# Patient Record
Sex: MALE | Race: WHITE | NOT HISPANIC OR LATINO | Employment: OTHER | URBAN - NONMETROPOLITAN AREA
[De-identification: names, ages, dates, MRNs, and addresses within clinical notes are randomized per-mention and may not be internally consistent; named-entity substitution may affect disease eponyms.]

---

## 2017-01-12 ENCOUNTER — LAB (OUTPATIENT)
Dept: FAMILY MEDICINE CLINIC | Facility: CLINIC | Age: 72
End: 2017-01-12

## 2017-01-12 DIAGNOSIS — Z12.5 SCREENING FOR PROSTATE CANCER: ICD-10-CM

## 2017-01-12 DIAGNOSIS — E55.9 VITAMIN D DEFICIENCY: ICD-10-CM

## 2017-01-12 DIAGNOSIS — I10 ESSENTIAL HYPERTENSION: ICD-10-CM

## 2017-01-12 DIAGNOSIS — D69.3 IDIOPATHIC THROMBOCYTOPENIC PURPURA (HCC): ICD-10-CM

## 2017-01-12 LAB
25(OH)D3 SERPL-MCNC: 31.6 NG/ML
ALBUMIN SERPL-MCNC: 4.5 G/DL (ref 3.2–4.8)
ALBUMIN/GLOB SERPL: 1.8 G/DL (ref 1.5–2.5)
ALP SERPL-CCNC: 55 U/L (ref 25–100)
ALT SERPL W P-5'-P-CCNC: 27 U/L (ref 7–40)
ANION GAP SERPL CALCULATED.3IONS-SCNC: 8 MMOL/L (ref 3–11)
ARTICHOKE IGE QN: 74 MG/DL (ref 0–130)
AST SERPL-CCNC: 23 U/L (ref 0–33)
BASOPHILS # BLD AUTO: 0 10*3/MM3 (ref 0–0.2)
BASOPHILS NFR BLD AUTO: 0 % (ref 0–1)
BILIRUB SERPL-MCNC: 1.4 MG/DL (ref 0.3–1.2)
BUN BLD-MCNC: 12 MG/DL (ref 9–23)
BUN/CREAT SERPL: 17.1 (ref 7–25)
CALCIUM SPEC-SCNC: 9.7 MG/DL (ref 8.7–10.4)
CHLORIDE SERPL-SCNC: 101 MMOL/L (ref 99–109)
CHOLEST SERPL-MCNC: 166 MG/DL (ref 0–200)
CO2 SERPL-SCNC: 30 MMOL/L (ref 20–31)
CREAT BLD-MCNC: 0.7 MG/DL (ref 0.6–1.3)
DEPRECATED RDW RBC AUTO: 46.7 FL (ref 37–54)
EOSINOPHIL # BLD AUTO: 0.06 10*3/MM3 (ref 0.1–0.3)
EOSINOPHIL NFR BLD AUTO: 1.3 % (ref 0–3)
ERYTHROCYTE [DISTWIDTH] IN BLOOD BY AUTOMATED COUNT: 13.1 % (ref 11.3–14.5)
GFR SERPL CREATININE-BSD FRML MDRD: 111 ML/MIN/1.73
GLOBULIN UR ELPH-MCNC: 2.5 GM/DL
GLUCOSE BLD-MCNC: 102 MG/DL (ref 70–100)
HCT VFR BLD AUTO: 43.9 % (ref 38.9–50.9)
HDLC SERPL-MCNC: 67 MG/DL (ref 40–60)
HGB BLD-MCNC: 15.4 G/DL (ref 13.1–17.5)
IMM GRANULOCYTES # BLD: 0.01 10*3/MM3 (ref 0–0.03)
IMM GRANULOCYTES NFR BLD: 0.2 % (ref 0–0.6)
LYMPHOCYTES # BLD AUTO: 1.45 10*3/MM3 (ref 0.6–4.8)
LYMPHOCYTES NFR BLD AUTO: 31.6 % (ref 24–44)
MCH RBC QN AUTO: 34.3 PG (ref 27–31)
MCHC RBC AUTO-ENTMCNC: 35.1 G/DL (ref 32–36)
MCV RBC AUTO: 97.8 FL (ref 80–99)
MONOCYTES # BLD AUTO: 0.58 10*3/MM3 (ref 0–1)
MONOCYTES NFR BLD AUTO: 12.6 % (ref 0–12)
NEUTROPHILS # BLD AUTO: 2.49 10*3/MM3 (ref 1.5–8.3)
NEUTROPHILS NFR BLD AUTO: 54.3 % (ref 41–71)
PLATELET # BLD AUTO: 108 10*3/MM3 (ref 150–450)
PMV BLD AUTO: 9.6 FL (ref 6–12)
POTASSIUM BLD-SCNC: 4.6 MMOL/L (ref 3.5–5.5)
PROT SERPL-MCNC: 7 G/DL (ref 5.7–8.2)
PSA SERPL-MCNC: 1 NG/ML (ref 0–4)
RBC # BLD AUTO: 4.49 10*6/MM3 (ref 4.2–5.76)
SODIUM BLD-SCNC: 139 MMOL/L (ref 132–146)
TRIGL SERPL-MCNC: 87 MG/DL (ref 0–150)
TSH SERPL DL<=0.05 MIU/L-ACNC: 1.07 MIU/ML (ref 0.35–5.35)
VIT B12 BLD-MCNC: 619 PG/ML (ref 211–911)
WBC NRBC COR # BLD: 4.59 10*3/MM3 (ref 3.5–10.8)

## 2017-01-12 PROCEDURE — 84443 ASSAY THYROID STIM HORMONE: CPT | Performed by: INTERNAL MEDICINE

## 2017-01-12 PROCEDURE — 82607 VITAMIN B-12: CPT | Performed by: INTERNAL MEDICINE

## 2017-01-12 PROCEDURE — 82306 VITAMIN D 25 HYDROXY: CPT | Performed by: INTERNAL MEDICINE

## 2017-01-12 PROCEDURE — 80053 COMPREHEN METABOLIC PANEL: CPT | Performed by: INTERNAL MEDICINE

## 2017-01-12 PROCEDURE — G0103 PSA SCREENING: HCPCS | Performed by: INTERNAL MEDICINE

## 2017-01-12 PROCEDURE — 80061 LIPID PANEL: CPT | Performed by: INTERNAL MEDICINE

## 2017-01-12 PROCEDURE — 85025 COMPLETE CBC W/AUTO DIFF WBC: CPT | Performed by: INTERNAL MEDICINE

## 2017-01-16 ENCOUNTER — OFFICE VISIT (OUTPATIENT)
Dept: FAMILY MEDICINE CLINIC | Facility: CLINIC | Age: 72
End: 2017-01-16

## 2017-01-16 VITALS
HEIGHT: 70 IN | TEMPERATURE: 98.4 F | DIASTOLIC BLOOD PRESSURE: 62 MMHG | OXYGEN SATURATION: 99 % | BODY MASS INDEX: 30.55 KG/M2 | HEART RATE: 62 BPM | WEIGHT: 213.4 LBS | RESPIRATION RATE: 16 BRPM | SYSTOLIC BLOOD PRESSURE: 135 MMHG

## 2017-01-16 DIAGNOSIS — E78.5 HYPERLIPIDEMIA, UNSPECIFIED HYPERLIPIDEMIA TYPE: ICD-10-CM

## 2017-01-16 DIAGNOSIS — E03.9 ACQUIRED HYPOTHYROIDISM: Primary | ICD-10-CM

## 2017-01-16 DIAGNOSIS — I10 ESSENTIAL HYPERTENSION: ICD-10-CM

## 2017-01-16 PROCEDURE — 99214 OFFICE O/P EST MOD 30 MIN: CPT | Performed by: INTERNAL MEDICINE

## 2017-01-16 RX ORDER — LEVOTHYROXINE SODIUM 112 UG/1
112 TABLET ORAL DAILY
Qty: 30 TABLET | Refills: 5 | Status: SHIPPED | OUTPATIENT
Start: 2017-01-16 | End: 2017-08-02 | Stop reason: SDUPTHER

## 2017-01-16 NOTE — MR AVS SNAPSHOT
Deniz Dickson   1/16/2017 9:45 AM   Office Visit    Dept Phone:  [phone]   Encounter #:  [id number]    Provider:  Zane Magallon MD   Department:  [facility] PRIMARY CARE                Your Full Care Plan              Today's Medication Changes          These changes are accurate as of: 1/16/17 10:32 AM.  If you have any questions, ask your nurse or doctor.               Medication(s)that have changed:     levothyroxine 112 MCG tablet   Commonly known as:  SYNTHROID, LEVOTHROID   Take 1 tablet by mouth Daily.   What changed:  See the new instructions.   Changed by:  Zane Magallon MD            Where to Get Your Medications      These medications were sent to [address] AT [facility] - [phone]  - [phone] [address] [address]     Phone:  [phone]     levothyroxine 112 MCG tablet                  Your Updated Medication List          This list is accurate as of: 1/16/17 10:32 AM.  Always use your most recent med list.                bisoprolol 5 MG tablet   Commonly known as:  ZEBeta   TAKE ONE HALF TABLET BY MOUTH DAILY       celecoxib 200 MG capsule   Commonly known as:  CeleBREX       CENTRUM SILVER ADULT 50+ tablet       ezetimibe-simvastatin 10-20 MG per tablet   Commonly known as:  VYTORIN   Take 1 tablet by mouth every evening.       finasteride 5 MG tablet   Commonly known as:  PROSCAR   Take 1 tablet by mouth daily.       fish oil 1200 MG capsule capsule       FLONASE 50 MCG/ACT nasal spray   Generic drug:  fluticasone       levothyroxine 112 MCG tablet   Commonly known as:  SYNTHROID, LEVOTHROID   Take 1 tablet by mouth Daily.       OSTEO BI-FLEX REGULAR STRENGTH PO       potassium gluconate 595 MG tablet tablet       vitamin B-12 1000 MCG tablet   Commonly known as:  CYANOCOBALAMIN       Vitamin D-3 1000 UNITS capsule               You Were Diagnosed With        Codes Comments    Acquired hypothyroidism    -  Primary ICD-10-CM: E03.9  ICD-9-CM: 244.9     Essential hypertension     ICD-10-CM: I10  ICD-9-CM: 401.9     Hyperlipidemia, unspecified hyperlipidemia type     ICD-10-CM: E78.5  ICD-9-CM: 272.4       Instructions     None    Patient Instructions History      Upcoming Appointments     Visit Type Date Time Department    OFFICE VISIT 2017  9:45 AM [facility]    MRI [facility] ABDOMEN WO CONTRAST 3/15/2017  9:00 AM  [facility]    OFFICE VISIT 2017 10:00 AM [facility]    FOLLOW UP 2017  9:45 AM [facility]    FOLLOW UP 2017  8:30 AM [facility]    FOLLOW UP 1 UNIT 2017 11:15 AM [facility]      MyChart Signup     [facility] 7digital allows you to send messages to your doctor, view your test results, renew your prescriptions, schedule appointments, and more. To sign up, go to [employer] and click on the Sign Up Now link in the New User? box. Enter your 7digital Activation Code exactly as it appears below along with the last four digits of your Social Security Number and your Date of Birth () to complete the sign-up process. If you do not sign up before the expiration date, you must request a new code.    7digital Activation Code: [email or web address]  Expires: 2017  8:23 AM    If you have questions, you can email [email or web address] or call [phone] to talk to our 7digital staff. Remember, 7digital is NOT to be used for urgent needs. For medical emergencies, dial 911.               Other Info from Your Visit           Your Appointments     Mar 15, 2017  9:00 AM EDT   MR [facility] abdomen wo contrast with [facility] 1   [facility]    [address] [address]   [phone]           Nothing to eat or drink 6 hrs before your test, medications are okay to take with a small sip of water. Arrive 30 minutes  before your appointment. Bring a current list  "of medications you are taking. Wear clothing without metal snaps, zippers, or other metalic artifacts. Do not wear jewelry to appointment.            Apr 19, 2017 10:00 AM EDT   Office Visit with Zane Magallon MD   [facility] PRIMARY CARE (--)    [address]  [facility] [address] [phone]           Arrive 15 minutes prior to appointment.            Apr 24, 2017  9:45 AM EDT   Follow Up with Suresh Ojeda MD   [facility] CARDIOLOGY (--)    [address]  [facility] [place] [address] [phone]           Arrive 15 minutes prior to appointment.            Jun 29, 2017  8:30 AM EDT   Follow Up with Doroteo Justice MD   [facility] UROLOGY (--)    [address]  [facility] [id number] [phone]           Arrive 15 minutes prior to appointment.            Dec 14, 2017 11:15 AM EST   FOLLOW UP with Jerome Whiting MD   [facility] HEMATOLOGY  AND ONCOLOGY ([place])    [address]  [facility] [place] [address] [phone]              Allergies     Penicillins  Itching, Rash      Reason for Visit     Follow-up           Vital Signs     Blood Pressure Pulse Temperature Respirations Height Weight    135/62 (BP Location: Right arm, Patient Position: Sitting, Cuff Size: Large Adult) 62 98.4 °F (36.9 °C) (Oral) 16 70\" (177.8 cm) 213 lb 6.4 oz (96.8 kg)    Oxygen Saturation Body Mass Index Smoking Status             99% 30.62 kg/m2 Former Smoker         Problems and Diagnoses Noted     High cholesterol or triglycerides    High blood pressure    Underactive thyroid        "

## 2017-01-16 NOTE — PROGRESS NOTES
"Subjective   Patient ID: Deniz Dickson is a 71 y.o. male Pt request medical management.     History of Present Illness   Pt request medical management.     Feeling well.   Wife  over Now. Pt adjusting.   Seen Dr Estrlela, Dr germain. Blood ok.   Dr Mccormick and cpap going well.       The following portions of the patient's history were reviewed and updated as appropriate: allergies, current medications, past family history, past medical history, past social history, past surgical history and problem list.  Review of Systems   Constitutional: Positive for fatigue.   All other systems reviewed and are negative.      Visit Vitals   • /62 (BP Location: Right arm, Patient Position: Sitting, Cuff Size: Large Adult)   • Pulse 62   • Temp 98.4 °F (36.9 °C) (Oral)   • Resp 16   • Ht 70\" (177.8 cm)   • Wt 213 lb 6.4 oz (96.8 kg)   • SpO2 99%   • BMI 30.62 kg/m2       Objective   Physical Exam  General Appearance:    Alert, cooperative, no distress, appears stated age   Head:    Normocephalic, without obvious abnormality, atraumatic   Eyes:    PERRL, conjunctiva/corneas clear, EOM's intact, fundi     benign, both eyes        Ears:    Normal TM's and external ear canals, both ears   Nose:   Nares normal, septum midline, mucosa normal, no drainage    or sinus tenderness   Throat:   Lips, mucosa, and tongue normal; teeth and gums normal   Neck:   Supple, symmetrical, trachea midline, no adenopathy;        thyroid:  No enlargement/tenderness/nodules; no carotid    bruit or JVD   Back:     Symmetric, no curvature, ROM normal, no CVA tenderness   Lungs:     Clear to auscultation bilaterally, respirations unlabored   Chest wall:    No tenderness or deformity   Heart:    Regular rate and rhythm, S1 and S2 normal, no murmur, rub   or gallop   Abdomen:     Soft, non-tender, bowel sounds active all four quadrants,     no masses, no organomegaly           Extremities:   Extremities normal, atraumatic, no cyanosis or edema "   Pulses:   2+ and symmetric all extremities   Skin:   Skin color, texture, turgor normal, no rashes or lesions   Lymph nodes:   Cervical, supraclavicular, and axillary nodes normal   Neurologic:   CNII-XII intact. Normal strength, sensation and reflexes       throughout     Assessment/Plan   Labs discussed and stable.   Adjust thyroid meds due to fatigue. wnl will try to get better numbers and clinical improvement.       Deniz was seen today for follow-up.    Diagnoses and all orders for this visit:    Acquired hypothyroidism    Essential hypertension    Hyperlipidemia, unspecified hyperlipidemia type    No Follow-up on file.           Immunization History   Administered Date(s) Administered   • Pneumococcal Conjugate 10/22/2014   • Pneumococcal Conjugate 13-Valent 12/25/2015   • Tdap 07/31/2012   • Zoster 02/01/2015           There are no Patient Instructions on file for this visit.

## 2017-03-14 ENCOUNTER — HOSPITAL ENCOUNTER (OUTPATIENT)
Dept: MRI IMAGING | Facility: HOSPITAL | Age: 72
Discharge: HOME | End: 2017-03-14
Attending: INTERNAL MEDICINE | Admitting: INTERNAL MEDICINE

## 2017-03-14 DIAGNOSIS — N28.1 RENAL CYST: ICD-10-CM

## 2017-03-14 DIAGNOSIS — K86.2 PANCREATIC CYST: ICD-10-CM

## 2017-03-14 DIAGNOSIS — K76.89 LIVER CYST: ICD-10-CM

## 2017-03-14 PROCEDURE — 74181 MRI ABDOMEN W/O CONTRAST: CPT

## 2017-03-15 ENCOUNTER — APPOINTMENT (OUTPATIENT)
Dept: MRI IMAGING | Facility: HOSPITAL | Age: 72
End: 2017-03-15
Attending: INTERNAL MEDICINE

## 2017-04-14 ENCOUNTER — LAB (OUTPATIENT)
Dept: FAMILY MEDICINE CLINIC | Facility: CLINIC | Age: 72
End: 2017-04-14

## 2017-04-14 DIAGNOSIS — R97.20 ELEVATED PSA: Primary | ICD-10-CM

## 2017-04-14 DIAGNOSIS — E03.9 ACQUIRED HYPOTHYROIDISM: ICD-10-CM

## 2017-04-15 LAB
T3FREE SERPL-MCNC: 3.1 PG/ML (ref 2–4.4)
T4 FREE SERPL-MCNC: 1.33 NG/DL (ref 0.78–2.19)
TSH SERPL DL<=0.005 MIU/L-ACNC: 0.72 MIU/ML (ref 0.47–4.68)

## 2017-04-19 ENCOUNTER — OFFICE VISIT (OUTPATIENT)
Dept: FAMILY MEDICINE CLINIC | Facility: CLINIC | Age: 72
End: 2017-04-19

## 2017-04-19 VITALS
DIASTOLIC BLOOD PRESSURE: 58 MMHG | TEMPERATURE: 98 F | SYSTOLIC BLOOD PRESSURE: 129 MMHG | HEIGHT: 70 IN | RESPIRATION RATE: 16 BRPM | OXYGEN SATURATION: 97 % | BODY MASS INDEX: 30.64 KG/M2 | WEIGHT: 214 LBS | HEART RATE: 66 BPM

## 2017-04-19 DIAGNOSIS — I10 ESSENTIAL HYPERTENSION: ICD-10-CM

## 2017-04-19 DIAGNOSIS — E03.9 ACQUIRED HYPOTHYROIDISM: ICD-10-CM

## 2017-04-19 DIAGNOSIS — E80.4 GILBERT'S SYNDROME: ICD-10-CM

## 2017-04-19 DIAGNOSIS — E78.5 HYPERLIPIDEMIA, UNSPECIFIED HYPERLIPIDEMIA TYPE: Primary | ICD-10-CM

## 2017-04-19 PROCEDURE — G0439 PPPS, SUBSEQ VISIT: HCPCS | Performed by: INTERNAL MEDICINE

## 2017-04-19 PROCEDURE — 99214 OFFICE O/P EST MOD 30 MIN: CPT | Performed by: INTERNAL MEDICINE

## 2017-04-19 RX ORDER — ASPIRIN 81 MG/1
81 TABLET ORAL DAILY
Qty: 90 TABLET | Refills: 3 | Status: SHIPPED | OUTPATIENT
Start: 2017-04-19 | End: 2018-05-29 | Stop reason: SDUPTHER

## 2017-04-19 NOTE — PROGRESS NOTES
"Subjective   Patient ID: Deniz Dickson is a 71 y.o. male Pt is here for management of multiple medical problems.    Chief Complaint   Patient presents with   • Hypothyroidism     3 month follow-up, patient needs refills on Levothyroxine and Vytorin sent to [facility].   • Hypertension     3 month follow-up   • Hyperlipidemia     3 month follow-up       History of Present Illness  Still with sig congestion and post nasal drip.  Taking otc sinus med with good result for 2 med.  Pt doing well otherwise.         The following portions of the patient's history were reviewed and updated as appropriate: allergies, current medications, past family history, past medical history, past social history, past surgical history and problem list.      Review of Systems   Constitutional: Negative for fatigue.   HENT: Positive for congestion, rhinorrhea and sinus pressure.    All other systems reviewed and are negative.      Objective     /58 (BP Location: Left arm, Patient Position: Sitting, Cuff Size: Large Adult)  Pulse 66  Temp 98 °F (36.7 °C) (Oral)   Resp 16  Ht 70\" (177.8 cm)  Wt 214 lb (97.1 kg)  SpO2 97%  BMI 30.71 kg/m2  Physical Exam  General Appearance:    Alert, cooperative, no distress, appears stated age   Head:    Normocephalic, without obvious abnormality, atraumatic   Eyes:    PERRL, conjunctiva/corneas clear, EOM's intact           Ears:    Normal TM's and external ear canals, both ears   Nose:   Nares normal, septum midline, mucosa normal, no drainage    or sinus tenderness   Throat:   Lips, mucosa, and tongue normal; teeth and gums normal   Neck:   Supple, symmetrical, trachea midline, no adenopathy;        thyroid:  No enlargement/tenderness/nodules; no carotid    bruit or JVD   Back:     Symmetric, no curvature, ROM normal, no CVA tenderness   Lungs:     Clear to auscultation bilaterally, respirations unlabored   Chest wall:    No tenderness or deformity   Heart:    Regular rate and rhythm, S1 and " S2 normal, no murmur, rub   or gallop   Abdomen:     Soft, non-tender, bowel sounds active all four quadrants,     no masses, no organomegaly           Extremities:   Extremities normal, atraumatic, no cyanosis or edema   Pulses:   2+ and symmetric all extremities   Skin:   Skin color, texture, turgor normal, no rashes or lesions   Lymph nodes:   Cervical, supraclavicular, and axillary nodes normal   Neurologic:   CNII-XII intact. Normal strength, sensation and reflexes       throughout       Assessment/Plan     Labs discussed.    No change in thyroid med.     Mri ab reviewed with pt. Will hold on future mri unless bili elevates.  Highest bili 1.7 in past.  Last bili1.4.      Deniz was seen today for hypothyroidism, hypertension and hyperlipidemia.    Diagnoses and all orders for this visit:    Hyperlipidemia, unspecified hyperlipidemia type  -     Lipid Panel  -     Comprehensive Metabolic Panel  -     TSH  -     T4, Free  -     T3, Free  -     Vitamin B12    Essential hypertension  -     Lipid Panel  -     Comprehensive Metabolic Panel  -     TSH  -     T4, Free  -     T3, Free  -     Vitamin B12    Acquired hypothyroidism  -     Lipid Panel  -     Comprehensive Metabolic Panel  -     TSH  -     T4, Free  -     T3, Free  -     Vitamin B12    Gilbert's syndrome  -     Lipid Panel  -     Comprehensive Metabolic Panel  -     TSH  -     T4, Free  -     T3, Free  -     Vitamin B12    Other orders  -     aspirin 81 MG EC tablet; Take 1 tablet by mouth Daily.      Return in about 6 months (around 10/19/2017).                   There are no Patient Instructions on file for this visit.

## 2017-04-24 ENCOUNTER — OFFICE VISIT (OUTPATIENT)
Dept: CARDIOLOGY | Facility: CLINIC | Age: 72
End: 2017-04-24

## 2017-04-24 VITALS
DIASTOLIC BLOOD PRESSURE: 76 MMHG | HEART RATE: 64 BPM | HEIGHT: 70 IN | BODY MASS INDEX: 31.07 KG/M2 | WEIGHT: 217 LBS | SYSTOLIC BLOOD PRESSURE: 128 MMHG

## 2017-04-24 DIAGNOSIS — E78.2 MIXED HYPERLIPIDEMIA: ICD-10-CM

## 2017-04-24 DIAGNOSIS — I10 ESSENTIAL HYPERTENSION: Primary | ICD-10-CM

## 2017-04-24 PROCEDURE — 99213 OFFICE O/P EST LOW 20 MIN: CPT | Performed by: INTERNAL MEDICINE

## 2017-04-24 RX ORDER — BISOPROLOL FUMARATE 5 MG/1
5 TABLET, FILM COATED ORAL DAILY
Qty: 45 TABLET | Refills: 3 | Status: SHIPPED | OUTPATIENT
Start: 2017-04-24 | End: 2018-04-16 | Stop reason: SDUPTHER

## 2017-04-24 RX ORDER — FEXOFENADINE HCL 180 MG/1
180 TABLET ORAL DAILY
COMMUNITY
End: 2019-07-19

## 2017-04-24 NOTE — PROGRESS NOTES
Deniz Dickson  1945  [phone]          PCP: Zane Magallon MD  [address]  [facility] [address]    IDENTIFICATION: A 71 y.o. male , retired ,  2016 from [place].     PROBLEM LIST:  1. 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 Dyslipidemia December 2015: Total cholesterol 178, triglycerides 119, HDL 69, LDL 85.   3. Hypertension, controlled on current regimen.   4. Remote bilateral knee surgery per Dr. Smith.   5. Obstructive sleep apnea, on CPAP greater than 5 years.   6. Reformed smoker, times greater than 12 years.   7. Hypothyroidism.       Chief Complaint   Patient presents with   • Hypertension           Allergies  Allergies   Allergen Reactions   • Penicillins Itching and Rash       Current Medications    Current Outpatient Prescriptions:   •  aspirin 81 MG EC tablet, Take 1 tablet by mouth Daily., Disp: 90 tablet, Rfl: 3  •  bisoprolol (ZEBeta) 5 MG tablet, Take 1 tablet by mouth Daily. 1/2 tablet daily, Disp: 45 tablet, Rfl: 3  •  celecoxib (CeleBREX) 200 MG capsule, Take 2 capsules by mouth Daily., Disp: , Rfl:   •  Cholecalciferol (VITAMIN D-3 PO), Take  by mouth Daily., Disp: , Rfl:   •  ezetimibe-simvastatin (VYTORIN) 10-20 MG per tablet, Take 1 tablet by mouth every evening., Disp: 90 tablet, Rfl: 3  •  fexofenadine (ALLEGRA) 180 MG tablet, Take 180 mg by mouth Daily., Disp: , Rfl:   •  finasteride (PROSCAR) 5 MG tablet, Take 1 tablet by mouth daily., Disp: 90 tablet, Rfl: 3  •  fluticasone (FLONASE) 50 MCG/ACT nasal spray, 1 spray into each nostril., Disp: , Rfl:   •  Glucosamine-Chondroitin (OSTEO BI-FLEX REGULAR STRENGTH PO), Take 1 tablet by mouth 2 (two) times a day., Disp: , Rfl:   •  levothyroxine (SYNTHROID, LEVOTHROID) 112 MCG tablet, Take 1 tablet by mouth Daily., Disp: 30 tablet, Rfl: 5  •  Multiple Vitamins-Minerals (CENTRUM SILVER ADULT 50+) tablet, Take 1  "tablet by mouth daily., Disp: , Rfl:   •  Omega-3 Fatty Acids (FISH OIL) 1200 MG capsule capsule, Take  by mouth Daily With Breakfast., Disp: , Rfl:   •  potassium gluconate 595 MG tablet tablet, Take 1 tablet by mouth Daily., Disp: , Rfl:   •  vitamin B-12 (CYANOCOBALAMIN) 1000 MCG tablet, Take  by mouth Daily., Disp: , Rfl:     History of Present Illness     Pt denies any chest pain, dyspnea, dyspnea on exertion, orthopnea, PND, palpitations, lower extremity edema.  Has lost weight w CHO restriction.  Tends cattle has decreased otherwise walking due to knee pain for which she is seeing Dr. Reed.    ROS:  All systems have been reviewed and are negative with the exception of those mentioned in the HPI.    OBJECTIVE:  Vitals:    04/24/17 0916 04/24/17 0921   BP: 136/84 128/76   BP Location: Right arm Left arm   Patient Position: Sitting Sitting   Pulse: 64    Weight: 217 lb (98.4 kg)    Height: 70\" (177.8 cm)      Physical Exam   Constitutional: He appears well-developed and well-nourished.   Neck: Normal range of motion. Neck supple. No hepatojugular reflux and no JVD present. Carotid bruit is not present. No tracheal deviation present. No thyromegaly present.   Cardiovascular: Normal rate, regular rhythm, S1 normal, S2 normal, intact distal pulses and normal pulses.  PMI is not displaced.  Exam reveals no gallop, no distant heart sounds, no friction rub, no midsystolic click and no opening snap.    No murmur heard.  Pulses:       Radial pulses are 2+ on the right side, and 2+ on the left side.        Dorsalis pedis pulses are 2+ on the right side, and 2+ on the left side.        Posterior tibial pulses are 2+ on the right side, and 2+ on the left side.   Pulmonary/Chest: Effort normal and breath sounds normal. He has no wheezes. He has no rales.   Abdominal: Soft. Bowel sounds are normal. He exhibits no mass. There is no tenderness. There is no guarding.       Diagnostic Data:  Procedures      ASSESSMENT:   " Diagnosis Plan   1. Essential hypertension     2. Mixed hyperlipidemia         PLAN:  1. Dyslipidemia, well-controlled on current regimen.   2. Hypertension, controlled.     Sleep apnea treated.                  3.  Acceptable cardiac wrist to undergo knee surgery of deemed indicated    Zane Magallon MD, thank you for referring Mr. Dickson for evaluation.  I have forwarded my electronically generated recommendations to you for review.  Please do not hesitate to call with any questions.      Suresh Ojeda MD, FACC

## 2017-04-24 NOTE — PROGRESS NOTES
Encounter Date:04/24/2017    Location: Rm Magallon MD    Patient ID: Deniz Dickson is a 71 y.o. male resident of [place], [place].    1945  Subjective:      Chief Complaint/Reason for visit:    Chief Complaint   Patient presents with   • Hypertension       Problem List:  1. Probable nonobstructive coronary artery disease:                   A: 2/15 SE wnl w low exercise tolerance  2. Dyslipidemia December 2015: Total cholesterol 178, triglycerides 119, HDL 69, LDL 85.   3. Hypertension, controlled on current regimen.   4. Remote bilateral knee surgery per Dr. Smith.   5. Obstructive sleep apnea, on CPAP greater than 5 years.   6. Reformed smoker, times greater than 12 years.   7. Hypothyroidism.   8. S/P left total knee replacement    HPI: Mr. Dickson  is a 71 yr old white male with the above noted medical history who presents for 9 month follow up of his hypertension and hyperlipidemia. He is doing well form a cardiac standpoint.  He denies symptoms of chest pain, unusual shortness of breath, palpitations, edema, orthopnea or PND.  His blood pressure has been well controlled at home.  He is using his CPAP on a regular basis.  His most recent Lipid Profile in January was acceptable. He anticipates having his right knee replaced in June.    Social History     Social History   • Marital status:      Spouse name: N/A   • Number of children: N/A   • Years of education: N/A     Occupational History   •  Retired     Social History Main Topics   • Smoking status: Former Smoker     Years: 15.00     Quit date: 6/8/1985   • Smokeless tobacco: Former User     Quit date: 1925   • Alcohol use No   • Drug use: No   • Sexual activity: Defer     Other Topics Concern   • Not on file     Social History Narrative       family history includes Alcohol abuse in his father; Brain cancer in his sister; Cancer in his father; Colon cancer in his other and paternal grandmother; Heart disease in his  mother; Lung cancer in his father; Mental illness in his mother; Migraines in his mother; Prostate cancer in his other.     has a past medical history of Coronary artery disease; Dyslipidemia; Elevated prostate specific antigen (PSA); Essential hypertension, benign; H/O benign prostatic hypertrophy; High cholesterol; History of hemorrhoids; History of idiopathic thrombocytopenic purpura; History of malignant neoplasm of skin; History of osteoarthritis; History of sleep apnea; Hypertension; Hypothyroidism; JUWAN on CPAP; and Tobacco abuse.    Allergies   Allergen Reactions   • Penicillins Itching and Rash         Current Outpatient Prescriptions:   •  aspirin 81 MG EC tablet, Take 1 tablet by mouth Daily., Disp: 90 tablet, Rfl: 3  •  bisoprolol (ZEBETA) 5 MG tablet, TAKE ONE HALF TABLET BY MOUTH DAILY, Disp: 45 tablet, Rfl: 3  •  celecoxib (CeleBREX) 200 MG capsule, Take 2 capsules by mouth Daily., Disp: , Rfl:   •  Cholecalciferol (VITAMIN D-3 PO), Take  by mouth Daily., Disp: , Rfl:   •  ezetimibe-simvastatin (VYTORIN) 10-20 MG per tablet, Take 1 tablet by mouth every evening., Disp: 90 tablet, Rfl: 3  •  fexofenadine (ALLEGRA) 180 MG tablet, Take 180 mg by mouth Daily., Disp: , Rfl:   •  finasteride (PROSCAR) 5 MG tablet, Take 1 tablet by mouth daily., Disp: 90 tablet, Rfl: 3  •  fluticasone (FLONASE) 50 MCG/ACT nasal spray, 1 spray into each nostril., Disp: , Rfl:   •  Glucosamine-Chondroitin (OSTEO BI-FLEX REGULAR STRENGTH PO), Take 1 tablet by mouth 2 (two) times a day., Disp: , Rfl:   •  levothyroxine (SYNTHROID, LEVOTHROID) 112 MCG tablet, Take 1 tablet by mouth Daily., Disp: 30 tablet, Rfl: 5  •  Multiple Vitamins-Minerals (CENTRUM SILVER ADULT 50+) tablet, Take 1 tablet by mouth daily., Disp: , Rfl:   •  Omega-3 Fatty Acids (FISH OIL) 1200 MG capsule capsule, Take  by mouth Daily With Breakfast., Disp: , Rfl:   •  potassium gluconate 595 MG tablet tablet, Take 1 tablet by mouth Daily., Disp: , Rfl:   •   vitamin B-12 (CYANOCOBALAMIN) 1000 MCG tablet, Take  by mouth Daily., Disp: , Rfl:     Review of Systems   Constitution: Negative.   HENT: Positive for hearing loss.    Eyes: Negative.    Cardiovascular: Negative for chest pain, dyspnea on exertion, irregular heartbeat, leg swelling, near-syncope, orthopnea, palpitations, paroxysmal nocturnal dyspnea and syncope.   Respiratory: Negative.    Endocrine: Negative.    Hematologic/Lymphatic: Negative.    Skin: Negative.    Musculoskeletal: Positive for arthritis and joint pain.   Gastrointestinal: Negative.    Genitourinary: Negative.    Neurological: Negative.    Psychiatric/Behavioral: Negative.    Allergic/Immunologic: Negative.        Vitals:    04/24/17 0921   BP: 128/76   Pulse: 64   Weight   217 lbs  BMI- 31.1      Objective:       Physical Exam   Constitutional: He is oriented to person, place, and time. He appears well-developed and well-nourished.   HENT:   Head: Normocephalic and atraumatic.   Neck: Normal range of motion. No thyromegaly present.   Cardiovascular: Normal rate, regular rhythm, normal heart sounds and intact distal pulses.    Pulmonary/Chest: Effort normal and breath sounds normal.   Abdominal: Soft. Bowel sounds are normal.   Musculoskeletal: Normal range of motion. He exhibits no edema.   Neurological: He is alert and oriented to person, place, and time.   Skin: Skin is warm and dry.   Psychiatric: He has a normal mood and affect. His behavior is normal. Judgment and thought content normal.   Nursing note and vitals reviewed.      Data Review:   Procedures      Assessment:      Diagnosis Plan   1. Essential hypertension  Well-controlled   2. Mixed hyperlipidemia  Acceptable profile 1/2017     Plan:   Patient is cleared from a cardiac standpoint to proceed with knee replacement.  Continue current medications for hypertension and hyperlipidemia.  FU in 9 MO, sooner as needed.  Thank you for allowing us to participate in the care of your patient.      Scribed for Suresh Ojeda MD by Samira Buck. 4/24/2017  9:54 AM      Please note that portions of this note may have been completed with a voice recognition program. Efforts were made to edit the dictations, but occasionally words are mistranscribed.

## 2017-05-19 ENCOUNTER — HOSPITAL ENCOUNTER (OUTPATIENT)
Dept: OTHER | Age: 72
Discharge: HOME | End: 2017-05-19
Attending: INTERNAL MEDICINE | Admitting: INTERNAL MEDICINE

## 2017-05-19 LAB
A/G RATIO: 2.1 (ref 0.8–2)
ALBUMIN SERPL-MCNC: 4.5 G/DL (ref 3.4–4.8)
ALP BLD-CCNC: 53 U/L (ref 25–100)
ALT SERPL-CCNC: 28 U/L (ref 4–36)
ANION GAP SERPL CALCULATED.3IONS-SCNC: 10 MMOL/L (ref 3–16)
AST SERPL-CCNC: 23 U/L (ref 8–33)
BASOPHILS ABSOLUTE: 0 K/UL (ref 0–0.1)
BASOPHILS RELATIVE PERCENT: 0.2 %
BILIRUB SERPL-MCNC: 1.6 MG/DL (ref 0.3–1.2)
BUN BLDV-MCNC: 14 MG/DL (ref 6–20)
CALCIUM SERPL-MCNC: 9.4 MG/DL (ref 8.5–10.5)
CHLORIDE BLD-SCNC: 100 MMOL/L (ref 98–107)
CO2: 28 MMOL/L (ref 20–30)
CREAT SERPL-MCNC: 0.8 MG/DL (ref 0.4–1.2)
EOSINOPHILS ABSOLUTE: 0.1 K/UL (ref 0–0.4)
EOSINOPHILS RELATIVE PERCENT: 1.5 %
GFR AFRICAN AMERICAN: >59
GFR NON-AFRICAN AMERICAN: >59
GLOBULIN: 2.1 G/DL
GLUCOSE BLD-MCNC: 120 MG/DL (ref 74–106)
HCT VFR BLD CALC: 42.1 % (ref 40–54)
HEMOGLOBIN: 15.1 G/DL (ref 13–18)
LYMPHOCYTES ABSOLUTE: 1.4 K/UL (ref 1.5–4)
LYMPHOCYTES RELATIVE PERCENT: 26.3 % (ref 20–40)
MCH RBC QN AUTO: 34.2 PG (ref 27–32)
MCHC RBC AUTO-ENTMCNC: 35.9 G/DL (ref 31–35)
MCV RBC AUTO: 95.2 FL (ref 80–100)
MONOCYTES ABSOLUTE: 0.7 K/UL (ref 0.2–0.8)
MONOCYTES RELATIVE PERCENT: 12.1 % (ref 3–10)
NEUTROPHILS ABSOLUTE: 3.3 K/UL (ref 2–7.5)
NEUTROPHILS RELATIVE PERCENT: 59.9 %
PDW BLD-RTO: 12.8 % (ref 11–16)
PLATELET # BLD: 103 K/UL (ref 150–400)
PMV BLD AUTO: 9 FL (ref 6–10)
POTASSIUM SERPL-SCNC: 4.5 MMOL/L (ref 3.4–5.1)
RBC # BLD: 4.42 M/UL (ref 4.5–6)
SODIUM BLD-SCNC: 138 MMOL/L (ref 136–145)
TOTAL PROTEIN: 6.6 G/DL (ref 6.4–8.3)
WBC # BLD: 5.4 K/UL (ref 4–11)

## 2017-05-29 ENCOUNTER — RESULTS ENCOUNTER (OUTPATIENT)
Dept: UROLOGY | Facility: CLINIC | Age: 72
End: 2017-05-29

## 2017-05-29 DIAGNOSIS — R97.20 ELEVATED PSA: ICD-10-CM

## 2017-05-31 ENCOUNTER — APPOINTMENT (OUTPATIENT)
Dept: LAB | Facility: HOSPITAL | Age: 72
End: 2017-05-31
Attending: UROLOGY

## 2017-05-31 PROCEDURE — 36415 COLL VENOUS BLD VENIPUNCTURE: CPT

## 2017-05-31 PROCEDURE — 84153 ASSAY OF PSA TOTAL: CPT | Performed by: UROLOGY

## 2017-06-01 LAB — PSA SERPL-MCNC: 1.3 NG/ML (ref 0–4)

## 2017-06-11 DIAGNOSIS — E78.00 HYPERCHOLESTEREMIA: ICD-10-CM

## 2017-06-12 RX ORDER — EZETIMIBE/SIMVASTATIN 10 MG-20MG
TABLET ORAL
Qty: 30 TABLET | Refills: 2 | Status: SHIPPED | OUTPATIENT
Start: 2017-06-12 | End: 2017-09-07 | Stop reason: SDUPTHER

## 2017-06-29 ENCOUNTER — OFFICE VISIT (OUTPATIENT)
Dept: UROLOGY | Facility: CLINIC | Age: 72
End: 2017-06-29

## 2017-06-29 VITALS
WEIGHT: 207.4 LBS | DIASTOLIC BLOOD PRESSURE: 74 MMHG | HEIGHT: 71 IN | SYSTOLIC BLOOD PRESSURE: 134 MMHG | BODY MASS INDEX: 29.03 KG/M2 | OXYGEN SATURATION: 95 % | HEART RATE: 64 BPM | TEMPERATURE: 98.1 F

## 2017-06-29 DIAGNOSIS — R97.20 ELEVATED PROSTATE SPECIFIC ANTIGEN (PSA): ICD-10-CM

## 2017-06-29 DIAGNOSIS — N40.0 BENIGN NON-NODULAR PROSTATIC HYPERPLASIA, PRESENCE OF LOWER URINARY TRACT SYMPTOMS UNSPECIFIED: ICD-10-CM

## 2017-06-29 DIAGNOSIS — N40.0 ENLARGED PROSTATE: Primary | ICD-10-CM

## 2017-06-29 DIAGNOSIS — N40.0 BENIGN NON-NODULAR PROSTATIC HYPERPLASIA WITHOUT LOWER URINARY TRACT SYMPTOMS: ICD-10-CM

## 2017-06-29 LAB
BILIRUB BLD-MCNC: NEGATIVE MG/DL
CLARITY, POC: CLEAR
COLOR UR: YELLOW
GLUCOSE UR STRIP-MCNC: NEGATIVE MG/DL
KETONES UR QL: NEGATIVE
LEUKOCYTE EST, POC: NEGATIVE
NITRITE UR-MCNC: NEGATIVE MG/ML
PH UR: 6 [PH] (ref 5–8)
PROT UR STRIP-MCNC: NEGATIVE MG/DL
RBC # UR STRIP: NEGATIVE /UL
SP GR UR: 1.03 (ref 1–1.03)
UROBILINOGEN UR QL: NORMAL

## 2017-06-29 PROCEDURE — 81003 URINALYSIS AUTO W/O SCOPE: CPT | Performed by: UROLOGY

## 2017-06-29 PROCEDURE — 99213 OFFICE O/P EST LOW 20 MIN: CPT | Performed by: UROLOGY

## 2017-06-29 RX ORDER — FINASTERIDE 5 MG/1
5 TABLET, FILM COATED ORAL DAILY
Qty: 90 TABLET | Refills: 3 | Status: SHIPPED | OUTPATIENT
Start: 2017-06-29 | End: 2018-06-29 | Stop reason: SDUPTHER

## 2017-06-29 NOTE — PROGRESS NOTES
Chief Complaint  Benign Prostatic Hypertrophy (yearly fup.) and Elevated PSA        HPI  Yessi is a 72 y.o. male who returns today for annual checkup with 2 primary concerns.  Originally referred for elevated PSA and symptoms of outlet obstruction he was found to have a markedly enlarged prostate.  After taking Proscar to shrink the gland he's voiding better and his PSA is down.  Most recently it was measured at 1.3.  He's had a slight progression in his difficulty voiding during the past year but it is still mild.  He is currently taking Proscar but we had an alpha-blocker in the future if he needs it.    Second concern today is erectile dysfunction.  He's requesting samples of Viagra and this is provided along with information on various sources.     Vitals:    06/29/17 0902   BP: 134/74   Pulse: 64   Temp: 98.1 °F (36.7 °C)   SpO2: 95%       Past Medical History  Past Medical History:   Diagnosis Date   • Coronary artery disease     Probable Non obstructive    • Dyslipidemia    • Elevated prostate specific antigen (PSA)    • Essential hypertension, benign    • H/O benign prostatic hypertrophy    • High cholesterol    • History of hemorrhoids    • History of idiopathic thrombocytopenic purpura    • History of malignant neoplasm of skin    • History of osteoarthritis    • History of sleep apnea    • Hypertension    • Hypothyroidism    • JUWAN on CPAP     greater than 5 years    • Tobacco abuse        Past Surgical History  Past Surgical History:   Procedure Laterality Date   • APPENDECTOMY     • COLONOSCOPY      Complete Colonoscopy   • HEMORRHOIDECTOMY     • HERNIA REPAIR     • KNEE SURGERY      Left Knee   • OTHER SURGICAL HISTORY      Surgery Testis Orchiopexy       Medications  has a current medication list which includes the following prescription(s): bisoprolol, cholecalciferol, fexofenadine, finasteride, fluticasone, glucosamine-chondroitin, levothyroxine, centrum silver adult 50+, fish oil, potassium  gluconate, vitamin b-12, vytorin, aspirin, and celecoxib.      Allergies  Allergies   Allergen Reactions   • Penicillins Itching and Rash       Social History  Social History     Social History Narrative       Family History  He has no family history of bladder or kidney cancer  He has no family history of kidney stones      AUA Symptom Score:      Review of Systems  Review of Systems   Constitutional: Negative.    Genitourinary: Negative.    All other systems reviewed and are negative.      Physical Exam  Physical Exam   Constitutional: He is oriented to person, place, and time. He appears well-developed and well-nourished.   HENT:   Head: Normocephalic and atraumatic.   Neck: Normal range of motion.   Pulmonary/Chest: Effort normal. No respiratory distress.   Abdominal: Soft. He exhibits no distension and no mass. There is no tenderness. No hernia.   Genitourinary: Rectum normal and prostate normal.   Musculoskeletal: Normal range of motion.   Lymphadenopathy:     He has no cervical adenopathy.   Neurological: He is alert and oriented to person, place, and time.   Skin: Skin is warm and dry.   Psychiatric: He has a normal mood and affect. His behavior is normal.   Vitals reviewed.      Labs Recent and today in the office:  Results for orders placed or performed in visit on 06/29/17   POC Urinalysis Dipstick, Automated   Result Value Ref Range    Color Yellow Yellow, Straw, Dark Yellow, Christal    Clarity, UA Clear Clear    Glucose, UA Negative Negative, 1000 mg/dL (3+) mg/dL    Bilirubin Negative Negative    Ketones, UA Negative Negative    Specific Gravity  1.030 1.005 - 1.030    Blood, UA Negative Negative    pH, Urine 6.0 5.0 - 8.0    Protein, POC Negative Negative mg/dL    Urobilinogen, UA Normal Normal    Leukocytes Negative Negative    Nitrite, UA Negative Negative         Assessment & Plan  He is currently taking Proscar but we had an alpha-blocker in the future if he needs it.    Second concern today is  erectile dysfunction.  He's requesting samples of Viagra and this is provided along with information on various sources.

## 2017-07-07 ENCOUNTER — TELEPHONE (OUTPATIENT)
Dept: CARDIOLOGY | Facility: CLINIC | Age: 72
End: 2017-07-07

## 2017-07-07 ENCOUNTER — OFFICE VISIT (OUTPATIENT)
Dept: FAMILY MEDICINE CLINIC | Facility: CLINIC | Age: 72
End: 2017-07-07

## 2017-07-07 VITALS
BODY MASS INDEX: 29.96 KG/M2 | TEMPERATURE: 97.9 F | HEIGHT: 71 IN | RESPIRATION RATE: 16 BRPM | HEART RATE: 55 BPM | WEIGHT: 214 LBS | DIASTOLIC BLOOD PRESSURE: 66 MMHG | SYSTOLIC BLOOD PRESSURE: 135 MMHG | OXYGEN SATURATION: 99 %

## 2017-07-07 DIAGNOSIS — Z01.818 PRE-OPERATIVE EXAM: Primary | ICD-10-CM

## 2017-07-07 DIAGNOSIS — E03.9 ACQUIRED HYPOTHYROIDISM: ICD-10-CM

## 2017-07-07 DIAGNOSIS — R53.83 FATIGUE, UNSPECIFIED TYPE: ICD-10-CM

## 2017-07-07 DIAGNOSIS — I25.10 CORONARY ARTERY DISEASE INVOLVING NATIVE HEART WITHOUT ANGINA PECTORIS, UNSPECIFIED VESSEL OR LESION TYPE: ICD-10-CM

## 2017-07-07 DIAGNOSIS — Z86.2 HISTORY OF ITP: ICD-10-CM

## 2017-07-07 DIAGNOSIS — Z01.818 PRE-OPERATIVE EXAM: ICD-10-CM

## 2017-07-07 DIAGNOSIS — I10 ESSENTIAL HYPERTENSION: ICD-10-CM

## 2017-07-07 PROCEDURE — 99214 OFFICE O/P EST MOD 30 MIN: CPT | Performed by: INTERNAL MEDICINE

## 2017-07-07 PROCEDURE — 93000 ELECTROCARDIOGRAM COMPLETE: CPT | Performed by: INTERNAL MEDICINE

## 2017-07-07 NOTE — PROGRESS NOTES
"Subjective   Patient ID: Deniz Dickson is a 72 y.o. male Pt is here for management of multiple medical problems.    Chief Complaint   Patient presents with   • Pre-op Exam     patient here for pre-op clearance for surgery on the right knee, scheduled on 07/12/17       History of Present Illness  Pt going for partial knee replacement right knee. May do with local block.  May need general.     Dr Cassidy.        The following portions of the patient's history were reviewed and updated as appropriate: allergies, current medications, past family history, past medical history, past social history, past surgical history and problem list.      Review of Systems   Constitutional: Positive for fatigue.   HENT: Positive for hearing loss.    Respiratory: Negative for cough, shortness of breath and wheezing.    All other systems reviewed and are negative.      Objective     /66 (BP Location: Left arm, Patient Position: Sitting, Cuff Size: Large Adult)  Pulse 55  Temp 97.9 °F (36.6 °C)  Resp 16  Ht 70.5\" (179.1 cm)  Wt 214 lb (97.1 kg)  SpO2 99%  BMI 30.27 kg/m2  Physical Exam  General Appearance:    Alert, cooperative, no distress, appears stated age   Head:    Normocephalic, without obvious abnormality, atraumatic   Eyes:    PERRL, conjunctiva/corneas clear, EOM's intact           Ears:    Normal TM's and external ear canals, both ears   Nose:   Nares normal, septum midline, mucosa normal, no drainage    or sinus tenderness   Throat:   Lips, mucosa, and tongue normal; teeth and gums normal   Neck:   Supple, symmetrical, trachea midline, no adenopathy;        thyroid:  No enlargement/tenderness/nodules; no carotid    bruit or JVD   Back:     Symmetric, no curvature, ROM normal, no CVA tenderness   Lungs:     Clear to auscultation bilaterally, respirations unlabored   Chest wall:    No tenderness or deformity   Heart:    Regular rate and rhythm, S1 and S2 normal, no murmur, rub   or gallop   Abdomen:     " Soft, non-tender, bowel sounds active all four quadrants,     no masses, no organomegaly           Extremities:   Extremities normal, atraumatic, no cyanosis or edema   Pulses:   2+ and symmetric all extremities   Skin:   Skin color, texture, turgor normal, no rashes or lesions   Lymph nodes:   Cervical, supraclavicular, and axillary nodes normal   Neurologic:   CNII-XII intact. Normal strength, sensation and reflexes       throughout       Assessment/Plan     Pt can walk 2 blocks on flat land. soa with walking up hill. Limited due to knee pain.   No CP.  Ok to walk up 2 fights of steps.     Neg stress test with good tolerance 2/5/2016.     Neg physical exam findings for any cardiac changes.    Reasonable exercise tolerance.    Pt is cleared for local block.  If general anesthesia is needed pt is still a good candidate.    Labs pending.          Deniz was seen today for pre-op exam.    Diagnoses and all orders for this visit:    Pre-operative exam  -     CBC Auto Differential  -     Comprehensive Metabolic Panel  -     Lipid Panel  -     TSH  -     T4, Free  -     T3, Free  -     Protime-INR  -     APTT; Future  -     Urinalysis With Microscopic  -     Urine Culture    History of ITP  -     CBC Auto Differential  -     Comprehensive Metabolic Panel  -     Lipid Panel  -     TSH  -     T4, Free  -     T3, Free  -     Protime-INR  -     APTT; Future  -     Urinalysis With Microscopic  -     Urine Culture    Coronary artery disease involving native heart without angina pectoris, unspecified vessel or lesion type  -     CBC Auto Differential  -     Comprehensive Metabolic Panel  -     Lipid Panel  -     TSH  -     T4, Free  -     T3, Free  -     Protime-INR  -     APTT; Future  -     Urinalysis With Microscopic  -     Urine Culture    Essential hypertension  -     CBC Auto Differential  -     Comprehensive Metabolic Panel  -     Lipid Panel  -     TSH  -     T4, Free  -     T3, Free  -     Protime-INR  -     APTT;  Future  -     Urinalysis With Microscopic  -     Urine Culture    Acquired hypothyroidism  -     CBC Auto Differential  -     Comprehensive Metabolic Panel  -     Lipid Panel  -     TSH  -     T4, Free  -     T3, Free  -     Protime-INR  -     APTT; Future  -     Urinalysis With Microscopic  -     Urine Culture    Fatigue, unspecified type  -     CBC Auto Differential  -     Comprehensive Metabolic Panel  -     Lipid Panel  -     TSH  -     T4, Free  -     T3, Free  -     Protime-INR  -     APTT; Future  -     Urinalysis With Microscopic  -     Urine Culture    Other orders  -     ECG 12 Lead  -     CBC & Differential  -     Microscopic Examination      No Follow-up on file.      ECG 12 Lead  Date/Time: 7/7/2017 9:22 AM  Performed by: TG PALUMBO  Authorized by: TG PALUMBO   Comparison: compared with previous ECG   Similar to previous ECG  Rhythm: sinus bradycardia  Clinical impression: normal ECG               Lungs are clear. cxr not done. No clear indication.             There are no Patient Instructions on file for this visit.

## 2017-07-09 LAB
ALBUMIN SERPL-MCNC: 4.7 G/DL (ref 3.5–5)
ALBUMIN/GLOB SERPL: 1.7 G/DL (ref 1–2)
ALP SERPL-CCNC: 61 U/L (ref 38–126)
ALT SERPL-CCNC: 42 U/L (ref 13–69)
APPEARANCE UR: CLEAR
AST SERPL-CCNC: 33 U/L (ref 15–46)
BACTERIA #/AREA URNS HPF: ABNORMAL /HPF
BACTERIA UR CULT: NO GROWTH
BACTERIA UR CULT: NORMAL
BASOPHILS # BLD AUTO: 0.01 10*3/MM3 (ref 0–0.2)
BASOPHILS NFR BLD AUTO: 0.2 % (ref 0–2.5)
BILIRUB SERPL-MCNC: 1.7 MG/DL (ref 0.2–1.3)
BILIRUB UR QL STRIP: NEGATIVE
BUN SERPL-MCNC: 13 MG/DL (ref 7–20)
BUN/CREAT SERPL: 16.3 (ref 6.3–21.9)
CALCIUM SERPL-MCNC: 9.5 MG/DL (ref 8.4–10.2)
CASTS URNS MICRO: ABNORMAL
CHLORIDE SERPL-SCNC: 101 MMOL/L (ref 98–107)
CHOLEST SERPL-MCNC: 173 MG/DL (ref 0–199)
CO2 SERPL-SCNC: 25 MMOL/L (ref 26–30)
COLOR UR: YELLOW
CREAT SERPL-MCNC: 0.8 MG/DL (ref 0.6–1.3)
EOSINOPHIL # BLD AUTO: 0.05 10*3/MM3 (ref 0–0.7)
EOSINOPHIL NFR BLD AUTO: 0.9 % (ref 0–7)
EPI CELLS #/AREA URNS HPF: ABNORMAL /HPF
ERYTHROCYTE [DISTWIDTH] IN BLOOD BY AUTOMATED COUNT: 12.5 % (ref 11.5–14.5)
GLOBULIN SER CALC-MCNC: 2.7 GM/DL
GLUCOSE SERPL-MCNC: 111 MG/DL (ref 74–98)
GLUCOSE UR QL: NEGATIVE
HCT VFR BLD AUTO: 47.8 % (ref 42–52)
HDLC SERPL-MCNC: 70 MG/DL (ref 40–60)
HGB BLD-MCNC: 16.3 G/DL (ref 14–18)
HGB UR QL STRIP: NEGATIVE
IMM GRANULOCYTES # BLD: 0.02 10*3/MM3 (ref 0–0.06)
IMM GRANULOCYTES NFR BLD: 0.3 % (ref 0–0.6)
INR PPP: 0.98 (ref 0.9–1.1)
KETONES UR QL STRIP: NEGATIVE
LDLC SERPL CALC-MCNC: 71 MG/DL (ref 0–99)
LEUKOCYTE ESTERASE UR QL STRIP: NEGATIVE
LYMPHOCYTES # BLD AUTO: 1.67 10*3/MM3 (ref 0.6–3.4)
LYMPHOCYTES NFR BLD AUTO: 28.5 % (ref 10–50)
MCH RBC QN AUTO: 33.7 PG (ref 27–31)
MCHC RBC AUTO-ENTMCNC: 34.1 G/DL (ref 30–37)
MCV RBC AUTO: 98.8 FL (ref 80–94)
MONOCYTES # BLD AUTO: 0.57 10*3/MM3 (ref 0–0.9)
MONOCYTES NFR BLD AUTO: 9.7 % (ref 0–12)
NEUTROPHILS # BLD AUTO: 3.53 10*3/MM3 (ref 2–6.9)
NEUTROPHILS NFR BLD AUTO: 60.4 % (ref 37–80)
NITRITE UR QL STRIP: NEGATIVE
NRBC BLD AUTO-RTO: 0 /100 WBC (ref 0–0)
PH UR STRIP: 6.5 [PH] (ref 5–8)
PLATELET # BLD AUTO: 97 10*3/MM3 (ref 130–400)
POTASSIUM SERPL-SCNC: 4.4 MMOL/L (ref 3.5–5.1)
PROT SERPL-MCNC: 7.4 G/DL (ref 6.3–8.2)
PROT UR QL STRIP: NEGATIVE
PROTHROMBIN TIME: 10.7 SECONDS (ref 9.3–12.1)
RBC # BLD AUTO: 4.84 10*6/MM3 (ref 4.7–6.1)
RBC #/AREA URNS HPF: ABNORMAL /HPF
SODIUM SERPL-SCNC: 140 MMOL/L (ref 137–145)
SP GR UR: 1.01 (ref 1–1.03)
T3FREE SERPL-MCNC: 2.9 PG/ML (ref 2–4.4)
T4 FREE SERPL-MCNC: 1.2 NG/DL (ref 0.78–2.19)
TRIGL SERPL-MCNC: 158 MG/DL
TSH SERPL DL<=0.005 MIU/L-ACNC: 1.14 MIU/ML (ref 0.47–4.68)
UROBILINOGEN UR STRIP-MCNC: (no result) MG/DL
VLDLC SERPL CALC-MCNC: 31.6 MG/DL
WBC # BLD AUTO: 5.85 10*3/MM3 (ref 4.8–10.8)
WBC #/AREA URNS HPF: ABNORMAL /HPF

## 2017-07-10 ENCOUNTER — TELEPHONE (OUTPATIENT)
Dept: ONCOLOGY | Facility: CLINIC | Age: 72
End: 2017-07-10

## 2017-07-10 NOTE — TELEPHONE ENCOUNTER
----- Message from Paz Guillermo sent at 7/7/2017 10:26 AM EDT -----  Regarding: YEMI - LETTER  Contact: [phone]  NEEDS A LETTER SENT TO DR. BURKE MCLEAN MD, GIVING CONSENT FOR RIGHT KNEE SURGERY FROM DR. BRAGG.     PATIENT JUST LAB WORK THIS MORNING WITH DR. VELAZQUEZ (PCP), SHOULD BE IN [facility].     SURGERY IS SCHEDULED FOR WEDNESDAY, JULY 12, 2017.     FAX LETTER [phone]    CALL PATIENT IF YOU HAVE ANY QUESTIONS

## 2017-07-20 ENCOUNTER — OFFICE VISIT (OUTPATIENT)
Dept: FAMILY MEDICINE CLINIC | Facility: CLINIC | Age: 72
End: 2017-07-20

## 2017-07-20 ENCOUNTER — TELEPHONE (OUTPATIENT)
Dept: FAMILY MEDICINE CLINIC | Facility: CLINIC | Age: 72
End: 2017-07-20

## 2017-07-20 VITALS
OXYGEN SATURATION: 95 % | TEMPERATURE: 98.2 F | WEIGHT: 205.8 LBS | BODY MASS INDEX: 29.46 KG/M2 | HEIGHT: 70 IN | DIASTOLIC BLOOD PRESSURE: 72 MMHG | SYSTOLIC BLOOD PRESSURE: 125 MMHG | HEART RATE: 71 BPM

## 2017-07-20 DIAGNOSIS — J18.9 CAP (COMMUNITY ACQUIRED PNEUMONIA): ICD-10-CM

## 2017-07-20 DIAGNOSIS — K59.03 DRUG-INDUCED CONSTIPATION: Primary | ICD-10-CM

## 2017-07-20 DIAGNOSIS — Z96.651 STATUS POST TOTAL RIGHT KNEE REPLACEMENT: ICD-10-CM

## 2017-07-20 DIAGNOSIS — J18.9 CAP (COMMUNITY ACQUIRED PNEUMONIA): Primary | ICD-10-CM

## 2017-07-20 PROCEDURE — 99214 OFFICE O/P EST MOD 30 MIN: CPT | Performed by: INTERNAL MEDICINE

## 2017-07-20 RX ORDER — MELOXICAM 15 MG/1
15 TABLET ORAL DAILY
COMMUNITY
Start: 2017-07-12 | End: 2017-09-06

## 2017-07-20 RX ORDER — IPRATROPIUM BROMIDE AND ALBUTEROL SULFATE 2.5; .5 MG/3ML; MG/3ML
3 SOLUTION RESPIRATORY (INHALATION) EVERY 4 HOURS PRN
Qty: 120 VIAL | Refills: 3 | Status: SHIPPED | OUTPATIENT
Start: 2017-07-20 | End: 2017-09-06

## 2017-07-20 RX ORDER — GABAPENTIN 300 MG/1
300 CAPSULE ORAL DAILY
COMMUNITY
Start: 2017-07-12 | End: 2017-09-06

## 2017-07-20 RX ORDER — LEVOFLOXACIN 750 MG/1
750 TABLET ORAL DAILY
COMMUNITY
Start: 2017-07-19 | End: 2017-09-06

## 2017-07-20 NOTE — PROGRESS NOTES
"Subjective   Patient ID: Deniz Dickson is a 72 y.o. male Pt is here for management of multiple medical problems.    Chief Complaint   Patient presents with   • Follow-up     patient is here for a follow up on pnuemonia and ct scan done at at [facility] patient is also following up on weakness       History of Present Illness    Recent CAP.    Recent knee repalcement. Had low o2 going home.   Was not using spirometer.   Pt has been on IV vanco and IV levo. Finished recent abx keflex.     surgery last week. Wed.  Had ct chest and xr chest.  D/C [facility] yesterday.       The following portions of the patient's history were reviewed and updated as appropriate: allergies, current medications, past family history, past medical history, past social history, past surgical history and problem list.      Review of Systems   Constitutional: Positive for fatigue. Negative for fever.   Respiratory: Positive for cough and shortness of breath. Negative for wheezing.    Neurological: Positive for weakness.   All other systems reviewed and are negative.      Objective     /72 (BP Location: Right arm, Patient Position: Sitting)  Pulse 71  Temp 98.2 °F (36.8 °C) (Oral)   Ht 70\" (177.8 cm)  Wt 205 lb 12.8 oz (93.4 kg)  SpO2 95%  BMI 29.53 kg/m2  Physical Exam  General Appearance:    Alert, cooperative, no distress, appears stated age   Head:    Normocephalic, without obvious abnormality, atraumatic   Eyes:    PERRL, conjunctiva/corneas clear, EOM's intact           Ears:    Normal TM's and external ear canals, both ears   Nose:   Nares normal, septum midline, mucosa normal, no drainage    or sinus tenderness   Throat:   Lips, mucosa, and tongue normal; teeth and gums normal   Neck:   Supple, symmetrical, trachea midline, no adenopathy;        thyroid:  No enlargement/tenderness/nodules; no carotid    bruit or JVD   Back:     Symmetric, no curvature, ROM normal, no CVA tenderness   Lungs:     Clear to auscultation " bilaterally, respirations unlabored   Chest wall:    No tenderness or deformity   Heart:    Regular rate and rhythm, S1 and S2 normal, no murmur, rub   or gallop   Abdomen:     Soft, non-tender, bowel sounds active all four quadrants,     no masses, no organomegaly           Extremities:   Extremities normal, atraumatic, no cyanosis or edema   Pulses:   2+ and symmetric all extremities   Skin:   Skin color, texture, turgor normal, no rashes or lesions   Lymph nodes:   Cervical, supraclavicular, and axillary nodes normal   Neurologic:   CNII-XII intact. Normal strength, sensation and reflexes       throughout       Assessment/Plan     On abx.     Mag citrate for constipation.   Increase walking. Use spirometry.        Deniz was seen today for follow-up.    Diagnoses and all orders for this visit:    Drug-induced constipation    Status post total right knee replacement    CAP (community acquired pneumonia)      No Follow-up on file.                   There are no Patient Instructions on file for this visit.

## 2017-07-20 NOTE — TELEPHONE ENCOUNTER
Alberto with [facility] called and requested that we send in an order for a nebulizer and duo nebs to [facility] for Mr. Dickson.  Thank you

## 2017-08-01 DIAGNOSIS — E03.9 ACQUIRED HYPOTHYROIDISM: ICD-10-CM

## 2017-08-01 RX ORDER — LEVOTHYROXINE SODIUM 112 UG/1
TABLET ORAL
Qty: 30 TABLET | Refills: 4 | Status: CANCELLED | OUTPATIENT
Start: 2017-08-01

## 2017-08-02 DIAGNOSIS — E03.9 ACQUIRED HYPOTHYROIDISM: ICD-10-CM

## 2017-08-02 RX ORDER — LEVOTHYROXINE SODIUM 112 UG/1
112 TABLET ORAL DAILY
Qty: 30 TABLET | Refills: 5 | Status: SHIPPED | OUTPATIENT
Start: 2017-08-02 | End: 2018-01-23 | Stop reason: SDUPTHER

## 2017-09-02 LAB
ALBUMIN SERPL-MCNC: 4.4 G/DL (ref 3.5–5)
ALBUMIN/GLOB SERPL: 1.8 G/DL (ref 1–2)
ALP SERPL-CCNC: 71 U/L (ref 38–126)
ALT SERPL-CCNC: 44 U/L (ref 13–69)
AST SERPL-CCNC: 26 U/L (ref 15–46)
BILIRUB SERPL-MCNC: 1.2 MG/DL (ref 0.2–1.3)
BUN SERPL-MCNC: 11 MG/DL (ref 7–20)
BUN/CREAT SERPL: 13.8 (ref 6.3–21.9)
CALCIUM SERPL-MCNC: 9.5 MG/DL (ref 8.4–10.2)
CHLORIDE SERPL-SCNC: 101 MMOL/L (ref 98–107)
CHOLEST SERPL-MCNC: 150 MG/DL (ref 0–199)
CO2 SERPL-SCNC: 28 MMOL/L (ref 26–30)
CREAT SERPL-MCNC: 0.8 MG/DL (ref 0.6–1.3)
GLOBULIN SER CALC-MCNC: 2.5 GM/DL
GLUCOSE SERPL-MCNC: 100 MG/DL (ref 74–98)
HDLC SERPL-MCNC: 65 MG/DL (ref 40–60)
LDLC SERPL CALC-MCNC: 62 MG/DL (ref 0–99)
POTASSIUM SERPL-SCNC: 4.5 MMOL/L (ref 3.5–5.1)
PROT SERPL-MCNC: 6.9 G/DL (ref 6.3–8.2)
SODIUM SERPL-SCNC: 139 MMOL/L (ref 137–145)
T3FREE SERPL-MCNC: 3.1 PG/ML (ref 2–4.4)
T4 FREE SERPL-MCNC: 1.15 NG/DL (ref 0.78–2.19)
TRIGL SERPL-MCNC: 114 MG/DL
TSH SERPL DL<=0.005 MIU/L-ACNC: 0.57 MIU/ML (ref 0.47–4.68)
VIT B12 SERPL-MCNC: 857 PG/ML (ref 239–931)
VLDLC SERPL CALC-MCNC: 22.8 MG/DL

## 2017-09-06 ENCOUNTER — OFFICE VISIT (OUTPATIENT)
Dept: FAMILY MEDICINE CLINIC | Facility: CLINIC | Age: 72
End: 2017-09-06

## 2017-09-06 VITALS
OXYGEN SATURATION: 97 % | WEIGHT: 213 LBS | SYSTOLIC BLOOD PRESSURE: 123 MMHG | HEIGHT: 70 IN | RESPIRATION RATE: 16 BRPM | HEART RATE: 70 BPM | TEMPERATURE: 97.5 F | BODY MASS INDEX: 30.49 KG/M2 | DIASTOLIC BLOOD PRESSURE: 65 MMHG

## 2017-09-06 DIAGNOSIS — E03.9 ACQUIRED HYPOTHYROIDISM: ICD-10-CM

## 2017-09-06 DIAGNOSIS — G89.29 CHRONIC BILATERAL LOW BACK PAIN WITHOUT SCIATICA: ICD-10-CM

## 2017-09-06 DIAGNOSIS — M25.552 HIP PAIN, BILATERAL: ICD-10-CM

## 2017-09-06 DIAGNOSIS — I10 ESSENTIAL HYPERTENSION: ICD-10-CM

## 2017-09-06 DIAGNOSIS — M25.559 HIP PAIN, CHRONIC, UNSPECIFIED LATERALITY: Primary | ICD-10-CM

## 2017-09-06 DIAGNOSIS — M54.50 CHRONIC BILATERAL LOW BACK PAIN WITHOUT SCIATICA: ICD-10-CM

## 2017-09-06 DIAGNOSIS — G89.29 HIP PAIN, CHRONIC, UNSPECIFIED LATERALITY: Primary | ICD-10-CM

## 2017-09-06 DIAGNOSIS — M25.551 HIP PAIN, BILATERAL: ICD-10-CM

## 2017-09-06 PROCEDURE — 99214 OFFICE O/P EST MOD 30 MIN: CPT | Performed by: INTERNAL MEDICINE

## 2017-09-06 RX ORDER — CELECOXIB 100 MG/1
200 CAPSULE ORAL DAILY PRN
Qty: 60 CAPSULE | Refills: 5 | Status: SHIPPED | OUTPATIENT
Start: 2017-09-06 | End: 2017-10-25 | Stop reason: HOSPADM

## 2017-09-06 RX ORDER — CELECOXIB 100 MG/1
200 CAPSULE ORAL DAILY
COMMUNITY
End: 2017-09-06 | Stop reason: SDUPTHER

## 2017-09-06 NOTE — PROGRESS NOTES
"Subjective   Patient ID: Deniz Dickson is a 72 y.o. male Pt is here for management of multiple medical problems.    Chief Complaint   Patient presents with   • Hypertension     follow-up   • Hypothyroidism     follow-up   • Coronary Artery Disease     follow-up   • Medication refills     patient needs refills for 90 days on  Levothyroxine and Celebrex  to [facility]       History of Present Illness    Feels well.   Tolerating meds well.    Here for medical management.     Back on celebrex and once lower dose with option to take extra as needed. Hip and back pain from recovering of knee sugery.         The following portions of the patient's history were reviewed and updated as appropriate: allergies, current medications, past family history, past medical history, past social history, past surgical history and problem list.      Review of Systems   Constitutional: Negative for fatigue.   Cardiovascular: Negative for chest pain and palpitations.   Musculoskeletal: Positive for arthralgias, back pain, gait problem, joint swelling and myalgias.   All other systems reviewed and are negative.      Objective     /65 (BP Location: Left arm, Patient Position: Sitting, Cuff Size: Large Adult)  Pulse 70  Temp 97.5 °F (36.4 °C) (Oral)   Resp 16  Ht 70\" (177.8 cm)  Wt 213 lb (96.6 kg)  SpO2 97%  BMI 30.56 kg/m2  Physical Exam  General Appearance:    Alert, cooperative, no distress, appears stated age   Head:    Normocephalic, without obvious abnormality, atraumatic   Eyes:    PERRL, conjunctiva/corneas clear, EOM's intact           Ears:    Normal TM's and external ear canals, both ears   Nose:   Nares normal, septum midline, mucosa normal, no drainage    or sinus tenderness   Throat:   Lips, mucosa, and tongue normal; teeth and gums normal   Neck:   Supple, symmetrical, trachea midline, no adenopathy;        thyroid:  No enlargement/tenderness/nodules; no carotid    bruit or JVD   Back:     Symmetric, no " curvature, ROM normal, no CVA tenderness   Lungs:     Clear to auscultation bilaterally, respirations unlabored   Chest wall:    No tenderness or deformity   Heart:    Regular rate and rhythm, S1 and S2 normal, no murmur, rub   or gallop   Abdomen:     Soft, non-tender, bowel sounds active all four quadrants,     no masses, no organomegaly           Extremities:   Extremities normal, atraumatic, no cyanosis or edema   Pulses:   2+ and symmetric all extremities   Skin:   Skin color, texture, turgor normal, no rashes or lesions   Lymph nodes:   Cervical, supraclavicular, and axillary nodes normal   Neurologic:   CNII-XII intact. Normal strength, sensation and reflexes       throughout       Assessment/Plan   streching hip and back demostrated today.   Labs reviewed with pt and look great.            Deniz was seen today for hypertension, hypothyroidism, coronary artery disease and medication refills.    Diagnoses and all orders for this visit:    Hip pain, chronic, unspecified laterality  -     TSH  -     T4, Free  -     T3, Free  -     Vitamin B12  -     Comprehensive Metabolic Panel  -     CBC & Differential  -     Lipid Panel    Chronic bilateral low back pain without sciatica  -     TSH  -     T4, Free  -     T3, Free  -     Vitamin B12  -     Comprehensive Metabolic Panel  -     CBC & Differential  -     Lipid Panel    Essential hypertension  -     TSH  -     T4, Free  -     T3, Free  -     Vitamin B12  -     Comprehensive Metabolic Panel  -     CBC & Differential  -     Lipid Panel    Acquired hypothyroidism  -     TSH  -     T4, Free  -     T3, Free  -     Vitamin B12  -     Comprehensive Metabolic Panel  -     CBC & Differential  -     Lipid Panel    Hip pain, bilateral    Other orders  -     celecoxib (CeleBREX) 100 MG capsule; Take 2 capsules by mouth Daily As Needed for Mild Pain .      Return in about 6 months (around 3/6/2018).                   There are no Patient Instructions on file for this visit.

## 2017-09-07 ENCOUNTER — TELEPHONE (OUTPATIENT)
Dept: FAMILY MEDICINE CLINIC | Facility: CLINIC | Age: 72
End: 2017-09-07

## 2017-09-07 DIAGNOSIS — E78.00 HYPERCHOLESTEREMIA: ICD-10-CM

## 2017-09-07 RX ORDER — EZETIMIBE/SIMVASTATIN 10 MG-20MG
1 TABLET ORAL NIGHTLY
Qty: 30 TABLET | Refills: 2 | Status: SHIPPED | OUTPATIENT
Start: 2017-09-07 | End: 2018-01-22 | Stop reason: SDUPTHER

## 2017-10-25 ENCOUNTER — OFFICE VISIT (OUTPATIENT)
Dept: GASTROENTEROLOGY | Facility: CLINIC | Age: 72
End: 2017-10-25

## 2017-10-25 VITALS
OXYGEN SATURATION: 96 % | SYSTOLIC BLOOD PRESSURE: 130 MMHG | TEMPERATURE: 97.4 F | WEIGHT: 215.4 LBS | BODY MASS INDEX: 30.84 KG/M2 | HEART RATE: 63 BPM | DIASTOLIC BLOOD PRESSURE: 80 MMHG | HEIGHT: 70 IN

## 2017-10-25 DIAGNOSIS — E80.4 GILBERT'S DISEASE: Primary | ICD-10-CM

## 2017-10-25 PROCEDURE — 99213 OFFICE O/P EST LOW 20 MIN: CPT | Performed by: INTERNAL MEDICINE

## 2017-10-25 RX ORDER — CELECOXIB 100 MG/1
100 CAPSULE ORAL DAILY
COMMUNITY
End: 2018-04-17 | Stop reason: SDUPTHER

## 2017-10-25 NOTE — PROGRESS NOTES
PCP: Zane Magallon MD    Chief Complaint   Patient presents with   • Follow-up       History of Present Illness:   HPI  Mr. Dickson returns to the office for a follow-up visit.  He had right knee surgery and is slowly recovering.  The patient is active on a daily basis.  He has a good appetite.  There is no history of abdominal pain with meals or postprandially.  There is no history of nausea vomiting.  Mr. Dicksno has no unexplained weight loss.  There is no history of night sweats, fever or chills.  He has not been aware of any blood in the stool or other signs of gastrointestinal bleeding.  Past Medical History:   Diagnosis Date   • Coronary artery disease     Probable Non obstructive    • Dyslipidemia    • Elevated prostate specific antigen (PSA)    • Essential hypertension, benign    • H/O benign prostatic hypertrophy    • High cholesterol    • History of hemorrhoids    • History of idiopathic thrombocytopenic purpura    • History of malignant neoplasm of skin    • History of osteoarthritis    • History of sleep apnea    • Hypertension    • Hypothyroidism    • JUWAN on CPAP     greater than 5 years    • S/P right knee surgery 07/12/2017   • Tobacco abuse        Past Surgical History:   Procedure Laterality Date   • APPENDECTOMY     • COLONOSCOPY      Complete Colonoscopy   • HEMORRHOIDECTOMY     • HERNIA REPAIR     • KNEE SURGERY      Left Knee   • KNEE SURGERY  07/2017    partial right knee replacement   • OTHER SURGICAL HISTORY      Surgery Testis Orchiopexy         Current Outpatient Prescriptions:   •  aspirin 81 MG EC tablet, Take 1 tablet by mouth Daily., Disp: 90 tablet, Rfl: 3  •  bisoprolol (ZEBeta) 5 MG tablet, Take 1 tablet by mouth Daily. 1/2 tablet daily, Disp: 45 tablet, Rfl: 3  •  celecoxib (CeleBREX) 100 MG capsule, Take 100 mg by mouth Daily., Disp: , Rfl:   •  Cholecalciferol (VITAMIN D-3 PO), Take  by mouth Daily., Disp: , Rfl:   •  finasteride (PROSCAR) 5 MG tablet, Take 1 tablet by mouth  Daily., Disp: 90 tablet, Rfl: 3  •  fluticasone (FLONASE) 50 MCG/ACT nasal spray, 1 spray into each nostril., Disp: , Rfl:   •  Glucosamine-Chondroitin (OSTEO BI-FLEX REGULAR STRENGTH PO), Take 1 tablet by mouth 2 (two) times a day., Disp: , Rfl:   •  levothyroxine (SYNTHROID, LEVOTHROID) 112 MCG tablet, Take 1 tablet by mouth Daily., Disp: 30 tablet, Rfl: 5  •  Multiple Vitamins-Minerals (CENTRUM SILVER ADULT 50+) tablet, Take 1 tablet by mouth daily., Disp: , Rfl:   •  Omega-3 Fatty Acids (FISH OIL) 1200 MG capsule capsule, Take  by mouth Daily With Breakfast., Disp: , Rfl:   •  potassium gluconate 595 MG tablet tablet, Take 1 tablet by mouth Daily., Disp: , Rfl:   •  vitamin B-12 (CYANOCOBALAMIN) 1000 MCG tablet, Take  by mouth Daily., Disp: , Rfl:   •  VYTORIN 10-20 MG per tablet, Take 1 tablet by mouth Every Night., Disp: 30 tablet, Rfl: 2  •  fexofenadine (ALLEGRA) 180 MG tablet, Take 180 mg by mouth Daily., Disp: , Rfl:     Allergies   Allergen Reactions   • Penicillins Itching and Rash       Family History   Problem Relation Age of Onset   • Migraines Mother    • Mental illness Mother    • Heart disease Mother    • Alcohol abuse Father    • Cancer Father    • Lung cancer Father    • Brain cancer Sister    • Colon cancer Paternal Grandmother    • Prostate cancer Other    • Colon cancer Other        Social History     Social History   • Marital status:      Spouse name: N/A   • Number of children: N/A   • Years of education: N/A     Occupational History   •  Retired     Social History Main Topics   • Smoking status: Former Smoker     Years: 15.00     Quit date: 6/8/1985   • Smokeless tobacco: Former User     Quit date: 1925   • Alcohol use No   • Drug use: No   • Sexual activity: Defer     Other Topics Concern   • Not on file     Social History Narrative       Review of Systems   Constitutional: Negative for activity change, appetite change, fatigue, fever and unexpected weight change.   HENT: Negative  for dental problem, hearing loss, mouth sores, postnasal drip, sneezing, trouble swallowing and voice change.    Eyes: Negative for pain, redness, itching and visual disturbance.   Respiratory: Negative for cough, choking, chest tightness, shortness of breath and wheezing.    Cardiovascular: Negative for chest pain, palpitations and leg swelling.   Gastrointestinal: Negative for abdominal distention, abdominal pain, anal bleeding, blood in stool, constipation, diarrhea, nausea, rectal pain and vomiting.   Endocrine: Negative for cold intolerance, heat intolerance, polydipsia, polyphagia and polyuria.   Genitourinary: Negative.  Negative for dysuria, enuresis, flank pain, hematuria and urgency.   Musculoskeletal: Negative for arthralgias, back pain, gait problem, joint swelling and myalgias.   Skin: Negative for color change, pallor and rash.   Allergic/Immunologic: Negative for environmental allergies, food allergies and immunocompromised state.   Neurological: Negative for dizziness, tremors, seizures, facial asymmetry, speech difficulty, numbness and headaches.   Hematological: Negative for adenopathy.   Psychiatric/Behavioral: Negative for behavioral problems, confusion, dysphoric mood, hallucinations and self-injury.       Vitals:    10/25/17 1015   BP: 130/80   Pulse: 63   Temp: 97.4 °F (36.3 °C)   SpO2: 96%       Physical Exam   Constitutional: He is oriented to person, place, and time. He appears well-nourished. No distress.   HENT:   Head: Normocephalic and atraumatic.   Mouth/Throat: Oropharynx is clear and moist. No oropharyngeal exudate.   Eyes: EOM are normal. Pupils are equal, round, and reactive to light. No scleral icterus.   Neck: Normal range of motion. Neck supple. No thyromegaly present.   Cardiovascular: Normal rate, regular rhythm and normal heart sounds.  Exam reveals no gallop.    No murmur heard.  Pulmonary/Chest: Effort normal and breath sounds normal. He has no wheezes. He has no rales.    Abdominal: Soft. Bowel sounds are normal. There is no tenderness. There is no guarding.   Diastasis recti   Musculoskeletal: Normal range of motion. He exhibits no edema or tenderness.   Lymphadenopathy:     He has no cervical adenopathy.   Neurological: He is alert and oriented to person, place, and time. He exhibits normal muscle tone.   Skin: Skin is dry. No erythema. No pallor.   Psychiatric: He has a normal mood and affect. His behavior is normal. Thought content normal.   Vitals reviewed.      Deniz was seen today for follow-up.    Diagnoses and all orders for this visit:    Gilbert's disease  The patient is doing well at this time.  He has no stigmata of chronic liver disease.      Plan: Discussed with Mr. Dickson that at this time I will have him return to see me as needed.        I spent over 50% of the office visit counseling and answering questions from the patient.

## 2017-11-06 ENCOUNTER — HOSPITAL ENCOUNTER (OUTPATIENT)
Dept: OTHER | Age: 72
Discharge: HOME | End: 2017-11-06
Attending: INTERNAL MEDICINE | Admitting: INTERNAL MEDICINE

## 2017-11-06 LAB
A/G RATIO: 1.8 (ref 0.8–2)
ALBUMIN SERPL-MCNC: 4.6 G/DL (ref 3.4–4.8)
ALP BLD-CCNC: 53 U/L (ref 25–100)
ALT SERPL-CCNC: 32 U/L (ref 4–36)
ANION GAP SERPL CALCULATED.3IONS-SCNC: 11 MMOL/L (ref 3–16)
AST SERPL-CCNC: 26 U/L (ref 8–33)
BASOPHILS ABSOLUTE: 0 K/UL (ref 0–0.1)
BASOPHILS RELATIVE PERCENT: 0.2 %
BILIRUB SERPL-MCNC: 1.4 MG/DL (ref 0.3–1.2)
BUN BLDV-MCNC: 12 MG/DL (ref 6–20)
CALCIUM SERPL-MCNC: 9.2 MG/DL (ref 8.5–10.5)
CHLORIDE BLD-SCNC: 102 MMOL/L (ref 98–107)
CO2: 27 MMOL/L (ref 20–30)
CREAT SERPL-MCNC: 0.7 MG/DL (ref 0.4–1.2)
EOSINOPHILS ABSOLUTE: 0.1 K/UL (ref 0–0.4)
EOSINOPHILS RELATIVE PERCENT: 1.4 %
GFR AFRICAN AMERICAN: >59
GFR NON-AFRICAN AMERICAN: >59
GLOBULIN: 2.5 G/DL
GLUCOSE BLD-MCNC: 115 MG/DL (ref 74–106)
HCT VFR BLD CALC: 44.5 % (ref 40–54)
HEMOGLOBIN: 15.9 G/DL (ref 13–18)
LYMPHOCYTES ABSOLUTE: 1.5 K/UL (ref 1.5–4)
LYMPHOCYTES RELATIVE PERCENT: 30.1 % (ref 20–40)
MCH RBC QN AUTO: 33.5 PG (ref 27–32)
MCHC RBC AUTO-ENTMCNC: 35.7 G/DL (ref 31–35)
MCV RBC AUTO: 93.9 FL (ref 80–100)
MONOCYTES ABSOLUTE: 0.5 K/UL (ref 0.2–0.8)
MONOCYTES RELATIVE PERCENT: 10.7 % (ref 3–10)
NEUTROPHILS ABSOLUTE: 2.8 K/UL (ref 2–7.5)
NEUTROPHILS RELATIVE PERCENT: 57.6 %
PDW BLD-RTO: 13.3 % (ref 11–16)
PLATELET # BLD: 115 K/UL (ref 150–400)
PMV BLD AUTO: 8.7 FL (ref 6–10)
POTASSIUM SERPL-SCNC: 4.2 MMOL/L (ref 3.4–5.1)
RBC # BLD: 4.74 M/UL (ref 4.5–6)
SODIUM BLD-SCNC: 140 MMOL/L (ref 136–145)
TOTAL PROTEIN: 7.1 G/DL (ref 6.4–8.3)
WBC # BLD: 4.9 K/UL (ref 4–11)

## 2017-12-06 LAB
ALBUMIN SERPL-MCNC: 4.7 G/DL (ref 3.5–5)
ALBUMIN/GLOB SERPL: 1.7 G/DL (ref 1–2)
ALP SERPL-CCNC: 57 U/L (ref 38–126)
ALT SERPL-CCNC: 47 U/L (ref 13–69)
AST SERPL-CCNC: 39 U/L (ref 15–46)
BASOPHILS # BLD AUTO: 0.01 10*3/MM3 (ref 0–0.2)
BASOPHILS NFR BLD AUTO: 0.2 % (ref 0–2.5)
BILIRUB SERPL-MCNC: 1.9 MG/DL (ref 0.2–1.3)
BUN SERPL-MCNC: 15 MG/DL (ref 7–20)
BUN/CREAT SERPL: 21.4 (ref 6.3–21.9)
CALCIUM SERPL-MCNC: 9.4 MG/DL (ref 8.4–10.2)
CHLORIDE SERPL-SCNC: 101 MMOL/L (ref 98–107)
CHOLEST SERPL-MCNC: 175 MG/DL (ref 0–199)
CO2 SERPL-SCNC: 26 MMOL/L (ref 26–30)
CREAT SERPL-MCNC: 0.7 MG/DL (ref 0.6–1.3)
EOSINOPHIL # BLD AUTO: 0.07 10*3/MM3 (ref 0–0.7)
EOSINOPHIL NFR BLD AUTO: 1.2 % (ref 0–7)
ERYTHROCYTE [DISTWIDTH] IN BLOOD BY AUTOMATED COUNT: 12.7 % (ref 11.5–14.5)
GFR SERPLBLD CREATININE-BSD FMLA CKD-EPI: 111 ML/MIN/1.73
GFR SERPLBLD CREATININE-BSD FMLA CKD-EPI: 134 ML/MIN/1.73
GLOBULIN SER CALC-MCNC: 2.7 GM/DL
GLUCOSE SERPL-MCNC: 102 MG/DL (ref 74–98)
HCT VFR BLD AUTO: 45.5 % (ref 42–52)
HDLC SERPL-MCNC: 68 MG/DL (ref 40–60)
HGB BLD-MCNC: 15.2 G/DL (ref 14–18)
IMM GRANULOCYTES # BLD: 0.01 10*3/MM3 (ref 0–0.06)
IMM GRANULOCYTES NFR BLD: 0.2 % (ref 0–0.6)
LDLC SERPL CALC-MCNC: 82 MG/DL (ref 0–99)
LYMPHOCYTES # BLD AUTO: 1.44 10*3/MM3 (ref 0.6–3.4)
LYMPHOCYTES NFR BLD AUTO: 25.4 % (ref 10–50)
MCH RBC QN AUTO: 32.3 PG (ref 27–31)
MCHC RBC AUTO-ENTMCNC: 33.4 G/DL (ref 30–37)
MCV RBC AUTO: 96.8 FL (ref 80–94)
MONOCYTES # BLD AUTO: 0.59 10*3/MM3 (ref 0–0.9)
MONOCYTES NFR BLD AUTO: 10.4 % (ref 0–12)
NEUTROPHILS # BLD AUTO: 3.55 10*3/MM3 (ref 2–6.9)
NEUTROPHILS NFR BLD AUTO: 62.6 % (ref 37–80)
NRBC BLD AUTO-RTO: 0 /100 WBC (ref 0–0)
PLATELET # BLD AUTO: 121 10*3/MM3 (ref 130–400)
POTASSIUM SERPL-SCNC: 4.7 MMOL/L (ref 3.5–5.1)
PROT SERPL-MCNC: 7.4 G/DL (ref 6.3–8.2)
RBC # BLD AUTO: 4.7 10*6/MM3 (ref 4.7–6.1)
SODIUM SERPL-SCNC: 139 MMOL/L (ref 137–145)
T3FREE SERPL-MCNC: 2.9 PG/ML (ref 2–4.4)
T4 FREE SERPL-MCNC: 1.13 NG/DL (ref 0.78–2.19)
TRIGL SERPL-MCNC: 124 MG/DL
TSH SERPL DL<=0.005 MIU/L-ACNC: 1.32 MIU/ML (ref 0.47–4.68)
VIT B12 SERPL-MCNC: 942 PG/ML (ref 239–931)
VLDLC SERPL CALC-MCNC: 24.8 MG/DL
WBC # BLD AUTO: 5.67 10*3/MM3 (ref 4.8–10.8)

## 2017-12-14 ENCOUNTER — OFFICE VISIT (OUTPATIENT)
Dept: ONCOLOGY | Facility: CLINIC | Age: 72
End: 2017-12-14

## 2017-12-14 VITALS
RESPIRATION RATE: 16 BRPM | BODY MASS INDEX: 30.64 KG/M2 | DIASTOLIC BLOOD PRESSURE: 71 MMHG | TEMPERATURE: 97.7 F | HEIGHT: 70 IN | SYSTOLIC BLOOD PRESSURE: 131 MMHG | WEIGHT: 214 LBS | HEART RATE: 74 BPM

## 2017-12-14 DIAGNOSIS — D69.3 IDIOPATHIC THROMBOCYTOPENIC PURPURA (HCC): Primary | ICD-10-CM

## 2017-12-14 PROCEDURE — 99213 OFFICE O/P EST LOW 20 MIN: CPT | Performed by: INTERNAL MEDICINE

## 2017-12-14 NOTE — PROGRESS NOTES
CHIEF COMPLAINT: Follow-up ITP      HISTORY OF PRESENT ILLNESS:  The patient is a 72 y.o. male, here for follow up on management of ITP.  Platelets are 121,000.  No bleeding complications since last I saw him.      Past Medical History:   Diagnosis Date   • Coronary artery disease     Probable Non obstructive    • Dyslipidemia    • Elevated prostate specific antigen (PSA)    • Essential hypertension, benign    • H/O benign prostatic hypertrophy    • High cholesterol    • History of hemorrhoids    • History of idiopathic thrombocytopenic purpura    • History of malignant neoplasm of skin    • History of osteoarthritis    • History of sleep apnea    • Hypertension    • Hypothyroidism    • JUWAN on CPAP     greater than 5 years    • S/P right knee surgery 07/12/2017   • Tobacco abuse      Past Surgical History:   Procedure Laterality Date   • APPENDECTOMY     • COLONOSCOPY      Complete Colonoscopy   • HEMORRHOIDECTOMY     • HERNIA REPAIR     • KNEE ARTHROPLASTY, PARTIAL REPLACEMENT Right 07/12/2017   • KNEE SURGERY      Left Knee   • OTHER SURGICAL HISTORY      Surgery Testis Orchiopexy       Allergies   Allergen Reactions   • Penicillins Itching and Rash       Family History and Social History reviewed and changed as necessary      REVIEW OF SYSTEM:   Review of Systems   Constitutional: Negative for appetite change, chills, diaphoresis, fatigue, fever and unexpected weight change.   HENT:   Negative for mouth sores, sore throat and trouble swallowing.    Eyes: Negative for icterus.   Respiratory: Negative for cough, hemoptysis and shortness of breath.    Cardiovascular: Negative for chest pain, leg swelling and palpitations.   Gastrointestinal: Negative for abdominal distention, abdominal pain, blood in stool, constipation, diarrhea, nausea and vomiting.   Endocrine: Negative for hot flashes.   Genitourinary: Negative for bladder incontinence, difficulty urinating, dysuria, frequency and hematuria.    Musculoskeletal:  "Negative for gait problem, neck pain and neck stiffness.   Skin: Negative for rash.   Neurological: Negative for dizziness, gait problem, headaches, light-headedness and numbness.   Hematological: Negative for adenopathy. Does not bruise/bleed easily.   Psychiatric/Behavioral: Negative for depression. The patient is not nervous/anxious.    All other systems reviewed and are negative.       PHYSICAL EXAM    Vitals:    12/14/17 1056   BP: 131/71   Pulse: 74   Resp: 16   Temp: 97.7 °F (36.5 °C)   TempSrc: Temporal Artery    Weight: 97.1 kg (214 lb)   Height: 177.8 cm (70\")     Constitutional: Appears well-developed and well-nourished. No distress.   ECOG: (0) Fully active, able to carry on all predisease performance without restriction  HENT:   Head: Normocephalic.   Mouth/Throat: Oropharynx is clear and moist.   Eyes: Conjunctivae are normal. Pupils are equal, round, and reactive to light. No scleral icterus.   Neck: Neck supple. No JVD present. No thyromegaly present.   Cardiovascular: Normal rate, regular rhythm and normal heart sounds.    Pulmonary/Chest: Breath sounds normal. No respiratory distress.   Abdominal: Soft. Exhibits no distension and no mass. There is no hepatosplenomegaly. There is no tenderness. There is no rebound and no guarding.   Musculoskeletal:Exhibits no edema, tenderness or deformity.   Neurological: Alert and oriented to person, place, and time. Exhibits normal muscle tone.   Skin: No ecchymosis, no petechiae and no rash noted. Not diaphoretic. No cyanosis. Nails show no clubbing.   Psychiatric: Normal mood and affect.   Vitals reviewed.      No visits with results within 6 Week(s) from this visit.  Latest known visit with results is:    Office Visit on 09/06/2017   Component Date Value Ref Range Status   • TSH 12/05/2017 1.320  0.470 - 4.680 mIU/mL Final   • Free T4 12/05/2017 1.13  0.78 - 2.19 ng/dL Final   • T3, Free 12/05/2017 2.9  2.0 - 4.4 pg/mL Final   • Vitamin B-12 12/05/2017 942* " 239 - 931 pg/mL Final   • Glucose 12/05/2017 102* 74 - 98 mg/dL Final   • BUN 12/05/2017 15  7 - 20 mg/dL Final   • Creatinine 12/05/2017 0.70  0.60 - 1.30 mg/dL Final   • eGFR Non African Am 12/05/2017 111  >60 mL/min/1.73 Final    Comment: The MDRD GFR formula is only valid for adults with stable  renal function between ages 18 and 70.     • eGFR  Am 12/05/2017 134  >60 mL/min/1.73 Final   • BUN/Creatinine Ratio 12/05/2017 21.4  6.3 - 21.9 Final   • Sodium 12/05/2017 139  137 - 145 mmol/L Final   • Potassium 12/05/2017 4.7  3.5 - 5.1 mmol/L Final   • Chloride 12/05/2017 101  98 - 107 mmol/L Final   • Total CO2 12/05/2017 26.0  26.0 - 30.0 mmol/L Final   • Calcium 12/05/2017 9.4  8.4 - 10.2 mg/dL Final   • Total Protein 12/05/2017 7.4  6.3 - 8.2 g/dL Final   • Albumin 12/05/2017 4.70  3.50 - 5.00 g/dL Final   • Globulin 12/05/2017 2.7  gm/dL Final   • A/G Ratio 12/05/2017 1.7  1.0 - 2.0 g/dL Final   • Total Bilirubin 12/05/2017 1.9* 0.2 - 1.3 mg/dL Final   • Alkaline Phosphatase 12/05/2017 57  38 - 126 U/L Final   • AST (SGOT) 12/05/2017 39  15 - 46 U/L Final   • ALT (SGPT) 12/05/2017 47  13 - 69 U/L Final   • WBC 12/05/2017 5.67  4.80 - 10.80 10*3/mm3 Final   • RBC 12/05/2017 4.70  4.70 - 6.10 10*6/mm3 Final   • Hemoglobin 12/05/2017 15.2  14.0 - 18.0 g/dL Final   • Hematocrit 12/05/2017 45.5  42.0 - 52.0 % Final   • MCV 12/05/2017 96.8* 80.0 - 94.0 fL Final   • MCH 12/05/2017 32.3* 27.0 - 31.0 pg Final   • MCHC 12/05/2017 33.4  30.0 - 37.0 g/dL Final   • RDW 12/05/2017 12.7  11.5 - 14.5 % Final   • Platelets 12/05/2017 121* 130 - 400 10*3/mm3 Final   • Neutrophil Rel % 12/05/2017 62.6  37.0 - 80.0 % Final   • Lymphocyte Rel % 12/05/2017 25.4  10.0 - 50.0 % Final   • Monocyte Rel % 12/05/2017 10.4  0.0 - 12.0 % Final   • Eosinophil Rel % 12/05/2017 1.2  0.0 - 7.0 % Final   • Basophil Rel % 12/05/2017 0.2  0.0 - 2.5 % Final   • Neutrophils Absolute 12/05/2017 3.55  2.00 - 6.90 10*3/mm3 Final   • Lymphocytes  Absolute 12/05/2017 1.44  0.60 - 3.40 10*3/mm3 Final   • Monocytes Absolute 12/05/2017 0.59  0.00 - 0.90 10*3/mm3 Final   • Eosinophils Absolute 12/05/2017 0.07  0.00 - 0.70 10*3/mm3 Final   • Basophils Absolute 12/05/2017 0.01  0.00 - 0.20 10*3/mm3 Final   • Immature Granulocyte Rel % 12/05/2017 0.2  0.0 - 0.6 % Final   • Immature Grans Absolute 12/05/2017 0.01  0.00 - 0.06 10*3/mm3 Final   • nRBC 12/05/2017 0.0  0.0 - 0.0 /100 WBC Final   • Total Cholesterol 12/05/2017 175  0 - 199 mg/dL Final   • Triglycerides 12/05/2017 124  <150 mg/dL Final   • HDL Cholesterol 12/05/2017 68* 40 - 60 mg/dL Final   • VLDL Cholesterol 12/05/2017 24.8  mg/dL Final   • LDL Cholesterol  12/05/2017 82  0 - 99 mg/dL Final       Assessment/Plan     1.  ITP: His counts are stable and he's had no hemostatic complications.  While he can follow with his primary care, he prefers to see me and I will check him annually with a CBC prior to return and any time there are petechiae or ecchymoses that occur.  That has yet to happen.      Jerome Whiting MD    12/14/2017

## 2018-01-22 DIAGNOSIS — E78.00 HYPERCHOLESTEREMIA: ICD-10-CM

## 2018-01-22 RX ORDER — EZETIMIBE/SIMVASTATIN 10 MG-20MG
1 TABLET ORAL NIGHTLY
Qty: 90 TABLET | Refills: 3 | Status: SHIPPED | OUTPATIENT
Start: 2018-01-22 | End: 2019-01-31 | Stop reason: SDUPTHER

## 2018-01-23 DIAGNOSIS — E03.9 ACQUIRED HYPOTHYROIDISM: ICD-10-CM

## 2018-01-23 RX ORDER — LEVOTHYROXINE SODIUM 112 UG/1
112 TABLET ORAL DAILY
Qty: 90 TABLET | Refills: 2 | Status: SHIPPED | OUTPATIENT
Start: 2018-01-23 | End: 2019-03-11

## 2018-01-23 RX ORDER — LEVOTHYROXINE SODIUM 112 UG/1
112 TABLET ORAL DAILY
Qty: 90 TABLET | Refills: 2 | Status: SHIPPED | OUTPATIENT
Start: 2018-01-23 | End: 2018-01-23 | Stop reason: SDUPTHER

## 2018-01-29 ENCOUNTER — OFFICE VISIT (OUTPATIENT)
Dept: CARDIOLOGY | Facility: CLINIC | Age: 73
End: 2018-01-29

## 2018-01-29 VITALS
WEIGHT: 208.8 LBS | DIASTOLIC BLOOD PRESSURE: 60 MMHG | HEIGHT: 70 IN | BODY MASS INDEX: 29.89 KG/M2 | HEART RATE: 60 BPM | SYSTOLIC BLOOD PRESSURE: 140 MMHG

## 2018-01-29 DIAGNOSIS — E78.5 DYSLIPIDEMIA: ICD-10-CM

## 2018-01-29 DIAGNOSIS — I10 ESSENTIAL HYPERTENSION: ICD-10-CM

## 2018-01-29 DIAGNOSIS — I25.10 CORONARY ARTERY DISEASE INVOLVING NATIVE HEART WITHOUT ANGINA PECTORIS, UNSPECIFIED VESSEL OR LESION TYPE: Primary | ICD-10-CM

## 2018-01-29 PROCEDURE — 99213 OFFICE O/P EST LOW 20 MIN: CPT | Performed by: INTERNAL MEDICINE

## 2018-01-29 RX ORDER — GLUCOSAMINE/CHONDR SU A SOD 750-600 MG
1500 TABLET ORAL 2 TIMES DAILY
COMMUNITY
End: 2019-03-11 | Stop reason: SDUPTHER

## 2018-01-29 NOTE — PROGRESS NOTES
Deniz Dickson  1945  [phone]          PCP: Zane Magallon MD  [address] [id number]    IDENTIFICATION: A 72 y.o. male , retired ,  2016 from [place].     PROBLEM LIST:  1. 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 Dyslipidemia December 2015: Total cholesterol 178, triglycerides 119, HDL 69, LDL 85.   3. Hypertension, controlled on current regimen.   4. Remote bilateral knee surgery per Dr. Smith.   5. Obstructive sleep apnea, on CPAP greater than 5 years.   6. Reformed smoker, times greater than 12 years.   7. Hypothyroidism.       Chief Complaint   Patient presents with   • Hyperlipidemia           Allergies  Allergies   Allergen Reactions   • Penicillins Itching and Rash       Current Medications    Current Outpatient Prescriptions:   •  aspirin 81 MG EC tablet, Take 1 tablet by mouth Daily., Disp: 90 tablet, Rfl: 3  •  bisoprolol (ZEBeta) 5 MG tablet, Take 1 tablet by mouth Daily. 1/2 tablet daily, Disp: 45 tablet, Rfl: 3  •  celecoxib (CeleBREX) 100 MG capsule, Take 100 mg by mouth Daily. 200 mg in AM & 100 mg in PM, Disp: , Rfl:   •  Cholecalciferol (VITAMIN D-3 PO), Take  by mouth Daily., Disp: , Rfl:   •  CHONDROITIN SULFATE A PO, Take 1,103 mg by mouth., Disp: , Rfl:   •  fexofenadine (ALLEGRA) 180 MG tablet, Take 180 mg by mouth Daily., Disp: , Rfl:   •  finasteride (PROSCAR) 5 MG tablet, Take 1 tablet by mouth Daily., Disp: 90 tablet, Rfl: 3  •  fluticasone (FLONASE) 50 MCG/ACT nasal spray, 1 spray into each nostril., Disp: , Rfl:   •  Glucosamine HCl 1500 MG tablet, Take 1,500 mg by mouth 2 (Two) Times a Day., Disp: , Rfl:   •  Glucosamine-Chondroitin (OSTEO BI-FLEX REGULAR STRENGTH PO), Take 1 tablet by mouth 2 (two) times a day., Disp: , Rfl:   •  levothyroxine (SYNTHROID, LEVOTHROID) 112 MCG tablet, Take 1 tablet by mouth Daily., Disp: 90 tablet, Rfl: 2  •   "Multiple Vitamins-Minerals (CENTRUM SILVER ADULT 50+) tablet, Take 1 tablet by mouth daily., Disp: , Rfl:   •  Omega-3 Fatty Acids (FISH OIL) 1200 MG capsule capsule, Take 1,200 mg by mouth 2 (Two) Times a Day With Meals., Disp: , Rfl:   •  potassium gluconate 595 MG tablet tablet, Take 1 tablet by mouth Daily., Disp: , Rfl:   •  vitamin B-12 (CYANOCOBALAMIN) 1000 MCG tablet, Take  by mouth Daily., Disp: , Rfl:   •  VYTORIN 10-20 MG per tablet, Take 1 tablet by mouth Every Night., Disp: 90 tablet, Rfl: 3    History of Present Illness     Pt denies any chest pain, dyspnea, dyspnea on exertion, orthopnea, PND, palpitations, lower extremity edema.  Has lost weight w CHO restriction.  Tends cattle has decreased otherwise walking due to knee pain for which she is seeing Dr. Reed.    ROS:  All systems have been reviewed and are negative with the exception of those mentioned in the HPI.    OBJECTIVE:  Vitals:    01/29/18 1529   BP: 140/60   BP Location: Right arm   Patient Position: Sitting   Pulse: 60   Weight: 94.7 kg (208 lb 12.8 oz)   Height: 177.8 cm (70\")     Physical Exam   Constitutional: He appears well-developed and well-nourished.   Neck: Normal range of motion. Neck supple. No hepatojugular reflux and no JVD present. Carotid bruit is not present. No tracheal deviation present. No thyromegaly present.   Cardiovascular: Normal rate, regular rhythm, S1 normal, S2 normal, intact distal pulses and normal pulses.  PMI is not displaced.  Exam reveals no gallop, no distant heart sounds, no friction rub, no midsystolic click and no opening snap.    No murmur heard.  Pulses:       Radial pulses are 2+ on the right side, and 2+ on the left side.        Dorsalis pedis pulses are 2+ on the right side, and 2+ on the left side.        Posterior tibial pulses are 2+ on the right side, and 2+ on the left side.   Pulmonary/Chest: Effort normal and breath sounds normal. He has no wheezes. He has no rales.   Abdominal: " Soft. Bowel sounds are normal. He exhibits no mass. There is no tenderness. There is no guarding.       Diagnostic Data:  Procedures      ASSESSMENT:   Diagnosis Plan   1. Coronary artery disease involving native heart without angina pectoris, unspecified vessel or lesion type     2. Dyslipidemia     3. Essential hypertension         PLAN:  1. Dyslipidemia, well-controlled on current regimen.   2. Hypertension, controlled.     Sleep apnea treated.                  3.  Acceptable cardiac wrist to undergo knee surgery of deemed indicated    Zane Magallon MD, thank you for referring Mr. Dickson for evaluation.  I have forwarded my electronically generated recommendations to you for review.  Please do not hesitate to call with any questions.      Suresh Ojeda MD, [facility]

## 2018-02-13 ENCOUNTER — TELEPHONE (OUTPATIENT)
Dept: INTERNAL MEDICINE | Facility: CLINIC | Age: 73
End: 2018-02-13

## 2018-02-23 ENCOUNTER — TELEPHONE (OUTPATIENT)
Dept: INTERNAL MEDICINE | Facility: CLINIC | Age: 73
End: 2018-02-23

## 2018-02-23 NOTE — TELEPHONE ENCOUNTER
I attempted to reach patient to inform that after completing a prior authorization for Vytorin brand name, his insurance has denied the request. They did state that he can file a request withThe Grievance and Appeals Dept. at [facility]. Appeal requests must come from the member according to the chat with [email or web address]. He will need to contact his insurance.     I was unable to leave him a message as well.   Thank you.

## 2018-02-28 ENCOUNTER — TELEPHONE (OUTPATIENT)
Dept: INTERNAL MEDICINE | Facility: CLINIC | Age: 73
End: 2018-02-28

## 2018-03-01 DIAGNOSIS — E78.2 MIXED HYPERLIPIDEMIA: Primary | ICD-10-CM

## 2018-03-01 DIAGNOSIS — E55.9 VITAMIN D DEFICIENCY: ICD-10-CM

## 2018-03-01 DIAGNOSIS — N40.0 BENIGN PROSTATIC HYPERPLASIA WITHOUT LOWER URINARY TRACT SYMPTOMS: ICD-10-CM

## 2018-03-01 DIAGNOSIS — I10 ESSENTIAL HYPERTENSION: ICD-10-CM

## 2018-03-01 DIAGNOSIS — I25.10 CORONARY ARTERY DISEASE INVOLVING NATIVE HEART WITHOUT ANGINA PECTORIS, UNSPECIFIED VESSEL OR LESION TYPE: ICD-10-CM

## 2018-03-01 DIAGNOSIS — E03.9 ACQUIRED HYPOTHYROIDISM: ICD-10-CM

## 2018-03-01 DIAGNOSIS — Z12.5 ENCOUNTER FOR SCREENING FOR MALIGNANT NEOPLASM OF PROSTATE: ICD-10-CM

## 2018-03-02 ENCOUNTER — HOSPITAL ENCOUNTER (OUTPATIENT)
Dept: OTHER | Age: 73
Discharge: HOME | End: 2018-03-02
Attending: INTERNAL MEDICINE | Admitting: INTERNAL MEDICINE

## 2018-03-02 LAB
A/G RATIO: 2.3 (ref 0.8–2)
ALBUMIN SERPL-MCNC: 4.6 G/DL (ref 3.4–4.8)
ALP BLD-CCNC: 49 U/L (ref 25–100)
ALT SERPL-CCNC: 24 U/L (ref 4–36)
ANION GAP SERPL CALCULATED.3IONS-SCNC: 13 MMOL/L (ref 3–16)
AST SERPL-CCNC: 19 U/L (ref 8–33)
BASOPHILS ABSOLUTE: 0 K/UL (ref 0–0.1)
BASOPHILS RELATIVE PERCENT: 0.2 %
BILIRUB SERPL-MCNC: 1.5 MG/DL (ref 0.3–1.2)
BUN BLDV-MCNC: 11 MG/DL (ref 6–20)
CALCIUM SERPL-MCNC: 8.8 MG/DL (ref 8.5–10.5)
CHLORIDE BLD-SCNC: 100 MMOL/L (ref 98–107)
CHOLESTEROL, TOTAL: 162 MG/DL (ref 0–200)
CO2: 24 MMOL/L (ref 20–30)
CREAT SERPL-MCNC: 0.7 MG/DL (ref 0.4–1.2)
EOSINOPHILS ABSOLUTE: 0.1 K/UL (ref 0–0.4)
EOSINOPHILS RELATIVE PERCENT: 1.1 %
GFR AFRICAN AMERICAN: >59
GFR NON-AFRICAN AMERICAN: >59
GLOBULIN: 2 G/DL
GLUCOSE BLD-MCNC: 115 MG/DL (ref 74–106)
HCT VFR BLD CALC: 45.6 % (ref 40–54)
HDLC SERPL-MCNC: 73 MG/DL (ref 40–60)
HEMOGLOBIN: 15.3 G/DL (ref 13–18)
IMMATURE GRANULOCYTES #: 0 K/UL
IMMATURE GRANULOCYTES %: 0.4 % (ref 0–5)
LDL CHOLESTEROL CALCULATED: 67 MG/DL
LYMPHOCYTES ABSOLUTE: 1.4 K/UL (ref 1.5–4)
LYMPHOCYTES RELATIVE PERCENT: 26.3 %
MCH RBC QN AUTO: 33 PG (ref 27–32)
MCHC RBC AUTO-ENTMCNC: 33.6 G/DL (ref 31–35)
MCV RBC AUTO: 98.3 FL (ref 80–100)
MONOCYTES ABSOLUTE: 0.5 K/UL (ref 0.2–0.8)
MONOCYTES RELATIVE PERCENT: 9.5 %
NEUTROPHILS ABSOLUTE: 3.3 K/UL (ref 2–7.5)
NEUTROPHILS RELATIVE PERCENT: 62.5 %
PDW BLD-RTO: 12.7 % (ref 11–16)
PLATELET # BLD: 112 K/UL (ref 150–400)
PMV BLD AUTO: 9.3 FL (ref 6–10)
POTASSIUM SERPL-SCNC: 4.1 MMOL/L (ref 3.4–5.1)
PROSTATE SPECIFIC ANTIGEN: 1.12 NG/ML (ref 0–4)
RBC # BLD: 4.64 M/UL (ref 4.5–6)
SODIUM BLD-SCNC: 137 MMOL/L (ref 136–145)
T4 FREE: 1.44 NG/DL (ref 0.89–1.76)
TOTAL PROTEIN: 6.6 G/DL (ref 6.4–8.3)
TRIGL SERPL-MCNC: 112 MG/DL (ref 0–249)
TSH SERPL DL<=0.05 MIU/L-ACNC: 1.05 UIU/ML (ref 0.35–5.5)
VITAMIN B-12: 796 PG/ML (ref 211–911)
VLDLC SERPL CALC-MCNC: 22 MG/DL
WBC # BLD: 5.3 K/UL (ref 4–11)

## 2018-03-06 ENCOUNTER — OFFICE VISIT (OUTPATIENT)
Dept: INTERNAL MEDICINE | Facility: CLINIC | Age: 73
End: 2018-03-06

## 2018-03-06 VITALS
HEIGHT: 70 IN | BODY MASS INDEX: 30.21 KG/M2 | RESPIRATION RATE: 16 BRPM | WEIGHT: 211 LBS | DIASTOLIC BLOOD PRESSURE: 57 MMHG | TEMPERATURE: 97.9 F | SYSTOLIC BLOOD PRESSURE: 102 MMHG | OXYGEN SATURATION: 97 % | HEART RATE: 65 BPM

## 2018-03-06 DIAGNOSIS — E78.2 MIXED HYPERLIPIDEMIA: ICD-10-CM

## 2018-03-06 DIAGNOSIS — I10 ESSENTIAL HYPERTENSION: Primary | ICD-10-CM

## 2018-03-06 DIAGNOSIS — E03.9 ACQUIRED HYPOTHYROIDISM: ICD-10-CM

## 2018-03-06 DIAGNOSIS — E55.9 VITAMIN D DEFICIENCY: ICD-10-CM

## 2018-03-06 PROCEDURE — 99214 OFFICE O/P EST MOD 30 MIN: CPT | Performed by: INTERNAL MEDICINE

## 2018-03-06 NOTE — PROGRESS NOTES
Subjective     Patient ID: Deniz Dickson is a 72 y.o. male. Patient is here for management of multiple medical problems.     Chief Complaint   Patient presents with   • Hyperlipidemia     follow-up   • medication refills     patient needs refills on Aspirin and Celebrex sent to [facility]     Hyperlipidemia   This is a chronic problem. The problem is controlled. Recent lipid tests were reviewed and are normal. He has no history of chronic renal disease or diabetes. There are no known factors aggravating his hyperlipidemia. Pertinent negatives include no chest pain. Current antihyperlipidemic treatment includes statins and ezetimibe. The current treatment provides moderate improvement of lipids. There are no compliance problems.  Risk factors for coronary artery disease include hypertension, male sex and a sedentary lifestyle.   Hypertension   This is a chronic problem. The problem is unchanged. The problem is controlled. Pertinent negatives include no chest pain. There are no associated agents to hypertension. Risk factors for coronary artery disease include dyslipidemia, male gender and sedentary lifestyle. Past treatments include beta blockers. There is no history of chronic renal disease.      Pt had labs done at [facility].          The following portions of the patient's history were reviewed and updated as appropriate: allergies, current medications, past family history, past medical history, past social history, past surgical history and problem list.    Review of Systems   Constitutional: Negative for fatigue.   HENT: Negative for congestion and postnasal drip.    Cardiovascular: Negative for chest pain and leg swelling.   All other systems reviewed and are negative.      Current Outpatient Prescriptions:   •  aspirin 81 MG EC tablet, Take 1 tablet by mouth Daily., Disp: 90 tablet, Rfl: 3  •  bisoprolol (ZEBeta) 5 MG tablet, Take 1 tablet by mouth Daily. 1/2 tablet daily, Disp: 45 tablet, Rfl: 3  •  celecoxib  "(CeleBREX) 100 MG capsule, Take 100 mg by mouth Daily. 200 mg in AM & 100 mg in PM, Disp: , Rfl:   •  Cholecalciferol (VITAMIN D-3 PO), Take  by mouth Daily., Disp: , Rfl:   •  CHONDROITIN SULFATE A PO, Take 1,103 mg by mouth., Disp: , Rfl:   •  fexofenadine (ALLEGRA) 180 MG tablet, Take 180 mg by mouth Daily., Disp: , Rfl:   •  finasteride (PROSCAR) 5 MG tablet, Take 1 tablet by mouth Daily., Disp: 90 tablet, Rfl: 3  •  fluticasone (FLONASE) 50 MCG/ACT nasal spray, 1 spray into each nostril., Disp: , Rfl:   •  Glucosamine HCl 1500 MG tablet, Take 1,500 mg by mouth 2 (Two) Times a Day., Disp: , Rfl:   •  Glucosamine-Chondroitin (OSTEO BI-FLEX REGULAR STRENGTH PO), Take 1 tablet by mouth 2 (two) times a day., Disp: , Rfl:   •  levothyroxine (SYNTHROID, LEVOTHROID) 112 MCG tablet, Take 1 tablet by mouth Daily., Disp: 90 tablet, Rfl: 2  •  Multiple Vitamins-Minerals (CENTRUM SILVER ADULT 50+) tablet, Take 1 tablet by mouth daily., Disp: , Rfl:   •  Omega-3 Fatty Acids (FISH OIL) 1200 MG capsule capsule, Take 1,200 mg by mouth 2 (Two) Times a Day With Meals., Disp: , Rfl:   •  potassium gluconate 595 MG tablet tablet, Take 1 tablet by mouth Daily., Disp: , Rfl:   •  vitamin B-12 (CYANOCOBALAMIN) 1000 MCG tablet, Take  by mouth Daily., Disp: , Rfl:   •  VYTORIN 10-20 MG per tablet, Take 1 tablet by mouth Every Night., Disp: 90 tablet, Rfl: 3    Objective      Blood pressure 102/57, pulse 65, temperature 97.9 °F (36.6 °C), temperature source Oral, resp. rate 16, height 177.8 cm (70\"), weight 95.7 kg (211 lb), SpO2 97 %.    Physical Exam     General Appearance:    Alert, cooperative, no distress, appears stated age   Head:    Normocephalic, without obvious abnormality, atraumatic   Eyes:    PERRL, conjunctiva/corneas clear, EOM's intact   Ears:    Normal TM's and external ear canals, both ears   Nose:   Nares normal, septum midline, mucosa normal, no drainage   or sinus tenderness   Throat:   Lips, mucosa, and tongue " normal; teeth and gums normal   Neck:   Supple, symmetrical, trachea midline, no adenopathy;        thyroid:  No enlargement/tenderness/nodules; no carotid    bruit or JVD   Back:     Symmetric, no curvature, ROM normal, no CVA tenderness   Lungs:     Clear to auscultation bilaterally, respirations unlabored   Chest wall:    No tenderness or deformity   Heart:    Regular rate and rhythm, S1 and S2 normal, no murmur,        rub or gallop   Abdomen:     Soft, non-tender, bowel sounds active all four quadrants,     no masses, no organomegaly   Extremities:   Extremities normal, atraumatic, no cyanosis or edema   Pulses:   2+ and symmetric all extremities   Skin:   Skin color, texture, turgor normal, no rashes or lesions   Lymph nodes:   Cervical, supraclavicular, and axillary nodes normal   Neurologic:   CNII-XII intact. Normal strength, sensation and reflexes       throughout      Results for orders placed or performed in visit on 09/06/17   TSH   Result Value Ref Range    TSH 1.320 0.470 - 4.680 mIU/mL   T4, Free   Result Value Ref Range    Free T4 1.13 0.78 - 2.19 ng/dL   T3, Free   Result Value Ref Range    T3, Free 2.9 2.0 - 4.4 pg/mL   Vitamin B12   Result Value Ref Range    Vitamin B-12 942 (H) 239 - 931 pg/mL   Comprehensive Metabolic Panel   Result Value Ref Range    Glucose 102 (H) 74 - 98 mg/dL    BUN 15 7 - 20 mg/dL    Creatinine 0.70 0.60 - 1.30 mg/dL    eGFR Non African Am 111 >60 mL/min/1.73    eGFR African Am 134 >60 mL/min/1.73    BUN/Creatinine Ratio 21.4 6.3 - 21.9    Sodium 139 137 - 145 mmol/L    Potassium 4.7 3.5 - 5.1 mmol/L    Chloride 101 98 - 107 mmol/L    Total CO2 26.0 26.0 - 30.0 mmol/L    Calcium 9.4 8.4 - 10.2 mg/dL    Total Protein 7.4 6.3 - 8.2 g/dL    Albumin 4.70 3.50 - 5.00 g/dL    Globulin 2.7 gm/dL    A/G Ratio 1.7 1.0 - 2.0 g/dL    Total Bilirubin 1.9 (H) 0.2 - 1.3 mg/dL    Alkaline Phosphatase 57 38 - 126 U/L    AST (SGOT) 39 15 - 46 U/L    ALT (SGPT) 47 13 - 69 U/L   Lipid Panel    Result Value Ref Range    Total Cholesterol 175 0 - 199 mg/dL    Triglycerides 124 <150 mg/dL    HDL Cholesterol 68 (H) 40 - 60 mg/dL    VLDL Cholesterol 24.8 mg/dL    LDL Cholesterol  82 0 - 99 mg/dL   CBC & Differential   Result Value Ref Range    WBC 5.67 4.80 - 10.80 10*3/mm3    RBC 4.70 4.70 - 6.10 10*6/mm3    Hemoglobin 15.2 14.0 - 18.0 g/dL    Hematocrit 45.5 42.0 - 52.0 %    MCV 96.8 (H) 80.0 - 94.0 fL    MCH 32.3 (H) 27.0 - 31.0 pg    MCHC 33.4 30.0 - 37.0 g/dL    RDW 12.7 11.5 - 14.5 %    Platelets 121 (L) 130 - 400 10*3/mm3    Neutrophil Rel % 62.6 37.0 - 80.0 %    Lymphocyte Rel % 25.4 10.0 - 50.0 %    Monocyte Rel % 10.4 0.0 - 12.0 %    Eosinophil Rel % 1.2 0.0 - 7.0 %    Basophil Rel % 0.2 0.0 - 2.5 %    Neutrophils Absolute 3.55 2.00 - 6.90 10*3/mm3    Lymphocytes Absolute 1.44 0.60 - 3.40 10*3/mm3    Monocytes Absolute 0.59 0.00 - 0.90 10*3/mm3    Eosinophils Absolute 0.07 0.00 - 0.70 10*3/mm3    Basophils Absolute 0.01 0.00 - 0.20 10*3/mm3    Immature Granulocyte Rel % 0.2 0.0 - 0.6 %    Immature Grans Absolute 0.01 0.00 - 0.06 10*3/mm3    nRBC 0.0 0.0 - 0.0 /100 WBC         Assessment/Plan     Labs look good.    Labs located in care everywhere.     Pt stable and doing well.      Deniz was seen today for hyperlipidemia and medication refills.    Diagnoses and all orders for this visit:    Essential hypertension  -     TSH  -     T4, Free  -     Vitamin B12  -     Vitamin D 25 Hydroxy  -     Lipid Panel  -     CBC & Differential  -     Comprehensive Metabolic Panel    Mixed hyperlipidemia  -     TSH  -     T4, Free  -     Vitamin B12  -     Vitamin D 25 Hydroxy  -     Lipid Panel  -     CBC & Differential  -     Comprehensive Metabolic Panel    Acquired hypothyroidism  -     TSH  -     T4, Free  -     Vitamin B12  -     Vitamin D 25 Hydroxy  -     Lipid Panel  -     CBC & Differential  -     Comprehensive Metabolic Panel    Vitamin D deficiency  -     TSH  -     T4, Free  -     Vitamin B12  -      Vitamin D 25 Hydroxy  -     Lipid Panel  -     CBC & Differential  -     Comprehensive Metabolic Panel    Return in about 6 months (around 9/6/2018).          There are no Patient Instructions on file for this visit.     Zane Magallon MD    Assessment/Plan

## 2018-04-16 RX ORDER — BISOPROLOL FUMARATE 5 MG/1
TABLET, FILM COATED ORAL
Qty: 45 TABLET | Refills: 2 | Status: SHIPPED | OUTPATIENT
Start: 2018-04-16 | End: 2019-01-07 | Stop reason: SDUPTHER

## 2018-04-17 RX ORDER — CELECOXIB 100 MG/1
100 CAPSULE ORAL DAILY
Qty: 90 CAPSULE | Refills: 3 | Status: SHIPPED | OUTPATIENT
Start: 2018-04-17 | End: 2019-06-09 | Stop reason: SDUPTHER

## 2018-04-17 RX ORDER — CELECOXIB 100 MG/1
CAPSULE ORAL
Qty: 60 CAPSULE | Refills: 4 | Status: SHIPPED | OUTPATIENT
Start: 2018-04-17 | End: 2018-09-23 | Stop reason: SDUPTHER

## 2018-05-29 RX ORDER — ASPIRIN 81 MG/1
81 TABLET ORAL DAILY
Qty: 90 TABLET | Refills: 1 | Status: SHIPPED | OUTPATIENT
Start: 2018-05-29

## 2018-06-01 DIAGNOSIS — R97.20 ELEVATED PROSTATE SPECIFIC ANTIGEN (PSA): Primary | ICD-10-CM

## 2018-06-02 ENCOUNTER — LAB (OUTPATIENT)
Dept: LAB | Facility: HOSPITAL | Age: 73
End: 2018-06-02
Attending: UROLOGY

## 2018-06-02 DIAGNOSIS — R97.20 ELEVATED PROSTATE SPECIFIC ANTIGEN (PSA): ICD-10-CM

## 2018-06-02 LAB — PSA SERPL-MCNC: 1.07 NG/ML (ref 0.06–4)

## 2018-06-02 PROCEDURE — 84153 ASSAY OF PSA TOTAL: CPT

## 2018-06-05 ENCOUNTER — HOSPITAL ENCOUNTER (OUTPATIENT)
Dept: OTHER | Age: 73
Discharge: HOME | End: 2018-06-05
Attending: INTERNAL MEDICINE | Admitting: INTERNAL MEDICINE

## 2018-06-05 LAB
A/G RATIO: 2.4 (ref 0.8–2)
ALBUMIN SERPL-MCNC: 4.8 G/DL (ref 3.4–4.8)
ALP BLD-CCNC: 55 U/L (ref 25–100)
ALT SERPL-CCNC: 34 U/L (ref 4–36)
ANION GAP SERPL CALCULATED.3IONS-SCNC: 12 MMOL/L (ref 3–16)
AST SERPL-CCNC: 26 U/L (ref 8–33)
BASOPHILS ABSOLUTE: 0 K/UL (ref 0–0.1)
BASOPHILS RELATIVE PERCENT: 0 %
BILIRUB SERPL-MCNC: 1.4 MG/DL (ref 0.3–1.2)
BUN BLDV-MCNC: 12 MG/DL (ref 6–20)
CALCIUM SERPL-MCNC: 9.4 MG/DL (ref 8.5–10.5)
CHLORIDE BLD-SCNC: 101 MMOL/L (ref 98–107)
CO2: 26 MMOL/L (ref 20–30)
CREAT SERPL-MCNC: 0.8 MG/DL (ref 0.4–1.2)
EOSINOPHILS ABSOLUTE: 0 K/UL (ref 0–0.4)
EOSINOPHILS RELATIVE PERCENT: 0.7 %
GFR AFRICAN AMERICAN: >59
GFR NON-AFRICAN AMERICAN: >59
GLOBULIN: 2 G/DL
GLUCOSE BLD-MCNC: 115 MG/DL (ref 74–106)
HCT VFR BLD CALC: 45.7 % (ref 40–54)
HEMOGLOBIN: 15.6 G/DL (ref 13–18)
IMMATURE GRANULOCYTES #: 0 K/UL
IMMATURE GRANULOCYTES %: 0.2 % (ref 0–5)
LYMPHOCYTES ABSOLUTE: 1.2 K/UL (ref 1.5–4)
LYMPHOCYTES RELATIVE PERCENT: 21.1 %
MCH RBC QN AUTO: 33.2 PG (ref 27–32)
MCHC RBC AUTO-ENTMCNC: 34.1 G/DL (ref 31–35)
MCV RBC AUTO: 97.2 FL (ref 80–100)
MONOCYTES ABSOLUTE: 0.5 K/UL (ref 0.2–0.8)
MONOCYTES RELATIVE PERCENT: 9.3 %
NEUTROPHILS ABSOLUTE: 3.9 K/UL (ref 2–7.5)
NEUTROPHILS RELATIVE PERCENT: 68.7 %
PDW BLD-RTO: 12.9 % (ref 11–16)
PLATELET # BLD: 109 K/UL (ref 150–400)
PMV BLD AUTO: 8.7 FL (ref 6–10)
POTASSIUM SERPL-SCNC: 4.5 MMOL/L (ref 3.4–5.1)
RBC # BLD: 4.7 M/UL (ref 4.5–6)
SODIUM BLD-SCNC: 139 MMOL/L (ref 136–145)
TOTAL PROTEIN: 6.8 G/DL (ref 6.4–8.3)
WBC # BLD: 5.7 K/UL (ref 4–11)

## 2018-06-29 ENCOUNTER — OFFICE VISIT (OUTPATIENT)
Dept: UROLOGY | Facility: CLINIC | Age: 73
End: 2018-06-29

## 2018-06-29 VITALS
BODY MASS INDEX: 30.21 KG/M2 | OXYGEN SATURATION: 97 % | HEART RATE: 76 BPM | HEIGHT: 70 IN | WEIGHT: 211 LBS | TEMPERATURE: 97.8 F | DIASTOLIC BLOOD PRESSURE: 60 MMHG | SYSTOLIC BLOOD PRESSURE: 110 MMHG

## 2018-06-29 DIAGNOSIS — R97.20 ELEVATED PROSTATE SPECIFIC ANTIGEN (PSA): Primary | ICD-10-CM

## 2018-06-29 DIAGNOSIS — N52.01 ERECTILE DYSFUNCTION DUE TO ARTERIAL INSUFFICIENCY: ICD-10-CM

## 2018-06-29 DIAGNOSIS — N40.0 BENIGN NON-NODULAR PROSTATIC HYPERPLASIA WITHOUT LOWER URINARY TRACT SYMPTOMS: ICD-10-CM

## 2018-06-29 LAB
BILIRUB BLD-MCNC: NEGATIVE MG/DL
CLARITY, POC: CLEAR
COLOR UR: YELLOW
GLUCOSE UR STRIP-MCNC: NEGATIVE MG/DL
KETONES UR QL: NEGATIVE
LEUKOCYTE EST, POC: NEGATIVE
NITRITE UR-MCNC: NEGATIVE MG/ML
PH UR: 5.5 [PH] (ref 5–8)
PROT UR STRIP-MCNC: NEGATIVE MG/DL
RBC # UR STRIP: NEGATIVE /UL
SP GR UR: 1.02 (ref 1–1.03)
UROBILINOGEN UR QL: NORMAL

## 2018-06-29 PROCEDURE — 81003 URINALYSIS AUTO W/O SCOPE: CPT | Performed by: UROLOGY

## 2018-06-29 PROCEDURE — 99213 OFFICE O/P EST LOW 20 MIN: CPT | Performed by: UROLOGY

## 2018-06-29 RX ORDER — SILDENAFIL CITRATE 20 MG/1
60 TABLET ORAL DAILY PRN
Qty: 30 TABLET | Refills: 11 | Status: SHIPPED | OUTPATIENT
Start: 2018-06-29 | End: 2019-03-11 | Stop reason: SDUPTHER

## 2018-06-29 RX ORDER — FINASTERIDE 5 MG/1
5 TABLET, FILM COATED ORAL DAILY
Qty: 90 TABLET | Refills: 3 | Status: SHIPPED | OUTPATIENT
Start: 2018-06-29 | End: 2019-06-10 | Stop reason: SDUPTHER

## 2018-06-29 NOTE — PROGRESS NOTES
Chief Complaint  Annual Exam (Patient is here for a yearly follow up)        ELMER Dickson is a 73 y.o. male who returns today for annual follow-up generally doing well.  He was recently referred for elevated PSA was found to have a markedly enlarged prostate along with his symptoms of outlet obstruction.  After taking Proscar to shrink the gland he's voiding better and his PSA is down.  It was most recently measured at 1.07.  He continues to have very mild symptoms of outlet obstruction but his bothersome index is low and he is not interested in additional medication like an alpha-blocker.    Vitals:    06/29/18 0902   BP: 110/60   Pulse: 76   Temp: 97.8 °F (36.6 °C)   SpO2: 97%       Past Medical History  Past Medical History:   Diagnosis Date   • Coronary artery disease     Probable Non obstructive    • Dyslipidemia    • Elevated prostate specific antigen (PSA)    • Essential hypertension, benign    • H/O benign prostatic hypertrophy    • High cholesterol    • History of hemorrhoids    • History of idiopathic thrombocytopenic purpura    • History of malignant neoplasm of skin    • History of osteoarthritis    • History of sleep apnea    • Hypertension    • Hypothyroidism    • JUWAN on CPAP     greater than 5 years    • S/P right knee surgery 07/12/2017   • Tobacco abuse        Past Surgical History  Past Surgical History:   Procedure Laterality Date   • APPENDECTOMY     • COLONOSCOPY      Complete Colonoscopy   • HEMORRHOIDECTOMY     • HERNIA REPAIR     • KNEE ARTHROPLASTY, PARTIAL REPLACEMENT Right 07/12/2017   • KNEE SURGERY      Left Knee   • OTHER SURGICAL HISTORY      Surgery Testis Orchiopexy       Medications  has a current medication list which includes the following prescription(s): aspirin, bisoprolol, celecoxib, celecoxib, cholecalciferol, chondroitin sulfate a, fexofenadine, finasteride, fluticasone, glucosamine hcl, glucosamine-chondroitin, levothyroxine, centrum silver adult 50+, fish oil, potassium  gluconate, vitamin b-12, and vytorin.      Allergies  Allergies   Allergen Reactions   • Penicillins Itching and Rash       Social History  Social History     Social History Narrative   • No narrative on file       Family History  He has no family history of bladder or kidney cancer  He has no family history of kidney stones      AUA Symptom Score:      Review of Systems  Review of Systems    Physical Exam  Physical Exam   Constitutional: He is oriented to person, place, and time. He appears well-developed and well-nourished.   HENT:   Head: Normocephalic and atraumatic.   Neck: Normal range of motion.   Pulmonary/Chest: Effort normal. No respiratory distress.   Abdominal: Soft. He exhibits no distension and no mass. There is no tenderness. No hernia.   Genitourinary: Rectum normal and prostate normal.   Musculoskeletal: Normal range of motion.   Lymphadenopathy:     He has no cervical adenopathy.   Neurological: He is alert and oriented to person, place, and time.   Skin: Skin is warm and dry.   Psychiatric: He has a normal mood and affect. His behavior is normal.   Vitals reviewed.      Labs Recent and today in the office:  Results for orders placed or performed in visit on 06/29/18   POC Urinalysis Dipstick, Automated   Result Value Ref Range    Color Yellow Yellow, Straw, Dark Yellow, Christal    Clarity, UA Clear Clear    Specific Gravity  1.025 1.005 - 1.030    pH, Urine 5.5 5.0 - 8.0    Leukocytes Negative Negative    Nitrite, UA Negative Negative    Protein, POC Negative Negative mg/dL    Glucose, UA Negative Negative, 1000 mg/dL (3+) mg/dL    Ketones, UA Negative Negative    Urobilinogen, UA Normal Normal    Bilirubin Negative Negative    Blood, UA Negative Negative         Assessment & Plan  #1 BPH with outlet obstruction: Currently voiding adequately on Proscar and not interested in additional therapeutic measures    #2 elevated PSA: At this point down to normal    #3 erectile dysfunction: Asking for  prescription for generic sildenafil to be printed out so he can shop pharmacies.

## 2018-09-05 ENCOUNTER — HOSPITAL ENCOUNTER (OUTPATIENT)
Facility: HOSPITAL | Age: 73
Discharge: HOME | End: 2018-09-05
Payer: MEDICARE

## 2018-09-05 LAB
A/G RATIO: 2 (ref 0.8–2)
ALBUMIN SERPL-MCNC: 4.9 G/DL (ref 3.4–4.8)
ALP BLD-CCNC: 54 U/L (ref 25–100)
ALT SERPL-CCNC: 29 U/L (ref 4–36)
ANION GAP SERPL CALCULATED.3IONS-SCNC: 12 MMOL/L (ref 3–16)
AST SERPL-CCNC: 27 U/L (ref 8–33)
BASOPHILS ABSOLUTE: 0 K/UL (ref 0–0.1)
BASOPHILS RELATIVE PERCENT: 0.2 %
BILIRUB SERPL-MCNC: 1.3 MG/DL (ref 0.3–1.2)
BUN BLDV-MCNC: 18 MG/DL (ref 6–20)
CALCIUM SERPL-MCNC: 10 MG/DL (ref 8.5–10.5)
CHLORIDE BLD-SCNC: 103 MMOL/L (ref 98–107)
CHOLESTEROL, TOTAL: 203 MG/DL (ref 0–200)
CO2: 26 MMOL/L (ref 20–30)
CREAT SERPL-MCNC: 0.8 MG/DL (ref 0.4–1.2)
EOSINOPHILS ABSOLUTE: 0.1 K/UL (ref 0–0.4)
EOSINOPHILS RELATIVE PERCENT: 1.6 %
GFR AFRICAN AMERICAN: >59
GFR NON-AFRICAN AMERICAN: >59
GLOBULIN: 2.4 G/DL
GLUCOSE BLD-MCNC: 123 MG/DL (ref 74–106)
HCT VFR BLD CALC: 47.1 % (ref 40–54)
HDLC SERPL-MCNC: 74 MG/DL (ref 40–60)
HEMOGLOBIN: 15.8 G/DL (ref 13–18)
IMMATURE GRANULOCYTES #: 0 K/UL
IMMATURE GRANULOCYTES %: 0.4 % (ref 0–5)
LDL CHOLESTEROL CALCULATED: 99 MG/DL
LYMPHOCYTES ABSOLUTE: 1.4 K/UL (ref 1.5–4)
LYMPHOCYTES RELATIVE PERCENT: 25.4 %
MCH RBC QN AUTO: 33 PG (ref 27–32)
MCHC RBC AUTO-ENTMCNC: 33.5 G/DL (ref 31–35)
MCV RBC AUTO: 98.3 FL (ref 80–100)
MONOCYTES ABSOLUTE: 0.6 K/UL (ref 0.2–0.8)
MONOCYTES RELATIVE PERCENT: 10.8 %
NEUTROPHILS ABSOLUTE: 3.4 K/UL (ref 2–7.5)
NEUTROPHILS RELATIVE PERCENT: 61.6 %
PDW BLD-RTO: 12.7 % (ref 11–16)
PLATELET # BLD: 115 K/UL (ref 150–400)
PMV BLD AUTO: 9.6 FL (ref 6–10)
POTASSIUM SERPL-SCNC: 4.9 MMOL/L (ref 3.4–5.1)
RBC # BLD: 4.79 M/UL (ref 4.5–6)
SODIUM BLD-SCNC: 141 MMOL/L (ref 136–145)
T4 FREE: 1.44 NG/DL (ref 0.89–1.76)
TOTAL PROTEIN: 7.3 G/DL (ref 6.4–8.3)
TRIGL SERPL-MCNC: 148 MG/DL (ref 0–249)
TSH SERPL DL<=0.05 MIU/L-ACNC: 0.78 UIU/ML (ref 0.35–5.5)
VITAMIN B-12: 825 PG/ML (ref 211–911)
VITAMIN D 25-HYDROXY: 31.8 (ref 32–100)
VLDLC SERPL CALC-MCNC: 30 MG/DL
WBC # BLD: 5.6 K/UL (ref 4–11)

## 2018-09-05 PROCEDURE — 36415 COLL VENOUS BLD VENIPUNCTURE: CPT

## 2018-09-05 PROCEDURE — 84443 ASSAY THYROID STIM HORMONE: CPT

## 2018-09-05 PROCEDURE — 85025 COMPLETE CBC W/AUTO DIFF WBC: CPT

## 2018-09-05 PROCEDURE — 80053 COMPREHEN METABOLIC PANEL: CPT

## 2018-09-05 PROCEDURE — 82607 VITAMIN B-12: CPT

## 2018-09-05 PROCEDURE — 80061 LIPID PANEL: CPT

## 2018-09-05 PROCEDURE — 84439 ASSAY OF FREE THYROXINE: CPT

## 2018-09-05 PROCEDURE — 82306 VITAMIN D 25 HYDROXY: CPT

## 2018-09-06 LAB — PROSTATE SPECIFIC ANTIGEN: 1.13 NG/ML (ref 0–4)

## 2018-09-10 ENCOUNTER — OFFICE VISIT (OUTPATIENT)
Dept: INTERNAL MEDICINE | Facility: CLINIC | Age: 73
End: 2018-09-10

## 2018-09-10 VITALS
HEART RATE: 65 BPM | SYSTOLIC BLOOD PRESSURE: 130 MMHG | WEIGHT: 214 LBS | DIASTOLIC BLOOD PRESSURE: 66 MMHG | RESPIRATION RATE: 16 BRPM | HEIGHT: 70 IN | BODY MASS INDEX: 30.64 KG/M2 | TEMPERATURE: 97.3 F | OXYGEN SATURATION: 97 %

## 2018-09-10 DIAGNOSIS — E03.9 ACQUIRED HYPOTHYROIDISM: ICD-10-CM

## 2018-09-10 DIAGNOSIS — M72.2 PLANTAR FASCIITIS: ICD-10-CM

## 2018-09-10 DIAGNOSIS — I10 ESSENTIAL HYPERTENSION: Primary | ICD-10-CM

## 2018-09-10 PROCEDURE — 99214 OFFICE O/P EST MOD 30 MIN: CPT | Performed by: INTERNAL MEDICINE

## 2018-09-10 NOTE — PATIENT INSTRUCTIONS
Plantar Fasciitis  Plantar fasciitis is a painful foot condition that affects the heel. It occurs when the band of tissue that connects the toes to the heel bone (plantar fascia) becomes irritated. This can happen after exercising too much or doing other repetitive activities (overuse injury). The pain from plantar fasciitis can range from mild irritation to severe pain that makes it difficult for you to walk or move. The pain is usually worse in the morning or after you have been sitting or lying down for a while.  What are the causes?  This condition may be caused by:  · Standing for long periods of time.  · Wearing shoes that do not fit.  · Doing high-impact activities, including running, aerobics, and ballet.  · Being overweight.  · Having an abnormal way of walking (gait).  · Having tight calf muscles.  · Having high arches in your feet.  · Starting a new athletic activity.    What are the signs or symptoms?  The main symptom of this condition is heel pain. Other symptoms include:  · Pain that gets worse after activity or exercise.  · Pain that is worse in the morning or after resting.  · Pain that goes away after you walk for a few minutes.    How is this diagnosed?  This condition may be diagnosed based on your signs and symptoms. Your health care provider will also do a physical exam to check for:  · A tender area on the bottom of your foot.  · A high arch in your foot.  · Pain when you move your foot.  · Difficulty moving your foot.    You may also need to have imaging studies to confirm the diagnosis. These can include:  · X-rays.  · Ultrasound.  · MRI.    How is this treated?  Treatment for plantar fasciitis depends on the severity of the condition. Your treatment may include:  · Rest, ice, and over-the-counter pain medicines to manage your pain.  · Exercises to stretch your calves and your plantar fascia.  · A splint that holds your foot in a stretched, upward position while you sleep (night  splint).  · Physical therapy to relieve symptoms and prevent problems in the future.  · Cortisone injections to relieve severe pain.  · Extracorporeal shock wave therapy (ESWT) to stimulate damaged plantar fascia with electrical impulses. It is often used as a last resort before surgery.  · Surgery, if other treatments have not worked after 12 months.    Follow these instructions at home:  · Take medicines only as directed by your health care provider.  · Avoid activities that cause pain.  · Roll the bottom of your foot over a bag of ice or a bottle of cold water. Do this for 20 minutes, 3-4 times a day.  · Perform simple stretches as directed by your health care provider.  · Try wearing athletic shoes with air-sole or gel-sole cushions or soft shoe inserts.  · Wear a night splint while sleeping, if directed by your health care provider.  · Keep all follow-up appointments with your health care provider.  How is this prevented?  · Do not perform exercises or activities that cause heel pain.  · Consider finding low-impact activities if you continue to have problems.  · Lose weight if you need to.  The best way to prevent plantar fasciitis is to avoid the activities that aggravate your plantar fascia.  Contact a health care provider if:  · Your symptoms do not go away after treatment with home care measures.  · Your pain gets worse.  · Your pain affects your ability to move or do your daily activities.  This information is not intended to replace advice given to you by your health care provider. Make sure you discuss any questions you have with your health care provider.  Document Released: 09/12/2002 Document Revised: 05/22/2017 Document Reviewed: 10/28/2015  [employer] Interactive Patient Education © 2018 [employer]

## 2018-09-10 NOTE — PROGRESS NOTES
Subjective     Patient ID: Deniz Dickson is a 73 y.o. male. Patient is here for management of multiple medical problems.     Chief Complaint   Patient presents with   • Hypertension     follow-up     Hypertension   This is a chronic problem. The problem is unchanged. The problem is controlled. Pertinent negatives include no anxiety, blurred vision or headaches. There are no associated agents to hypertension. Past treatments include nothing. Current antihypertension treatment includes beta blockers. The current treatment provides moderate improvement.   Hypothyroidism   This is a chronic problem. The problem has been unchanged. Associated symptoms include arthralgias and joint swelling. Pertinent negatives include no headaches. Nothing aggravates the symptoms. He has tried nothing for the symptoms. The treatment provided moderate relief.              The following portions of the patient's history were reviewed and updated as appropriate: allergies, current medications, past family history, past medical history, past social history, past surgical history and problem list.    Review of Systems   HENT: Negative.    Eyes: Negative for blurred vision.   Musculoskeletal: Positive for arthralgias, back pain, gait problem and joint swelling.   Neurological: Negative for headaches.   Psychiatric/Behavioral: Negative for sleep disturbance.   All other systems reviewed and are negative.      Current Outpatient Prescriptions:   •  aspirin 81 MG EC tablet, Take 1 tablet by mouth Daily., Disp: 90 tablet, Rfl: 1  •  bisoprolol (ZEBeta) 5 MG tablet, TAKE ONE-HALF TABLET BY MOUTH DAILY, Disp: 45 tablet, Rfl: 2  •  celecoxib (CeleBREX) 100 MG capsule, TAKE TWO CAPSULES BY MOUTH DAILY AS NEEDED FOR MILD PAIN, Disp: 60 capsule, Rfl: 4  •  celecoxib (CeleBREX) 100 MG capsule, Take 1 capsule by mouth Daily. 200 mg in AM & 100 mg in PM, Disp: 90 capsule, Rfl: 3  •  Cholecalciferol (VITAMIN D-3 PO), Take  by mouth Daily., Disp: ,  "Rfl:   •  CHONDROITIN SULFATE A PO, Take 1,103 mg by mouth., Disp: , Rfl:   •  fexofenadine (ALLEGRA) 180 MG tablet, Take 180 mg by mouth Daily., Disp: , Rfl:   •  finasteride (PROSCAR) 5 MG tablet, Take 1 tablet by mouth Daily., Disp: 90 tablet, Rfl: 3  •  fluticasone (FLONASE) 50 MCG/ACT nasal spray, 1 spray into each nostril., Disp: , Rfl:   •  Glucosamine HCl 1500 MG tablet, Take 1,500 mg by mouth 2 (Two) Times a Day., Disp: , Rfl:   •  Glucosamine-Chondroitin (OSTEO BI-FLEX REGULAR STRENGTH PO), Take 1 tablet by mouth 2 (two) times a day., Disp: , Rfl:   •  levothyroxine (SYNTHROID, LEVOTHROID) 112 MCG tablet, Take 1 tablet by mouth Daily., Disp: 90 tablet, Rfl: 2  •  Multiple Vitamins-Minerals (CENTRUM SILVER ADULT 50+) tablet, Take 1 tablet by mouth daily., Disp: , Rfl:   •  Omega-3 Fatty Acids (FISH OIL) 1200 MG capsule capsule, Take 1,200 mg by mouth 2 (Two) Times a Day With Meals., Disp: , Rfl:   •  potassium gluconate 595 MG tablet tablet, Take 1 tablet by mouth Daily., Disp: , Rfl:   •  sildenafil (REVATIO) 20 MG tablet, Take 3 tablets by mouth Daily As Needed (ED)., Disp: 30 tablet, Rfl: 11  •  vitamin B-12 (CYANOCOBALAMIN) 1000 MCG tablet, Take  by mouth Daily., Disp: , Rfl:   •  VYTORIN 10-20 MG per tablet, Take 1 tablet by mouth Every Night., Disp: 90 tablet, Rfl: 3    Objective      Blood pressure 130/66, pulse 65, temperature 97.3 °F (36.3 °C), temperature source Oral, resp. rate 16, height 177.8 cm (70\"), weight 97.1 kg (214 lb), SpO2 97 %.    Physical Exam     General Appearance:    Alert, cooperative, no distress, appears stated age   Head:    Normocephalic, without obvious abnormality, atraumatic   Eyes:    PERRL, conjunctiva/corneas clear, EOM's intact   Ears:    Normal TM's and external ear canals, both ears   Nose:   Nares normal, septum midline, mucosa normal, no drainage   or sinus tenderness   Throat:   Lips, mucosa, and tongue normal; teeth and gums normal   Neck:   Supple, symmetrical, " trachea midline, no adenopathy;        thyroid:  No enlargement/tenderness/nodules; no carotid    bruit or JVD   Back:     Symmetric, no curvature, ROM normal, no CVA tenderness   Lungs:     Clear to auscultation bilaterally, respirations unlabored   Chest wall:    No tenderness or deformity   Heart:    Regular rate and rhythm, S1 and S2 normal, no murmur,        rub or gallop   Abdomen:     Soft, non-tender, bowel sounds active all four quadrants,     no masses, no organomegaly   Extremities:   ttp right foot.   Extremities normal, atraumatic, no cyanosis or edema   Pulses:   2+ and symmetric all extremities   Skin:   Skin color, texture, turgor normal, no rashes or lesions   Lymph nodes:   Cervical, supraclavicular, and axillary nodes normal   Neurologic:   CNII-XII intact. Normal strength, sensation and reflexes       throughout      Results for orders placed or performed in visit on 06/29/18   POC Urinalysis Dipstick, Automated   Result Value Ref Range    Color Yellow Yellow, Straw, Dark Yellow, Christal    Clarity, UA Clear Clear    Specific Gravity  1.025 1.005 - 1.030    pH, Urine 5.5 5.0 - 8.0    Leukocytes Negative Negative    Nitrite, UA Negative Negative    Protein, POC Negative Negative mg/dL    Glucose, UA Negative Negative, 1000 mg/dL (3+) mg/dL    Ketones, UA Negative Negative    Urobilinogen, UA Normal Normal    Bilirubin Negative Negative    Blood, UA Negative Negative         Assessment/Plan           Deniz was seen today for hypertension.    Diagnoses and all orders for this visit:    Essential hypertension  -     Lipid Panel  -     TSH  -     Comprehensive Metabolic Panel  -     Vitamin B12  -     CBC & Differential  -     T4, Free    Acquired hypothyroidism  -     Lipid Panel  -     TSH  -     Comprehensive Metabolic Panel  -     Vitamin B12  -     CBC & Differential  -     T4, Free    Plantar fasciitis      Return in about 6 months (around 3/10/2019).          Patient Instructions   Plantar  Fasciitis  Plantar fasciitis is a painful foot condition that affects the heel. It occurs when the band of tissue that connects the toes to the heel bone (plantar fascia) becomes irritated. This can happen after exercising too much or doing other repetitive activities (overuse injury). The pain from plantar fasciitis can range from mild irritation to severe pain that makes it difficult for you to walk or move. The pain is usually worse in the morning or after you have been sitting or lying down for a while.  What are the causes?  This condition may be caused by:  · Standing for long periods of time.  · Wearing shoes that do not fit.  · Doing high-impact activities, including running, aerobics, and ballet.  · Being overweight.  · Having an abnormal way of walking (gait).  · Having tight calf muscles.  · Having high arches in your feet.  · Starting a new athletic activity.    What are the signs or symptoms?  The main symptom of this condition is heel pain. Other symptoms include:  · Pain that gets worse after activity or exercise.  · Pain that is worse in the morning or after resting.  · Pain that goes away after you walk for a few minutes.    How is this diagnosed?  This condition may be diagnosed based on your signs and symptoms. Your health care provider will also do a physical exam to check for:  · A tender area on the bottom of your foot.  · A high arch in your foot.  · Pain when you move your foot.  · Difficulty moving your foot.    You may also need to have imaging studies to confirm the diagnosis. These can include:  · X-rays.  · Ultrasound.  · MRI.    How is this treated?  Treatment for plantar fasciitis depends on the severity of the condition. Your treatment may include:  · Rest, ice, and over-the-counter pain medicines to manage your pain.  · Exercises to stretch your calves and your plantar fascia.  · A splint that holds your foot in a stretched, upward position while you sleep (night splint).  · Physical  therapy to relieve symptoms and prevent problems in the future.  · Cortisone injections to relieve severe pain.  · Extracorporeal shock wave therapy (ESWT) to stimulate damaged plantar fascia with electrical impulses. It is often used as a last resort before surgery.  · Surgery, if other treatments have not worked after 12 months.    Follow these instructions at home:  · Take medicines only as directed by your health care provider.  · Avoid activities that cause pain.  · Roll the bottom of your foot over a bag of ice or a bottle of cold water. Do this for 20 minutes, 3-4 times a day.  · Perform simple stretches as directed by your health care provider.  · Try wearing athletic shoes with air-sole or gel-sole cushions or soft shoe inserts.  · Wear a night splint while sleeping, if directed by your health care provider.  · Keep all follow-up appointments with your health care provider.  How is this prevented?  · Do not perform exercises or activities that cause heel pain.  · Consider finding low-impact activities if you continue to have problems.  · Lose weight if you need to.  The best way to prevent plantar fasciitis is to avoid the activities that aggravate your plantar fascia.  Contact a health care provider if:  · Your symptoms do not go away after treatment with home care measures.  · Your pain gets worse.  · Your pain affects your ability to move or do your daily activities.  This information is not intended to replace advice given to you by your health care provider. Make sure you discuss any questions you have with your health care provider.  Document Released: 09/12/2002 Document Revised: 05/22/2017 Document Reviewed: 10/28/2015  [employer] Interactive Patient Education © 2018 [employer]         Zane Magallon MD    Assessment/Plan

## 2018-09-24 RX ORDER — CELECOXIB 100 MG/1
CAPSULE ORAL
Qty: 60 CAPSULE | Refills: 5 | Status: SHIPPED | OUTPATIENT
Start: 2018-09-24 | End: 2018-10-31 | Stop reason: SDDI

## 2018-10-31 ENCOUNTER — OFFICE VISIT (OUTPATIENT)
Dept: CARDIOLOGY | Facility: CLINIC | Age: 73
End: 2018-10-31

## 2018-10-31 VITALS
SYSTOLIC BLOOD PRESSURE: 142 MMHG | OXYGEN SATURATION: 96 % | HEIGHT: 70 IN | HEART RATE: 74 BPM | DIASTOLIC BLOOD PRESSURE: 70 MMHG | BODY MASS INDEX: 31.15 KG/M2 | WEIGHT: 217.6 LBS

## 2018-10-31 DIAGNOSIS — E78.2 MIXED HYPERLIPIDEMIA: Primary | ICD-10-CM

## 2018-10-31 DIAGNOSIS — I10 ESSENTIAL HYPERTENSION: ICD-10-CM

## 2018-10-31 DIAGNOSIS — G47.33 OSA (OBSTRUCTIVE SLEEP APNEA): ICD-10-CM

## 2018-10-31 PROCEDURE — 99213 OFFICE O/P EST LOW 20 MIN: CPT | Performed by: INTERNAL MEDICINE

## 2018-10-31 RX ORDER — CELECOXIB 200 MG/1
1 CAPSULE ORAL AS NEEDED
COMMUNITY
Start: 2018-09-05 | End: 2019-06-10 | Stop reason: SDUPTHER

## 2018-10-31 NOTE — PROGRESS NOTES
Deniz Dickson  1945  [phone]    OFFICE PROGRESS NOTE    VISIT DATE: 10/31/2018     PCP: Zane Magallon MD  [address]  [facility] [address]    IDENTIFICATION: A 73 y.o. male , retired ,  2016 from [employer].     PROBLEM LIST:  1. 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 Dyslipidemia   a. December 2015: Total cholesterol 178, triglycerides 119, HDL 69, LDL 85.   b. 12/5/17   HL 68 LDL 82  3. Hypertension, controlled on current regimen.   4. Remote bilateral knee surgery per Dr. Smith. Redo R 8/18  5. Obstructive sleep apnea, on CPAP greater than 5 years.   6. Reformed smoker, times greater than 12 years.   7. Hypothyroidism.       Chief Complaint   Patient presents with   • Coronary Artery Disease   • Hypertension     Allergies  Allergies   Allergen Reactions   • Penicillins Itching and Rash       Current Medications    Current Outpatient Prescriptions:   •  aspirin 81 MG EC tablet, Take 1 tablet by mouth Daily., Disp: 90 tablet, Rfl: 1  •  bisoprolol (ZEBeta) 5 MG tablet, TAKE ONE-HALF TABLET BY MOUTH DAILY, Disp: 45 tablet, Rfl: 2  •  celecoxib (CeleBREX) 100 MG capsule, Take 1 capsule by mouth Daily. 200 mg in AM & 100 mg in PM (Patient taking differently: Take 100 mg by mouth Daily.), Disp: 90 capsule, Rfl: 3  •  celecoxib (CeleBREX) 200 MG capsule, Take 1 capsule by mouth As Needed., Disp: , Rfl:   •  Cholecalciferol (VITAMIN D-3 PO), Take 1 tablet by mouth Daily., Disp: , Rfl:   •  fexofenadine (ALLEGRA) 180 MG tablet, Take 180 mg by mouth Daily., Disp: , Rfl:   •  finasteride (PROSCAR) 5 MG tablet, Take 1 tablet by mouth Daily., Disp: 90 tablet, Rfl: 3  •  fluticasone (FLONASE) 50 MCG/ACT nasal spray, 1 spray into each nostril., Disp: , Rfl:   •  Glucosamine HCl 1500 MG tablet, Take 1,500 mg by mouth 2 (Two) Times a Day., Disp: , Rfl:   •  Glucosamine-Chondroitin  "(OSTEO BI-FLEX REGULAR STRENGTH PO), Take 1 tablet by mouth 2 (two) times a day., Disp: , Rfl:   •  GLUCOSAMINE-CHONDROITIN-MSM PO, Take 1,500 mg by mouth 2 (Two) Times a Day., Disp: , Rfl:   •  levothyroxine (SYNTHROID, LEVOTHROID) 112 MCG tablet, Take 1 tablet by mouth Daily., Disp: 90 tablet, Rfl: 2  •  Multiple Vitamins-Minerals (CENTRUM SILVER ADULT 50+) tablet, Take 1 tablet by mouth daily., Disp: , Rfl:   •  Omega-3 Fatty Acids (FISH OIL) 1200 MG capsule capsule, Take 1,200 mg by mouth 2 (Two) Times a Day With Meals., Disp: , Rfl:   •  potassium gluconate 595 MG tablet tablet, Take 1 tablet by mouth Daily., Disp: , Rfl:   •  sildenafil (REVATIO) 20 MG tablet, Take 3 tablets by mouth Daily As Needed (ED)., Disp: 30 tablet, Rfl: 11  •  vitamin B-12 (CYANOCOBALAMIN) 1000 MCG tablet, Take 1,000 mcg by mouth Daily., Disp: , Rfl:   •  VYTORIN 10-20 MG per tablet, Take 1 tablet by mouth Every Night., Disp: 90 tablet, Rfl: 3    History of Present Illness     Pt denies any chest pain, dyspnea, dyspnea on exertion, orthopnea, PND, palpitations, lower extremity edema.   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  However now caring for his  wife's Kinyarwanda dog that has CHF.  ROS:  All systems have been reviewed and are negative with the exception of those mentioned in the HPI.    OBJECTIVE:  Vitals:    10/31/18 1456   BP: 142/70   BP Location: Right arm   Patient Position: Sitting   Pulse: 74   SpO2: 96%   Weight: 98.7 kg (217 lb 9.6 oz)   Height: 177.8 cm (70\")        Physical Exam   Constitutional: He appears well-developed and well-nourished.   Neck: Normal range of motion. Neck supple. No hepatojugular reflux and no JVD present. Carotid bruit is not present. No tracheal deviation present. No thyromegaly present.   Cardiovascular: Normal rate, regular rhythm, S1 normal, S2 normal, intact distal pulses and normal pulses.  PMI is not displaced.  Exam reveals no gallop, no distant heart sounds, no friction " rub, no midsystolic click and no opening snap.    No murmur heard.  Pulses:       Radial pulses are 2+ on the right side, and 2+ on the left side.        Dorsalis pedis pulses are 2+ on the right side, and 2+ on the left side.        Posterior tibial pulses are 2+ on the right side, and 2+ on the left side.   Pulmonary/Chest: Effort normal and breath sounds normal. He has no wheezes. He has no rales.   Abdominal: Soft. Bowel sounds are normal. He exhibits no mass. There is no tenderness. There is no guarding.       Diagnostic Data:  Procedures      ASSESSMENT:   Diagnosis Plan   1. Mixed hyperlipidemia     2. Essential hypertension     3. JUWAN (obstructive sleep apnea)         PLAN:  1. Dyslipidemia, well-controlled on current regimen.   2. Hypertension, controlled.   3. Sleep apnea treated.      Zane Magallon MD, thank you for referring Mr. Dickson for evaluation.  I have forwarded my electronically generated recommendations to you for review.  Please do not hesitate to call with any questions.     10/31/2018  3:17 PM     Suresh Ojeda MD, [facility]

## 2018-11-05 ENCOUNTER — OFFICE VISIT (OUTPATIENT)
Dept: UROLOGY | Facility: CLINIC | Age: 73
End: 2018-11-05

## 2018-11-05 DIAGNOSIS — R35.1 NOCTURIA MORE THAN TWICE PER NIGHT: ICD-10-CM

## 2018-11-05 DIAGNOSIS — N50.819 TESTICULAR PAIN: ICD-10-CM

## 2018-11-05 DIAGNOSIS — R35.0 URINARY FREQUENCY: Primary | ICD-10-CM

## 2018-11-05 LAB
BILIRUB BLD-MCNC: NEGATIVE MG/DL
CLARITY, POC: CLEAR
COLOR UR: YELLOW
GLUCOSE UR STRIP-MCNC: NEGATIVE MG/DL
KETONES UR QL: NEGATIVE
LEUKOCYTE EST, POC: NEGATIVE
NITRITE UR-MCNC: NEGATIVE MG/ML
PH UR: 5.5 [PH] (ref 5–8)
PROT UR STRIP-MCNC: NEGATIVE MG/DL
RBC # UR STRIP: ABNORMAL /UL
SP GR UR: 1.03 (ref 1–1.03)
UROBILINOGEN UR QL: NORMAL

## 2018-11-05 PROCEDURE — 81003 URINALYSIS AUTO W/O SCOPE: CPT | Performed by: UROLOGY

## 2018-11-05 PROCEDURE — 99214 OFFICE O/P EST MOD 30 MIN: CPT | Performed by: UROLOGY

## 2018-11-05 PROCEDURE — 51798 US URINE CAPACITY MEASURE: CPT | Performed by: UROLOGY

## 2018-11-05 RX ORDER — OXYBUTYNIN CHLORIDE 5 MG/1
5 TABLET ORAL NIGHTLY
Qty: 30 TABLET | Refills: 11 | Status: SHIPPED | OUTPATIENT
Start: 2018-11-05 | End: 2019-11-24 | Stop reason: SDUPTHER

## 2018-11-05 NOTE — PROGRESS NOTES
Chief Complaint  Urinary Frequency and Testicle Pain        HPI  Yessi is a 73 y.o. male who returns today with new complaints of pain or discomfort in the left testicle and nocturia ×4.  He was originally referred for elevated PSA and was found have a markedly enlarged prostate and put on Proscar.  Since then he's denied difficulty voiding and has a normal PSA through the last office visit.  He has a history of obstructive sleep apnea but states he always wears his CPAP device since his wife  in a nursing home when she left off her mask.    There were no vitals filed for this visit.    Past Medical History  Past Medical History:   Diagnosis Date   • Coronary artery disease     Probable Non obstructive    • Dyslipidemia    • Elevated prostate specific antigen (PSA)    • Essential hypertension, benign    • H/O benign prostatic hypertrophy    • High cholesterol    • History of hemorrhoids    • History of idiopathic thrombocytopenic purpura    • History of malignant neoplasm of skin    • History of osteoarthritis    • History of sleep apnea    • Hypertension    • Hypothyroidism    • JUWAN on CPAP     greater than 5 years    • S/P right knee surgery 2017   • Tobacco abuse        Past Surgical History  Past Surgical History:   Procedure Laterality Date   • APPENDECTOMY     • COLONOSCOPY      Complete Colonoscopy   • HEMORRHOIDECTOMY     • HERNIA REPAIR     • KNEE ARTHROPLASTY, PARTIAL REPLACEMENT Right 2017   • KNEE SURGERY      Left Knee   • OTHER SURGICAL HISTORY      Surgery Testis Orchiopexy       Medications  has a current medication list which includes the following prescription(s): aspirin, bisoprolol, celecoxib, celecoxib, cholecalciferol, fexofenadine, finasteride, fluticasone, glucosamine hcl, glucosamine-chondroitin, glucosamine-chondroitin-msm, levothyroxine, centrum silver adult 50+, fish oil, potassium gluconate, sildenafil, vitamin b-12, and vytorin.      Allergies  Allergies   Allergen  Reactions   • Penicillins Itching and Rash       Social History  Social History     Social History Narrative   • No narrative on file       Family History  He has no family history of bladder or kidney cancer  He has no family history of kidney stones      AUA Symptom Score:      Review of Systems  Review of Systems    Physical Exam  Physical Exam   Constitutional: He is oriented to person, place, and time. He appears well-developed and well-nourished.   HENT:   Head: Normocephalic and atraumatic.   Neck: Normal range of motion.   Pulmonary/Chest: Effort normal. No respiratory distress.   Abdominal: Soft. He exhibits no distension and no mass. There is no tenderness. No hernia. Hernia confirmed negative in the right inguinal area and confirmed negative in the left inguinal area.   Genitourinary: Penis normal.         Musculoskeletal: Normal range of motion. He exhibits edema.   Lymphadenopathy:     He has no cervical adenopathy. No inguinal adenopathy noted on the right or left side.   Neurological: He is alert and oriented to person, place, and time.   Skin: Skin is warm and dry.   Psychiatric: He has a normal mood and affect. His behavior is normal.   Vitals reviewed.      Labs Recent and today in the office:  Results for orders placed or performed in visit on 11/05/18   POC Urinalysis Dipstick, Automated   Result Value Ref Range    Color Yellow Yellow, Straw, Dark Yellow, Christal    Clarity, UA Clear Clear    Specific Gravity  1.030 1.005 - 1.030    pH, Urine 5.5 5.0 - 8.0    Leukocytes Negative Negative    Nitrite, UA Negative Negative    Protein, POC Negative Negative mg/dL    Glucose, UA Negative Negative, 1000 mg/dL (3+) mg/dL    Ketones, UA Negative Negative    Urobilinogen, UA Normal Normal    Bilirubin Negative Negative    Blood, UA Trace (A) Negative     Bladder scan reveals 0 postvoid residual    Assessment & Plan  1 left testicular pain.  No tenderness or palpable abnormality except for a    Hydrocele but  we'll get a scrotal ultrasound for better look.  He had undescended right testicle within orchiopexy and subsequent atrophy    #2 nocturia ×4: I suspect this is nocturnal polyuria associated with his sleep apnea and lower extremity edema.  He carries no postvoid residual so we'll try Ditropan at night increase his bladder capacity but he is encouraged to cut back on his salt intake.

## 2018-11-06 ENCOUNTER — HOSPITAL ENCOUNTER (OUTPATIENT)
Dept: ULTRASOUND IMAGING | Facility: HOSPITAL | Age: 73
Discharge: HOME | End: 2018-11-06
Attending: UROLOGY | Admitting: UROLOGY

## 2018-11-06 DIAGNOSIS — N50.819 TESTICULAR PAIN: ICD-10-CM

## 2018-11-06 PROCEDURE — 76870 US EXAM SCROTUM: CPT

## 2018-12-04 ENCOUNTER — HOSPITAL ENCOUNTER (OUTPATIENT)
Facility: HOSPITAL | Age: 73
Discharge: HOME | End: 2018-12-04
Payer: MEDICARE

## 2018-12-04 LAB
A/G RATIO: 2.1 (ref 0.8–2)
ALBUMIN SERPL-MCNC: 4.8 G/DL (ref 3.4–4.8)
ALP BLD-CCNC: 54 U/L (ref 25–100)
ALT SERPL-CCNC: 30 U/L (ref 4–36)
ANION GAP SERPL CALCULATED.3IONS-SCNC: 10 MMOL/L (ref 3–16)
AST SERPL-CCNC: 28 U/L (ref 8–33)
BASOPHILS ABSOLUTE: 0 K/UL (ref 0–0.1)
BASOPHILS RELATIVE PERCENT: 0.2 %
BILIRUB SERPL-MCNC: 1.4 MG/DL (ref 0.3–1.2)
BUN BLDV-MCNC: 16 MG/DL (ref 6–20)
CALCIUM SERPL-MCNC: 9.6 MG/DL (ref 8.5–10.5)
CHLORIDE BLD-SCNC: 102 MMOL/L (ref 98–107)
CO2: 28 MMOL/L (ref 20–30)
CREAT SERPL-MCNC: 0.8 MG/DL (ref 0.4–1.2)
EOSINOPHILS ABSOLUTE: 0.1 K/UL (ref 0–0.4)
EOSINOPHILS RELATIVE PERCENT: 1.1 %
GFR AFRICAN AMERICAN: >59
GFR NON-AFRICAN AMERICAN: >59
GLOBULIN: 2.3 G/DL
GLUCOSE BLD-MCNC: 109 MG/DL (ref 74–106)
HCT VFR BLD CALC: 46.7 % (ref 40–54)
HEMOGLOBIN: 15.5 G/DL (ref 13–18)
IMMATURE GRANULOCYTES #: 0 K/UL
IMMATURE GRANULOCYTES %: 0 % (ref 0–5)
LYMPHOCYTES ABSOLUTE: 1.3 K/UL (ref 1.5–4)
LYMPHOCYTES RELATIVE PERCENT: 23.2 %
MCH RBC QN AUTO: 32.7 PG (ref 27–32)
MCHC RBC AUTO-ENTMCNC: 33.2 G/DL (ref 31–35)
MCV RBC AUTO: 98.5 FL (ref 80–100)
MONOCYTES ABSOLUTE: 0.5 K/UL (ref 0.2–0.8)
MONOCYTES RELATIVE PERCENT: 9.6 %
NEUTROPHILS ABSOLUTE: 3.7 K/UL (ref 2–7.5)
NEUTROPHILS RELATIVE PERCENT: 65.9 %
PDW BLD-RTO: 12.4 % (ref 11–16)
PLATELET # BLD: 110 K/UL (ref 150–400)
PMV BLD AUTO: 8.7 FL (ref 6–10)
POTASSIUM SERPL-SCNC: 4.4 MMOL/L (ref 3.4–5.1)
RBC # BLD: 4.74 M/UL (ref 4.5–6)
SODIUM BLD-SCNC: 140 MMOL/L (ref 136–145)
TOTAL PROTEIN: 7.1 G/DL (ref 6.4–8.3)
WBC # BLD: 5.6 K/UL (ref 4–11)

## 2018-12-04 PROCEDURE — 36415 COLL VENOUS BLD VENIPUNCTURE: CPT

## 2018-12-04 PROCEDURE — 85025 COMPLETE CBC W/AUTO DIFF WBC: CPT

## 2018-12-04 PROCEDURE — 80053 COMPREHEN METABOLIC PANEL: CPT

## 2018-12-14 ENCOUNTER — OFFICE VISIT (OUTPATIENT)
Dept: UROLOGY | Facility: CLINIC | Age: 73
End: 2018-12-14

## 2018-12-14 VITALS
BODY MASS INDEX: 31.07 KG/M2 | HEART RATE: 64 BPM | TEMPERATURE: 98.5 F | OXYGEN SATURATION: 96 % | HEIGHT: 70 IN | DIASTOLIC BLOOD PRESSURE: 84 MMHG | SYSTOLIC BLOOD PRESSURE: 134 MMHG | WEIGHT: 217 LBS

## 2018-12-14 DIAGNOSIS — N40.1 BPH WITH URINARY OBSTRUCTION: ICD-10-CM

## 2018-12-14 DIAGNOSIS — N50.819 TESTICLE PAIN: Primary | ICD-10-CM

## 2018-12-14 DIAGNOSIS — N13.8 BPH WITH URINARY OBSTRUCTION: ICD-10-CM

## 2018-12-14 LAB
BILIRUB BLD-MCNC: NEGATIVE MG/DL
CLARITY, POC: CLEAR
COLOR UR: YELLOW
GLUCOSE UR STRIP-MCNC: NEGATIVE MG/DL
KETONES UR QL: NEGATIVE
LEUKOCYTE EST, POC: NEGATIVE
NITRITE UR-MCNC: NEGATIVE MG/ML
PH UR: 5.5 [PH] (ref 5–8)
PROT UR STRIP-MCNC: NEGATIVE MG/DL
RBC # UR STRIP: NEGATIVE /UL
SP GR UR: 1.03 (ref 1–1.03)
UROBILINOGEN UR QL: NORMAL

## 2018-12-14 PROCEDURE — 99213 OFFICE O/P EST LOW 20 MIN: CPT | Performed by: UROLOGY

## 2018-12-14 PROCEDURE — 81003 URINALYSIS AUTO W/O SCOPE: CPT | Performed by: UROLOGY

## 2018-12-14 NOTE — PROGRESS NOTES
Chief Complaint  Testicular pain (Fup scrotal ultrasound.)        ELMER Dickson is a 73 y.o. male who turns today for follow-up of multiple urological concerns.  He was having some pain in the left testicle all of his currently resolved.  Recent scrotal ultrasound revealed a normal left testicle with a small hydrocele and atrophic right testicle.    Second concern was nocturia.  He does have obstructive sleep apnea with contrast wear the mask.  He also has some problems with lower extremity edema and his been trying to cut back on his salt intake.  I offered him Ditropan and he states this made dramatic improvement in his sleeping through the night.  He continues his Proscar for the markedly enlarged prostate and his last PSA was down to normal.    Vitals:    12/14/18 0915   BP: 134/84   Pulse: 64   Temp: 98.5 °F (36.9 °C)   SpO2: 96%       Past Medical History  Past Medical History:   Diagnosis Date   • Coronary artery disease     Probable Non obstructive    • Dyslipidemia    • Elevated prostate specific antigen (PSA)    • Essential hypertension, benign    • H/O benign prostatic hypertrophy    • High cholesterol    • History of hemorrhoids    • History of idiopathic thrombocytopenic purpura    • History of malignant neoplasm of skin    • History of osteoarthritis    • History of sleep apnea    • Hypertension    • Hypothyroidism    • JUWAN on CPAP     greater than 5 years    • S/P right knee surgery 07/12/2017   • Tobacco abuse        Past Surgical History  Past Surgical History:   Procedure Laterality Date   • APPENDECTOMY     • COLONOSCOPY      Complete Colonoscopy   • HEMORRHOIDECTOMY     • HERNIA REPAIR     • KNEE ARTHROPLASTY, PARTIAL REPLACEMENT Right 07/12/2017   • KNEE SURGERY      Left Knee   • OTHER SURGICAL HISTORY      Surgery Testis Orchiopexy       Medications  has a current medication list which includes the following prescription(s): aspirin, bisoprolol, celecoxib, celecoxib, cholecalciferol,  fexofenadine, finasteride, fluticasone, glucosamine hcl, glucosamine-chondroitin, glucosamine-chondroitin-msm, levothyroxine, centrum silver adult 50+, fish oil, oxybutynin, potassium gluconate, sildenafil, vitamin b-12, and vytorin.      Allergies  Allergies   Allergen Reactions   • Penicillins Itching and Rash       Social History  Social History     Social History Narrative   • Not on file       Family History  He has no family history of bladder or kidney cancer  He has no family history of kidney stones      AUA Symptom Score:      Review of Systems  Review of Systems   Constitutional: Negative.    Genitourinary: Negative.    All other systems reviewed and are negative.      Physical Exam  Physical Exam    Labs Recent and today in the office:  Results for orders placed or performed in visit on 12/14/18   POC Urinalysis Dipstick, Automated   Result Value Ref Range    Color Yellow Yellow, Straw, Dark Yellow, Christal    Clarity, UA Clear Clear    Specific Gravity  1.030 1.005 - 1.030    pH, Urine 5.5 5.0 - 8.0    Leukocytes Negative Negative    Nitrite, UA Negative Negative    Protein, POC Negative Negative mg/dL    Glucose, UA Negative Negative, 1000 mg/dL (3+) mg/dL    Ketones, UA Negative Negative    Urobilinogen, UA Normal Normal    Bilirubin Negative Negative    Blood, UA Negative Negative         Assessment & Plan  #1 left testicular pain: Currently improved with a normal scrotal ultrasound and physical exam.  #2 BPH with outlet obstruction/elevated PSA: Currently all back to normal on Proscar

## 2018-12-17 ENCOUNTER — OFFICE VISIT (OUTPATIENT)
Dept: ONCOLOGY | Facility: CLINIC | Age: 73
End: 2018-12-17

## 2018-12-17 VITALS
OXYGEN SATURATION: 95 % | SYSTOLIC BLOOD PRESSURE: 140 MMHG | HEIGHT: 70 IN | RESPIRATION RATE: 18 BRPM | BODY MASS INDEX: 31.07 KG/M2 | TEMPERATURE: 97.9 F | WEIGHT: 217 LBS | DIASTOLIC BLOOD PRESSURE: 66 MMHG | HEART RATE: 69 BPM

## 2018-12-17 DIAGNOSIS — D69.3 IDIOPATHIC THROMBOCYTOPENIC PURPURA (HCC): Primary | Chronic | ICD-10-CM

## 2018-12-17 PROCEDURE — 99213 OFFICE O/P EST LOW 20 MIN: CPT | Performed by: INTERNAL MEDICINE

## 2018-12-17 NOTE — PROGRESS NOTES
CHIEF COMPLAINT: Follow-up ITP    Problem List:  Oncology/Hematology History    1. Chronic ITP  2. Degenerative joint disease per rheumatology  3. Obstructive sleep apnea  4. Hypertension  5. Hyperlipidemia  6. History of right UKA  7. History of basal cell carcinomas        Idiopathic thrombocytopenic purpura (CMS/HCC)    6/8/2016 Initial Diagnosis     Idiopathic thrombocytopenic purpura (CMS/HCC)            HISTORY OF PRESENT ILLNESS:  The patient is a 73 y.o. male, here for follow up on management of ITP.  No bleeding problems.  No petechiae or ecchymoses.  No change in the color caliber consistency of his stools.  Platelets on 12/14/18 were 110,000.      Past Medical History:   Diagnosis Date   • Coronary artery disease     Probable Non obstructive    • Dyslipidemia    • Elevated prostate specific antigen (PSA)    • Essential hypertension, benign    • H/O benign prostatic hypertrophy    • High cholesterol    • History of hemorrhoids    • History of idiopathic thrombocytopenic purpura    • History of malignant neoplasm of skin    • History of osteoarthritis    • History of sleep apnea    • Hypertension    • Hypothyroidism    • JUWAN on CPAP     greater than 5 years    • S/P right knee surgery 07/12/2017   • Tobacco abuse      Past Surgical History:   Procedure Laterality Date   • APPENDECTOMY     • COLONOSCOPY      Complete Colonoscopy   • HEMORRHOIDECTOMY     • HERNIA REPAIR     • KNEE ARTHROPLASTY, PARTIAL REPLACEMENT Right 07/12/2017   • KNEE SURGERY      Left Knee   • OTHER SURGICAL HISTORY      Surgery Testis Orchiopexy       Allergies   Allergen Reactions   • Penicillins Itching and Rash       Family History and Social History reviewed and changed as necessary      REVIEW OF SYSTEM:   Review of Systems   Constitutional: Negative for appetite change, chills, diaphoresis, fatigue, fever and unexpected weight change.   HENT:   Negative for mouth sores, sore throat and trouble swallowing.    Eyes: Negative for  "icterus.   Respiratory: Negative for cough, hemoptysis and shortness of breath.    Cardiovascular: Negative for chest pain, leg swelling and palpitations.   Gastrointestinal: Negative for abdominal distention, abdominal pain, blood in stool, constipation, diarrhea, nausea and vomiting.   Endocrine: Negative for hot flashes.   Genitourinary: Negative for bladder incontinence, difficulty urinating, dysuria, frequency and hematuria.    Musculoskeletal: Negative for gait problem, neck pain and neck stiffness.   Skin: Negative for rash.   Neurological: Negative for dizziness, gait problem, headaches, light-headedness and numbness.   Hematological: Negative for adenopathy. Does not bruise/bleed easily.   Psychiatric/Behavioral: Negative for depression. The patient is not nervous/anxious.    All other systems reviewed and are negative.       PHYSICAL EXAM    Vitals:    12/17/18 1030   BP: 140/66   Pulse: 69   Resp: 18   Temp: 97.9 °F (36.6 °C)   SpO2: 95%   Weight: 98.4 kg (217 lb)   Height: 177.8 cm (70\")     Constitutional: Appears well-developed and well-nourished. No distress.   ECOG: (0) Fully active, able to carry on all predisease performance without restriction  HENT:   Head: Normocephalic.   Mouth/Throat: Oropharynx is clear and moist.   Eyes: Conjunctivae are normal. Pupils are equal, round, and reactive to light. No scleral icterus.   Neck: Neck supple. No JVD present. No thyromegaly present.   Cardiovascular: Normal rate, regular rhythm and normal heart sounds.    Pulmonary/Chest: Breath sounds normal. No respiratory distress.   Abdominal: Soft. Exhibits no distension and no mass. There is no hepatosplenomegaly. There is no tenderness. There is no rebound and no guarding.   Musculoskeletal:Exhibits no edema, tenderness or deformity.   Neurological: Alert and oriented to person, place, and time. Exhibits normal muscle tone.   Skin: No ecchymosis, no petechiae and no rash noted. Not diaphoretic. No cyanosis. " Nails show no clubbing.   Psychiatric: Normal mood and affect.   Vitals reviewed.      No radiology results for the last 7 days          ASSESSMENT & PLAN:    1.  Chronic ITP: No hint of this worsening over time.  We'll continue to check him annually with no plans for treatment unless he has bleeding issues or platelets drop below 50,000.      Jerome Whiting MD    12/17/2018

## 2019-01-02 ENCOUNTER — OFFICE VISIT (OUTPATIENT)
Dept: ORTHOPEDIC SURGERY | Facility: CLINIC | Age: 74
End: 2019-01-02

## 2019-01-02 VITALS — HEIGHT: 70 IN | BODY MASS INDEX: 31.06 KG/M2 | HEART RATE: 75 BPM | WEIGHT: 216.93 LBS | OXYGEN SATURATION: 98 %

## 2019-01-02 DIAGNOSIS — M24.574 CONTRACTURE OF JOINT OF RIGHT FOOT: Primary | ICD-10-CM

## 2019-01-02 PROCEDURE — 99203 OFFICE O/P NEW LOW 30 MIN: CPT | Performed by: ORTHOPAEDIC SURGERY

## 2019-01-02 NOTE — PROGRESS NOTES
NEW PATIENT    Patient: Deniz Dickson  : 1945    Primary Care Provider: Zane Magallon MD    Requesting Provider: As above    Pain of the Right Foot      History    Chief Complaint: Right second toe pain    History of Present Illness: This is an extremely pleasant 73-year-old gentleman who sees Dr. Margot Estrella for osteoarthritis.  He is here because he has had a recurring problem with an ulcer and pain on the right second toe.  He reports that it rubs in his boots, especially his cowboy boots.  Most recently he got another wound about 6 weeks ago, and it has taken that long for it to still not  Be completely healed.  He does not have diabetes.  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  He has tried several types of pads.  When pad seemed to make the toe worse.  He uses a fairly large foam pad right now.  He would like to know whether there is any surgery that can be done.    Current Outpatient Medications on File Prior to Visit   Medication Sig Dispense Refill   • aspirin 81 MG EC tablet Take 1 tablet by mouth Daily. 90 tablet 1   • bisoprolol (ZEBeta) 5 MG tablet TAKE ONE-HALF TABLET BY MOUTH DAILY 45 tablet 2   • celecoxib (CeleBREX) 100 MG capsule Take 1 capsule by mouth Daily. 200 mg in AM & 100 mg in PM (Patient taking differently: Take 100 mg by mouth Daily.) 90 capsule 3   • celecoxib (CeleBREX) 200 MG capsule Take 1 capsule by mouth As Needed.     • Cholecalciferol (VITAMIN D-3 PO) Take 1 tablet by mouth Daily.     • fexofenadine (ALLEGRA) 180 MG tablet Take 180 mg by mouth Daily.     • finasteride (PROSCAR) 5 MG tablet Take 1 tablet by mouth Daily. 90 tablet 3   • fluticasone (FLONASE) 50 MCG/ACT nasal spray 1 spray into each nostril.     • Glucosamine HCl 1500 MG tablet Take 1,500 mg by mouth 2 (Two) Times a Day.     • Glucosamine-Chondroitin (OSTEO BI-FLEX REGULAR STRENGTH PO) Take 1 tablet by mouth 2 (two) times a day.     •  GLUCOSAMINE-CHONDROITIN-MSM PO Take 1,500 mg by mouth 2 (Two) Times a Day.     • levothyroxine (SYNTHROID, LEVOTHROID) 112 MCG tablet Take 1 tablet by mouth Daily. 90 tablet 2   • Multiple Vitamins-Minerals (CENTRUM SILVER ADULT 50+) tablet Take 1 tablet by mouth daily.     • Omega-3 Fatty Acids (FISH OIL) 1200 MG capsule capsule Take 1,200 mg by mouth 2 (Two) Times a Day With Meals.     • oxybutynin (DITROPAN) 5 MG tablet Take 1 tablet by mouth Every Night. 30 tablet 11   • potassium gluconate 595 MG tablet tablet Take 1 tablet by mouth Daily.     • sildenafil (REVATIO) 20 MG tablet Take 3 tablets by mouth Daily As Needed (ED). 30 tablet 11   • vitamin B-12 (CYANOCOBALAMIN) 1000 MCG tablet Take 1,000 mcg by mouth Daily.     • VYTORIN 10-20 MG per tablet Take 1 tablet by mouth Every Night. 90 tablet 3     No current facility-administered medications on file prior to visit.       Allergies   Allergen Reactions   • Penicillins Itching and Rash      Past Medical History:   Diagnosis Date   • Coronary artery disease     Probable Non obstructive    • Dyslipidemia    • Elevated prostate specific antigen (PSA)    • Essential hypertension, benign    • H/O benign prostatic hypertrophy    • High cholesterol    • History of hemorrhoids    • History of idiopathic thrombocytopenic purpura    • History of malignant neoplasm of skin    • History of osteoarthritis    • History of sleep apnea    • Hypertension    • Hypothyroidism    • JUWAN on CPAP     greater than 5 years    • S/P right knee surgery 07/12/2017   • Tobacco abuse      Past Surgical History:   Procedure Laterality Date   • APPENDECTOMY     • COLONOSCOPY      Complete Colonoscopy   • HEMORRHOIDECTOMY     • HERNIA REPAIR     • KNEE ARTHROPLASTY, PARTIAL REPLACEMENT Right 07/12/2017   • KNEE SURGERY      Left Knee   • OTHER SURGICAL HISTORY      Surgery Testis Orchiopexy     Family History   Problem Relation Age of Onset   • Migraines Mother    • Mental illness Mother    •  "Heart disease Mother    • Alcohol abuse Father    • Cancer Father    • Lung cancer Father    • Brain cancer Sister    • Colon cancer Paternal Grandmother    • Prostate cancer Other    • Colon cancer Other       Social History     Socioeconomic History   • Marital status:      Spouse name: Not on file   • Number of children: Not on file   • Years of education: Not on file   • Highest education level: Not on file   Social Needs   • Financial resource strain: Not on file   • Food insecurity - worry: Not on file   • Food insecurity - inability: Not on file   • Transportation needs - medical: Not on file   • Transportation needs - non-medical: Not on file   Occupational History     Employer: RETIRED   Tobacco Use   • Smoking status: Former Smoker     Years: 15.00     Types: Cigarettes     Last attempt to quit: 1985     Years since quittin.5   • Smokeless tobacco: Former User     Quit date:    Substance and Sexual Activity   • Alcohol use: No   • Drug use: No   • Sexual activity: Defer   Other Topics Concern   • Not on file   Social History Narrative   • Not on file        Review of Systems   Constitutional: Negative.    HENT: Negative.    Eyes: Negative.    Respiratory: Negative.    Cardiovascular: Negative.    Gastrointestinal: Negative.    Endocrine: Negative.    Genitourinary: Negative.    Musculoskeletal: Positive for arthralgias.   Skin: Negative.    Allergic/Immunologic: Negative.    Neurological: Negative.    Hematological: Negative.    Psychiatric/Behavioral: Negative.        The following portions of the patient's history were reviewed and updated as appropriate: allergies, current medications, past family history, past medical history, past social history, past surgical history and problem list.    Physical Exam:   Pulse 75   Ht 177.8 cm (70\")   Wt 98.4 kg (216 lb 14.9 oz)   SpO2 98%   BMI 31.13 kg/m²   GENERAL: Body habitus: overweight    Lower extremity edema: Right: none; Leftt: " none    Varicose veins:  Right: none; Left: none    Gait: normal     Mental Status:  awake and alert; oriented to person, place, and time    Voice:  clear  SKIN:  Normal and warm and dry    Hair Growth:  Right:normal; Left:  normal  NAILS: Toenails: thick  HEENT: Head: Normocephalic, atraumatic,  without obvious abnormality.  eye: normal external eye, no icterus  ears: normal external ears  nose: normal external nose  pharynx: dental hygiene adequate  Fairly hard of hearing  PULM:  Repiratory effort normal  CV:  Dorsalis Pedis:  Right: 2+; Left:2+    Posterior Tibial: Right:2+; Left:2+    Capillary Refill:  Brisk  MSK:  Hand:right handed and mild arthritis, Heberden's nodes      Tibia:  Right:  non tender; Left:  non tender      Ankle:  Right: non tender, ROM  normal and symmetric and motor function  normal; Left:  non tender, ROM  normal and symmetric and motor function  normal      Foot:  Right:  Second hammertoe with about a 40° contracture at the PIP joint, reducible metatarsal phalangeal joint, tiny area of skin over the PIP joint that's not completely solid, but no signs of infection.  He is tender in the PIP joint, otherwise nontender.  He has mild hallux valgus, but there is reasonable between the great toe and the third toe for the second toe to fit if it were straight; Left:  non tender, ROM  normal and motor function  normal      NEURO: Heel Walking:  Right:  normal; Left:  normal    Toe Walking:  Right:  normal; Left:  normal     Bylas-Perico 5.07 monofilament test: normal    Lower extremity sensation: intact     Reflexes:  Biceps:  Right:  1+; Left:  1+           Quads:  Right:  2+; Left:  2+           Ankle:  Right:  1+; Left:  1+      Calf Atrophy:none    Motor Function: all 5/5         Medical Decision Making    Data Review:   ordered and reviewed x-rays today, reviewed radiology results and reviewed outside records    Assessment and Plan/ Diagnosis/Treatment options:   1. Contracture of joint of  right foot  Painful hammertoe with recurrent ulcer over the PIP joint.  We talked about conservative and surgical treatment.  I showed him the silicone sleeves that we have, and explained where to order more.  We also discussed wide, deep toe box shoes.  If he wishes to have this straightened, I would do a PIP joint excision, metatarsal capsulotomy with extensor lengthening, and flexor tenotomy.  I think there is enough room between the first and third toes, that they do not need to be addressed.  The second toe can be straightened in isolation.  I explained however that it is a long recovery time.  I explained the pin in the toe.  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  The pin stays in about 6 weeks.  We talked about the pros and cons.  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  I will see him in June with standing 2 views of the foot  - XR Foot 2 View Right; Future

## 2019-01-07 RX ORDER — BISOPROLOL FUMARATE 5 MG/1
TABLET, FILM COATED ORAL
Qty: 45 TABLET | Refills: 1 | Status: SHIPPED | OUTPATIENT
Start: 2019-01-07 | End: 2019-07-14 | Stop reason: SDUPTHER

## 2019-01-31 ENCOUNTER — PRIOR AUTHORIZATION (OUTPATIENT)
Dept: INTERNAL MEDICINE | Facility: CLINIC | Age: 74
End: 2019-01-31

## 2019-01-31 DIAGNOSIS — E78.00 HYPERCHOLESTEREMIA: ICD-10-CM

## 2019-01-31 RX ORDER — EZETIMIBE/SIMVASTATIN 10 MG-20MG
TABLET ORAL
Qty: 30 TABLET | Refills: 2 | Status: SHIPPED | OUTPATIENT
Start: 2019-01-31 | End: 2019-04-29 | Stop reason: SDUPTHER

## 2019-01-31 NOTE — TELEPHONE ENCOUNTER
PA approved for Vytorin 10-20mg tablet.    Authorization good until 12/31/2019    Pharmacy notified

## 2019-03-05 LAB
ALBUMIN SERPL-MCNC: 4.9 G/DL (ref 3.5–5)
ALBUMIN/GLOB SERPL: 1.6 G/DL (ref 1–2)
ALP SERPL-CCNC: 76 U/L (ref 38–126)
ALT SERPL-CCNC: 43 U/L (ref 13–69)
AST SERPL-CCNC: 34 U/L (ref 15–46)
BASOPHILS # BLD AUTO: 0.02 10*3/MM3 (ref 0–0.2)
BASOPHILS NFR BLD AUTO: 0.3 % (ref 0–2.5)
BILIRUB SERPL-MCNC: 1.2 MG/DL (ref 0.2–1.3)
BUN SERPL-MCNC: 14 MG/DL (ref 7–20)
BUN/CREAT SERPL: 23.3 (ref 6.3–21.9)
CALCIUM SERPL-MCNC: 9.9 MG/DL (ref 8.4–10.2)
CHLORIDE SERPL-SCNC: 102 MMOL/L (ref 98–107)
CHOLEST SERPL-MCNC: 191 MG/DL (ref 0–199)
CO2 SERPL-SCNC: 25 MMOL/L (ref 26–30)
CREAT SERPL-MCNC: 0.6 MG/DL (ref 0.6–1.3)
EOSINOPHIL # BLD AUTO: 0.07 10*3/MM3 (ref 0–0.7)
EOSINOPHIL NFR BLD AUTO: 1 % (ref 0–7)
ERYTHROCYTE [DISTWIDTH] IN BLOOD BY AUTOMATED COUNT: 12.7 % (ref 11.5–14.5)
GLOBULIN SER CALC-MCNC: 3.1 GM/DL
GLUCOSE SERPL-MCNC: 102 MG/DL (ref 74–98)
HCT VFR BLD AUTO: 48.4 % (ref 42–52)
HDLC SERPL-MCNC: 74 MG/DL (ref 40–60)
HGB BLD-MCNC: 16.6 G/DL (ref 14–18)
IMM GRANULOCYTES # BLD AUTO: 0.03 10*3/MM3 (ref 0–0.06)
IMM GRANULOCYTES NFR BLD AUTO: 0.4 % (ref 0–0.6)
LDLC SERPL CALC-MCNC: 83 MG/DL (ref 0–99)
LYMPHOCYTES # BLD AUTO: 1.6 10*3/MM3 (ref 0.6–3.4)
LYMPHOCYTES NFR BLD AUTO: 23.8 % (ref 10–50)
MCH RBC QN AUTO: 32.9 PG (ref 27–31)
MCHC RBC AUTO-ENTMCNC: 34.3 G/DL (ref 30–37)
MCV RBC AUTO: 95.8 FL (ref 80–94)
MONOCYTES # BLD AUTO: 0.55 10*3/MM3 (ref 0–0.9)
MONOCYTES NFR BLD AUTO: 8.2 % (ref 0–12)
NEUTROPHILS # BLD AUTO: 4.46 10*3/MM3 (ref 2–6.9)
NEUTROPHILS NFR BLD AUTO: 66.3 % (ref 37–80)
NRBC BLD AUTO-RTO: 0 /100 WBC (ref 0–0)
PLATELET # BLD AUTO: 128 10*3/MM3 (ref 130–400)
POTASSIUM SERPL-SCNC: 4.6 MMOL/L (ref 3.5–5.1)
PROT SERPL-MCNC: 8 G/DL (ref 6.3–8.2)
RBC # BLD AUTO: 5.05 10*6/MM3 (ref 4.7–6.1)
SODIUM SERPL-SCNC: 138 MMOL/L (ref 137–145)
T4 FREE SERPL-MCNC: 1.53 NG/DL (ref 0.78–2.19)
TRIGL SERPL-MCNC: 169 MG/DL
TSH SERPL DL<=0.005 MIU/L-ACNC: 2.44 MIU/ML (ref 0.47–4.68)
VIT B12 SERPL-MCNC: 976 PG/ML (ref 239–931)
VLDLC SERPL CALC-MCNC: 33.8 MG/DL
WBC # BLD AUTO: 6.73 10*3/MM3 (ref 4.8–10.8)

## 2019-03-11 ENCOUNTER — OFFICE VISIT (OUTPATIENT)
Dept: INTERNAL MEDICINE | Facility: CLINIC | Age: 74
End: 2019-03-11

## 2019-03-11 VITALS
DIASTOLIC BLOOD PRESSURE: 65 MMHG | SYSTOLIC BLOOD PRESSURE: 128 MMHG | RESPIRATION RATE: 16 BRPM | HEIGHT: 70 IN | OXYGEN SATURATION: 98 % | HEART RATE: 69 BPM | TEMPERATURE: 97.6 F | BODY MASS INDEX: 30.46 KG/M2 | WEIGHT: 212.8 LBS

## 2019-03-11 DIAGNOSIS — J30.89 SEASONAL ALLERGIC RHINITIS DUE TO OTHER ALLERGIC TRIGGER: ICD-10-CM

## 2019-03-11 DIAGNOSIS — E78.00 HYPERCHOLESTEREMIA: ICD-10-CM

## 2019-03-11 DIAGNOSIS — D69.6 THROMBOCYTOPENIA (HCC): ICD-10-CM

## 2019-03-11 DIAGNOSIS — J40 BRONCHITIS: ICD-10-CM

## 2019-03-11 DIAGNOSIS — R93.3 ABNORMAL COLONOSCOPY: ICD-10-CM

## 2019-03-11 DIAGNOSIS — E03.9 ACQUIRED HYPOTHYROIDISM: ICD-10-CM

## 2019-03-11 DIAGNOSIS — R79.89 LOW VITAMIN D LEVEL: Primary | ICD-10-CM

## 2019-03-11 DIAGNOSIS — E55.9 VITAMIN D DEFICIENCY: ICD-10-CM

## 2019-03-11 PROCEDURE — 99214 OFFICE O/P EST MOD 30 MIN: CPT | Performed by: INTERNAL MEDICINE

## 2019-03-11 RX ORDER — LEVOCETIRIZINE DIHYDROCHLORIDE 5 MG/1
5 TABLET, FILM COATED ORAL EVERY EVENING
Qty: 30 TABLET | Refills: 11 | Status: SHIPPED | OUTPATIENT
Start: 2019-03-11 | End: 2019-12-09

## 2019-03-11 RX ORDER — LEVOTHYROXINE SODIUM 0.12 MG/1
125 TABLET ORAL DAILY
Qty: 90 TABLET | Refills: 3 | Status: SHIPPED | OUTPATIENT
Start: 2019-03-11 | End: 2019-12-09 | Stop reason: SDUPTHER

## 2019-03-11 RX ORDER — AZITHROMYCIN 250 MG/1
TABLET, FILM COATED ORAL
Qty: 6 TABLET | Refills: 0 | Status: SHIPPED | OUTPATIENT
Start: 2019-03-11 | End: 2019-07-19

## 2019-03-11 NOTE — PROGRESS NOTES
Subjective     Patient ID: Deniz Dickson is a 73 y.o. male. Patient is here for management of multiple medical problems.     Chief Complaint   Patient presents with   • hypercholest          • Med Refill     patient needs refills on Vytorin, Levothyroxine and Celebrex sent to [facility]   • Aspirin questions     patient has questions regarding whether or not he needs to take Aspirin     Hyperlipidemia   This is a chronic problem. The problem is controlled. Recent lipid tests were reviewed and are normal. He has no history of chronic renal disease or diabetes. There are no known factors aggravating his hyperlipidemia. Pertinent negatives include no myalgias. Current antihyperlipidemic treatment includes statins and ezetimibe. There are no compliance problems.  Risk factors for coronary artery disease include dyslipidemia and a sedentary lifestyle.      Pt on asa 81 mg.    Bronchitis over the past few weeks. And just unable to get over it. Cough yellow sputum. otc not helping.      The following portions of the patient's history were reviewed and updated as appropriate: allergies, current medications, past family history, past medical history, past social history, past surgical history and problem list.    Review of Systems   Constitutional: Negative for fatigue.   Musculoskeletal: Negative for arthralgias, gait problem, joint swelling and myalgias.   Psychiatric/Behavioral: Negative for sleep disturbance.   All other systems reviewed and are negative.      Current Outpatient Medications:   •  aspirin 81 MG EC tablet, Take 1 tablet by mouth Daily., Disp: 90 tablet, Rfl: 1  •  bisoprolol (ZEBeta) 5 MG tablet, TAKE ONE-HALF TABLET BY MOUTH DAILY, Disp: 45 tablet, Rfl: 1  •  celecoxib (CeleBREX) 100 MG capsule, Take 1 capsule by mouth Daily. 200 mg in AM & 100 mg in PM (Patient taking differently: Take 100 mg by mouth Daily.), Disp: 90 capsule, Rfl: 3  •  celecoxib (CeleBREX) 200 MG capsule, Take 1 capsule by mouth As  "Needed., Disp: , Rfl:   •  Cholecalciferol (VITAMIN D-3 PO), Take 1 tablet by mouth Daily., Disp: , Rfl:   •  fexofenadine (ALLEGRA) 180 MG tablet, Take 180 mg by mouth Daily., Disp: , Rfl:   •  finasteride (PROSCAR) 5 MG tablet, Take 1 tablet by mouth Daily., Disp: 90 tablet, Rfl: 3  •  fluticasone (FLONASE) 50 MCG/ACT nasal spray, 1 spray into each nostril., Disp: , Rfl:   •  Glucosamine-Chondroitin (OSTEO BI-FLEX REGULAR STRENGTH PO), Take 1 tablet by mouth 2 (two) times a day., Disp: , Rfl:   •  Multiple Vitamins-Minerals (CENTRUM SILVER ADULT 50+) tablet, Take 1 tablet by mouth daily., Disp: , Rfl:   •  oxybutynin (DITROPAN) 5 MG tablet, Take 1 tablet by mouth Every Night., Disp: 30 tablet, Rfl: 11  •  potassium gluconate 595 MG tablet tablet, Take 1 tablet by mouth Daily., Disp: , Rfl:   •  vitamin B-12 (CYANOCOBALAMIN) 1000 MCG tablet, Take 1,000 mcg by mouth Daily., Disp: , Rfl:   •  VYTORIN 10-20 MG per tablet, TAKE ONE TABLET BY MOUTH ONCE NIGHTLY, Disp: 30 tablet, Rfl: 2  •  azithromycin (ZITHROMAX) 250 MG tablet, Take 2 tablets the first day, then 1 tablet daily for 4 days., Disp: 6 tablet, Rfl: 0  •  levocetirizine (XYZAL) 5 MG tablet, Take 1 tablet by mouth Every Evening., Disp: 30 tablet, Rfl: 11  •  levothyroxine (SYNTHROID) 125 MCG tablet, Take 1 tablet by mouth Daily., Disp: 90 tablet, Rfl: 3  •  Omega-3 Fatty Acids (FISH OIL) 1200 MG capsule capsule, Take 1,200 mg by mouth 2 (Two) Times a Day With Meals., Disp: , Rfl:     Objective      Blood pressure 128/65, pulse 69, temperature 97.6 °F (36.4 °C), resp. rate 16, height 177.8 cm (70\"), weight 96.5 kg (212 lb 12.8 oz), SpO2 98 %.    Physical Exam     General Appearance:    Alert, cooperative, no distress, appears stated age   Head:    Normocephalic, without obvious abnormality, atraumatic   Eyes:    PERRL, conjunctiva/corneas clear, EOM's intact   Ears:    Normal TM's and external ear canals, both ears   Nose:   Nares normal, septum midline, " mucosa normal, no drainage   or sinus tenderness   Throat:   Lips, mucosa, and tongue normal; teeth and gums normal   Neck:   Supple, symmetrical, trachea midline, no adenopathy;        thyroid:  No enlargement/tenderness/nodules; no carotid    bruit or JVD   Back:     Symmetric, no curvature, ROM normal, no CVA tenderness   Lungs:     Clear to auscultation bilaterally, respirations unlabored   Chest wall:    No tenderness or deformity   Heart:    Regular rate and rhythm, S1 and S2 normal, no murmur,        rub or gallop   Abdomen:     Soft, non-tender, bowel sounds active all four quadrants,     no masses, no organomegaly   Extremities:   Extremities normal, atraumatic, no cyanosis or edema   Pulses:   2+ and symmetric all extremities   Skin:   Skin color, texture, turgor normal, no rashes or lesions   Lymph nodes:   Cervical, supraclavicular, and axillary nodes normal   Neurologic:   CNII-XII intact. Normal strength, sensation and reflexes       throughout      Results for orders placed or performed in visit on 12/14/18   POC Urinalysis Dipstick, Automated   Result Value Ref Range    Color Yellow Yellow, Straw, Dark Yellow, Christal    Clarity, UA Clear Clear    Specific Gravity  1.030 1.005 - 1.030    pH, Urine 5.5 5.0 - 8.0    Leukocytes Negative Negative    Nitrite, UA Negative Negative    Protein, POC Negative Negative mg/dL    Glucose, UA Negative Negative, 1000 mg/dL (3+) mg/dL    Ketones, UA Negative Negative    Urobilinogen, UA Normal Normal    Bilirubin Negative Negative    Blood, UA Negative Negative         Assessment/Plan       Deniz was seen today for hypertension, med refill and aspirin questions.    Diagnoses and all orders for this visit:    Low vitamin D level  -     T4, Free  -     TSH  -     Comprehensive Metabolic Panel  -     Vitamin B12  -     CBC & Differential  -     Lipid Panel  -     Vitamin D 25 Hydroxy    Hypercholesteremia  -     T4, Free  -     TSH  -     Comprehensive Metabolic Panel  -      Vitamin B12  -     CBC & Differential  -     Lipid Panel  -     Vitamin D 25 Hydroxy    Thrombocytopenia (CMS/HCC)  -     T4, Free  -     TSH  -     Comprehensive Metabolic Panel  -     Vitamin B12  -     CBC & Differential  -     Lipid Panel  -     Vitamin D 25 Hydroxy    Vitamin D deficiency   -     Vitamin D 25 Hydroxy    Abnormal colonoscopy  -     Ambulatory Referral to General Surgery    Seasonal allergic rhinitis due to other allergic trigger  -     levocetirizine (XYZAL) 5 MG tablet; Take 1 tablet by mouth Every Evening.    Acquired hypothyroidism  -     levothyroxine (SYNTHROID) 125 MCG tablet; Take 1 tablet by mouth Daily.    Bronchitis  -     azithromycin (ZITHROMAX) 250 MG tablet; Take 2 tablets the first day, then 1 tablet daily for 4 days.      Return in about 6 months (around 9/11/2019).          There are no Patient Instructions on file for this visit.     Zane Magallon MD    Assessment/Plan

## 2019-04-28 DIAGNOSIS — E78.00 HYPERCHOLESTEREMIA: ICD-10-CM

## 2019-04-28 RX ORDER — EZETIMIBE/SIMVASTATIN 10 MG-20MG
TABLET ORAL
Qty: 30 TABLET | Refills: 1 | Status: CANCELLED | OUTPATIENT
Start: 2019-04-28

## 2019-04-29 DIAGNOSIS — E78.00 HYPERCHOLESTEREMIA: ICD-10-CM

## 2019-04-29 RX ORDER — EZETIMIBE/SIMVASTATIN 10 MG-20MG
1 TABLET ORAL NIGHTLY
Qty: 30 TABLET | Refills: 5 | Status: SHIPPED | OUTPATIENT
Start: 2019-04-29 | End: 2019-10-21 | Stop reason: SDUPTHER

## 2019-06-10 ENCOUNTER — HOSPITAL ENCOUNTER (OUTPATIENT)
Facility: HOSPITAL | Age: 74
Discharge: HOME | End: 2019-06-10
Payer: MEDICARE

## 2019-06-10 DIAGNOSIS — N40.0 BENIGN NON-NODULAR PROSTATIC HYPERPLASIA WITHOUT LOWER URINARY TRACT SYMPTOMS: ICD-10-CM

## 2019-06-10 DIAGNOSIS — N40.1 BPH WITH URINARY OBSTRUCTION: ICD-10-CM

## 2019-06-10 DIAGNOSIS — N13.8 BPH WITH URINARY OBSTRUCTION: ICD-10-CM

## 2019-06-10 DIAGNOSIS — R97.20 ELEVATED PROSTATE SPECIFIC ANTIGEN (PSA): Primary | ICD-10-CM

## 2019-06-10 LAB
A/G RATIO: 2.6 (ref 0.8–2)
ALBUMIN SERPL-MCNC: 4.9 G/DL (ref 3.4–4.8)
ALP BLD-CCNC: 52 U/L (ref 25–100)
ALT SERPL-CCNC: 24 U/L (ref 4–36)
ANION GAP SERPL CALCULATED.3IONS-SCNC: 9 MMOL/L (ref 3–16)
AST SERPL-CCNC: 23 U/L (ref 8–33)
BASOPHILS ABSOLUTE: 0 K/UL (ref 0–0.1)
BASOPHILS RELATIVE PERCENT: 0.2 %
BILIRUB SERPL-MCNC: 1.4 MG/DL (ref 0.3–1.2)
BUN BLDV-MCNC: 16 MG/DL (ref 6–20)
CALCIUM SERPL-MCNC: 9.5 MG/DL (ref 8.5–10.5)
CHLORIDE BLD-SCNC: 100 MMOL/L (ref 98–107)
CO2: 31 MMOL/L (ref 20–30)
CREAT SERPL-MCNC: 0.8 MG/DL (ref 0.4–1.2)
EOSINOPHILS ABSOLUTE: 0.1 K/UL (ref 0–0.4)
EOSINOPHILS RELATIVE PERCENT: 1.7 %
GFR AFRICAN AMERICAN: >59
GFR NON-AFRICAN AMERICAN: >59
GLOBULIN: 1.9 G/DL
GLUCOSE BLD-MCNC: 118 MG/DL (ref 74–106)
HCT VFR BLD CALC: 46.1 % (ref 40–54)
HEMOGLOBIN: 15.7 G/DL (ref 13–18)
IMMATURE GRANULOCYTES #: 0 K/UL
IMMATURE GRANULOCYTES %: 0.2 % (ref 0–5)
LYMPHOCYTES ABSOLUTE: 1.4 K/UL (ref 1.5–4)
LYMPHOCYTES RELATIVE PERCENT: 29.2 %
MCH RBC QN AUTO: 33.1 PG (ref 27–32)
MCHC RBC AUTO-ENTMCNC: 34.1 G/DL (ref 31–35)
MCV RBC AUTO: 97.3 FL (ref 80–100)
MONOCYTES ABSOLUTE: 0.5 K/UL (ref 0.2–0.8)
MONOCYTES RELATIVE PERCENT: 10.9 %
NEUTROPHILS ABSOLUTE: 2.8 K/UL (ref 2–7.5)
NEUTROPHILS RELATIVE PERCENT: 57.8 %
PDW BLD-RTO: 12.4 % (ref 11–16)
PLATELET # BLD: 94 K/UL (ref 150–400)
PMV BLD AUTO: 8.9 FL (ref 6–10)
POTASSIUM SERPL-SCNC: 4.2 MMOL/L (ref 3.4–5.1)
RBC # BLD: 4.74 M/UL (ref 4.5–6)
SODIUM BLD-SCNC: 140 MMOL/L (ref 136–145)
TOTAL PROTEIN: 6.8 G/DL (ref 6.4–8.3)
WBC # BLD: 4.8 K/UL (ref 4–11)

## 2019-06-10 PROCEDURE — 85025 COMPLETE CBC W/AUTO DIFF WBC: CPT

## 2019-06-10 PROCEDURE — 36415 COLL VENOUS BLD VENIPUNCTURE: CPT

## 2019-06-10 PROCEDURE — 80053 COMPREHEN METABOLIC PANEL: CPT

## 2019-06-10 RX ORDER — FINASTERIDE 5 MG/1
5 TABLET, FILM COATED ORAL DAILY
Qty: 90 TABLET | Refills: 3 | Status: SHIPPED | OUTPATIENT
Start: 2019-06-10 | End: 2020-06-10

## 2019-06-11 ENCOUNTER — LAB (OUTPATIENT)
Dept: LAB | Facility: HOSPITAL | Age: 74
End: 2019-06-11

## 2019-06-11 ENCOUNTER — OFFICE VISIT (OUTPATIENT)
Dept: SURGERY | Facility: CLINIC | Age: 74
End: 2019-06-11

## 2019-06-11 VITALS
WEIGHT: 208 LBS | OXYGEN SATURATION: 96 % | BODY MASS INDEX: 29.78 KG/M2 | HEART RATE: 74 BPM | TEMPERATURE: 98.2 F | RESPIRATION RATE: 18 BRPM | HEIGHT: 70 IN | DIASTOLIC BLOOD PRESSURE: 68 MMHG | SYSTOLIC BLOOD PRESSURE: 120 MMHG

## 2019-06-11 DIAGNOSIS — R97.20 ELEVATED PROSTATE SPECIFIC ANTIGEN (PSA): ICD-10-CM

## 2019-06-11 DIAGNOSIS — Z86.010 HISTORY OF ADENOMATOUS POLYP OF COLON: Primary | ICD-10-CM

## 2019-06-11 DIAGNOSIS — N13.8 BPH WITH URINARY OBSTRUCTION: ICD-10-CM

## 2019-06-11 DIAGNOSIS — N40.1 BPH WITH URINARY OBSTRUCTION: ICD-10-CM

## 2019-06-11 LAB — PSA SERPL-MCNC: 0.92 NG/ML (ref 0.06–4)

## 2019-06-11 PROCEDURE — 99212 OFFICE O/P EST SF 10 MIN: CPT | Performed by: SURGERY

## 2019-06-11 PROCEDURE — 84153 ASSAY OF PSA TOTAL: CPT

## 2019-06-11 PROCEDURE — 36415 COLL VENOUS BLD VENIPUNCTURE: CPT

## 2019-06-11 RX ORDER — SODIUM CHLORIDE 0.9 % (FLUSH) 0.9 %
3 SYRINGE (ML) INJECTION EVERY 12 HOURS SCHEDULED
Status: CANCELLED | OUTPATIENT
Start: 2019-06-11

## 2019-06-11 RX ORDER — SODIUM CHLORIDE 0.9 % (FLUSH) 0.9 %
3-10 SYRINGE (ML) INJECTION AS NEEDED
Status: CANCELLED | OUTPATIENT
Start: 2019-06-11

## 2019-06-11 RX ORDER — POLYETHYLENE GLYCOL 3350 17 G/17G
238 POWDER, FOR SOLUTION ORAL ONCE
Qty: 238 G | Refills: 0 | Status: SHIPPED | OUTPATIENT
Start: 2019-06-11 | End: 2019-06-11

## 2019-06-11 RX ORDER — SODIUM CHLORIDE, SODIUM LACTATE, POTASSIUM CHLORIDE, CALCIUM CHLORIDE 600; 310; 30; 20 MG/100ML; MG/100ML; MG/100ML; MG/100ML
50 INJECTION, SOLUTION INTRAVENOUS CONTINUOUS
Status: CANCELLED | OUTPATIENT
Start: 2019-06-11

## 2019-06-11 RX ORDER — SILDENAFIL CITRATE 20 MG/1
20 TABLET ORAL 3 TIMES DAILY
COMMUNITY
End: 2019-07-01 | Stop reason: SDUPTHER

## 2019-06-11 RX ORDER — BISACODYL 5 MG/1
TABLET, DELAYED RELEASE ORAL
Qty: 4 TABLET | Refills: 0 | Status: SHIPPED | OUTPATIENT
Start: 2019-06-11 | End: 2019-07-22 | Stop reason: HOSPADM

## 2019-06-11 NOTE — PROGRESS NOTES
Subjective   Deniz Dickson is a 74 y.o. male.   Chief Complaint   Patient presents with   • Colonoscopy     evaluation.    History of Present Illness     Patient presents for evaluation for colon cancer screening.  There is a family history of colon cancer. Patient denies changes in bowel habits, diarrhea, constipation, abdominal pain, decreased caliber of stool, melena, brbpr and weight loss. Patient reports a personal history of colon polyps.  .There is no personal or family history of bleeding disorder or untoward reaction to anesthesia.  Patient has had a colonoscopy 3 year(s) ago. Adenomatous polyps were removed at that time.     The following portions of the patient's history were reviewed and updated as appropriate: allergies, current medications, past family history, past medical history, past social history, past surgical history and problem list.     Patient Active Problem List   Diagnosis   • Atopic rhinitis   • BPH (benign prostatic hyperplasia)   • Gastroesophageal reflux disease   • Granulomatous disease (CMS/HCC)   • Hemorrhoids   • Hyperlipidemia   • Hypertension   • Hypothyroidism   • Idiopathic thrombocytopenic purpura (CMS/HCC)   • JUWAN on CPAP   • Vitamin D deficiency   • Skin lesion   • Diastasis recti   • Palpitations   • Osteoarthritis   • History of ITP   • Arthritic-like pain   • Coronary artery disease   • Dyslipidemia   • Hypertension   • Contracture of joint of right foot   • Bronchitis       Past Medical History:   Diagnosis Date   • Coronary artery disease     Probable Non obstructive    • Dyslipidemia    • Elevated prostate specific antigen (PSA)    • Essential hypertension, benign    • H/O benign prostatic hypertrophy    • High cholesterol    • History of hemorrhoids    • History of idiopathic thrombocytopenic purpura    • History of malignant neoplasm of skin    • History of osteoarthritis    • History of sleep apnea    • Hypertension    • Hypothyroidism    • JUWAN on CPAP      greater than 5 years    • S/P right knee surgery 07/12/2017   • Tobacco abuse        Past Surgical History:   Procedure Laterality Date   • APPENDECTOMY     • COLONOSCOPY      Complete Colonoscopy   • HEMORRHOIDECTOMY     • HERNIA REPAIR     • KNEE ARTHROPLASTY, PARTIAL REPLACEMENT Right 07/12/2017   • KNEE SURGERY      Left Knee   • OTHER SURGICAL HISTORY      Surgery Testis Orchiopexy       Medications:     Current Outpatient Medications:   •  aspirin 81 MG EC tablet, Take 1 tablet by mouth Daily., Disp: 90 tablet, Rfl: 1  •  bisoprolol (ZEBeta) 5 MG tablet, TAKE ONE-HALF TABLET BY MOUTH DAILY, Disp: 45 tablet, Rfl: 1  •  celecoxib (CeleBREX) 100 MG capsule, Take 1 capsule by mouth Daily. 200 mg in AM & 100 mg in PM (Patient taking differently: Take 100 mg by mouth Daily.), Disp: 90 capsule, Rfl: 3  •  celecoxib (CeleBREX) 200 MG capsule, Take 1 capsule by mouth As Needed., Disp: , Rfl:   •  Cholecalciferol (VITAMIN D-3 PO), Take 1 tablet by mouth Daily., Disp: , Rfl:   •  finasteride (PROSCAR) 5 MG tablet, Take 1 tablet by mouth Daily., Disp: 90 tablet, Rfl: 3  •  fluticasone (FLONASE) 50 MCG/ACT nasal spray, 1 spray into each nostril., Disp: , Rfl:   •  Glucosamine-Chondroitin (OSTEO BI-FLEX REGULAR STRENGTH PO), Take 1 tablet by mouth 2 (two) times a day., Disp: , Rfl:   •  levocetirizine (XYZAL) 5 MG tablet, Take 1 tablet by mouth Every Evening., Disp: 30 tablet, Rfl: 11  •  levothyroxine (SYNTHROID) 125 MCG tablet, Take 1 tablet by mouth Daily., Disp: 90 tablet, Rfl: 3  •  Multiple Vitamins-Minerals (CENTRUM SILVER ADULT 50+) tablet, Take 1 tablet by mouth daily., Disp: , Rfl:   •  Omega-3 Fatty Acids (FISH OIL) 1200 MG capsule capsule, Take 1,200 mg by mouth 2 (Two) Times a Day With Meals., Disp: , Rfl:   •  oxybutynin (DITROPAN) 5 MG tablet, Take 1 tablet by mouth Every Night., Disp: 30 tablet, Rfl: 11  •  potassium gluconate 595 MG tablet tablet, Take 1 tablet by mouth Daily., Disp: , Rfl:   •  sildenafil  (REVATIO) 20 MG tablet, Take 20 mg by mouth 3 (Three) Times a Day., Disp: , Rfl:   •  vitamin B-12 (CYANOCOBALAMIN) 1000 MCG tablet, Take 1,000 mcg by mouth Daily., Disp: , Rfl:   •  VYTORIN 10-20 MG per tablet, Take 1 tablet by mouth Every Night., Disp: 30 tablet, Rfl: 5  •  azithromycin (ZITHROMAX) 250 MG tablet, Take 2 tablets the first day, then 1 tablet daily for 4 days., Disp: 6 tablet, Rfl: 0  •  fexofenadine (ALLEGRA) 180 MG tablet, Take 180 mg by mouth Daily., Disp: , Rfl:     Allergies:   Allergies   Allergen Reactions   • Penicillins Itching and Rash         Family History   Problem Relation Age of Onset   • Migraines Mother    • Mental illness Mother    • Heart disease Mother    • Alcohol abuse Father    • Cancer Father    • Lung cancer Father    • Brain cancer Sister    • Colon cancer Paternal Grandmother    • Prostate cancer Other    • Colon cancer Other        Social History     Socioeconomic History   • Marital status:      Spouse name: Not on file   • Number of children: Not on file   • Years of education: Not on file   • Highest education level: Not on file   Occupational History     Employer: RETIRED   Tobacco Use   • Smoking status: Former Smoker     Years: 15.00     Types: Cigarettes     Last attempt to quit: 1985     Years since quittin.0   • Smokeless tobacco: Former User     Quit date:    Substance and Sexual Activity   • Alcohol use: No   • Drug use: No   • Sexual activity: Defer         Review of Systems   Constitutional: Negative for chills, fever and unexpected weight change.   HENT: Negative for trouble swallowing and voice change.    Eyes: Negative for visual disturbance.   Respiratory: Negative for apnea, cough, chest tightness, shortness of breath and wheezing.    Cardiovascular: Negative for chest pain, palpitations and leg swelling.   Gastrointestinal: Negative for abdominal distention, abdominal pain, anal bleeding, blood in stool, constipation, diarrhea,  "nausea, rectal pain and vomiting.   Endocrine: Negative for cold intolerance and heat intolerance.   Genitourinary: Negative for difficulty urinating, dysuria, flank pain, scrotal swelling and testicular pain.   Musculoskeletal: Negative for back pain, gait problem and joint swelling.   Skin: Negative for color change, rash and wound.   Neurological: Negative for dizziness, syncope, speech difficulty, weakness, numbness and headaches.   Hematological: Negative for adenopathy. Does not bruise/bleed easily.   Psychiatric/Behavioral: Negative for confusion. The patient is not nervous/anxious.        Objective    /68   Pulse 74   Temp 98.2 °F (36.8 °C) (Temporal)   Resp 18   Ht 177.8 cm (70\")   Wt 94.3 kg (208 lb)   SpO2 96%   BMI 29.84 kg/m²     Physical Exam   Constitutional: He is oriented to person, place, and time. He appears well-developed and well-nourished.   HENT:   Head: Normocephalic and atraumatic.   Eyes: EOM are normal. Pupils are equal, round, and reactive to light. No scleral icterus.   Neck: Neck supple.   Cardiovascular: Regular rhythm.   Pulmonary/Chest: Effort normal.   Abdominal: Soft. He exhibits no distension. There is no tenderness.   Neurological: He is alert and oriented to person, place, and time.   Skin: Skin is warm and dry.   Psychiatric: He has a normal mood and affect. His behavior is normal.       Assessment/Plan   Deniz was seen today for colonoscopy.    Diagnoses and all orders for this visit:    History of adenomatous polyp of colon  -     Case Request; Standing  -     lactated ringers infusion  -     sodium chloride 0.9 % flush 3 mL  -     sodium chloride 0.9 % flush 3-10 mL  -     Case Request    Other orders  -     Follow Anesthesia Guidelines / Standing Orders; Future  -     Provide NPO Instructions to Patient; Future  -     Follow Anesthesia Guidelines / Standing Orders; Standing  -     Verify NPO Status; Standing  -     Obtain Informed Consent; Standing  -     Verify " / Perform Chlorhexidine Skin Prep if Indicated (If Not Already Completed); Standing  -     Verify Bowel Prep Was Successful; Standing  -     Give Tap Water Enema If Bowel Prep Insufficient; Standing  -     Notify Physician - Standard; Standing  -     Insert Peripheral IV; Standing  -     Saline Lock & Maintain IV Access; Standing  -     polyethylene glycol (MIRALAX) powder; Take 238 g by mouth 1 (One) Time for 1 dose. Mix 238 grams of miralax with 64 oz garotade and drink at 4 pm day before colonoscopy  -     bisacodyl (DULCOLAX) 5 MG EC tablet; Take 4 tablets once by mouth at 1 pm on day before colonoscopy.      We discussed colonoscopy for colon cancer screening purposes.  We discussed the indications for screening colonoscopy as well as the risks, benefits and alternatives to this procedure. Risks including but not limited to perforation, bleeding,need for blood transfusion or emergent surgery ,and missed neoplasm were reviewed in detail with the patient. The necessary bowel preparation and pre-procedure clear liquid diet was explained in detail.  A written instructional handout was also provided.  Electronic prescriptions for miralax and dulcolax were sent to the patient's pharmacy.  The patient was given an opportunity to ask questions.  The patient verbalized understanding of these recommendations and the plan of care. The patient is willing to proceed with colonoscopy and has signed informed consent in the office today.  My office will arrange scheduling for the colonoscopy procedure and pre-admission testing.

## 2019-06-13 RX ORDER — CELECOXIB 100 MG/1
CAPSULE ORAL
Qty: 60 CAPSULE | Refills: 4 | Status: SHIPPED | OUTPATIENT
Start: 2019-06-13 | End: 2019-07-19

## 2019-06-13 RX ORDER — CELECOXIB 200 MG/1
200 CAPSULE ORAL AS NEEDED
Qty: 30 CAPSULE | Refills: 11 | Status: SHIPPED | OUTPATIENT
Start: 2019-06-13 | End: 2020-04-13

## 2019-07-01 ENCOUNTER — OFFICE VISIT (OUTPATIENT)
Dept: UROLOGY | Facility: CLINIC | Age: 74
End: 2019-07-01

## 2019-07-01 VITALS
DIASTOLIC BLOOD PRESSURE: 76 MMHG | HEIGHT: 70 IN | HEART RATE: 64 BPM | WEIGHT: 208 LBS | SYSTOLIC BLOOD PRESSURE: 132 MMHG | TEMPERATURE: 97.7 F | OXYGEN SATURATION: 95 % | BODY MASS INDEX: 29.78 KG/M2

## 2019-07-01 DIAGNOSIS — N52.01 ERECTILE DYSFUNCTION DUE TO ARTERIAL INSUFFICIENCY: ICD-10-CM

## 2019-07-01 DIAGNOSIS — N13.8 BPH WITH URINARY OBSTRUCTION: ICD-10-CM

## 2019-07-01 DIAGNOSIS — N40.1 BPH WITH URINARY OBSTRUCTION: ICD-10-CM

## 2019-07-01 DIAGNOSIS — R97.20 ELEVATED PROSTATE SPECIFIC ANTIGEN (PSA): Primary | ICD-10-CM

## 2019-07-01 LAB
BILIRUB BLD-MCNC: NEGATIVE MG/DL
CLARITY, POC: CLEAR
COLOR UR: YELLOW
GLUCOSE UR STRIP-MCNC: NEGATIVE MG/DL
KETONES UR QL: NEGATIVE
LEUKOCYTE EST, POC: NEGATIVE
NITRITE UR-MCNC: NEGATIVE MG/ML
PH UR: 5 [PH] (ref 5–8)
PROT UR STRIP-MCNC: NEGATIVE MG/DL
RBC # UR STRIP: NEGATIVE /UL
SP GR UR: 1.03 (ref 1–1.03)
UROBILINOGEN UR QL: NORMAL

## 2019-07-01 PROCEDURE — 81003 URINALYSIS AUTO W/O SCOPE: CPT | Performed by: UROLOGY

## 2019-07-01 PROCEDURE — 99213 OFFICE O/P EST LOW 20 MIN: CPT | Performed by: UROLOGY

## 2019-07-01 RX ORDER — SILDENAFIL CITRATE 20 MG/1
60 TABLET ORAL 3 TIMES DAILY
Qty: 30 TABLET | Refills: 11 | Status: SHIPPED | OUTPATIENT
Start: 2019-07-01 | End: 2020-07-02

## 2019-07-01 NOTE — PROGRESS NOTES
Chief Complaint  Elevated PSA (yearly)        ELMER Dickson is a 74 y.o. male who returns today for routine follow-up originally referred for elevated PSA where he was found to have a markedly enlarged prostate.  Since taking Proscar his values have been normal and his symptoms have resolved describing an AUA index today of only 3.  At one time he complained of pain in the testicles but is had no recurrence.  He came by recently for PSA which is better than last year at 0.9.  He is only complaint today is erectile dysfunction but he does well on generic sildenafil.    Vitals:    07/01/19 0919   BP: 132/76   Pulse: 64   Temp: 97.7 °F (36.5 °C)   SpO2: 95%       Past Medical History  Past Medical History:   Diagnosis Date   • Coronary artery disease     Probable Non obstructive    • Dyslipidemia    • Elevated prostate specific antigen (PSA)    • Essential hypertension, benign    • H/O benign prostatic hypertrophy    • High cholesterol    • History of hemorrhoids    • History of idiopathic thrombocytopenic purpura    • History of malignant neoplasm of skin    • History of osteoarthritis    • History of sleep apnea    • Hypertension    • Hypothyroidism    • JUWAN on CPAP     greater than 5 years    • S/P right knee surgery 07/12/2017   • Tobacco abuse        Past Surgical History  Past Surgical History:   Procedure Laterality Date   • APPENDECTOMY     • COLONOSCOPY      Complete Colonoscopy   • HEMORRHOIDECTOMY     • HERNIA REPAIR     • KNEE ARTHROPLASTY, PARTIAL REPLACEMENT Right 07/12/2017   • KNEE SURGERY      Left Knee   • OTHER SURGICAL HISTORY      Surgery Testis Orchiopexy       Medications  has a current medication list which includes the following prescription(s): aspirin, azithromycin, bisacodyl, bisoprolol, celecoxib, celecoxib, cholecalciferol, fexofenadine, finasteride, fluticasone, glucosamine-chondroitin, levocetirizine, levothyroxine, centrum silver adult 50+, fish oil, oxybutynin, potassium gluconate,  sildenafil, vitamin b-12, and vytorin.      Allergies  Allergies   Allergen Reactions   • Penicillins Itching and Rash       Social History  Social History     Social History Narrative   • Not on file       Family History  He has no family history of bladder or kidney cancer  He has no family history of kidney stones      AUA Symptom Score:      Review of Systems  Review of Systems    Physical Exam  Physical Exam   Constitutional: He is oriented to person, place, and time. He appears well-developed and well-nourished.   HENT:   Head: Normocephalic and atraumatic.   Neck: Normal range of motion.   Pulmonary/Chest: Effort normal. No respiratory distress.   Abdominal: Soft. He exhibits no distension and no mass. There is no tenderness. No hernia.   Genitourinary: Rectum normal and prostate normal.   Musculoskeletal: Normal range of motion.   Lymphadenopathy:     He has no cervical adenopathy.   Neurological: He is alert and oriented to person, place, and time.   Skin: Skin is warm and dry.   Psychiatric: He has a normal mood and affect. His behavior is normal.   Vitals reviewed.      Labs Recent and today in the office:  Results for orders placed or performed in visit on 07/01/19   POC Urinalysis Dipstick, Automated   Result Value Ref Range    Color Yellow Yellow, Straw, Dark Yellow, Christal    Clarity, UA Clear Clear    Specific Gravity  1.030 1.005 - 1.030    pH, Urine 5.0 5.0 - 8.0    Leukocytes Negative Negative    Nitrite, UA Negative Negative    Protein, POC Negative Negative mg/dL    Glucose, UA Negative Negative, 1000 mg/dL (3+) mg/dL    Ketones, UA Negative Negative    Urobilinogen, UA Normal Normal    Bilirubin Negative Negative    Blood, UA Negative Negative         Assessment & Plan  Elevated PSA: After taking Proscar for his markedly enlarged gland his recent PSA was 0.9.    BPH with outlet obstruction: Digital rectal exam is benign and is a weight symptom index was only 3.  He will continue the Proscar and  return on an annual basis.  He is encouraged to eat a heart healthy diet to reduce the risk of getting prostate cancer even though his family history is negative except for second and third-degree relatives.    Erectile dysfunction: He requests generic sildenafil and this is provided.

## 2019-07-10 ENCOUNTER — TELEPHONE (OUTPATIENT)
Dept: INTERNAL MEDICINE | Facility: CLINIC | Age: 74
End: 2019-07-10

## 2019-07-10 NOTE — TELEPHONE ENCOUNTER
Patient had low platelet on last blood test and patient is needing to get colonoscopy and needs a repeated lab order to check his platelets.  Or anything else that is needed Want to have blood taken on the 15 th [facility] in [place]    Please give patient a call when the orders are placed.

## 2019-07-12 DIAGNOSIS — E78.2 MIXED HYPERLIPIDEMIA: ICD-10-CM

## 2019-07-12 DIAGNOSIS — D69.3 IDIOPATHIC THROMBOCYTOPENIC PURPURA (HCC): Chronic | ICD-10-CM

## 2019-07-12 DIAGNOSIS — R79.9 ABNORMAL FINDING OF BLOOD CHEMISTRY: ICD-10-CM

## 2019-07-12 DIAGNOSIS — I25.10 CORONARY ARTERY DISEASE INVOLVING NATIVE HEART WITHOUT ANGINA PECTORIS, UNSPECIFIED VESSEL OR LESION TYPE: ICD-10-CM

## 2019-07-12 DIAGNOSIS — E55.9 VITAMIN D DEFICIENCY: ICD-10-CM

## 2019-07-12 DIAGNOSIS — I10 ESSENTIAL HYPERTENSION: Primary | ICD-10-CM

## 2019-07-12 DIAGNOSIS — E03.9 ACQUIRED HYPOTHYROIDISM: ICD-10-CM

## 2019-07-15 ENCOUNTER — HOSPITAL ENCOUNTER (OUTPATIENT)
Facility: HOSPITAL | Age: 74
Discharge: HOME | End: 2019-07-15
Payer: MEDICARE

## 2019-07-15 PROBLEM — Z86.010 HISTORY OF ADENOMATOUS POLYP OF COLON: Status: ACTIVE | Noted: 2019-07-15

## 2019-07-15 PROBLEM — Z86.0101 HISTORY OF ADENOMATOUS POLYP OF COLON: Status: ACTIVE | Noted: 2019-07-15

## 2019-07-15 LAB
A/G RATIO: 2.3 (ref 0.8–2)
ALBUMIN SERPL-MCNC: 5 G/DL (ref 3.4–4.8)
ALP BLD-CCNC: 53 U/L (ref 25–100)
ALT SERPL-CCNC: 25 U/L (ref 4–36)
ANION GAP SERPL CALCULATED.3IONS-SCNC: 12 MMOL/L (ref 3–16)
AST SERPL-CCNC: 25 U/L (ref 8–33)
BASOPHILS ABSOLUTE: 0 K/UL (ref 0–0.1)
BASOPHILS RELATIVE PERCENT: 0.4 %
BILIRUB SERPL-MCNC: 1.7 MG/DL (ref 0.3–1.2)
BUN BLDV-MCNC: 14 MG/DL (ref 6–20)
CALCIUM SERPL-MCNC: 9.6 MG/DL (ref 8.5–10.5)
CHLORIDE BLD-SCNC: 101 MMOL/L (ref 98–107)
CHOLESTEROL, TOTAL: 177 MG/DL (ref 0–200)
CO2: 25 MMOL/L (ref 20–30)
CREAT SERPL-MCNC: 0.8 MG/DL (ref 0.4–1.2)
EOSINOPHILS ABSOLUTE: 0.1 K/UL (ref 0–0.4)
EOSINOPHILS RELATIVE PERCENT: 1.5 %
GFR AFRICAN AMERICAN: >59
GFR NON-AFRICAN AMERICAN: >59
GLOBULIN: 2.2 G/DL
GLUCOSE BLD-MCNC: 114 MG/DL (ref 74–106)
HBA1C MFR BLD: 5.8 %
HCT VFR BLD CALC: 47.9 % (ref 40–54)
HDLC SERPL-MCNC: 78 MG/DL (ref 40–60)
HEMOGLOBIN: 15.9 G/DL (ref 13–18)
IMMATURE GRANULOCYTES #: 0 K/UL
IMMATURE GRANULOCYTES %: 0.4 % (ref 0–5)
LDL CHOLESTEROL CALCULATED: 68 MG/DL
LYMPHOCYTES ABSOLUTE: 1.5 K/UL (ref 1.5–4)
LYMPHOCYTES RELATIVE PERCENT: 28.1 %
MCH RBC QN AUTO: 32.9 PG (ref 27–32)
MCHC RBC AUTO-ENTMCNC: 33.2 G/DL (ref 31–35)
MCV RBC AUTO: 99 FL (ref 80–100)
MICROALBUMIN UR-MCNC: <1.2 MG/DL (ref 0–22)
MONOCYTES ABSOLUTE: 0.5 K/UL (ref 0.2–0.8)
MONOCYTES RELATIVE PERCENT: 9.5 %
NEUTROPHILS ABSOLUTE: 3.2 K/UL (ref 2–7.5)
NEUTROPHILS RELATIVE PERCENT: 60.1 %
PDW BLD-RTO: 12.5 % (ref 11–16)
PLATELET # BLD: 93 K/UL (ref 150–400)
PMV BLD AUTO: 8.8 FL (ref 6–10)
POTASSIUM SERPL-SCNC: 4.6 MMOL/L (ref 3.4–5.1)
RBC # BLD: 4.84 M/UL (ref 4.5–6)
SODIUM BLD-SCNC: 138 MMOL/L (ref 136–145)
TOTAL PROTEIN: 7.2 G/DL (ref 6.4–8.3)
TRIGL SERPL-MCNC: 157 MG/DL (ref 0–249)
VITAMIN B-12: 878 PG/ML (ref 211–911)
VLDLC SERPL CALC-MCNC: 31 MG/DL
WBC # BLD: 5.3 K/UL (ref 4–11)

## 2019-07-15 PROCEDURE — 36415 COLL VENOUS BLD VENIPUNCTURE: CPT

## 2019-07-15 PROCEDURE — 80061 LIPID PANEL: CPT

## 2019-07-15 PROCEDURE — 80053 COMPREHEN METABOLIC PANEL: CPT

## 2019-07-15 PROCEDURE — 82043 UR ALBUMIN QUANTITATIVE: CPT

## 2019-07-15 PROCEDURE — 85025 COMPLETE CBC W/AUTO DIFF WBC: CPT

## 2019-07-15 PROCEDURE — 83036 HEMOGLOBIN GLYCOSYLATED A1C: CPT

## 2019-07-15 PROCEDURE — 82607 VITAMIN B-12: CPT

## 2019-07-16 RX ORDER — BISOPROLOL FUMARATE 5 MG/1
TABLET, FILM COATED ORAL
Qty: 90 TABLET | Refills: 1 | Status: SHIPPED | OUTPATIENT
Start: 2019-07-16 | End: 2020-04-13

## 2019-07-18 ENCOUNTER — TELEPHONE (OUTPATIENT)
Dept: ONCOLOGY | Facility: CLINIC | Age: 74
End: 2019-07-18

## 2019-07-18 NOTE — TELEPHONE ENCOUNTER
----- Message from Paz Guillermo sent at 7/16/2019  3:44 PM EDT -----  Regarding: [facility] - COLONOSCOPY   Contact: [phone]  Patient called and said he has labs done today and his platelets were 93,000. He is due to schedule his 3 year colonoscopy and wants to make sure its ok with Dr Whiting.

## 2019-07-19 ENCOUNTER — TELEPHONE (OUTPATIENT)
Dept: SURGERY | Facility: CLINIC | Age: 74
End: 2019-07-19

## 2019-07-22 ENCOUNTER — ANESTHESIA EVENT (OUTPATIENT)
Dept: GASTROENTEROLOGY | Facility: HOSPITAL | Age: 74
End: 2019-07-22

## 2019-07-22 ENCOUNTER — HOSPITAL ENCOUNTER (OUTPATIENT)
Facility: HOSPITAL | Age: 74
Setting detail: HOSPITAL OUTPATIENT SURGERY
Discharge: HOME | End: 2019-07-22
Attending: SURGERY | Admitting: SURGERY

## 2019-07-22 ENCOUNTER — ANESTHESIA (OUTPATIENT)
Dept: GASTROENTEROLOGY | Facility: HOSPITAL | Age: 74
End: 2019-07-22

## 2019-07-22 VITALS
SYSTOLIC BLOOD PRESSURE: 117 MMHG | HEIGHT: 70 IN | TEMPERATURE: 98.4 F | WEIGHT: 208 LBS | DIASTOLIC BLOOD PRESSURE: 57 MMHG | RESPIRATION RATE: 20 BRPM | HEART RATE: 63 BPM | OXYGEN SATURATION: 96 % | BODY MASS INDEX: 29.78 KG/M2

## 2019-07-22 DIAGNOSIS — Z86.010 HISTORY OF ADENOMATOUS POLYP OF COLON: ICD-10-CM

## 2019-07-22 PROCEDURE — S0260 H&P FOR SURGERY: HCPCS | Performed by: SURGERY

## 2019-07-22 PROCEDURE — 25010000002 PHENYLEPHRINE PER 1 ML: Performed by: NURSE ANESTHETIST, CERTIFIED REGISTERED

## 2019-07-22 PROCEDURE — 25010000002 PROPOFOL 200 MG/20ML EMULSION: Performed by: NURSE ANESTHETIST, CERTIFIED REGISTERED

## 2019-07-22 RX ORDER — SODIUM CHLORIDE, SODIUM LACTATE, POTASSIUM CHLORIDE, CALCIUM CHLORIDE 600; 310; 30; 20 MG/100ML; MG/100ML; MG/100ML; MG/100ML
50 INJECTION, SOLUTION INTRAVENOUS CONTINUOUS
Status: DISCONTINUED | OUTPATIENT
Start: 2019-07-22 | End: 2019-07-22 | Stop reason: HOSPADM

## 2019-07-22 RX ORDER — SODIUM CHLORIDE 0.9 % (FLUSH) 0.9 %
3-10 SYRINGE (ML) INJECTION AS NEEDED
Status: DISCONTINUED | OUTPATIENT
Start: 2019-07-22 | End: 2019-07-22 | Stop reason: HOSPADM

## 2019-07-22 RX ORDER — PROPOFOL 10 MG/ML
INJECTION, EMULSION INTRAVENOUS AS NEEDED
Status: DISCONTINUED | OUTPATIENT
Start: 2019-07-22 | End: 2019-07-22 | Stop reason: SURG

## 2019-07-22 RX ORDER — LIDOCAINE HYDROCHLORIDE 20 MG/ML
INJECTION, SOLUTION INTRAVENOUS AS NEEDED
Status: DISCONTINUED | OUTPATIENT
Start: 2019-07-22 | End: 2019-07-22 | Stop reason: SURG

## 2019-07-22 RX ORDER — SODIUM CHLORIDE 0.9 % (FLUSH) 0.9 %
3 SYRINGE (ML) INJECTION EVERY 12 HOURS SCHEDULED
Status: DISCONTINUED | OUTPATIENT
Start: 2019-07-22 | End: 2019-07-22 | Stop reason: HOSPADM

## 2019-07-22 RX ORDER — MAGNESIUM HYDROXIDE 1200 MG/15ML
LIQUID ORAL AS NEEDED
Status: DISCONTINUED | OUTPATIENT
Start: 2019-07-22 | End: 2019-07-22 | Stop reason: HOSPADM

## 2019-07-22 RX ADMIN — PROPOFOL 50 MG: 10 INJECTION, EMULSION INTRAVENOUS at 07:55

## 2019-07-22 RX ADMIN — LIDOCAINE HYDROCHLORIDE 60 MG: 20 INJECTION, SOLUTION INTRAVENOUS at 07:36

## 2019-07-22 RX ADMIN — PROPOFOL 50 MG: 10 INJECTION, EMULSION INTRAVENOUS at 07:51

## 2019-07-22 RX ADMIN — PROPOFOL 100 MG: 10 INJECTION, EMULSION INTRAVENOUS at 07:36

## 2019-07-22 RX ADMIN — PHENYLEPHRINE HYDROCHLORIDE 100 MCG: 10 INJECTION INTRAVENOUS at 07:43

## 2019-07-22 RX ADMIN — PROPOFOL 50 MG: 10 INJECTION, EMULSION INTRAVENOUS at 07:42

## 2019-07-22 RX ADMIN — SODIUM CHLORIDE, POTASSIUM CHLORIDE, SODIUM LACTATE AND CALCIUM CHLORIDE 50 ML/HR: 600; 310; 30; 20 INJECTION, SOLUTION INTRAVENOUS at 06:52

## 2019-07-22 RX ADMIN — PHENYLEPHRINE HYDROCHLORIDE 100 MCG: 10 INJECTION INTRAVENOUS at 08:00

## 2019-07-22 RX ADMIN — EPHEDRINE SULFATE 10 MG: 50 INJECTION INTRAMUSCULAR; INTRAVENOUS; SUBCUTANEOUS at 08:10

## 2019-07-22 RX ADMIN — PROPOFOL 50 MG: 10 INJECTION, EMULSION INTRAVENOUS at 07:59

## 2019-07-22 RX ADMIN — PHENYLEPHRINE HYDROCHLORIDE 100 MCG: 10 INJECTION INTRAVENOUS at 07:49

## 2019-07-22 NOTE — ANESTHESIA PREPROCEDURE EVALUATION
Anesthesia Evaluation     Patient summary reviewed and Nursing notes reviewed   no history of anesthetic complications:  NPO Solid Status: > 8 hours  NPO Liquid Status: > 8 hours           Airway   Mallampati: II  TM distance: >3 FB  Neck ROM: full  No difficulty expected  Dental - normal exam     Pulmonary - normal exam   (+) a smoker Former, sleep apnea on CPAP,   Cardiovascular - normal exam    PT is on anticoagulation therapy    (+) hypertension well controlled less than 2 medications, CAD, hyperlipidemia,       Neuro/Psych  GI/Hepatic/Renal/Endo    (+)  GERD well controlled,  hypothyroidism,     Musculoskeletal     Abdominal    Substance History      OB/GYN          Other   (+) blood dyscrasia, arthritis   history of cancer remission    ROS/Med Hx Other: BPH  Idiopathic thrombotytopenic purpura                   Anesthesia Plan    ASA 3     MAC   (Risks and benefits discussed including risk of aspiration, recall and dental damage. All patient questions answered. Will continue with POC.)  intravenous induction   Anesthetic plan, all risks, benefits, and alternatives have been provided, discussed and informed consent has been obtained with: patient.

## 2019-07-22 NOTE — ANESTHESIA POSTPROCEDURE EVALUATION
Patient: Deniz Dickson    Procedure Summary     Date:  07/22/19 Room / Location:  [facility] ENDOSCOPY 3 / [facility] ENDOSCOPY    Anesthesia Start:  0725 Anesthesia Stop:  0812    Procedure:  COLONOSCOPY (N/A ) Diagnosis:       History of adenomatous polyp of colon      (History of adenomatous polyp of colon [Z86.010])    Surgeon:  Lisa Rhodes MD Provider:  Niya Mae CRNA    Anesthesia Type:  MAC ASA Status:  3          Anesthesia Type: MAC  Last vitals  BP   105/47 (07/22/19 0816)   Temp   98.4 °F (36.9 °C) (07/22/19 0816)   Pulse   65 (07/22/19 0816)   Resp   20 (07/22/19 0816)     SpO2   93 % (07/22/19 0816)     Post Anesthesia Care and Evaluation    Patient location during evaluation: PHASE II  Patient participation: complete - patient participated  Level of consciousness: awake and alert  Pain score: 0  Pain management: satisfactory to patient  Airway patency: patent  Anesthetic complications: No anesthetic complications  PONV Status: none  Cardiovascular status: acceptable and stable  Respiratory status: acceptable  Hydration status: acceptable

## 2019-08-08 ENCOUNTER — TELEPHONE (OUTPATIENT)
Dept: CARDIOLOGY | Facility: CLINIC | Age: 74
End: 2019-08-08

## 2019-08-08 ENCOUNTER — TELEPHONE (OUTPATIENT)
Dept: INTERNAL MEDICINE | Facility: CLINIC | Age: 74
End: 2019-08-08

## 2019-08-08 NOTE — TELEPHONE ENCOUNTER
Patient called again and is asking if he can have an EKG order sent to [facility], as well.    Please advise.

## 2019-08-08 NOTE — TELEPHONE ENCOUNTER
Patient called to report he was having dizziness. He thinks his BP is 124/50, HR 51. His PCP has sent him for an EKG at [facility]. He will call in the AM with BP/HR readings.

## 2019-08-08 NOTE — TELEPHONE ENCOUNTER
Patient spoke with Elza today, it was decided that patient needed to schedule an appointment for issues he is having. Patient is scheduled to be seen in the morning.

## 2019-08-09 ENCOUNTER — TELEPHONE (OUTPATIENT)
Dept: CARDIOLOGY | Facility: CLINIC | Age: 74
End: 2019-08-09

## 2019-08-09 ENCOUNTER — OFFICE VISIT (OUTPATIENT)
Dept: INTERNAL MEDICINE | Facility: CLINIC | Age: 74
End: 2019-08-09

## 2019-08-09 VITALS
HEART RATE: 58 BPM | HEIGHT: 70 IN | RESPIRATION RATE: 16 BRPM | TEMPERATURE: 97.9 F | SYSTOLIC BLOOD PRESSURE: 133 MMHG | WEIGHT: 201.4 LBS | DIASTOLIC BLOOD PRESSURE: 64 MMHG | OXYGEN SATURATION: 100 % | BODY MASS INDEX: 28.83 KG/M2

## 2019-08-09 DIAGNOSIS — I10 ESSENTIAL HYPERTENSION: ICD-10-CM

## 2019-08-09 DIAGNOSIS — K76.89 LIVER CYST: ICD-10-CM
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
## 2019-08-09 DIAGNOSIS — N28.1 RENAL CYST: ICD-10-CM

## 2019-08-09 DIAGNOSIS — R00.1 BRADYCARDIA: ICD-10-CM

## 2019-08-09 DIAGNOSIS — E80.6 HYPERBILIRUBINEMIA: ICD-10-CM

## 2019-08-09 DIAGNOSIS — Z86.2 HISTORY OF ITP: ICD-10-CM

## 2019-08-09 PROCEDURE — 93005 ELECTROCARDIOGRAM TRACING: CPT | Performed by: INTERNAL MEDICINE

## 2019-08-09 PROCEDURE — 99214 OFFICE O/P EST MOD 30 MIN: CPT | Performed by: INTERNAL MEDICINE

## 2019-08-09 NOTE — TELEPHONE ENCOUNTER
Pt called to update us with BP and HR. This morning his BP was 120/68 HR 54. He went to Dr. Magallon's office today and while there his BP was 133/64 HR 58. Once he got back home his BP was 119/65 HR 77. Dr. Magallon's office did an EKG rhythm was sinus amanda. Pt reports his dizziness has gotten better today. As well as the flushing in his face has also gotten better. I advised for him to continue monitoring his BP and HR. Advised if anything changes or gets worse over the weekend to go to the ER. He is amendable to the plan and verbalized understanding.

## 2019-08-09 NOTE — PROGRESS NOTES
Subjective     Patient ID: Deniz Dickson is a 74 y.o. male. Patient is here for management of multiple medical problems.     Chief Complaint   Patient presents with   • Itching     patient has red spots all over his body that have been itching x 3 weeks   • Dizziness     patient having episodes of facial flushing and also patient having dizziness x 6 months but worse yesterday    • Hypertension     patient also having issues with his blood pressure      History of Present Illness     ithching and spots on bady.  X 3 weeks.   Hx of recent tick bites recently.  Also has low plt.  No fever.   Pt temp low all the time. Using cortizone 10 gel.  Some help with itching.   Area then will darken and heals. Has animal sleeps in bed. [place] dog.        Had colonoscopy and clean.    Carple tunnel syndrome surgery soon.      Constant dizziness. Not stable with balance.   Facial flushing with no fever no diarrhea.    Pt bp fluctuating.   bp sys in the 150's only once.           The following portions of the patient's history were reviewed and updated as appropriate: allergies, current medications, past family history, past medical history, past social history, past surgical history and problem list.    Review of Systems   Constitutional: Positive for fatigue.   Psychiatric/Behavioral: Negative for hallucinations, self-injury and sleep disturbance. The patient is not nervous/anxious and is not hyperactive.    All other systems reviewed and are negative.      Current Outpatient Medications:   •  aspirin 81 MG EC tablet, Take 1 tablet by mouth Daily., Disp: 90 tablet, Rfl: 1  •  bisoprolol (ZEBeta) 5 MG tablet, TAKE ONE-HALF TABLET BY MOUTH DAILY, Disp: 90 tablet, Rfl: 1  •  celecoxib (CeleBREX) 200 MG capsule, Take 1 capsule by mouth As Needed for Moderate Pain . (Patient taking differently: Take 200 mg by mouth 2 (Two) Times a Day.), Disp: 30 capsule, Rfl: 11  •  Cholecalciferol (VITAMIN D-3 PO), Take 1 tablet by mouth  "Daily., Disp: , Rfl:   •  finasteride (PROSCAR) 5 MG tablet, Take 1 tablet by mouth Daily., Disp: 90 tablet, Rfl: 3  •  fluticasone (FLONASE) 50 MCG/ACT nasal spray, 1 spray into the nostril(s) as directed by provider Daily., Disp: , Rfl:   •  Glucosamine-Chondroitin (OSTEO BI-FLEX REGULAR STRENGTH PO), Take 1 tablet by mouth 2 (two) times a day., Disp: , Rfl:   •  levocetirizine (XYZAL) 5 MG tablet, Take 1 tablet by mouth Every Evening., Disp: 30 tablet, Rfl: 11  •  levothyroxine (SYNTHROID) 125 MCG tablet, Take 1 tablet by mouth Daily., Disp: 90 tablet, Rfl: 3  •  Multiple Vitamins-Minerals (CENTRUM SILVER ADULT 50+) tablet, Take 1 tablet by mouth daily., Disp: , Rfl:   •  Omega-3 Fatty Acids (FISH OIL) 1200 MG capsule capsule, Take 1,200 mg by mouth 2 (Two) Times a Day With Meals., Disp: , Rfl:   •  oxybutynin (DITROPAN) 5 MG tablet, Take 1 tablet by mouth Every Night., Disp: 30 tablet, Rfl: 11  •  Potassium 75 MG tablet, Take 99 mg by mouth Daily., Disp: , Rfl:   •  sildenafil (REVATIO) 20 MG tablet, Take 3 tablets by mouth 3 (Three) Times a Day. As needed for ED (Patient taking differently: Take 60 mg by mouth Take As Directed. As needed for ED), Disp: 30 tablet, Rfl: 11  •  vitamin B-12 (CYANOCOBALAMIN) 1000 MCG tablet, Take 1,000 mcg by mouth Daily., Disp: , Rfl:   •  VYTORIN 10-20 MG per tablet, Take 1 tablet by mouth Every Night., Disp: 30 tablet, Rfl: 5    Objective      Blood pressure 133/64, pulse 58, temperature 97.9 °F (36.6 °C), temperature source Oral, resp. rate 16, height 177.8 cm (70\"), weight 91.4 kg (201 lb 6.4 oz), SpO2 100 %.    Physical Exam     General Appearance:    Alert, cooperative, no distress, appears stated age   Head:    Normocephalic, without obvious abnormality, atraumatic   Eyes:    PERRL, conjunctiva/corneas clear, EOM's intact   Ears:    Normal TM's and external ear canals, both ears   Nose:   Nares normal, septum midline, mucosa normal, no drainage   or sinus tenderness "   Throat:   Lips, mucosa, and tongue normal; teeth and gums normal   Neck:   Supple, symmetrical, trachea midline, no adenopathy;        thyroid:  No enlargement/tenderness/nodules; no carotid    bruit or JVD   Back:     Symmetric, no curvature, ROM normal, no CVA tenderness   Lungs:     Clear to auscultation bilaterally, respirations unlabored   Chest wall:    No tenderness or deformity   Heart:    Regular rate and rhythm, S1 and S2 normal, no murmur,        rub or gallop   Abdomen:     Soft, non-tender, bowel sounds active all four quadrants,     no masses, no organomegaly   Extremities:   Extremities normal, atraumatic, no cyanosis or edema   Pulses:   2+ and symmetric all extremities   Skin:   Non confluent punctate lesion on left waistline x 3.      Lymph nodes:   Cervical, supraclavicular, and axillary nodes normal   Neurologic:   CNII-XII intact. Normal strength, sensation and reflexes       throughout      Results for orders placed or performed in visit on 07/01/19   POC Urinalysis Dipstick, Automated   Result Value Ref Range    Color Yellow Yellow, Straw, Dark Yellow, Christal    Clarity, UA Clear Clear    Specific Gravity  1.030 1.005 - 1.030    pH, Urine 5.0 5.0 - 8.0    Leukocytes Negative Negative    Nitrite, UA Negative Negative    Protein, POC Negative Negative mg/dL    Glucose, UA Negative Negative, 1000 mg/dL (3+) mg/dL    Ketones, UA Negative Negative    Urobilinogen, UA Normal Normal    Bilirubin Negative Negative    Blood, UA Negative Negative         Assessment/Plan     Insect bites.    Review of MRI ab with cyst on liver and kidneys.      Dr Ojeda office knows of dizziness. May need med adjustment.    ECG 12 Lead  Date/Time: 8/9/2019 10:39 AM  Performed by: Zane Magallon MD  Authorized by: Zane Magallon MD   Comparison: compared with previous ECG   Similar to previous ECG  Rhythm: sinus bradycardia  Rate: bradycardic  QRS axis: normal    Clinical impression: abnormal EKG  Comments: Pt  with likely symptomatic bradycardia.          pt per ports that he will f/u with DR Ojeda on the EKG results. On bisoprolol 2.5mg.  This may need held/.  Will check thyroid as this will also contribute to bradycardia.    Deniz was seen today for itching, dizziness and hypertension.    Diagnoses and all orders for this visit:    Insect bite, initial encounter    Essential hypertension  -     CBC & Differential  -     Vitamin B12  -     Lipid Panel  -     Comprehensive Metabolic Panel  -     TSH  -     T4, Free  -     Gamma GT    Hyperbilirubinemia  -     MRI abdomen w wo contrast; Future  -     CBC & Differential  -     Vitamin B12  -     Lipid Panel  -     Comprehensive Metabolic Panel  -     TSH  -     T4, Free  -     Gamma GT    History of ITP  -     MRI abdomen w wo contrast; Future  -     CBC & Differential  -     Vitamin B12  -     Lipid Panel  -     Comprehensive Metabolic Panel  -     TSH  -     T4, Free  -     Gamma GT    Liver cyst  -     MRI abdomen w wo contrast; Future  -     CBC & Differential  -     Vitamin B12  -     Lipid Panel  -     Comprehensive Metabolic Panel  -     TSH  -     T4, Free  -     Gamma GT    Renal cyst  -     MRI abdomen w wo contrast; Future  -     CBC & Differential  -     Vitamin B12  -     Lipid Panel  -     Comprehensive Metabolic Panel  -     TSH  -     T4, Free  -     Gamma GT    Other orders  -     ECG 12 Lead      Return in about 6 weeks (around 9/20/2019).          There are no Patient Instructions on file for this visit.     Zane Magallon MD    Assessment/Plan

## 2019-08-10 LAB
ALBUMIN SERPL-MCNC: 4.8 G/DL (ref 3.5–5.2)
ALBUMIN/GLOB SERPL: 2.1 G/DL
ALP SERPL-CCNC: 51 U/L (ref 39–117)
ALT SERPL-CCNC: 23 U/L (ref 1–41)
AST SERPL-CCNC: 21 U/L (ref 1–40)
BASOPHILS # BLD AUTO: 0.01 10*3/MM3 (ref 0–0.2)
BASOPHILS NFR BLD AUTO: 0.2 % (ref 0–1.5)
BILIRUB SERPL-MCNC: 1.6 MG/DL (ref 0.2–1.2)
BUN SERPL-MCNC: 15 MG/DL (ref 8–23)
BUN/CREAT SERPL: 19.2 (ref 7–25)
CALCIUM SERPL-MCNC: 9.1 MG/DL (ref 8.6–10.5)
CHLORIDE SERPL-SCNC: 99 MMOL/L (ref 98–107)
CHOLEST SERPL-MCNC: 169 MG/DL (ref 0–200)
CO2 SERPL-SCNC: 28.6 MMOL/L (ref 22–29)
CREAT SERPL-MCNC: 0.78 MG/DL (ref 0.76–1.27)
EOSINOPHIL # BLD AUTO: 0.06 10*3/MM3 (ref 0–0.4)
EOSINOPHIL NFR BLD AUTO: 1 % (ref 0.3–6.2)
ERYTHROCYTE [DISTWIDTH] IN BLOOD BY AUTOMATED COUNT: 12.9 % (ref 12.3–15.4)
GGT SERPL-CCNC: 17 U/L (ref 8–61)
GLOBULIN SER CALC-MCNC: 2.3 GM/DL
GLUCOSE SERPL-MCNC: 111 MG/DL (ref 65–99)
HCT VFR BLD AUTO: 50.2 % (ref 37.5–51)
HDLC SERPL-MCNC: 73 MG/DL (ref 40–60)
HGB BLD-MCNC: 16.4 G/DL (ref 13–17.7)
IMM GRANULOCYTES # BLD AUTO: 0.02 10*3/MM3 (ref 0–0.05)
IMM GRANULOCYTES NFR BLD AUTO: 0.3 % (ref 0–0.5)
LDLC SERPL CALC-MCNC: 66 MG/DL (ref 0–100)
LYMPHOCYTES # BLD AUTO: 1.4 10*3/MM3 (ref 0.7–3.1)
LYMPHOCYTES NFR BLD AUTO: 22.7 % (ref 19.6–45.3)
MCH RBC QN AUTO: 32.6 PG (ref 26.6–33)
MCHC RBC AUTO-ENTMCNC: 32.7 G/DL (ref 31.5–35.7)
MCV RBC AUTO: 99.8 FL (ref 79–97)
MONOCYTES # BLD AUTO: 0.59 10*3/MM3 (ref 0.1–0.9)
MONOCYTES NFR BLD AUTO: 9.6 % (ref 5–12)
NEUTROPHILS # BLD AUTO: 4.08 10*3/MM3 (ref 1.7–7)
NEUTROPHILS NFR BLD AUTO: 66.2 % (ref 42.7–76)
NRBC BLD AUTO-RTO: 0 /100 WBC (ref 0–0.2)
PLATELET # BLD AUTO: 107 10*3/MM3 (ref 140–450)
POTASSIUM SERPL-SCNC: 4.8 MMOL/L (ref 3.5–5.2)
PROT SERPL-MCNC: 7.1 G/DL (ref 6–8.5)
RBC # BLD AUTO: 5.03 10*6/MM3 (ref 4.14–5.8)
SODIUM SERPL-SCNC: 141 MMOL/L (ref 136–145)
T4 FREE SERPL-MCNC: 1.57 NG/DL (ref 0.93–1.7)
TRIGL SERPL-MCNC: 148 MG/DL (ref 0–150)
TSH SERPL DL<=0.005 MIU/L-ACNC: 1.21 MIU/ML (ref 0.27–4.2)
VIT B12 SERPL-MCNC: 834 PG/ML (ref 211–946)
VLDLC SERPL CALC-MCNC: 29.6 MG/DL
WBC # BLD AUTO: 6.16 10*3/MM3 (ref 3.4–10.8)

## 2019-08-12 NOTE — PROGRESS NOTES
Please let them know the bili is back up to 1.6.  Been as high as 1.9.  ggt in normal. Other labs ok and will discuss on rtc.

## 2019-08-20 ENCOUNTER — HOSPITAL ENCOUNTER (OUTPATIENT)
Dept: MRI IMAGING | Facility: HOSPITAL | Age: 74
Discharge: HOME | End: 2019-08-20
Admitting: INTERNAL MEDICINE

## 2019-08-20 DIAGNOSIS — E80.6 HYPERBILIRUBINEMIA: ICD-10-CM

## 2019-08-20 DIAGNOSIS — Z86.2 HISTORY OF ITP: ICD-10-CM

## 2019-08-20 DIAGNOSIS — N28.1 RENAL CYST: ICD-10-CM

## 2019-08-20 DIAGNOSIS — K76.89 LIVER CYST: ICD-10-CM

## 2019-08-20 PROCEDURE — 0 GADOBENATE DIMEGLUMINE 529 MG/ML SOLUTION: Performed by: INTERNAL MEDICINE

## 2019-08-20 PROCEDURE — 74183 MRI ABD W/O CNTR FLWD CNTR: CPT

## 2019-08-20 PROCEDURE — A9577 INJ MULTIHANCE: HCPCS | Performed by: INTERNAL MEDICINE

## 2019-08-20 RX ADMIN — GADOBENATE DIMEGLUMINE 15 ML: 529 INJECTION, SOLUTION INTRAVENOUS at 19:33

## 2019-09-09 ENCOUNTER — OFFICE VISIT (OUTPATIENT)
Dept: INTERNAL MEDICINE | Facility: CLINIC | Age: 74
End: 2019-09-09

## 2019-09-09 VITALS
DIASTOLIC BLOOD PRESSURE: 54 MMHG | HEIGHT: 70 IN | BODY MASS INDEX: 29.03 KG/M2 | SYSTOLIC BLOOD PRESSURE: 123 MMHG | WEIGHT: 202.8 LBS | RESPIRATION RATE: 16 BRPM | OXYGEN SATURATION: 99 % | HEART RATE: 66 BPM | TEMPERATURE: 97.9 F
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
## 2019-09-09 DIAGNOSIS — S30.861D TICK BITE OF ABDOMEN, SUBSEQUENT ENCOUNTER: ICD-10-CM

## 2019-09-09 DIAGNOSIS — I10 ESSENTIAL HYPERTENSION: ICD-10-CM

## 2019-09-09 DIAGNOSIS — Z00.00 ROUTINE GENERAL MEDICAL EXAMINATION AT A HEALTH CARE FACILITY: ICD-10-CM

## 2019-09-09 DIAGNOSIS — M19.90 ARTHRITIS: ICD-10-CM

## 2019-09-09 DIAGNOSIS — E03.9 ACQUIRED HYPOTHYROIDISM: ICD-10-CM

## 2019-09-09 DIAGNOSIS — E80.6 HYPERBILIRUBINEMIA: Primary | ICD-10-CM

## 2019-09-09 PROCEDURE — G0008 ADMIN INFLUENZA VIRUS VAC: HCPCS | Performed by: INTERNAL MEDICINE

## 2019-09-09 PROCEDURE — 90653 IIV ADJUVANT VACCINE IM: CPT | Performed by: INTERNAL MEDICINE

## 2019-09-09 PROCEDURE — G0439 PPPS, SUBSEQ VISIT: HCPCS | Performed by: INTERNAL MEDICINE

## 2019-09-09 PROCEDURE — 99214 OFFICE O/P EST MOD 30 MIN: CPT | Performed by: INTERNAL MEDICINE

## 2019-09-09 NOTE — PROGRESS NOTES
QUICK REFERENCE INFORMATION:  The ABCs of the Annual Wellness Visit    Medicare Annual Wellness Visit    Subjective   History of Present Illness    Deniz Dickson is a 74 y.o. male who presents for an Annual Wellness Visit. In addition, we addressed the following health issues:arthirits hyperbili.      Chronic pain:2/10      PMH, PSH, SocHx, FamHx, Allergies, and Medications: Reviewed and updated.     Outpatient Medications Prior to Visit   Medication Sig Dispense Refill   • aspirin 81 MG EC tablet Take 1 tablet by mouth Daily. 90 tablet 1   • bisoprolol (ZEBeta) 5 MG tablet TAKE ONE-HALF TABLET BY MOUTH DAILY 90 tablet 1   • celecoxib (CeleBREX) 200 MG capsule Take 1 capsule by mouth As Needed for Moderate Pain . (Patient taking differently: Take 200 mg by mouth 2 (Two) Times a Day.) 30 capsule 11   • Cholecalciferol (VITAMIN D-3 PO) Take 1 tablet by mouth Daily.     • finasteride (PROSCAR) 5 MG tablet Take 1 tablet by mouth Daily. 90 tablet 3   • fluticasone (FLONASE) 50 MCG/ACT nasal spray 1 spray into the nostril(s) as directed by provider Daily.     • Glucosamine-Chondroitin (OSTEO BI-FLEX REGULAR STRENGTH PO) Take 1 tablet by mouth 2 (two) times a day.     • levocetirizine (XYZAL) 5 MG tablet Take 1 tablet by mouth Every Evening. 30 tablet 11   • levothyroxine (SYNTHROID) 125 MCG tablet Take 1 tablet by mouth Daily. 90 tablet 3   • Multiple Vitamins-Minerals (CENTRUM SILVER ADULT 50+) tablet Take 1 tablet by mouth daily.     • Omega-3 Fatty Acids (FISH OIL) 1200 MG capsule capsule Take 1,200 mg by mouth 2 (Two) Times a Day With Meals.     • oxybutynin (DITROPAN) 5 MG tablet Take 1 tablet by mouth Every Night. 30 tablet 11   • Potassium 75 MG tablet Take 99 mg by mouth Daily.     • sildenafil (REVATIO) 20 MG tablet Take 3 tablets by mouth 3 (Three) Times a Day. As needed for ED (Patient taking differently: Take 60 mg by mouth Take As Directed. As needed for ED) 30 tablet 11   • vitamin B-12  (CYANOCOBALAMIN) 1000 MCG tablet Take 1,000 mcg by mouth Daily.     • VYTORIN 10-20 MG per tablet Take 1 tablet by mouth Every Night. 30 tablet 5     No facility-administered medications prior to visit.        Patient Active Problem List   Diagnosis   • Atopic rhinitis   • BPH (benign prostatic hyperplasia)   • Gastroesophageal reflux disease   • Granulomatous disease (CMS/HCC)   • Hemorrhoids   • Hyperlipidemia   • Hypertension   • Hypothyroidism   • Idiopathic thrombocytopenic purpura (CMS/HCC)   • JUWAN on CPAP   • Vitamin D deficiency   • Skin lesion   • Diastasis recti   • Palpitations   • Osteoarthritis   • History of ITP   • Arthritic-like pain   • Coronary artery disease   • Dyslipidemia   • Hypertension   • Contracture of joint of right foot   • Bronchitis   • History of adenomatous polyp of colon   • Arthritis       Health Habits:  Dental Exam. up to date  Eye Exam. up to date  No exam data present  Exercise: 6 times/week.  Current exercise activities include: yard work    Health Risk Assessment:  The patient has completed a Health Risk Assessment. This has been reviewed with them and has been scanned into the patient's chart.    Current Medical Providers:  Patient Care Team:  Zane Magallon MD as PCP - General  Zane Magallon MD as PCP - [facility]  Lisa Rhodes MD as Surgeon (General Surgery)    The [facility] providers who are involved in the care of this patient are listed above. Additional providers and suppliers are listed below:  Dr Estrella          Recent Hospitalizations:  No hospitalization(s) within the last year..    Recent Lab Results:  CMP:  Lab Results   Component Value Date     (H) 08/09/2019    BUN 15 08/09/2019    CREATININE 0.78 08/09/2019    EGFRIFNONA 97 08/09/2019    EGFRIFAFRI 118 08/09/2019    BCR 19.2 08/09/2019     08/09/2019    K 4.8 08/09/2019    CO2 28.6 08/09/2019    CALCIUM 9.1 08/09/2019    PROTENTOTREF 7.1 08/09/2019    ALBUMIN 4.80 08/09/2019     LABGLOBREF 2.3 08/09/2019    LABIL2 2.1 08/09/2019    BILITOT 1.6 (H) 08/09/2019    ALKPHOS 51 08/09/2019    AST 21 08/09/2019    ALT 23 08/09/2019       LIPID PANEL:  Lab Results   Component Value Date    CHLPL 169 08/09/2019    TRIG 148 08/09/2019    HDL 73 (H) 08/09/2019    VLDL 29.6 08/09/2019    LDL 66 08/09/2019       HbA1c:  Lab Results   Component Value Date    HGBA1C 5.8 07/15/2019       URINE MICROALBUMIN:  No results found for: MICROALBUR, POCMALB    PSA:  Lab Results   Component Value Date    PSA 0.915 06/11/2019       Age-appropriate Screening Schedule:  Refer to the list below for future screening recommendations based on patient's age, sex and/or medical conditions. Orders for these recommended tests are listed in the plan section. The patient has been provided with a written plan.    Health Maintenance   Topic Date Due   • ZOSTER VACCINE (2 of 3) 03/29/2015   • INFLUENZA VACCINE  08/01/2019   • LIPID PANEL  08/09/2020   • TDAP/TD VACCINES (2 - Td) 07/31/2022   • COLONOSCOPY  07/22/2029   • PNEUMOCOCCAL VACCINES (65+ LOW/MEDIUM RISK)  Completed       Depression Screen:   PHQ-2/PHQ-9 Depression Screening 9/9/2019   Little interest or pleasure in doing things 0   Feeling down, depressed, or hopeless 0   Trouble falling or staying asleep, or sleeping too much -   Feeling tired or having little energy -   Poor appetite or overeating -   Feeling bad about yourself - or that you are a failure or have let yourself or your family down -   Trouble concentrating on things, such as reading the newspaper or watching television -   Moving or speaking so slowly that other people could have noticed. Or the opposite - being so fidgety or restless that you have been moving around a lot more than usual -   Thoughts that you would be better off dead, or of hurting yourself in some way -   Total Score 0         Functional and Cognitive Screening:  Functional & Cognitive Status 9/9/2019   Do you have difficulty  preparing food and eating? No   Do you have difficulty bathing yourself, getting dressed or grooming yourself? No   Do you have difficulty using the toilet? No   Do you have difficulty moving around from place to place? No   Do you have trouble with steps or getting out of a bed or a chair? No   Current Diet Well Balanced Diet   Dental Exam Up to date   Eye Exam Up to date   Exercise (times per week) 6 times per week   Current Exercise Activities Include Yard Work   Do you need help using the phone?  No   Are you deaf or do you have serious difficulty hearing?  No   Do you need help with transportation? No   Do you need help shopping? No   Do you need help preparing meals?  No   Do you need help with housework?  No   Do you need help with laundry? No   Do you need help taking your medications? No   Do you need help managing money? No   Do you ever drive or ride in a car without wearing a seat belt? No   Have you felt unusual stress, anger or loneliness in the last month? No   Who do you live with? Child   If you need help, do you have trouble finding someone available to you? No   Have you been bothered in the last four weeks by sexual problems? No   Do you have difficulty concentrating, remembering or making decisions? No       Does the patient have evidence of cognitive impairment? No    Advanced Care Planning:  Patient does not have an advance directive - additional information requested. Referral to advance care planning placed    Identification of Risk Factors:  Risk factors include: Advance Directive Discussion  Cardiovascular risk  Fall Risk  Obesity/Overweight .    Review of Systems    Compared to one year ago, the patient feels his physical health is better.  Compared to one year ago, the patient feels his mental health is better.    Objective     Physical Exam    Vitals:    09/09/19 0917   BP: 123/54   BP Location: Left arm   Patient Position: Sitting   Cuff Size: Large Adult   Pulse: 66   Resp: 16   Temp:  "97.9 °F (36.6 °C)   TempSrc: Temporal   SpO2: 99%   Weight: 92 kg (202 lb 12.8 oz)   Height: 177.8 cm (70\")       Body mass index is 29.1 kg/m².  Discussed the patient's BMI with him. The BMI is above average; BMI management plan is completed.    Assessment/Plan   Patient Self-Management and Personalized Health Advice  The patient has been provided with information about: diet, exercise and weight management and preventive services including:   · Annual Wellness Visit (AWV).    Visit Diagnoses:    ICD-10-CM ICD-9-CM   1. Hyperbilirubinemia E80.6 782.4   2. Essential hypertension I10 401.9   3. Acquired hypothyroidism E03.9 244.9   4. Arthritis M19.90 716.90   5. Tick bite of abdomen, subsequent encounter S30.861D V58.89    W57.XXXD 911.4   6. Routine general medical examination at a health care facility Z00.00 V70.0       Orders Placed This Encounter   Procedures   • Fluad Tri 65yr ([id number])   • [place] Spotted Fever, IgM   • [facility] Spotted Fever, IgG   • Lyme Disease, PCR   • Ehrlichia Antibody Panel   • Cyclic Citrul Peptide Antibody, IgG / IgA   • Rheumatoid Factor   • Sedimentation Rate   • Uric Acid   • C-reactive Protein   • Antistreptolysin O Titer       Outpatient Encounter Medications as of 9/9/2019   Medication Sig Dispense Refill   • aspirin 81 MG EC tablet Take 1 tablet by mouth Daily. 90 tablet 1   • bisoprolol (ZEBeta) 5 MG tablet TAKE ONE-HALF TABLET BY MOUTH DAILY 90 tablet 1   • celecoxib (CeleBREX) 200 MG capsule Take 1 capsule by mouth As Needed for Moderate Pain . (Patient taking differently: Take 200 mg by mouth 2 (Two) Times a Day.) 30 capsule 11   • Cholecalciferol (VITAMIN D-3 PO) Take 1 tablet by mouth Daily.     • finasteride (PROSCAR) 5 MG tablet Take 1 tablet by mouth Daily. 90 tablet 3   • fluticasone (FLONASE) 50 MCG/ACT nasal spray 1 spray into the nostril(s) as directed by provider Daily.     • Glucosamine-Chondroitin (OSTEO BI-FLEX REGULAR STRENGTH PO) Take 1 tablet by " mouth 2 (two) times a day.     • levocetirizine (XYZAL) 5 MG tablet Take 1 tablet by mouth Every Evening. 30 tablet 11   • levothyroxine (SYNTHROID) 125 MCG tablet Take 1 tablet by mouth Daily. 90 tablet 3   • Multiple Vitamins-Minerals (CENTRUM SILVER ADULT 50+) tablet Take 1 tablet by mouth daily.     • Omega-3 Fatty Acids (FISH OIL) 1200 MG capsule capsule Take 1,200 mg by mouth 2 (Two) Times a Day With Meals.     • oxybutynin (DITROPAN) 5 MG tablet Take 1 tablet by mouth Every Night. 30 tablet 11   • Potassium 75 MG tablet Take 99 mg by mouth Daily.     • sildenafil (REVATIO) 20 MG tablet Take 3 tablets by mouth 3 (Three) Times a Day. As needed for ED (Patient taking differently: Take 60 mg by mouth Take As Directed. As needed for ED) 30 tablet 11   • vitamin B-12 (CYANOCOBALAMIN) 1000 MCG tablet Take 1,000 mcg by mouth Daily.     • VYTORIN 10-20 MG per tablet Take 1 tablet by mouth Every Night. 30 tablet 5     No facility-administered encounter medications on file as of 9/9/2019.        Reviewed use of high risk medication in the elderly: yes  Reviewed for potential of harmful drug interactions in the elderly: yes    Follow Up:  Return in about 3 months (around 12/9/2019).     An After Visit Summary and PPPS with all of these plans were given to the patient.             Deniz was seen today for hypertension, hyperlipidemia and rash.    Diagnoses and all orders for this visit:      Routine general medical examination at a health care facility    Other orders  -     Fluad Tri 65yr ([id number])

## 2019-09-09 NOTE — PROGRESS NOTES
Subjective     Patient ID: Deniz Dickson is a 74 y.o. male. Patient is here for management of multiple medical problems.     Chief Complaint   Patient presents with   • Hypertension     6 week follow-up   • Hyperlipidemia     6 week follow-up   • Rash     patient states he still has some reds spots on his body that still have not gone away.     Hypertension   This is a chronic problem. The current episode started more than 1 year ago. The problem is unchanged. The problem is controlled. Pertinent negatives include no anxiety, blurred vision, chest pain or headaches. Risk factors for coronary artery disease include male gender, obesity and dyslipidemia. Past treatments include nothing. Current antihypertension treatment includes beta blockers. The current treatment provides moderate improvement. There is no history of chronic renal disease.   Hyperlipidemia   This is a chronic problem. The problem is controlled. Recent lipid tests were reviewed and are normal. He has no history of chronic renal disease, diabetes, hypothyroidism or liver disease. There are no known factors aggravating his hyperlipidemia. Pertinent negatives include no chest pain or focal sensory loss. He is currently on no antihyperlipidemic treatment. There are no known risk factors for coronary artery disease.   Rash   This is a chronic problem. The current episode started more than 1 year ago. Past treatments include nothing. The treatment provided moderate relief. There is no history of asthma.      Has arthritis. On celebrex.    Many tick bites in the past. Lives in [facility].         The following portions of the patient's history were reviewed and updated as appropriate: allergies, current medications, past family history, past medical history, past social history, past surgical history and problem list.    Review of Systems   Eyes: Negative for blurred vision.   Cardiovascular: Negative for chest pain.   Skin: Positive for rash.    Neurological: Negative for headaches.       Current Outpatient Medications:   •  aspirin 81 MG EC tablet, Take 1 tablet by mouth Daily., Disp: 90 tablet, Rfl: 1  •  bisoprolol (ZEBeta) 5 MG tablet, TAKE ONE-HALF TABLET BY MOUTH DAILY, Disp: 90 tablet, Rfl: 1  •  celecoxib (CeleBREX) 200 MG capsule, Take 1 capsule by mouth As Needed for Moderate Pain . (Patient taking differently: Take 200 mg by mouth 2 (Two) Times a Day.), Disp: 30 capsule, Rfl: 11  •  Cholecalciferol (VITAMIN D-3 PO), Take 1 tablet by mouth Daily., Disp: , Rfl:   •  finasteride (PROSCAR) 5 MG tablet, Take 1 tablet by mouth Daily., Disp: 90 tablet, Rfl: 3  •  fluticasone (FLONASE) 50 MCG/ACT nasal spray, 1 spray into the nostril(s) as directed by provider Daily., Disp: , Rfl:   •  Glucosamine-Chondroitin (OSTEO BI-FLEX REGULAR STRENGTH PO), Take 1 tablet by mouth 2 (two) times a day., Disp: , Rfl:   •  levocetirizine (XYZAL) 5 MG tablet, Take 1 tablet by mouth Every Evening., Disp: 30 tablet, Rfl: 11  •  levothyroxine (SYNTHROID) 125 MCG tablet, Take 1 tablet by mouth Daily., Disp: 90 tablet, Rfl: 3  •  Multiple Vitamins-Minerals (CENTRUM SILVER ADULT 50+) tablet, Take 1 tablet by mouth daily., Disp: , Rfl:   •  Omega-3 Fatty Acids (FISH OIL) 1200 MG capsule capsule, Take 1,200 mg by mouth 2 (Two) Times a Day With Meals., Disp: , Rfl:   •  oxybutynin (DITROPAN) 5 MG tablet, Take 1 tablet by mouth Every Night., Disp: 30 tablet, Rfl: 11  •  Potassium 75 MG tablet, Take 99 mg by mouth Daily., Disp: , Rfl:   •  sildenafil (REVATIO) 20 MG tablet, Take 3 tablets by mouth 3 (Three) Times a Day. As needed for ED (Patient taking differently: Take 60 mg by mouth Take As Directed. As needed for ED), Disp: 30 tablet, Rfl: 11  •  vitamin B-12 (CYANOCOBALAMIN) 1000 MCG tablet, Take 1,000 mcg by mouth Daily., Disp: , Rfl:   •  VYTORIN 10-20 MG per tablet, Take 1 tablet by mouth Every Night., Disp: 30 tablet, Rfl: 5    Objective      Blood pressure 123/54, pulse  "66, temperature 97.9 °F (36.6 °C), temperature source Temporal, resp. rate 16, height 177.8 cm (70\"), weight 92 kg (202 lb 12.8 oz), SpO2 99 %.    Physical Exam     General Appearance:    Alert, cooperative, no distress, appears stated age   Head:    Normocephalic, without obvious abnormality, atraumatic   Eyes:    PERRL, conjunctiva/corneas clear, EOM's intact   Ears:    Normal TM's and external ear canals, both ears   Nose:   Nares normal, septum midline, mucosa normal, no drainage   or sinus tenderness   Throat:   Lips, mucosa, and tongue normal; teeth and gums normal   Neck:   Supple, symmetrical, trachea midline, no adenopathy;        thyroid:  No enlargement/tenderness/nodules; no carotid    bruit or JVD   Back:     Symmetric, no curvature, ROM normal, no CVA tenderness   Lungs:     Clear to auscultation bilaterally, respirations unlabored   Chest wall:    No tenderness or deformity   Heart:    Regular rate and rhythm, S1 and S2 normal, no murmur,        rub or gallop   Abdomen:     Soft, non-tender, bowel sounds active all four quadrants,     no masses, no organomegaly   Extremities:   Extremities normal, atraumatic, no cyanosis or edema   Pulses:   2+ and symmetric all extremities   Skin:   Skin color, texture, turgor normal, no rashes or lesions   Lymph nodes:   Cervical, supraclavicular, and axillary nodes normal   Neurologic:   CNII-XII intact. Normal strength, sensation and reflexes       throughout      Results for orders placed or performed in visit on 08/09/19   Vitamin B12   Result Value Ref Range    Vitamin B-12 834 211 - 946 pg/mL   Lipid Panel   Result Value Ref Range    Total Cholesterol 169 0 - 200 mg/dL    Triglycerides 148 0 - 150 mg/dL    HDL Cholesterol 73 (H) 40 - 60 mg/dL    VLDL Cholesterol 29.6 mg/dL    LDL Cholesterol  66 0 - 100 mg/dL   Comprehensive Metabolic Panel   Result Value Ref Range    Glucose 111 (H) 65 - 99 mg/dL    BUN 15 8 - 23 mg/dL    Creatinine 0.78 0.76 - 1.27 mg/dL    " eGFR Non African Am 97 >60 mL/min/1.73    eGFR African Am 118 >60 mL/min/1.73    BUN/Creatinine Ratio 19.2 7.0 - 25.0    Sodium 141 136 - 145 mmol/L    Potassium 4.8 3.5 - 5.2 mmol/L    Chloride 99 98 - 107 mmol/L    Total CO2 28.6 22.0 - 29.0 mmol/L    Calcium 9.1 8.6 - 10.5 mg/dL    Total Protein 7.1 6.0 - 8.5 g/dL    Albumin 4.80 3.50 - 5.20 g/dL    Globulin 2.3 gm/dL    A/G Ratio 2.1 g/dL    Total Bilirubin 1.6 (H) 0.2 - 1.2 mg/dL    Alkaline Phosphatase 51 39 - 117 U/L    AST (SGOT) 21 1 - 40 U/L    ALT (SGPT) 23 1 - 41 U/L   TSH   Result Value Ref Range    TSH 1.210 0.270 - 4.200 mIU/mL   T4, Free   Result Value Ref Range    Free T4 1.57 0.93 - 1.70 ng/dL   Gamma GT   Result Value Ref Range    GGT 17 8 - 61 U/L   CBC & Differential   Result Value Ref Range    WBC 6.16 3.40 - 10.80 10*3/mm3    RBC 5.03 4.14 - 5.80 10*6/mm3    Hemoglobin 16.4 13.0 - 17.7 g/dL    Hematocrit 50.2 37.5 - 51.0 %    MCV 99.8 (H) 79.0 - 97.0 fL    MCH 32.6 26.6 - 33.0 pg    MCHC 32.7 31.5 - 35.7 g/dL    RDW 12.9 12.3 - 15.4 %    Platelets 107 (L) 140 - 450 10*3/mm3    Neutrophil Rel % 66.2 42.7 - 76.0 %    Lymphocyte Rel % 22.7 19.6 - 45.3 %    Monocyte Rel % 9.6 5.0 - 12.0 %    Eosinophil Rel % 1.0 0.3 - 6.2 %    Basophil Rel % 0.2 0.0 - 1.5 %    Neutrophils Absolute 4.08 1.70 - 7.00 10*3/mm3    Lymphocytes Absolute 1.40 0.70 - 3.10 10*3/mm3    Monocytes Absolute 0.59 0.10 - 0.90 10*3/mm3    Eosinophils Absolute 0.06 0.00 - 0.40 10*3/mm3    Basophils Absolute 0.01 0.00 - 0.20 10*3/mm3    Immature Granulocyte Rel % 0.3 0.0 - 0.5 %    Immature Grans Absolute 0.02 0.00 - 0.05 10*3/mm3    nRBC 0.0 0.0 - 0.2 /100 WBC         Assessment/Plan   Bili stable. Mri ab stable.   Decrease celebrex to 100mg check for tick related illness causing arthritis.      Deniz was seen today for hypertension, hyperlipidemia and rash.    Diagnoses and all orders for this visit:    Hyperbilirubinemia  -     [facility] [place] Spotted Fever, IgM  -     [facility]  Spotted Fever, IgG  -     Lyme Disease, PCR  -     Ehrlichia Antibody Panel  -     Cyclic Citrul Peptide Antibody, IgG / IgA  -     Rheumatoid Factor  -     Sedimentation Rate  -     Uric Acid  -     C-reactive Protein  -     Antistreptolysin O Titer    Essential hypertension  -     [place] Spotted Fever, IgM  -     [facility] Spotted Fever, IgG  -     Lyme Disease, PCR  -     Ehrlichia Antibody Panel  -     Cyclic Citrul Peptide Antibody, IgG / IgA  -     Rheumatoid Factor  -     Sedimentation Rate  -     Uric Acid  -     C-reactive Protein  -     Antistreptolysin O Titer    Acquired hypothyroidism  -     [place] Spotted Fever, IgM  -     [facility] Spotted Fever, IgG  -     Lyme Disease, PCR  -     Ehrlichia Antibody Panel  -     Cyclic Citrul Peptide Antibody, IgG / IgA  -     Rheumatoid Factor  -     Sedimentation Rate  -     Uric Acid  -     C-reactive Protein  -     Antistreptolysin O Titer    Arthritis  -     [place] Spotted Fever, IgM  -     [facility] Spotted Fever, IgG  -     Lyme Disease, PCR  -     Ehrlichia Antibody Panel  -     Cyclic Citrul Peptide Antibody, IgG / IgA  -     Rheumatoid Factor  -     Sedimentation Rate  -     Uric Acid  -     C-reactive Protein  -     Antistreptolysin O Titer    Tick bite of abdomen, subsequent encounter  -     [place] Spotted Fever, IgM  -     [facility] Spotted Fever, IgG  -     Lyme Disease, PCR  -     Ehrlichia Antibody Panel  -     Cyclic Citrul Peptide Antibody, IgG / IgA  -     Rheumatoid Factor  -     Sedimentation Rate  -     Uric Acid  -     C-reactive Protein  -     Antistreptolysin O Titer    Other orders  -     Flu[facility] Tri 65yr ([id number])      Return in about 3 months (around 12/9/2019).          There are no Patient Instructions on file for this visit.     Zane Magallon MD    Assessment/Plan

## 2019-09-11 LAB
A PHAGOCYTOPH IGG TITR SER IF: NEGATIVE {TITER}
A PHAGOCYTOPH IGM TITR SER IF: NEGATIVE {TITER}
ASO AB SERPL-ACNC: <20 IU/ML (ref 0–200)
B BURGDOR DNA SPEC QL NAA+PROBE: NEGATIVE
CCP IGA+IGG SERPL IA-ACNC: 8 UNITS (ref 0–19)
CRP SERPL-MCNC: 0.1 MG/DL (ref 0–0.5)
E CHAFFEENSIS IGG TITR SER IF: NEGATIVE {TITER}
E CHAFFEENSIS IGM TITR SER IF: NEGATIVE {TITER}
ERYTHROCYTE [SEDIMENTATION RATE] IN BLOOD BY WESTERGREN METHOD: 2 MM/HR (ref 0–20)
R RICKETTSI IGG SER QL IA: NEGATIVE
R RICKETTSI IGM SER-ACNC: 0.75 INDEX (ref 0–0.89)
RHEUMATOID FACT SERPL-ACNC: <10 IU/ML (ref 0–13.9)
URATE SERPL-MCNC: 5 MG/DL (ref 3.4–7)

## 2019-10-20 DIAGNOSIS — E78.00 HYPERCHOLESTEREMIA: ICD-10-CM

## 2019-10-20 RX ORDER — EZETIMIBE/SIMVASTATIN 10 MG-20MG
TABLET ORAL
Qty: 30 TABLET | Refills: 4 | Status: CANCELLED | OUTPATIENT
Start: 2019-10-20

## 2019-10-21 DIAGNOSIS — E78.00 HYPERCHOLESTEREMIA: ICD-10-CM

## 2019-10-21 RX ORDER — EZETIMIBE/SIMVASTATIN 10 MG-20MG
1 TABLET ORAL NIGHTLY
Qty: 30 TABLET | Refills: 5 | Status: SHIPPED | OUTPATIENT
Start: 2019-10-21 | End: 2020-04-16

## 2019-11-04 ENCOUNTER — OFFICE VISIT (OUTPATIENT)
Dept: CARDIOLOGY | Facility: CLINIC | Age: 74
End: 2019-11-04

## 2019-11-04 VITALS
SYSTOLIC BLOOD PRESSURE: 128 MMHG | WEIGHT: 206 LBS | HEART RATE: 60 BPM | DIASTOLIC BLOOD PRESSURE: 72 MMHG | OXYGEN SATURATION: 97 % | HEIGHT: 71 IN | BODY MASS INDEX: 28.84 KG/M2

## 2019-11-04 DIAGNOSIS — R00.2 PALPITATIONS: ICD-10-CM

## 2019-11-04 DIAGNOSIS — I10 ESSENTIAL HYPERTENSION: Primary | ICD-10-CM

## 2019-11-04 DIAGNOSIS — E78.2 MIXED HYPERLIPIDEMIA: ICD-10-CM

## 2019-11-04 PROCEDURE — 99214 OFFICE O/P EST MOD 30 MIN: CPT | Performed by: INTERNAL MEDICINE

## 2019-11-04 NOTE — PROGRESS NOTES
[facility] at [facility]  Office Progress Note  Deniz Dickson  1945      Visit Date: 11/04/19    PCP: Zane Magallon MD  [address] [address]    IDENTIFICATION: A 74 y.o. male , retired ,  2016 from [place].     PROBLEM LIST:  1. 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 Dyslipidemia   a. December 2015: Total cholesterol 178, triglycerides 119, HDL 69, LDL 85.   b. 12/5/17   HL 68 LDL 82  c. 8/9/2019   HDL 73 LDL 66  3. Hypertension, controlled on current regimen.   4. Remote bilateral knee surgery per Dr. Smith. Redo R 8/18  5. Obstructive sleep apnea, on CPAP greater than 5 years.   6. Reformed smoker, times greater than 12 years.   7. Hypothyroidism.       Chief Complaint   Patient presents with   • Coronary Artery Disease       Allergies  Allergies   Allergen Reactions   • Penicillins Itching and Rash       Current Medications    Current Outpatient Medications:   •  aspirin 81 MG EC tablet, Take 1 tablet by mouth Daily., Disp: 90 tablet, Rfl: 1  •  bisoprolol (ZEBeta) 5 MG tablet, TAKE ONE-HALF TABLET BY MOUTH DAILY, Disp: 90 tablet, Rfl: 1  •  celecoxib (CeleBREX) 200 MG capsule, Take 1 capsule by mouth As Needed for Moderate Pain . (Patient taking differently: Take 100 mg by mouth 2 (Two) Times a Day.), Disp: 30 capsule, Rfl: 11  •  Cholecalciferol (VITAMIN D-3 PO), Take 1 tablet by mouth Daily., Disp: , Rfl:   •  finasteride (PROSCAR) 5 MG tablet, Take 1 tablet by mouth Daily., Disp: 90 tablet, Rfl: 3  •  fluticasone (FLONASE) 50 MCG/ACT nasal spray, 1 spray into the nostril(s) as directed by provider Daily., Disp: , Rfl:   •  Glucosamine-Chondroitin (OSTEO BI-FLEX REGULAR STRENGTH PO), Take 1 tablet by mouth 2 (two) times a day., Disp: , Rfl:   •  levocetirizine (XYZAL) 5 MG tablet, Take 1 tablet by mouth Every Evening., Disp: 30  "tablet, Rfl: 11  •  levothyroxine (SYNTHROID) 125 MCG tablet, Take 1 tablet by mouth Daily., Disp: 90 tablet, Rfl: 3  •  Multiple Vitamins-Minerals (CENTRUM SILVER ADULT 50+) tablet, Take 1 tablet by mouth daily., Disp: , Rfl:   •  Omega-3 Fatty Acids (FISH OIL) 1200 MG capsule capsule, Take 1,200 mg by mouth 2 (Two) Times a Day With Meals., Disp: , Rfl:   •  oxybutynin (DITROPAN) 5 MG tablet, Take 1 tablet by mouth Every Night., Disp: 30 tablet, Rfl: 11  •  Potassium 75 MG tablet, Take 595 mg by mouth Daily., Disp: , Rfl:   •  sildenafil (REVATIO) 20 MG tablet, Take 3 tablets by mouth 3 (Three) Times a Day. As needed for ED (Patient taking differently: Take 60 mg by mouth Take As Directed. As needed for ED), Disp: 30 tablet, Rfl: 11  •  vitamin B-12 (CYANOCOBALAMIN) 1000 MCG tablet, Take 1,000 mcg by mouth Daily., Disp: , Rfl:   •  VYTORIN 10-20 MG per tablet, Take 1 tablet by mouth Every Night., Disp: 30 tablet, Rfl: 5      History of Present Illness   Deniz Dickson is a 74 y.o. year old male here for follow up.  His blood pressures systolic 1 15-1 50 typically 120 range diastolic never greater than 80 no chest discomfort palpitations or dyspnea.  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   Neck: Normal range of motion. Neck supple. No hepatojugular reflux and no JVD present. Carotid bruit is not present. No tracheal deviation present. No thyromegaly present.   Cardiovascular: Normal rate, regular rhythm, S1 normal, S2 normal, intact distal pulses and normal pulses. PMI is not displaced. Exam reveals no gallop, no distant heart sounds, no friction rub, no midsystolic click and no opening " snap.   No murmur heard.  Pulses:       Radial pulses are 2+ on the right side, and 2+ on the left side.        Dorsalis pedis pulses are 2+ on the right side, and 2+ on the left side.        Posterior tibial pulses are 2+ on the right side, and 2+ on the left side.   Pulmonary/Chest: Effort normal and breath sounds normal. He has no wheezes. He has no rales.   Abdominal: Soft. Bowel sounds are normal. He exhibits no mass. There is no tenderness. There is no guarding.       Diagnostic Data:  Procedures      ASSESSMENT:   Diagnosis Plan   1. Essential hypertension     2. Mixed hyperlipidemia     3. Palpitations         PLAN:  Hypertension largely controlled follows at home closely on bisoprolol    Dyslipidemia on Vytorin historical LDL greater than 200 prior to Vytorin    Palpitations suppressed on bisoprolol with preservation of LV function continue observation and rediscussed as little as necessary nonsteroidal anti-inflammatory      Zane Magallon MD, thank you for referring Mr. Dickson for evaluation.  I have forwarded my electronically generated recommendations to you for review.  Please do not hesitate to call with any questions.      Suresh Ojeda MD, [facility]

## 2019-11-24 DIAGNOSIS — R35.0 URINARY FREQUENCY: ICD-10-CM

## 2019-11-25 ENCOUNTER — TELEPHONE (OUTPATIENT)
Dept: INTERNAL MEDICINE | Facility: CLINIC | Age: 74
End: 2019-11-25

## 2019-11-25 DIAGNOSIS — Z12.5 ENCOUNTER FOR SCREENING FOR MALIGNANT NEOPLASM OF PROSTATE: ICD-10-CM

## 2019-11-25 DIAGNOSIS — E78.2 MIXED HYPERLIPIDEMIA: ICD-10-CM

## 2019-11-25 DIAGNOSIS — I10 ESSENTIAL HYPERTENSION: Primary | ICD-10-CM

## 2019-11-25 DIAGNOSIS — E03.9 ACQUIRED HYPOTHYROIDISM: ICD-10-CM

## 2019-11-25 RX ORDER — OXYBUTYNIN CHLORIDE 5 MG/1
TABLET ORAL
Qty: 30 TABLET | Refills: 10 | Status: SHIPPED | OUTPATIENT
Start: 2019-11-25 | End: 2019-12-09

## 2019-11-25 NOTE — TELEPHONE ENCOUNTER
Patient is needing to know if labs need to be done before his apt. On Dec. 9th.  If so he needs them faxed to the lab at [facility] ([facility]  Please advise.  Phone number verified.

## 2019-12-03 ENCOUNTER — HOSPITAL ENCOUNTER (OUTPATIENT)
Facility: HOSPITAL | Age: 74
Discharge: HOME | End: 2019-12-03
Payer: MEDICARE

## 2019-12-03 LAB
A/G RATIO: 2.2 (ref 0.8–2)
ALBUMIN SERPL-MCNC: 4.6 G/DL (ref 3.4–4.8)
ALP BLD-CCNC: 60 U/L (ref 25–100)
ALT SERPL-CCNC: 26 U/L (ref 4–36)
ANION GAP SERPL CALCULATED.3IONS-SCNC: 10 MMOL/L (ref 3–16)
AST SERPL-CCNC: 24 U/L (ref 8–33)
BASOPHILS ABSOLUTE: 0 K/UL (ref 0–0.1)
BASOPHILS RELATIVE PERCENT: 0.2 %
BILIRUB SERPL-MCNC: 0.9 MG/DL (ref 0.3–1.2)
BUN BLDV-MCNC: 15 MG/DL (ref 6–20)
CALCIUM SERPL-MCNC: 9.2 MG/DL (ref 8.5–10.5)
CHLORIDE BLD-SCNC: 102 MMOL/L (ref 98–107)
CHOLESTEROL, TOTAL: 168 MG/DL (ref 0–200)
CO2: 28 MMOL/L (ref 20–30)
CREAT SERPL-MCNC: 0.8 MG/DL (ref 0.4–1.2)
EOSINOPHILS ABSOLUTE: 0.1 K/UL (ref 0–0.4)
EOSINOPHILS RELATIVE PERCENT: 1.8 %
GFR AFRICAN AMERICAN: >59
GFR NON-AFRICAN AMERICAN: >59
GLOBULIN: 2.1 G/DL
GLUCOSE BLD-MCNC: 109 MG/DL (ref 74–106)
HCT VFR BLD CALC: 43.2 % (ref 40–54)
HDLC SERPL-MCNC: 69 MG/DL (ref 40–60)
HEMOGLOBIN: 14.9 G/DL (ref 13–18)
IMMATURE GRANULOCYTES #: 0 K/UL
IMMATURE GRANULOCYTES %: 0 % (ref 0–5)
LDL CHOLESTEROL CALCULATED: 79 MG/DL
LYMPHOCYTES ABSOLUTE: 1.4 K/UL (ref 1.5–4)
LYMPHOCYTES RELATIVE PERCENT: 27.3 %
MCH RBC QN AUTO: 33.7 PG (ref 27–32)
MCHC RBC AUTO-ENTMCNC: 34.5 G/DL (ref 31–35)
MCV RBC AUTO: 97.7 FL (ref 80–100)
MONOCYTES ABSOLUTE: 0.4 K/UL (ref 0.2–0.8)
MONOCYTES RELATIVE PERCENT: 8.9 %
NEUTROPHILS ABSOLUTE: 3.1 K/UL (ref 2–7.5)
NEUTROPHILS RELATIVE PERCENT: 61.8 %
PDW BLD-RTO: 12.5 % (ref 11–16)
PLATELET # BLD: 87 K/UL (ref 150–400)
PMV BLD AUTO: 8.9 FL (ref 6–10)
POTASSIUM SERPL-SCNC: 4.4 MMOL/L (ref 3.4–5.1)
PROSTATE SPECIFIC ANTIGEN: 1.2 NG/ML (ref 0–4)
RBC # BLD: 4.42 M/UL (ref 4.5–6)
SODIUM BLD-SCNC: 140 MMOL/L (ref 136–145)
T4 FREE: 1.3 NG/DL (ref 0.89–1.76)
TOTAL PROTEIN: 6.7 G/DL (ref 6.4–8.3)
TRIGL SERPL-MCNC: 102 MG/DL (ref 0–249)
TSH SERPL DL<=0.05 MIU/L-ACNC: 2.17 UIU/ML (ref 0.35–5.5)
VITAMIN B-12: 904 PG/ML (ref 211–911)
VLDLC SERPL CALC-MCNC: 20 MG/DL
WBC # BLD: 5 K/UL (ref 4–11)

## 2019-12-03 PROCEDURE — 80061 LIPID PANEL: CPT

## 2019-12-03 PROCEDURE — 36415 COLL VENOUS BLD VENIPUNCTURE: CPT

## 2019-12-03 PROCEDURE — 85025 COMPLETE CBC W/AUTO DIFF WBC: CPT

## 2019-12-03 PROCEDURE — 80053 COMPREHEN METABOLIC PANEL: CPT

## 2019-12-03 PROCEDURE — 84439 ASSAY OF FREE THYROXINE: CPT

## 2019-12-03 PROCEDURE — G0103 PSA SCREENING: HCPCS

## 2019-12-03 PROCEDURE — 84153 ASSAY OF PSA TOTAL: CPT

## 2019-12-03 PROCEDURE — 84443 ASSAY THYROID STIM HORMONE: CPT

## 2019-12-03 PROCEDURE — 82607 VITAMIN B-12: CPT

## 2019-12-09 ENCOUNTER — OFFICE VISIT (OUTPATIENT)
Dept: INTERNAL MEDICINE | Facility: CLINIC | Age: 74
End: 2019-12-09

## 2019-12-09 VITALS
RESPIRATION RATE: 16 BRPM | BODY MASS INDEX: 29.69 KG/M2 | WEIGHT: 207.4 LBS | OXYGEN SATURATION: 98 % | HEART RATE: 63 BPM | DIASTOLIC BLOOD PRESSURE: 55 MMHG | TEMPERATURE: 97.8 F | SYSTOLIC BLOOD PRESSURE: 123 MMHG | HEIGHT: 70 IN

## 2019-12-09 DIAGNOSIS — E03.9 ACQUIRED HYPOTHYROIDISM: ICD-10-CM

## 2019-12-09 DIAGNOSIS — I10 ESSENTIAL HYPERTENSION: Primary | ICD-10-CM

## 2019-12-09 PROCEDURE — 99214 OFFICE O/P EST MOD 30 MIN: CPT | Performed by: INTERNAL MEDICINE

## 2019-12-09 RX ORDER — CELECOXIB 100 MG/1
100 CAPSULE ORAL 2 TIMES DAILY
COMMUNITY
Start: 2019-11-24 | End: 2019-12-09 | Stop reason: SDUPTHER

## 2019-12-09 RX ORDER — CELECOXIB 100 MG/1
100 CAPSULE ORAL 2 TIMES DAILY
Qty: 90 CAPSULE | Refills: 3 | Status: SHIPPED | OUTPATIENT
Start: 2019-12-09 | End: 2020-08-03

## 2019-12-09 RX ORDER — MONTELUKAST SODIUM 10 MG/1
10 TABLET ORAL NIGHTLY
Qty: 90 TABLET | Refills: 3 | Status: SHIPPED | OUTPATIENT
Start: 2019-12-09 | End: 2020-10-20 | Stop reason: SDUPTHER

## 2019-12-09 RX ORDER — LEVOTHYROXINE SODIUM 137 UG/1
137 TABLET ORAL DAILY
Qty: 90 TABLET | Refills: 3 | Status: SHIPPED | OUTPATIENT
Start: 2019-12-09 | End: 2020-10-20 | Stop reason: SDUPTHER

## 2019-12-09 NOTE — PROGRESS NOTES
Subjective     Patient ID: Deniz Dickson is a 74 y.o. male. Patient is here for management of multiple medical problems.     Chief Complaint   Patient presents with   • Jaundice     3 month follow-up     History of Present Illness     Pt with no Jaudice.  Pt on less celebrex.       Arthritis tolerated. Pt will try to decrease celebrex to 100mg bid to qd.      Still on oxybutin.  Pt with memory loss.          On cpap. And doing well.          The following portions of the patient's history were reviewed and updated as appropriate: allergies, current medications, past family history, past medical history, past social history, past surgical history and problem list.    Review of Systems   Constitutional: Positive for fatigue.   HENT: Positive for congestion, postnasal drip and rhinorrhea.    Respiratory: Positive for shortness of breath. Negative for cough.    Psychiatric/Behavioral: Positive for sleep disturbance.   All other systems reviewed and are negative.      Current Outpatient Medications:   •  aspirin 81 MG EC tablet, Take 1 tablet by mouth Daily., Disp: 90 tablet, Rfl: 1  •  bisoprolol (ZEBeta) 5 MG tablet, TAKE ONE-HALF TABLET BY MOUTH DAILY, Disp: 90 tablet, Rfl: 1  •  celecoxib (CeleBREX) 100 MG capsule, Take 1 capsule by mouth 2 (Two) Times a Day., Disp: 90 capsule, Rfl: 3  •  celecoxib (CeleBREX) 200 MG capsule, Take 1 capsule by mouth As Needed for Moderate Pain . (Patient taking differently: Take 200 mg by mouth As Needed.), Disp: 30 capsule, Rfl: 11  •  Cholecalciferol (VITAMIN D-3 PO), Take 1 tablet by mouth Daily., Disp: , Rfl:   •  finasteride (PROSCAR) 5 MG tablet, Take 1 tablet by mouth Daily., Disp: 90 tablet, Rfl: 3  •  fluticasone (FLONASE) 50 MCG/ACT nasal spray, 1 spray into the nostril(s) as directed by provider Daily., Disp: , Rfl:   •  Glucosamine-Chondroitin (OSTEO BI-FLEX REGULAR STRENGTH PO), Take 1 tablet by mouth 2 (two) times a day., Disp: , Rfl:   •  levothyroxine (SYNTHROID,  "LEVOTHROID) 137 MCG tablet, Take 1 tablet by mouth Daily., Disp: 90 tablet, Rfl: 3  •  Multiple Vitamins-Minerals (CENTRUM SILVER ADULT 50+) tablet, Take 1 tablet by mouth daily., Disp: , Rfl:   •  Omega-3 Fatty Acids (FISH OIL) 1200 MG capsule capsule, Take 1,200 mg by mouth 2 (Two) Times a Day With Meals., Disp: , Rfl:   •  Potassium 75 MG tablet, Take 595 mg by mouth Daily., Disp: , Rfl:   •  sildenafil (REVATIO) 20 MG tablet, Take 3 tablets by mouth 3 (Three) Times a Day. As needed for ED (Patient taking differently: Take 60 mg by mouth Take As Directed. As needed for ED), Disp: 30 tablet, Rfl: 11  •  vitamin B-12 (CYANOCOBALAMIN) 1000 MCG tablet, Take 1,000 mcg by mouth Daily., Disp: , Rfl:   •  VYTORIN 10-20 MG per tablet, Take 1 tablet by mouth Every Night., Disp: 30 tablet, Rfl: 5  •  montelukast (SINGULAIR) 10 MG tablet, Take 1 tablet by mouth Every Night., Disp: 90 tablet, Rfl: 3    Objective      Blood pressure 123/55, pulse 63, temperature 97.8 °F (36.6 °C), temperature source Oral, resp. rate 16, height 177.8 cm (70\"), weight 94.1 kg (207 lb 6.4 oz), SpO2 98 %.    Physical Exam     General Appearance:    Alert, cooperative, no distress, appears stated age   Head:    Normocephalic, without obvious abnormality, atraumatic   Eyes:    PERRL, conjunctiva/corneas clear, EOM's intact   Ears:    Normal TM's and external ear canals, both ears   Nose:   Nares normal, septum midline, mucosa normal, no drainage   or sinus tenderness   Throat:   Lips, mucosa, and tongue normal; teeth and gums normal   Neck:   Supple, symmetrical, trachea midline, no adenopathy;        thyroid:  No enlargement/tenderness/nodules; no carotid    bruit or JVD   Back:     Symmetric, no curvature, ROM normal, no CVA tenderness   Lungs:     Course deep crackles in right lower lung, respirations unlabored   Chest wall:    No tenderness or deformity   Heart:    Regular rate and rhythm, S1 and S2 normal, no murmur,        rub or gallop "   Abdomen:     Soft, non-tender, bowel sounds active all four quadrants,     no masses, no organomegaly   Extremities:   Extremities normal, atraumatic, no cyanosis or edema   Pulses:   2+ and symmetric all extremities   Skin:   Skin color, texture, turgor normal, no rashes or lesions   Lymph nodes:   Cervical, supraclavicular, and axillary nodes normal   Neurologic:   CNII-XII intact. Normal strength, sensation and reflexes       throughout      Results for orders placed or performed in visit on 09/09/19   [place] Spotted Fever, IgM   Result Value Ref Range    RMSF IgM 0.75 0.00 - 0.89 index   [facility] Spotted Fever, IgG   Result Value Ref Range    RMSF IgG Negative Negative   Lyme Disease, PCR   Result Value Ref Range    Lyme Disease(B.burgdorferi)PCR Negative Negative   Ehrlichia Antibody Panel   Result Value Ref Range    E. chaffeensis ([facility]) IgG Titer Negative Neg:<1:64    E. chaffeensis ([facility]) IgM Titer Negative Neg:<1:20    [facility] IgG Titer Negative Neg:<1:64    [facility] IgM Titer Negative Neg:<1:20   Cyclic Citrul Peptide Antibody, IgG / IgA   Result Value Ref Range    CCP Antibodies IgG/IgA 8 0 - 19 units   Rheumatoid Factor   Result Value Ref Range    RA Latex Turbid <10.0 0.0 - 13.9 IU/mL   Sedimentation Rate   Result Value Ref Range    Sed Rate 2 0 - 20 mm/hr   Uric Acid   Result Value Ref Range    Uric Acid 5.0 3.4 - 7.0 mg/dL   C-reactive Protein   Result Value Ref Range    C-Reactive Protein 0.10 0.00 - 0.50 mg/dL   Antistreptolysin O Titer   Result Value Ref Range    ASO <20.0 0.0 - 200.0 IU/mL         Assessment/Plan     Memory loss. Stop oxybutynin and xyzal.    Start Singulair for AR.    Crackles in right lower lung. Sounds like atelectasis. Will continue for followe for now per joint dision making with pt.        Deniz was seen today for jaundice.    Diagnoses and all orders for this visit:    Essential hypertension  -     TSH  -     T4, Free  -     Comprehensive Metabolic Panel  -     Vitamin B12  -      CBC & Differential    Acquired hypothyroidism  -     levothyroxine (SYNTHROID, LEVOTHROID) 137 MCG tablet; Take 1 tablet by mouth Daily.  -     TSH  -     T4, Free  -     Comprehensive Metabolic Panel  -     Vitamin B12  -     CBC & Differential    Other orders  -     celecoxib (CeleBREX) 100 MG capsule; Take 1 capsule by mouth 2 (Two) Times a Day.  -     montelukast (SINGULAIR) 10 MG tablet; Take 1 tablet by mouth Every Night.      Return in about 4 months (around 4/9/2020).          There are no Patient Instructions on file for this visit.     Zane Magallon MD    Assessment/Plan

## 2019-12-23 ENCOUNTER — OFFICE VISIT (OUTPATIENT)
Dept: ONCOLOGY | Facility: CLINIC | Age: 74
End: 2019-12-23

## 2019-12-23 VITALS
BODY MASS INDEX: 29.63 KG/M2 | HEART RATE: 81 BPM | SYSTOLIC BLOOD PRESSURE: 151 MMHG | WEIGHT: 207 LBS | TEMPERATURE: 97.9 F | RESPIRATION RATE: 18 BRPM | HEIGHT: 70 IN | DIASTOLIC BLOOD PRESSURE: 65 MMHG | OXYGEN SATURATION: 95 %

## 2019-12-23 DIAGNOSIS — D69.3 IDIOPATHIC THROMBOCYTOPENIC PURPURA (HCC): Primary | Chronic | ICD-10-CM

## 2019-12-23 PROCEDURE — 99213 OFFICE O/P EST LOW 20 MIN: CPT | Performed by: NURSE PRACTITIONER

## 2019-12-23 NOTE — PROGRESS NOTES
CHIEF COMPLAINT: Follow-up ITP    Problem List:  Oncology/Hematology History    1. Chronic ITP  2. Degenerative joint disease per rheumatology  3. Obstructive sleep apnea  4. Hypertension  5. Hyperlipidemia  6. History of right UKA  7. History of basal cell carcinomas        Idiopathic thrombocytopenic purpura (CMS/HCC)    6/8/2016 Initial Diagnosis     Idiopathic thrombocytopenic purpura (CMS/HCC)         HISTORY OF PRESENT ILLNESS:  The patient is a 74 y.o. male, here for follow up on management of ITP.  No bleeding problems.  No petechiae or ecchymoses.  No change in the color caliber consistency of his stools.  Platelets on 12/3/2019 were 87,000.      Past Medical History:   Diagnosis Date   • Arthritis     OA   • Cancer (CMS/HCC)     Skin cancer removed 3x    • Coronary artery disease     Probable Non obstructive    • Dyslipidemia    • Elevated prostate specific antigen (PSA)    • Essential hypertension, benign    • Full dentures     Instructed no adhesives the DOS   • GERD (gastroesophageal reflux disease)    • H/O benign prostatic hypertrophy    • H/O cardiovascular stress test 07/19/2019    Patient reported done by Dr Ojeda around 3-4 years ago and reported that all was wnl's    • High cholesterol    • History of bronchitis    • History of hemorrhoids    • History of idiopathic thrombocytopenic purpura    • History of malignant neoplasm of skin    • History of sleep apnea    • [place] (hard of hearing)     No use of hearing aids   • Hypertension    • Hypothyroidism    • JUWAN on CPAP     Patient instructed to bring mask and machine the DOS   • Rectal bleeding    • S/P right knee surgery 07/12/2017   • Seasonal allergies    • Tobacco abuse    • Wears glasses     Rx wears     Past Surgical History:   Procedure Laterality Date   • APPENDECTOMY     • COLONOSCOPY      Complete Colonoscopy   • COLONOSCOPY N/A 7/22/2019    Procedure: COLONOSCOPY;  Surgeon: Lisa Rhodes MD;  Location: [facility] ENDOSCOPY;  Service:  "Gastroenterology   • HERNIA REPAIR      patient reported by Dr Banuelos - patient unsure extact location   • JOINT REPLACEMENT Left     partial knee replacement   • JOINT REPLACEMENT Right     partial knee replacement   • KNEE SURGERY      Left Knee   • MOUTH SURGERY      full mouth extraction   • OTHER SURGICAL HISTORY      Surgery Testis Orchiopexy around age 20   • OTHER SURGICAL HISTORY      Cyst removed from a finger - patient reported to this as a \"growth\" and is unsure laterality   • SKIN BIOPSY      Patient reported skin cancer removed 3x.  Nose, back and face       Allergies   Allergen Reactions   • Penicillins Itching and Rash       Family History and Social History reviewed and changed as necessary      REVIEW OF SYSTEM:   Review of Systems   Constitutional: Negative for appetite change, chills, diaphoresis, fatigue, fever and unexpected weight change.   HENT:   Negative for mouth sores, sore throat and trouble swallowing.    Eyes: Negative for icterus.   Respiratory: Negative for cough, hemoptysis and shortness of breath.    Cardiovascular: Negative for chest pain, leg swelling and palpitations.   Gastrointestinal: Negative for abdominal distention, abdominal pain, blood in stool, constipation, diarrhea, nausea and vomiting.   Endocrine: Negative for hot flashes.   Genitourinary: Negative for bladder incontinence, difficulty urinating, dysuria, frequency and hematuria.    Musculoskeletal: Negative for gait problem, neck pain and neck stiffness.   Skin: Negative for rash.   Neurological: Negative for dizziness, gait problem, headaches, light-headedness and numbness.   Hematological: Negative for adenopathy. Does not bruise/bleed easily.   Psychiatric/Behavioral: Negative for depression. The patient is not nervous/anxious.    All other systems reviewed and are negative.       PHYSICAL EXAM    Vitals:    12/23/19 1513   BP: 151/65   Pulse: 81   Resp: 18   Temp: 97.9 °F (36.6 °C)   SpO2: 95%   Weight: 93.9 kg " "(207 lb)   Height: 177.8 cm (70\")     Constitutional: Appears well-developed and well-nourished. No distress.   ECOG: (0) Fully active, able to carry on all predisease performance without restriction  HENT:   Head: Normocephalic.   Mouth/Throat: Oropharynx is clear and moist.   Eyes: Conjunctivae are normal. Pupils are equal, round, and reactive to light. No scleral icterus.   Neck: Neck supple. No JVD present. No thyromegaly present.   Cardiovascular: Normal rate, regular rhythm and normal heart sounds.    Pulmonary/Chest: Breath sounds normal. No respiratory distress.   Abdominal: Soft. Exhibits no distension. There is no tenderness.    Musculoskeletal:Exhibits no edema, tenderness or deformity.   Neurological: Alert and oriented to person, place, and time. Exhibits normal muscle tone.   Skin: No ecchymosis, no petechiae and no rash noted. Not diaphoretic. No cyanosis. Nails show no clubbing.   Psychiatric: Normal mood and affect.   Vitals reviewed.  Labs reviewed.      ASSESSMENT & PLAN:    1.  Chronic ITP: The patient is doing well.  He has had no bleeding or unusual bruising incidents.  Platelet count stable at 87,000.  No hint of this worsening over time.  We'll continue to check him annually with no plans for treatment unless he has bleeding issues or platelets drop below 50,000.    I spent a total of 15 minutes in direct patient care, greater than 8  minutes (greater than 50%) were spent in coordination of care, and counseling the patient regarding  (diagnosis) . Answered any questions patient had regarding medications and plan of care.     Nathalia Jolly, APRN    12/23/2019        "

## 2020-02-12 ENCOUNTER — TELEPHONE (OUTPATIENT)
Dept: INTERNAL MEDICINE | Facility: CLINIC | Age: 75
End: 2020-02-12

## 2020-02-12 NOTE — TELEPHONE ENCOUNTER
Patient states he dropped off some paperwork to get his VYTORIN 10-20 MG per tablet approved. He has checked with the pharmacy and he still cannot get his medicine. He says the day he dropped off the paperwork Dr. Magallon's staff was not in.     Pt callback [phone]    Please advise.

## 2020-02-13 NOTE — TELEPHONE ENCOUNTER
I called patient and left a message, explaining that  I spoke with [facility] pharmacy, and the pharmacist asked that we wait  to run the RX until Monday of the next week, since patient was requesting the medication a week early.

## 2020-02-17 NOTE — TELEPHONE ENCOUNTER
Patient called stating he called pharmacy and they are still needing the requested information. Please advise.

## 2020-02-18 ENCOUNTER — PRIOR AUTHORIZATION (OUTPATIENT)
Dept: INTERNAL MEDICINE | Facility: CLINIC | Age: 75
End: 2020-02-18

## 2020-02-20 ENCOUNTER — TELEPHONE (OUTPATIENT)
Dept: INTERNAL MEDICINE | Facility: CLINIC | Age: 75
End: 2020-02-20

## 2020-02-20 NOTE — TELEPHONE ENCOUNTER
RX for Vytorin 10-20 mg approved, [facility] pharmacy and  patient have been notified.

## 2020-03-30 ENCOUNTER — TELEPHONE (OUTPATIENT)
Dept: INTERNAL MEDICINE | Facility: CLINIC | Age: 75
End: 2020-03-30

## 2020-03-30 NOTE — TELEPHONE ENCOUNTER
PATIENT CALLED AND REQUESTED AN ORDER TO GET HIS BLOOD WORK DONE PRIOR TO HIS APPT ON 04/18/2020 @8:45AM. PLEASE ADVISE.    HE WANTS TO GET HIS LAB WORK DONE AT [facility]    PATIENT CALLBACK # [phone]

## 2020-03-31 NOTE — TELEPHONE ENCOUNTER
I spoke with patient, lab orders will be faxed to [facility] at his request. Patient verified that he wants to keep his scheduled appointment with Dr. Magallon on 4/18/2020.

## 2020-04-06 ENCOUNTER — HOSPITAL ENCOUNTER (OUTPATIENT)
Facility: HOSPITAL | Age: 75
Discharge: HOME | End: 2020-04-06
Payer: MEDICARE

## 2020-04-06 LAB
A/G RATIO: 2.4 (ref 0.8–2)
ALBUMIN SERPL-MCNC: 5.1 G/DL (ref 3.4–4.8)
ALP BLD-CCNC: 57 U/L (ref 25–100)
ALT SERPL-CCNC: 26 U/L (ref 4–36)
ANION GAP SERPL CALCULATED.3IONS-SCNC: 12 MMOL/L (ref 3–16)
AST SERPL-CCNC: 25 U/L (ref 8–33)
BASOPHILS ABSOLUTE: 0 K/UL (ref 0–0.1)
BASOPHILS RELATIVE PERCENT: 0.2 %
BILIRUB SERPL-MCNC: 1.4 MG/DL (ref 0.3–1.2)
BUN BLDV-MCNC: 16 MG/DL (ref 6–20)
CALCIUM SERPL-MCNC: 9.4 MG/DL (ref 8.5–10.5)
CHLORIDE BLD-SCNC: 97 MMOL/L (ref 98–107)
CO2: 28 MMOL/L (ref 20–30)
CREAT SERPL-MCNC: 0.8 MG/DL (ref 0.4–1.2)
EOSINOPHILS ABSOLUTE: 0.1 K/UL (ref 0–0.4)
EOSINOPHILS RELATIVE PERCENT: 1 %
GFR AFRICAN AMERICAN: >59
GFR NON-AFRICAN AMERICAN: >59
GLOBULIN: 2.1 G/DL
GLUCOSE BLD-MCNC: 119 MG/DL (ref 74–106)
HCT VFR BLD CALC: 46.6 % (ref 40–54)
HEMOGLOBIN: 15.9 G/DL (ref 13–18)
IMMATURE GRANULOCYTES #: 0 K/UL
IMMATURE GRANULOCYTES %: 0.3 % (ref 0–5)
LYMPHOCYTES ABSOLUTE: 1.7 K/UL (ref 1.5–4)
LYMPHOCYTES RELATIVE PERCENT: 26.7 %
MCH RBC QN AUTO: 33.1 PG (ref 27–32)
MCHC RBC AUTO-ENTMCNC: 34.1 G/DL (ref 31–35)
MCV RBC AUTO: 97.1 FL (ref 80–100)
MONOCYTES ABSOLUTE: 0.6 K/UL (ref 0.2–0.8)
MONOCYTES RELATIVE PERCENT: 9.3 %
NEUTROPHILS ABSOLUTE: 3.9 K/UL (ref 2–7.5)
NEUTROPHILS RELATIVE PERCENT: 62.5 %
PDW BLD-RTO: 12.6 % (ref 11–16)
PLATELET # BLD: 95 K/UL (ref 150–400)
PMV BLD AUTO: 8.7 FL (ref 6–10)
POTASSIUM SERPL-SCNC: 4.5 MMOL/L (ref 3.4–5.1)
RBC # BLD: 4.8 M/UL (ref 4.5–6)
SODIUM BLD-SCNC: 137 MMOL/L (ref 136–145)
T4 FREE: 1.59 NG/DL (ref 0.89–1.76)
TOTAL PROTEIN: 7.2 G/DL (ref 6.4–8.3)
TSH SERPL DL<=0.05 MIU/L-ACNC: 1.11 UIU/ML (ref 0.35–5.5)
VITAMIN B-12: 743 PG/ML (ref 211–911)
WBC # BLD: 6.3 K/UL (ref 4–11)

## 2020-04-06 PROCEDURE — 84443 ASSAY THYROID STIM HORMONE: CPT

## 2020-04-06 PROCEDURE — 82607 VITAMIN B-12: CPT

## 2020-04-06 PROCEDURE — 84439 ASSAY OF FREE THYROXINE: CPT

## 2020-04-06 PROCEDURE — 85025 COMPLETE CBC W/AUTO DIFF WBC: CPT

## 2020-04-06 PROCEDURE — 80053 COMPREHEN METABOLIC PANEL: CPT

## 2020-04-13 ENCOUNTER — OFFICE VISIT (OUTPATIENT)
Dept: INTERNAL MEDICINE | Facility: CLINIC | Age: 75
End: 2020-04-13

## 2020-04-13 VITALS
SYSTOLIC BLOOD PRESSURE: 127 MMHG | OXYGEN SATURATION: 99 % | TEMPERATURE: 98 F | DIASTOLIC BLOOD PRESSURE: 70 MMHG | WEIGHT: 204.8 LBS | HEART RATE: 63 BPM | BODY MASS INDEX: 29.32 KG/M2 | HEIGHT: 70 IN

## 2020-04-13 DIAGNOSIS — E03.9 ACQUIRED HYPOTHYROIDISM: ICD-10-CM

## 2020-04-13 DIAGNOSIS — I10 ESSENTIAL HYPERTENSION: Primary | ICD-10-CM

## 2020-04-13 DIAGNOSIS — M54.50 CHRONIC BILATERAL LOW BACK PAIN WITHOUT SCIATICA: ICD-10-CM

## 2020-04-13 DIAGNOSIS — G89.29 CHRONIC BILATERAL LOW BACK PAIN WITHOUT SCIATICA: ICD-10-CM

## 2020-04-13 PROCEDURE — G2025 DIS SITE TELE SVCS RHC/FQHC: HCPCS | Performed by: INTERNAL MEDICINE

## 2020-04-13 RX ORDER — BISOPROLOL FUMARATE 5 MG/1
TABLET, FILM COATED ORAL
Qty: 45 TABLET | Refills: 1 | Status: SHIPPED | OUTPATIENT
Start: 2020-04-13 | End: 2020-12-14

## 2020-04-13 NOTE — PROGRESS NOTES
.You have chosen to receive care through a telephone visit today. Do you consent to use a telephone visit for your medical care today? YES    Patient has consented to have a Telephone visit:    Present during visit:  Patient - Deniz Dickson  Doctor - Zane Jimenez  Subjective     Patient ID: Deniz Dickson is a 74 y.o. male. Patient is here for management of multiple medical problems.     Chief Complaint   Patient presents with   • Hypertension     4 month follow-up     History of Present Illness   Telephone visit.    Pt hard of hearing and had ot have his wife hlep.    Episodes of BAck pain. Unable to get to gym or chiropractor.    Baseline soa no changed.  No cp, no fever.    recent Basal ell ca.   Un able to get Moles procedure to to covid 19 restrictions.        Htn stable.       The following portions of the patient's history were reviewed and updated as appropriate: allergies, current medications, past family history, past medical history, past social history, past surgical history and problem list.    Review of Systems   Constitutional: Negative for fatigue.   Respiratory: Positive for shortness of breath.    Musculoskeletal: Positive for arthralgias, back pain and gait problem.   Psychiatric/Behavioral: Negative for self-injury and sleep disturbance. The patient is not nervous/anxious and is not hyperactive.        Current Outpatient Medications:   •  aspirin 81 MG EC tablet, Take 1 tablet by mouth Daily., Disp: 90 tablet, Rfl: 1  •  bisoprolol (ZEBeta) 5 MG tablet, TAKE ONE-HALF TABLET BY MOUTH DAILY, Disp: 90 tablet, Rfl: 1  •  celecoxib (CeleBREX) 100 MG capsule, Take 1 capsule by mouth 2 (Two) Times a Day., Disp: 90 capsule, Rfl: 3  •  Cholecalciferol (VITAMIN D-3 PO), Take 1 tablet by mouth Daily., Disp: , Rfl:   •  finasteride (PROSCAR) 5 MG tablet, Take 1 tablet by mouth Daily., Disp: 90 tablet, Rfl: 3  •  fluticasone (FLONASE) 50 MCG/ACT nasal spray, 1 spray into the nostril(s)  "as directed by provider Daily., Disp: , Rfl:   •  Glucosamine-Chondroitin (OSTEO BI-FLEX REGULAR STRENGTH PO), Take 1 tablet by mouth 2 (two) times a day., Disp: , Rfl:   •  levothyroxine (SYNTHROID, LEVOTHROID) 137 MCG tablet, Take 1 tablet by mouth Daily., Disp: 90 tablet, Rfl: 3  •  montelukast (SINGULAIR) 10 MG tablet, Take 1 tablet by mouth Every Night., Disp: 90 tablet, Rfl: 3  •  Multiple Vitamins-Minerals (CENTRUM SILVER ADULT 50+) tablet, Take 1 tablet by mouth daily., Disp: , Rfl:   •  Omega-3 Fatty Acids (FISH OIL) 1200 MG capsule capsule, Take 1,200 mg by mouth 2 (Two) Times a Day With Meals., Disp: , Rfl:   •  Potassium 75 MG tablet, Take 595 mg by mouth Daily., Disp: , Rfl:   •  sildenafil (REVATIO) 20 MG tablet, Take 3 tablets by mouth 3 (Three) Times a Day. As needed for ED (Patient taking differently: Take 60 mg by mouth Take As Directed. As needed for ED), Disp: 30 tablet, Rfl: 11  •  vitamin B-12 (CYANOCOBALAMIN) 1000 MCG tablet, Take 1,000 mcg by mouth Daily., Disp: , Rfl:   •  VYTORIN 10-20 MG per tablet, Take 1 tablet by mouth Every Night., Disp: 30 tablet, Rfl: 5    Objective      Blood pressure 127/70, pulse 63, temperature 98 °F (36.7 °C), temperature source Oral, height 177.8 cm (70\"), weight 92.9 kg (204 lb 12.8 oz), SpO2 99 %.    Physical Exam     General Appearance:    Alert, cooperative, no distress, appears stated age   Head:     Eyes:     Ears:     Nose:    Throat:    Neck:    Back:      Lungs:      Chest wall:     Heart:     Abdomen:      Extremities:    Pulses:    Skin:    Lymph nodes:    Neurologic:       Results for orders placed or performed in visit on 09/09/19   [place] Spotted Fever, IgM   Result Value Ref Range    RMSF IgM 0.75 0.00 - 0.89 index   [facility] Spotted Fever, IgG   Result Value Ref Range    RMSF IgG Negative Negative   Lyme Disease, PCR   Result Value Ref Range    Lyme Disease(B.burgdorferi)PCR Negative Negative   Ehrlichia Antibody Panel   Result Value Ref " Range    E. chaffeensis ([facility]ME) IgG Titer Negative Neg:<1:64    E. chaffeensis ([facility]) IgM Titer Negative Neg:<1:20    HGE IgG Titer Negative Neg:<1:64    [facility]GE IgM Titer Negative Neg:<1:20   Cyclic Citrul Peptide Antibody, IgG / IgA   Result Value Ref Range    CCP Antibodies IgG/IgA 8 0 - 19 units   Rheumatoid Factor   Result Value Ref Range    RA Latex Turbid <10.0 0.0 - 13.9 IU/mL   Sedimentation Rate   Result Value Ref Range    Sed Rate 2 0 - 20 mm/hr   Uric Acid   Result Value Ref Range    Uric Acid 5.0 3.4 - 7.0 mg/dL   C-reactive Protein   Result Value Ref Range    C-Reactive Protein 0.10 0.00 - 0.50 mg/dL   Antistreptolysin O Titer   Result Value Ref Range    ASO <20.0 0.0 - 200.0 IU/mL         Assessment/Plan   Pt is to get a tens unit for back pain.  Tens 7000 recommended.    BP stable.      Basal Cell ca on ear. Unable to treatment to to govener required shut down of non emergent procedures.  Pt wound currently healed. If starts to change will try to re cyro to keep is stable.    This visit has been rescheduled as a phone visit to comply with patient safety concerns in accordance with CDC recommendations. Total time of discussion was 25 minutes.    Labs stable.      Deniz was seen today for hypertension.    Diagnoses and all orders for this visit:    Essential hypertension  -     Vitamin B12  -     CBC & Differential  -     Lipid Panel  -     Comprehensive Metabolic Panel  -     TSH  -     T4, Free    Acquired hypothyroidism  -     Vitamin B12  -     CBC & Differential  -     Lipid Panel  -     Comprehensive Metabolic Panel  -     TSH  -     T4, Free    Chronic bilateral low back pain without sciatica      Return in about 6 months (around 10/13/2020).          There are no Patient Instructions on file for this visit.     Zane Magallon MD    Assessment/Plan     est

## 2020-04-15 DIAGNOSIS — E78.00 HYPERCHOLESTEREMIA: ICD-10-CM

## 2020-04-16 RX ORDER — EZETIMIBE/SIMVASTATIN 10 MG-20MG
TABLET ORAL
Qty: 30 TABLET | Refills: 4 | Status: SHIPPED | OUTPATIENT
Start: 2020-04-16 | End: 2020-08-31

## 2020-05-19 ENCOUNTER — TELEPHONE (OUTPATIENT)
Dept: ONCOLOGY | Facility: CLINIC | Age: 75
End: 2020-05-19

## 2020-05-19 NOTE — TELEPHONE ENCOUNTER
PT IS SCHEDULED TO HAVE EAR SURGERY ON 5/28/20 PT HAS HAD HIS PRE-OP LABS DONE PT NEEDS THE OK FROM DR BRAGG TO PROCEED WITH THE SURGERY     PT CONTACT # [phone]

## 2020-05-19 NOTE — TELEPHONE ENCOUNTER
Discussed with Dr. Whiting.  Ok to proceed as long as platelets over 80k.  Platelets 95k on 4-9-20.   Patient notified and verbalized understanding.

## 2020-06-07 DIAGNOSIS — N40.0 BENIGN NON-NODULAR PROSTATIC HYPERPLASIA WITHOUT LOWER URINARY TRACT SYMPTOMS: ICD-10-CM

## 2020-06-10 RX ORDER — FINASTERIDE 5 MG/1
TABLET, FILM COATED ORAL
Qty: 90 TABLET | Refills: 2 | Status: SHIPPED | OUTPATIENT
Start: 2020-06-10 | End: 2021-03-22

## 2020-06-15 ENCOUNTER — LAB (OUTPATIENT)
Dept: LAB | Facility: HOSPITAL | Age: 75
End: 2020-06-15

## 2020-06-15 DIAGNOSIS — R97.20 ELEVATED PROSTATE SPECIFIC ANTIGEN (PSA): ICD-10-CM

## 2020-06-15 LAB — PSA SERPL-MCNC: 1.26 NG/ML (ref 0–4)

## 2020-06-15 PROCEDURE — 84153 ASSAY OF PSA TOTAL: CPT

## 2020-07-02 ENCOUNTER — OFFICE VISIT (OUTPATIENT)
Dept: UROLOGY | Facility: CLINIC | Age: 75
End: 2020-07-02

## 2020-07-02 VITALS
WEIGHT: 204 LBS | HEART RATE: 74 BPM | HEIGHT: 70 IN | TEMPERATURE: 98 F | DIASTOLIC BLOOD PRESSURE: 68 MMHG | BODY MASS INDEX: 29.2 KG/M2 | SYSTOLIC BLOOD PRESSURE: 134 MMHG | OXYGEN SATURATION: 99 %

## 2020-07-02 DIAGNOSIS — R97.20 ELEVATED PROSTATE SPECIFIC ANTIGEN (PSA): Primary | ICD-10-CM

## 2020-07-02 DIAGNOSIS — N40.0 BENIGN NON-NODULAR PROSTATIC HYPERPLASIA WITHOUT LOWER URINARY TRACT SYMPTOMS: ICD-10-CM

## 2020-07-02 DIAGNOSIS — N52.01 ERECTILE DYSFUNCTION DUE TO ARTERIAL INSUFFICIENCY: ICD-10-CM

## 2020-07-02 LAB
BILIRUB BLD-MCNC: NEGATIVE MG/DL
CLARITY, POC: CLEAR
COLOR UR: ABNORMAL
GLUCOSE UR STRIP-MCNC: ABNORMAL MG/DL
KETONES UR QL: NEGATIVE
LEUKOCYTE EST, POC: NEGATIVE
NITRITE UR-MCNC: NEGATIVE MG/ML
PH UR: 6 [PH] (ref 5–8)
PROT UR STRIP-MCNC: NEGATIVE MG/DL
RBC # UR STRIP: NEGATIVE /UL
SP GR UR: 1.03 (ref 1–1.03)
UROBILINOGEN UR QL: NORMAL

## 2020-07-02 PROCEDURE — 99213 OFFICE O/P EST LOW 20 MIN: CPT | Performed by: UROLOGY

## 2020-07-02 PROCEDURE — 81003 URINALYSIS AUTO W/O SCOPE: CPT | Performed by: UROLOGY

## 2020-07-02 RX ORDER — SILDENAFIL CITRATE 20 MG/1
TABLET ORAL
Qty: 30 TABLET | Refills: 11 | Status: SHIPPED | OUTPATIENT
Start: 2020-07-02 | End: 2021-04-01

## 2020-07-02 NOTE — PROGRESS NOTES
Chief Complaint  Follow-up (yearly) and Elevated PSA        HPI  Yessi is a 75 y.o. male who returns today for an annual checkup originally referred for elevated PSA and found to have a markedly enlarged prostate.  Since taking Proscar to shrink the gland he has had an improvement in his stream and returns today describing an AUA index today of only 6.  His PSA last year was 1.2 and recently obtained with no change at 1.26.  His voided urine today is clear.    He also has mild problems with erectile dysfunction that is handled with generic sildenafil and he is requesting a printed prescription since he still has some left on his old 1.    Vitals:    07/02/20 0843   Temp: 98 °F (36.7 °C)       Past Medical History  Past Medical History:   Diagnosis Date   • Arthritis     OA   • Basal cell carcinoma 02/2020    left ear   • Cancer (CMS/HCC)     Skin cancer removed 3x    • Coronary artery disease     Probable Non obstructive    • Dyslipidemia    • Elevated prostate specific antigen (PSA)    • Essential hypertension, benign    • Full dentures     Instructed no adhesives the DOS   • GERD (gastroesophageal reflux disease)    • H/O benign prostatic hypertrophy    • H/O cardiovascular stress test 07/19/2019    Patient reported done by Dr Ojeda around 3-4 years ago and reported that all was wnl's    • High cholesterol    • History of bronchitis    • History of hemorrhoids    • History of idiopathic thrombocytopenic purpura    • History of malignant neoplasm of skin    • History of sleep apnea    • [place] (hard of hearing)     No use of hearing aids   • Hypertension    • Hypothyroidism    • JUWAN on CPAP     Patient instructed to bring mask and machine the DOS   • Rectal bleeding    • S/P right knee surgery 07/12/2017   • Seasonal allergies    • Tobacco abuse    • Wears glasses     Rx wears       Past Surgical History  Past Surgical History:   Procedure Laterality Date   • APPENDECTOMY     • COLONOSCOPY      Complete Colonoscopy  "  • COLONOSCOPY N/A 2019    Procedure: COLONOSCOPY;  Surgeon: Lisa Rhodes MD;  Location: [facility] ENDOSCOPY;  Service: Gastroenterology   • HERNIA REPAIR      patient reported by Dr Banuelos - patient unsure extact location   • JOINT REPLACEMENT Left     partial knee replacement   • JOINT REPLACEMENT Right     partial knee replacement   • KNEE SURGERY      Left Knee   • MOUTH SURGERY      full mouth extraction   • OTHER SURGICAL HISTORY      Surgery Testis Orchiopexy around age 20   • OTHER SURGICAL HISTORY      Cyst removed from a finger - patient reported to this as a \"growth\" and is unsure laterality   • SKIN BIOPSY      Patient reported skin cancer removed 3x.  Nose, back and face       Medications  has a current medication list which includes the following prescription(s): aspirin, bisoprolol, celecoxib, cholecalciferol, finasteride, fluticasone, glucosamine-chondroitin, levothyroxine, montelukast, centrum silver adult 50+, fish oil, potassium, sildenafil, vitamin b-12, and vytorin.      Allergies  Allergies   Allergen Reactions   • Penicillins Itching and Rash       Social History  Social History     Socioeconomic History   • Marital status:      Spouse name: Not on file   • Number of children: Not on file   • Years of education: Not on file   • Highest education level: Not on file   Occupational History     Employer: RETIRED   Tobacco Use   • Smoking status: Former Smoker     Packs/day: 2.00     Years: 15.00     Pack years: 30.00     Types: Cigarettes     Last attempt to quit: 1985     Years since quittin.0   • Smokeless tobacco: Former User     Types: Chew     Quit date:    Substance and Sexual Activity   • Alcohol use: No   • Drug use: No   • Sexual activity: Defer       Family History  He has no family history of bladder or kidney cancer  He has no family history of kidney stones      AUA Symptom Score:      Review of Systems  Review of Systems   Constitutional: Negative for " activity change, appetite change, chills, fatigue, fever, unexpected weight gain and unexpected weight loss.   Respiratory: Negative for apnea, cough, chest tightness, shortness of breath, wheezing and stridor.    Cardiovascular: Negative for chest pain, palpitations and leg swelling.   Gastrointestinal: Negative for abdominal distention, abdominal pain, anal bleeding, blood in stool, constipation, diarrhea, nausea, rectal pain, vomiting, GERD and indigestion.   Genitourinary: Negative for decreased libido, decreased urine volume, difficulty urinating, discharge, dysuria, flank pain, frequency, genital sores, hematuria, nocturia, penile pain, erectile dysfunction, penile swelling, scrotal swelling, testicular pain, urgency and urinary incontinence.   Musculoskeletal: Negative for back pain and joint swelling.   Neurological: Negative for tremors, seizures, speech difficulty, weakness and numbness.   Psychiatric/Behavioral: Negative for agitation, decreased concentration, sleep disturbance, depressed mood and stress. The patient is not nervous/anxious.        Physical Exam  Physical Exam   Constitutional: He is oriented to person, place, and time. He appears well-developed and well-nourished.   HENT:   Head: Normocephalic and atraumatic.   Neck: Normal range of motion.   Pulmonary/Chest: Effort normal. No respiratory distress.   Abdominal: Soft. He exhibits no distension and no mass. There is no tenderness. No hernia.   Genitourinary: Rectum normal and prostate normal.   Musculoskeletal: Normal range of motion.   Lymphadenopathy:     He has no cervical adenopathy.   Neurological: He is alert and oriented to person, place, and time.   Skin: Skin is warm and dry.   Psychiatric: He has a normal mood and affect. His behavior is normal.   Vitals reviewed.      Labs Recent and today in the office:  Results for orders placed or performed in visit on 06/15/20   PSA DIAGNOSTIC   Result Value Ref Range    PSA 1.260 0.000 -  4.000 ng/mL         Assessment & Plan  Elevated PSA: Associated with a markedly enlarged gland and a benign PSA density.  The PSA is come down to normal after taking finasteride to shrink his gland.    Erectile dysfunction: Treated satisfactorily with sildenafil.

## 2020-08-03 RX ORDER — CELECOXIB 100 MG/1
CAPSULE ORAL
Qty: 90 CAPSULE | Refills: 2 | Status: SHIPPED | OUTPATIENT
Start: 2020-08-03 | End: 2020-11-17 | Stop reason: HOSPADM

## 2020-08-05 ENCOUNTER — OFFICE VISIT (OUTPATIENT)
Dept: CARDIOLOGY | Facility: CLINIC | Age: 75
End: 2020-08-05

## 2020-08-05 VITALS
HEIGHT: 71 IN | OXYGEN SATURATION: 97 % | HEART RATE: 62 BPM | SYSTOLIC BLOOD PRESSURE: 118 MMHG | WEIGHT: 208 LBS | DIASTOLIC BLOOD PRESSURE: 74 MMHG | BODY MASS INDEX: 29.12 KG/M2

## 2020-08-05 DIAGNOSIS — I25.10 CORONARY ARTERY DISEASE INVOLVING NATIVE CORONARY ARTERY OF NATIVE HEART WITHOUT ANGINA PECTORIS: Primary | ICD-10-CM

## 2020-08-05 DIAGNOSIS — I10 ESSENTIAL HYPERTENSION: ICD-10-CM

## 2020-08-05 DIAGNOSIS — E78.2 MIXED HYPERLIPIDEMIA: ICD-10-CM

## 2020-08-05 PROCEDURE — 99213 OFFICE O/P EST LOW 20 MIN: CPT | Performed by: INTERNAL MEDICINE

## 2020-08-05 NOTE — PROGRESS NOTES
[facility]  Office Progress Note  Deniz Dickson  1945      Visit Date: 08/05/20    PCP: Zane Magallon MD  [address]  [facility] [address]    IDENTIFICATION: A 75 y.o. male , retired ,  2016 from [place].     PROBLEM LIST:  1. 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 Dyslipidemia   a. December 2015: Total cholesterol 178, triglycerides 119, HDL 69, LDL 85.   b. 12/5/17   HL 68 LDL 82  c. 8/9/2019   HDL 73 LDL 66  3. Hypertension, controlled on current regimen.   4. Remote bilateral knee surgery per Dr. Smith. Redo R 8/18  5. Obstructive sleep apnea, on CPAP greater than 5 years.   6. Reformed smoker, times greater than 12 years.   7. Hypothyroidism.       Chief Complaint   Patient presents with   • Hypertension   • Shortness of Breath       Allergies  Allergies   Allergen Reactions   • Penicillins Itching and Rash       Current Medications    Current Outpatient Medications:   •  aspirin 81 MG EC tablet, Take 1 tablet by mouth Daily., Disp: 90 tablet, Rfl: 1  •  bisoprolol (ZEBeta) 5 MG tablet, TAKE ONE-HALF TABLET BY MOUTH DAILY, Disp: 45 tablet, Rfl: 1  •  celecoxib (CeleBREX) 100 MG capsule, TAKE ONE CAPSULE BY MOUTH TWICE A DAY, Disp: 90 capsule, Rfl: 2  •  Cholecalciferol (VITAMIN D-3 PO), Take 1 tablet by mouth Daily., Disp: , Rfl:   •  finasteride (PROSCAR) 5 MG tablet, TAKE ONE TABLET BY MOUTH DAILY, Disp: 90 tablet, Rfl: 2  •  fluticasone (FLONASE) 50 MCG/ACT nasal spray, 1 spray into the nostril(s) as directed by provider Daily., Disp: , Rfl:   •  Glucosamine-Chondroitin (OSTEO BI-FLEX REGULAR STRENGTH PO), Take 1 tablet by mouth 2 (two) times a day., Disp: , Rfl:   •  levothyroxine (SYNTHROID, LEVOTHROID) 137 MCG tablet, Take 1 tablet by mouth Daily., Disp: 90 tablet, Rfl: 3  •  montelukast (SINGULAIR) 10 MG tablet, Take 1  "tablet by mouth Every Night., Disp: 90 tablet, Rfl: 3  •  Multiple Vitamins-Minerals (CENTRUM SILVER ADULT 50+) tablet, Take 1 tablet by mouth daily., Disp: , Rfl:   •  Omega-3 Fatty Acids (FISH OIL) 1200 MG capsule capsule, Take 1,200 mg by mouth 2 (Two) Times a Day With Meals., Disp: , Rfl:   •  Potassium 75 MG tablet, Take 595 mg by mouth Daily., Disp: , Rfl:   •  sildenafil (REVATIO) 20 MG tablet, 1 to 3 tablets daily as needed for ED, Disp: 30 tablet, Rfl: 11  •  vitamin B-12 (CYANOCOBALAMIN) 1000 MCG tablet, Take 1,000 mcg by mouth Daily., Disp: , Rfl:   •  VYTORIN 10-20 MG per tablet, TAKE ONE TABLET BY MOUTH ONCE NIGHTLY, Disp: 30 tablet, Rfl: 4      History of Present Illness   Deniz Dickson is a 75 y.o. year old male here for follow up.  His blood pressures systolic 1 15-1 50 typically 120 range diastolic never greater than 80 no chest discomfort palpitations or dyspnea.  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   Neck: Normal range of motion. Neck supple. No hepatojugular reflux and no JVD present. Carotid bruit is not present. No tracheal deviation present. No thyromegaly present.   Cardiovascular: Normal rate, regular rhythm, S1 normal, S2 normal, intact distal pulses and normal pulses. PMI is not displaced. Exam reveals no gallop, no distant heart sounds, no friction rub, no midsystolic click and no opening snap.   No murmur heard.  Pulses:       Radial pulses are 2+ on the right side, and 2+ on the left side.        Dorsalis pedis pulses are 2+ on the right side, and 2+ on the left side.        Posterior tibial pulses are 2+ on the right side, and 2+ on the left side. "   Pulmonary/Chest: Effort normal and breath sounds normal. He has no wheezes. He has no rales.   Abdominal: Soft. Bowel sounds are normal. He exhibits no mass. There is no tenderness. There is no guarding.       Diagnostic Data:  Procedures      ASSESSMENT:  No diagnosis found.    PLAN:  Hypertension largely controlled follows at home closely on bisoprolol    Dyslipidemia on Vytorin historical LDL greater than 200 prior to Vytorin    Palpitations suppressed on bisoprolol with preservation of LV function continue observation and rediscussed as little as necessary nonsteroidal anti-inflammatory      Zane Magallon MD, thank you for referring Mr. Dickson for evaluation.  I have forwarded my electronically generated recommendations to you for review.  Please do not hesitate to call with any questions.      Suresh Ojeda MD, FACC

## 2020-08-05 NOTE — PROGRESS NOTES
[facility] at [facility]  Office Progress Note  Deniz Dickson  1945      Visit Date: 08/05/20    PCP: Zane Magallon MD  [address]  [facility] [address]    IDENTIFICATION: A 75 y.o. male , retired ,  2016 from [place].     PROBLEM LIST:  1. 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 Dyslipidemia   a. December 2015: Total cholesterol 178, triglycerides 119, HDL 69, LDL 85.   b. 12/5/17   HL 68 LDL 82  c. 8/9/2019   HDL 73 LDL 66  3. Hypertension, controlled on current regimen.   4. Remote bilateral knee surgery per Dr. Smith. Redo R 8/18  5. Obstructive sleep apnea, on CPAP greater than 5 years.   6. Reformed smoker, times greater than 12 years.   7. Hypothyroidism.       Chief Complaint   Patient presents with   • Hypertension   • Shortness of Breath       Allergies  Allergies   Allergen Reactions   • Penicillins Itching and Rash       Current Medications    Current Outpatient Medications:   •  aspirin 81 MG EC tablet, Take 1 tablet by mouth Daily., Disp: 90 tablet, Rfl: 1  •  bisoprolol (ZEBeta) 5 MG tablet, TAKE ONE-HALF TABLET BY MOUTH DAILY, Disp: 45 tablet, Rfl: 1  •  celecoxib (CeleBREX) 100 MG capsule, TAKE ONE CAPSULE BY MOUTH TWICE A DAY, Disp: 90 capsule, Rfl: 2  •  Cholecalciferol (VITAMIN D-3 PO), Take 1 tablet by mouth Daily., Disp: , Rfl:   •  finasteride (PROSCAR) 5 MG tablet, TAKE ONE TABLET BY MOUTH DAILY, Disp: 90 tablet, Rfl: 2  •  fluticasone (FLONASE) 50 MCG/ACT nasal spray, 1 spray into the nostril(s) as directed by provider Daily., Disp: , Rfl:   •  Glucosamine-Chondroitin (OSTEO BI-FLEX REGULAR STRENGTH PO), Take 1 tablet by mouth 2 (two) times a day., Disp: , Rfl:   •  levothyroxine (SYNTHROID, LEVOTHROID) 137 MCG tablet, Take 1 tablet by mouth Daily., Disp: 90 tablet, Rfl: 3  •  montelukast (SINGULAIR) 10 MG tablet, Take 1  "tablet by mouth Every Night., Disp: 90 tablet, Rfl: 3  •  Multiple Vitamins-Minerals (CENTRUM SILVER ADULT 50+) tablet, Take 1 tablet by mouth daily., Disp: , Rfl:   •  Omega-3 Fatty Acids (FISH OIL) 1200 MG capsule capsule, Take 1,200 mg by mouth 2 (Two) Times a Day With Meals., Disp: , Rfl:   •  Potassium 75 MG tablet, Take 595 mg by mouth Daily., Disp: , Rfl:   •  sildenafil (REVATIO) 20 MG tablet, 1 to 3 tablets daily as needed for ED, Disp: 30 tablet, Rfl: 11  •  vitamin B-12 (CYANOCOBALAMIN) 1000 MCG tablet, Take 1,000 mcg by mouth Daily., Disp: , Rfl:   •  VYTORIN 10-20 MG per tablet, TAKE ONE TABLET BY MOUTH ONCE NIGHTLY, Disp: 30 tablet, Rfl: 4      History of Present Illness   Deniz Dickson is a 75 y.o. year old male here for follow up.  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  He has no cardiac symptoms.  He is largely staying at home due to the COVID pandemic.  His hearing impairment is much worsened but now that he cannot lip read.        OBJECTIVE:  Vitals:    08/05/20 1328   BP: 118/74   BP Location: Right arm   Patient Position: Sitting   Pulse: 62   SpO2: 97%   Weight: 94.3 kg (208 lb)   Height: 179.1 cm (70.5\")     Physical Exam   Constitutional: He appears well-developed and well-nourished.   Neck: Normal range of motion. Neck supple. No hepatojugular reflux and no JVD present. Carotid bruit is not present. No tracheal deviation present. No thyromegaly present.   Cardiovascular: Normal rate, regular rhythm, S1 normal, S2 normal, intact distal pulses and normal pulses. PMI is not displaced. Exam reveals no gallop, no distant heart sounds, no friction rub, no midsystolic click and no opening snap.   No murmur heard.  Pulses:       Radial pulses are 2+ on the right side, and 2+ on the left side.        Dorsalis pedis pulses are 2+ on the right side, and 2+ on the left side.        Posterior tibial pulses are 2+ on the right side, and 2+ on the left side. "   Pulmonary/Chest: Effort normal and breath sounds normal. He has no wheezes. He has no rales.   Abdominal: Soft. Bowel sounds are normal. He exhibits no mass. There is no tenderness. There is no guarding.       Diagnostic Data:  Procedures      ASSESSMENT:   Diagnosis Plan   1. Coronary artery disease involving native coronary artery of native heart without angina pectoris     2. Essential hypertension     3. Mixed hyperlipidemia         PLAN:  Hypertension largely controlled follows at home closely on bisoprolol    Dyslipidemia on Vytorin historical LDL greater than 200 prior to Vytorin    Palpitations suppressed on bisoprolol with preservation of LV function continue observation and rediscussed as little as necessary nonsteroidal anti-inflammatory      Zane Magallon MD, thank you for referring Mr. Dickson for evaluation.  I have forwarded my electronically generated recommendations to you for review.  Please do not hesitate to call with any questions.      Suresh Ojeda MD, [facility]

## 2020-08-30 DIAGNOSIS — E78.00 HYPERCHOLESTEREMIA: ICD-10-CM

## 2020-08-31 RX ORDER — EZETIMIBE/SIMVASTATIN 10 MG-20MG
TABLET ORAL
Qty: 30 TABLET | Refills: 3 | Status: SHIPPED | OUTPATIENT
Start: 2020-08-31 | End: 2020-10-20 | Stop reason: SDUPTHER

## 2020-10-06 ENCOUNTER — HOSPITAL ENCOUNTER (OUTPATIENT)
Dept: RESPIRATORY THERAPY | Facility: HOSPITAL | Age: 75
Discharge: HOME | End: 2020-10-06
Payer: MEDICARE

## 2020-10-06 ENCOUNTER — HOSPITAL ENCOUNTER (OUTPATIENT)
Facility: HOSPITAL | Age: 75
Discharge: HOME | End: 2020-10-06
Payer: MEDICARE

## 2020-10-06 LAB — SARS-COV-2, NAAT: NOT DETECTED

## 2020-10-06 PROCEDURE — U0002 COVID-19 LAB TEST NON-CDC: HCPCS

## 2020-10-06 PROCEDURE — 94729 DIFFUSING CAPACITY: CPT

## 2020-10-06 PROCEDURE — 94010 BREATHING CAPACITY TEST: CPT

## 2020-10-06 PROCEDURE — 94618 PULMONARY STRESS TESTING: CPT

## 2020-10-08 ENCOUNTER — TELEPHONE (OUTPATIENT)
Dept: INTERNAL MEDICINE | Facility: CLINIC | Age: 75
End: 2020-10-08

## 2020-10-08 DIAGNOSIS — M10.9 GOUT, UNSPECIFIED CAUSE, UNSPECIFIED CHRONICITY, UNSPECIFIED SITE: Primary | ICD-10-CM

## 2020-10-08 NOTE — TELEPHONE ENCOUNTER
Patient requested a call back. Patient has an appointment with Dr. Magallon on 10/20/20. Patient would like to know if he could have lab work done prior to this appointment to check his carbon dioxide level and to check his uric acid level for gout as well as his regular blood work. Patient would like to go to [facility] in [place], [place] for his lab work if possible.    Please call and advise. Patient call back [phone]

## 2020-10-13 ENCOUNTER — HOSPITAL ENCOUNTER (OUTPATIENT)
Facility: HOSPITAL | Age: 75
Discharge: HOME | End: 2020-10-13
Payer: MEDICARE

## 2020-10-13 LAB
A/G RATIO: 2.6 (ref 0.8–2)
ALBUMIN SERPL-MCNC: 4.9 G/DL (ref 3.4–4.8)
ALP BLD-CCNC: 64 U/L (ref 25–100)
ALT SERPL-CCNC: 23 U/L (ref 4–36)
ANION GAP SERPL CALCULATED.3IONS-SCNC: 11 MMOL/L (ref 3–16)
AST SERPL-CCNC: 24 U/L (ref 8–33)
BASOPHILS ABSOLUTE: 0 K/UL (ref 0–0.1)
BASOPHILS RELATIVE PERCENT: 0.4 %
BILIRUB SERPL-MCNC: 1.4 MG/DL (ref 0.3–1.2)
BUN BLDV-MCNC: 14 MG/DL (ref 6–20)
CALCIUM SERPL-MCNC: 9.3 MG/DL (ref 8.5–10.5)
CHLORIDE BLD-SCNC: 99 MMOL/L (ref 98–107)
CHOLESTEROL, TOTAL: 171 MG/DL (ref 0–200)
CO2: 26 MMOL/L (ref 20–30)
CREAT SERPL-MCNC: 0.8 MG/DL (ref 0.4–1.2)
EOSINOPHILS ABSOLUTE: 0.1 K/UL (ref 0–0.4)
EOSINOPHILS RELATIVE PERCENT: 2.4 %
GFR AFRICAN AMERICAN: >59
GFR NON-AFRICAN AMERICAN: >59
GLOBULIN: 1.9 G/DL
GLUCOSE BLD-MCNC: 112 MG/DL (ref 74–106)
HCT VFR BLD CALC: 46.4 % (ref 40–54)
HDLC SERPL-MCNC: 68 MG/DL (ref 40–60)
HEMOGLOBIN: 15.2 G/DL (ref 13–18)
IMMATURE GRANULOCYTES #: 0 K/UL
IMMATURE GRANULOCYTES %: 0.2 % (ref 0–5)
LDL CHOLESTEROL CALCULATED: 75 MG/DL
LYMPHOCYTES ABSOLUTE: 1.5 K/UL (ref 1.5–4)
LYMPHOCYTES RELATIVE PERCENT: 27 %
MCH RBC QN AUTO: 32.8 PG (ref 27–32)
MCHC RBC AUTO-ENTMCNC: 32.8 G/DL (ref 31–35)
MCV RBC AUTO: 100 FL (ref 80–100)
MONOCYTES ABSOLUTE: 0.6 K/UL (ref 0.2–0.8)
MONOCYTES RELATIVE PERCENT: 11.7 %
NEUTROPHILS ABSOLUTE: 3.2 K/UL (ref 2–7.5)
NEUTROPHILS RELATIVE PERCENT: 58.3 %
PDW BLD-RTO: 12.7 % (ref 11–16)
PLATELET # BLD: 100 K/UL (ref 150–400)
PMV BLD AUTO: 8.9 FL (ref 6–10)
POTASSIUM SERPL-SCNC: 4 MMOL/L (ref 3.4–5.1)
RBC # BLD: 4.64 M/UL (ref 4.5–6)
SODIUM BLD-SCNC: 136 MMOL/L (ref 136–145)
T4 FREE: 1.69 NG/DL (ref 0.89–1.76)
TOTAL PROTEIN: 6.8 G/DL (ref 6.4–8.3)
TRIGL SERPL-MCNC: 138 MG/DL (ref 0–249)
TSH SERPL DL<=0.05 MIU/L-ACNC: 0.87 UIU/ML (ref 0.35–5.5)
URIC ACID, SERUM: 4.4 MG/DL (ref 3.7–8.6)
VITAMIN B-12: 866 PG/ML (ref 211–911)
VLDLC SERPL CALC-MCNC: 28 MG/DL
WBC # BLD: 5.5 K/UL (ref 4–11)

## 2020-10-13 PROCEDURE — 85025 COMPLETE CBC W/AUTO DIFF WBC: CPT

## 2020-10-13 PROCEDURE — 84439 ASSAY OF FREE THYROXINE: CPT

## 2020-10-13 PROCEDURE — 36415 COLL VENOUS BLD VENIPUNCTURE: CPT

## 2020-10-13 PROCEDURE — 84550 ASSAY OF BLOOD/URIC ACID: CPT

## 2020-10-13 PROCEDURE — 82607 VITAMIN B-12: CPT

## 2020-10-13 PROCEDURE — 80053 COMPREHEN METABOLIC PANEL: CPT

## 2020-10-13 PROCEDURE — 80061 LIPID PANEL: CPT

## 2020-10-13 PROCEDURE — 84443 ASSAY THYROID STIM HORMONE: CPT

## 2020-10-20 ENCOUNTER — OFFICE VISIT (OUTPATIENT)
Dept: INTERNAL MEDICINE | Facility: CLINIC | Age: 75
End: 2020-10-20

## 2020-10-20 VITALS
HEIGHT: 70 IN | OXYGEN SATURATION: 98 % | BODY MASS INDEX: 30.03 KG/M2 | TEMPERATURE: 98 F | SYSTOLIC BLOOD PRESSURE: 120 MMHG | HEART RATE: 60 BPM | WEIGHT: 209.8 LBS | DIASTOLIC BLOOD PRESSURE: 55 MMHG | RESPIRATION RATE: 16 BRPM

## 2020-10-20 DIAGNOSIS — E03.9 ACQUIRED HYPOTHYROIDISM: ICD-10-CM

## 2020-10-20 DIAGNOSIS — Z00.00 ROUTINE GENERAL MEDICAL EXAMINATION AT A HEALTH CARE FACILITY: Primary | ICD-10-CM

## 2020-10-20 DIAGNOSIS — E78.00 HYPERCHOLESTEREMIA: ICD-10-CM

## 2020-10-20 PROCEDURE — G0439 PPPS, SUBSEQ VISIT: HCPCS | Performed by: INTERNAL MEDICINE

## 2020-10-20 PROCEDURE — 90694 VACC AIIV4 NO PRSRV 0.5ML IM: CPT | Performed by: INTERNAL MEDICINE

## 2020-10-20 PROCEDURE — G0008 ADMIN INFLUENZA VIRUS VAC: HCPCS | Performed by: INTERNAL MEDICINE

## 2020-10-20 PROCEDURE — 99213 OFFICE O/P EST LOW 20 MIN: CPT | Performed by: INTERNAL MEDICINE

## 2020-10-20 RX ORDER — EZETIMIBE/SIMVASTATIN 10 MG-20MG
1 TABLET ORAL NIGHTLY
Qty: 30 TABLET | Refills: 11 | Status: SHIPPED | OUTPATIENT
Start: 2020-10-20 | End: 2021-02-11 | Stop reason: SDUPTHER

## 2020-10-20 RX ORDER — MONTELUKAST SODIUM 10 MG/1
10 TABLET ORAL NIGHTLY
Qty: 90 TABLET | Refills: 3 | Status: SHIPPED | OUTPATIENT
Start: 2020-10-20 | End: 2021-11-22

## 2020-10-20 RX ORDER — TRIAMCINOLONE ACETONIDE 1 MG/G
1 CREAM TOPICAL AS NEEDED
COMMUNITY
Start: 2020-07-25 | End: 2021-07-06

## 2020-10-20 RX ORDER — LEVOTHYROXINE SODIUM 137 UG/1
137 TABLET ORAL DAILY
Qty: 90 TABLET | Refills: 3 | Status: SHIPPED | OUTPATIENT
Start: 2020-10-20 | End: 2021-11-22

## 2020-10-20 NOTE — PROGRESS NOTES
Subjective     Patient ID: Deniz Dickson is a 75 y.o. male. Patient is here for management of multiple medical problems.     Chief Complaint   Patient presents with   • Hypertension     follow-up     History of Present Illness   htn      Recent skin ca on left ear.  Healing well    Hyperbili.  No jaudice.    arthrtitis of hand  Seen Dr Zheng and Dr Mcpherson.  Needs carpule tunnel surgery.        The following portions of the patient's history were reviewed and updated as appropriate: allergies, current medications, past family history, past medical history, past social history, past surgical history and problem list.    Review of Systems   Constitutional: Negative for fatigue.   HENT: Positive for hearing loss. Negative for ear discharge and facial swelling.    Cardiovascular: Negative for chest pain and leg swelling.   Gastrointestinal: Negative for abdominal distention, abdominal pain and anal bleeding.   Musculoskeletal: Negative for arthralgias, gait problem and joint swelling.   Psychiatric/Behavioral: Negative for hallucinations and sleep disturbance. The patient is not nervous/anxious and is not hyperactive.    All other systems reviewed and are negative.      Current Outpatient Medications:   •  aspirin 81 MG EC tablet, Take 1 tablet by mouth Daily., Disp: 90 tablet, Rfl: 1  •  bisoprolol (ZEBeta) 5 MG tablet, TAKE ONE-HALF TABLET BY MOUTH DAILY, Disp: 45 tablet, Rfl: 1  •  celecoxib (CeleBREX) 100 MG capsule, TAKE ONE CAPSULE BY MOUTH TWICE A DAY, Disp: 90 capsule, Rfl: 2  •  Cholecalciferol (VITAMIN D-3 PO), Take 1 tablet by mouth Daily., Disp: , Rfl:   •  finasteride (PROSCAR) 5 MG tablet, TAKE ONE TABLET BY MOUTH DAILY, Disp: 90 tablet, Rfl: 2  •  fluticasone (FLONASE) 50 MCG/ACT nasal spray, 1 spray into the nostril(s) as directed by provider Daily., Disp: , Rfl:   •  Glucosamine-Chondroitin (OSTEO BI-FLEX REGULAR STRENGTH PO), Take 1 tablet by mouth 2 (two) times a day., Disp: , Rfl:   •   "levothyroxine (SYNTHROID, LEVOTHROID) 137 MCG tablet, Take 1 tablet by mouth Daily., Disp: 90 tablet, Rfl: 3  •  montelukast (Singulair) 10 MG tablet, Take 1 tablet by mouth Every Night., Disp: 90 tablet, Rfl: 3  •  Multiple Vitamins-Minerals (CENTRUM SILVER ADULT 50+) tablet, Take 1 tablet by mouth daily., Disp: , Rfl:   •  Omega-3 Fatty Acids (FISH OIL) 1200 MG capsule capsule, Take 1,200 mg by mouth 2 (Two) Times a Day With Meals., Disp: , Rfl:   •  Potassium 75 MG tablet, Take 595 mg by mouth Daily., Disp: , Rfl:   •  sildenafil (REVATIO) 20 MG tablet, 1 to 3 tablets daily as needed for ED, Disp: 30 tablet, Rfl: 11  •  triamcinolone (KENALOG) 0.1 % cream, Apply 1 application topically to the appropriate area as directed As Needed., Disp: , Rfl:   •  vitamin B-12 (CYANOCOBALAMIN) 1000 MCG tablet, Take 1,000 mcg by mouth Daily., Disp: , Rfl:   •  Vytorin 10-20 MG per tablet, Take 1 tablet by mouth Every Night., Disp: 30 tablet, Rfl: 11    Objective      Blood pressure 120/55, pulse 60, temperature 98 °F (36.7 °C), temperature source Temporal, resp. rate 16, height 177.8 cm (70\"), weight 95.2 kg (209 lb 12.8 oz), SpO2 98 %.    Physical Exam     General Appearance:    Alert, cooperative, no distress, appears stated age   Head:    Normocephalic, without obvious abnormality, atraumatic   Eyes:    PERRL, conjunctiva/corneas clear, EOM's intact   Ears:   very narrowed cannal on the right. Normal TM's and external ear canals, both ears   Nose:   Nares normal, septum midline, mucosa normal, no drainage   or sinus tenderness   Throat:   Lips, mucosa, and tongue normal; teeth and gums normal   Neck:   Supple, symmetrical, trachea midline, no adenopathy;        thyroid:  No enlargement/tenderness/nodules; no carotid    bruit or JVD   Back:     Symmetric, no curvature, ROM normal, no CVA tenderness   Lungs:     Clear to auscultation bilaterally, respirations unlabored   Chest wall:    No tenderness or deformity   Heart:    " Regular rate and rhythm, S1 and S2 normal, no murmur,        rub or gallop   Abdomen:     Soft, non-tender, bowel sounds active all four quadrants,     no masses, no organomegaly   Extremities:   Extremities normal, atraumatic, no cyanosis or edema   Pulses:   2+ and symmetric all extremities   Skin:   Skin color, texture, turgor normal, no rashes or lesions   Lymph nodes:   Cervical, supraclavicular, and axillary nodes normal   Neurologic:   CNII-XII intact. Normal strength, sensation and reflexes       throughout      Results for orders placed or performed in visit on 07/02/20   POC Urinalysis Dipstick, Automated    Specimen: Urine   Result Value Ref Range    Color Orange (A) Yellow, Straw, Dark Yellow, Christal    Clarity, UA Clear Clear    Specific Gravity  1.030 1.005 - 1.030    pH, Urine 6.0 5.0 - 8.0    Leukocytes Negative Negative    Nitrite, UA Negative Negative    Protein, POC Negative Negative mg/dL    Glucose, UA 1+ (A) Negative, 1000 mg/dL (3+) mg/dL    Ketones, UA Negative Negative    Urobilinogen, UA Normal Normal    Bilirubin Negative Negative    Blood, UA Negative Negative         Assessment/Plan   Pt will see Dr Cardozo again soon.    Pt will wait on [facility] surgery.  Hearing loss. Pt will consider seeing Mukul Sin.        Diagnoses and all orders for this visit:    1. Routine general medical examination at a health care facility (Primary)  -     Lipid Panel  -     CBC & Differential  -     Vitamin B12  -     Comprehensive Metabolic Panel  -     TSH  -     T4, Free    2. Hypercholesteremia  -     Vytorin 10-20 MG per tablet; Take 1 tablet by mouth Every Night.  Dispense: 30 tablet; Refill: 11  -     Lipid Panel  -     CBC & Differential  -     Vitamin B12  -     Comprehensive Metabolic Panel  -     TSH  -     T4, Free    3. Acquired hypothyroidism  -     levothyroxine (SYNTHROID, LEVOTHROID) 137 MCG tablet; Take 1 tablet by mouth Daily.  Dispense: 90 tablet; Refill: 3  -     Lipid Panel  -      CBC & Differential  -     Vitamin B12  -     Comprehensive Metabolic Panel  -     TSH  -     T4, Free    Other orders  -     montelukast (Singulair) 10 MG tablet; Take 1 tablet by mouth Every Night.  Dispense: 90 tablet; Refill: 3  -     Fluad Quad 65+ yrs (4593-3241)      Return in about 6 months (around 4/20/2021).          There are no Patient Instructions on file for this visit.     Zane Magallon MD    Assessment/Plan

## 2020-10-20 NOTE — PROGRESS NOTES
QUICK REFERENCE INFORMATION:  The ABCs of the Annual Wellness Visit    Medicare Annual Wellness Visit    Subjective   History of Present Illness    Deniz Dickson is a 75 y.o. male who presents for an Annual Wellness Visit. In addition, we addressed the following health issues: hearing loss    chrissy thomas.    Chronic pain 2/10      PMH, PSH, SocHx, FamHx, Allergies, and Medications: Reviewed and updated.     Outpatient Medications Prior to Visit   Medication Sig Dispense Refill   • aspirin 81 MG EC tablet Take 1 tablet by mouth Daily. 90 tablet 1   • bisoprolol (ZEBeta) 5 MG tablet TAKE ONE-HALF TABLET BY MOUTH DAILY 45 tablet 1   • celecoxib (CeleBREX) 100 MG capsule TAKE ONE CAPSULE BY MOUTH TWICE A DAY 90 capsule 2   • Cholecalciferol (VITAMIN D-3 PO) Take 1 tablet by mouth Daily.     • finasteride (PROSCAR) 5 MG tablet TAKE ONE TABLET BY MOUTH DAILY 90 tablet 2   • fluticasone (FLONASE) 50 MCG/ACT nasal spray 1 spray into the nostril(s) as directed by provider Daily.     • Glucosamine-Chondroitin (OSTEO BI-FLEX REGULAR STRENGTH PO) Take 1 tablet by mouth 2 (two) times a day.     • Multiple Vitamins-Minerals (CENTRUM SILVER ADULT 50+) tablet Take 1 tablet by mouth daily.     • Omega-3 Fatty Acids (FISH OIL) 1200 MG capsule capsule Take 1,200 mg by mouth 2 (Two) Times a Day With Meals.     • Potassium 75 MG tablet Take 595 mg by mouth Daily.     • sildenafil (REVATIO) 20 MG tablet 1 to 3 tablets daily as needed for ED 30 tablet 11   • triamcinolone (KENALOG) 0.1 % cream Apply 1 application topically to the appropriate area as directed As Needed.     • vitamin B-12 (CYANOCOBALAMIN) 1000 MCG tablet Take 1,000 mcg by mouth Daily.     • levothyroxine (SYNTHROID, LEVOTHROID) 137 MCG tablet Take 1 tablet by mouth Daily. 90 tablet 3   • montelukast (SINGULAIR) 10 MG tablet Take 1 tablet by mouth Every Night. 90 tablet 3   • VYTORIN 10-20 MG per tablet TAKE ONE TABLET BY MOUTH ONCE NIGHTLY 30 tablet 3     No  facility-administered medications prior to visit.        Patient Active Problem List   Diagnosis   • Atopic rhinitis   • BPH (benign prostatic hyperplasia)   • Gastroesophageal reflux disease   • Granulomatous disease (CMS/HCC)   • Hemorrhoids   • Hyperlipidemia   • Hypertension   • Hypothyroidism   • Idiopathic thrombocytopenic purpura (CMS/HCC)   • JUWAN on CPAP   • Vitamin D deficiency   • Skin lesion   • Diastasis recti   • Palpitations   • Osteoarthritis   • History of ITP   • Arthritic-like pain   • Coronary artery disease   • Dyslipidemia   • Hypertension   • Contracture of joint of right foot   • Bronchitis   • History of adenomatous polyp of colon   • Arthritis   • Chronic bilateral low back pain without sciatica       Health Habits:  Dental Exam. up to date  Eye Exam. up to date  No exam data present  Exercise: 7 times/week.  Current exercise activities include: walking and yard work    Health Risk Assessment:  The patient has completed a Health Risk Assessment. This has been reviewed with them and has been scanned into the patient's chart.    Current Medical Providers:  Patient Care Team:  Zane Magallon MD as PCP - General  [facility], Madhavi Booker MD as PCP - Lisa Mares MD as Surgeon (General Surgery)    The [facility] providers who are involved in the care of this patient are listed above. Additional providers and suppliers are listed below:  Dr Mcpherson.  Dr Zheng.        Recent Hospitalizations:  No hospitalization(s) within the last year..    Recent Lab Results:  CMP:  Lab Results   Component Value Date     (H) 08/09/2019    BUN 15 08/09/2019    CREATININE 0.78 08/09/2019    EGFRIFNONA 97 08/09/2019    EGFRIFAFRI 118 08/09/2019    BCR 19.2 08/09/2019     08/09/2019    K 4.8 08/09/2019    CO2 28.6 08/09/2019    CALCIUM 9.1 08/09/2019    PROTENTOTREF 7.1 08/09/2019    ALBUMIN 4.80 08/09/2019    LABGLOBREF 2.3 08/09/2019    LABIL2 2.1 08/09/2019    BILITOT 1.6 (H)  08/09/2019    ALKPHOS 51 08/09/2019    AST 21 08/09/2019    ALT 23 08/09/2019       LIPID PANEL:  Lab Results   Component Value Date    CHLPL 171 10/13/2020    TRIG 138 10/13/2020    HDL 68 (H) 10/13/2020    VLDL 28 10/13/2020    LDL 75 10/13/2020       HbA1c:  Lab Results   Component Value Date    HGBA1C 5.8 07/15/2019       URINE MICROALBUMIN:  No results found for: MICROALBUR, POCMALB    PSA:  Lab Results   Component Value Date    PSA 1.260 06/15/2020       Age-appropriate Screening Schedule:  Refer to the list below for future screening recommendations based on patient's age, sex and/or medical conditions. Orders for these recommended tests are listed in the plan section. The patient has been provided with a written plan.    Health Maintenance   Topic Date Due   • ZOSTER VACCINE (2 of 3) 03/29/2015   • LIPID PANEL  12/03/2020   • TDAP/TD VACCINES (2 - Td) 07/31/2022   • COLONOSCOPY  07/22/2029   • INFLUENZA VACCINE  Completed       Depression Screen:   PHQ-2/PHQ-9 Depression Screening 10/20/2020   Little interest or pleasure in doing things 0   Feeling down, depressed, or hopeless 0   Trouble falling or staying asleep, or sleeping too much 0   Feeling tired or having little energy 0   Poor appetite or overeating 0   Feeling bad about yourself - or that you are a failure or have let yourself or your family down 0   Trouble concentrating on things, such as reading the newspaper or watching television 0   Moving or speaking so slowly that other people could have noticed. Or the opposite - being so fidgety or restless that you have been moving around a lot more than usual 0   Thoughts that you would be better off dead, or of hurting yourself in some way 0   Total Score 0         Functional and Cognitive Screening:  Functional & Cognitive Status 10/20/2020   Do you have difficulty preparing food and eating? No   Do you have difficulty bathing yourself, getting dressed or grooming yourself? No   Do you have difficulty  "using the toilet? No   Do you have difficulty moving around from place to place? No   Do you have trouble with steps or getting out of a bed or a chair? No   Current Diet Well Balanced Diet   Dental Exam Up to date   Eye Exam Up to date   Exercise (times per week) 7 times per week   Current Exercises Include Walking   Current Exercise Activities Include -   Do you need help using the phone?  Yes   Are you deaf or do you have serious difficulty hearing?  Yes   Do you need help with transportation? No   Do you need help shopping? No   Do you need help preparing meals?  No   Do you need help with housework?  No   Do you need help with laundry? No   Do you need help taking your medications? No   Do you need help managing money? No   Do you ever drive or ride in a car without wearing a seat belt? No   Have you felt unusual stress, anger or loneliness in the last month? No   Who do you live with? Child   If you need help, do you have trouble finding someone available to you? No   Have you been bothered in the last four weeks by sexual problems? No   Do you have difficulty concentrating, remembering or making decisions? No       Does the patient have evidence of cognitive impairment? No    Advanced Care Planning:  ACP discussion was held with the patient during this visit. Patient does not have an advance directive, declines further assistance.    Identification of Risk Factors:  Risk factors include: Advance Directive Discussion.    Review of Systems    Compared to one year ago, the patient feels his physical health is the same.  Compared to one year ago, the patient feels his mental health is the same.    Objective     Physical Exam    Vitals:    10/20/20 1008   BP: 120/55   BP Location: Left arm   Patient Position: Sitting   Cuff Size: Large Adult   Pulse: 60   Resp: 16   Temp: 98 °F (36.7 °C)   TempSrc: Temporal   SpO2: 98%   Weight: 95.2 kg (209 lb 12.8 oz)   Height: 177.8 cm (70\")       Body mass index is 30.1 " kg/m².  Discussed the patient's BMI with him. The BMI is above average; BMI management plan is completed.    Assessment/Plan   Patient Self-Management and Personalized Health Advice  The patient has been provided with information about: diet, exercise, weight management and fall prevention and preventive services including:   · Annual Wellness Visit (AWV)  · Influenza Vaccine and Administration.    Visit Diagnoses:    ICD-10-CM ICD-9-CM   1. Routine general medical examination at a health care facility  Z00.00 V70.0   2. Hypercholesteremia  E78.00 272.0   3. Acquired hypothyroidism  E03.9 244.9       Orders Placed This Encounter   Procedures   • Fluad Quad 65+ yrs ([id number])       Outpatient Encounter Medications as of 10/20/2020   Medication Sig Dispense Refill   • aspirin 81 MG EC tablet Take 1 tablet by mouth Daily. 90 tablet 1   • bisoprolol (ZEBeta) 5 MG tablet TAKE ONE-HALF TABLET BY MOUTH DAILY 45 tablet 1   • celecoxib (CeleBREX) 100 MG capsule TAKE ONE CAPSULE BY MOUTH TWICE A DAY 90 capsule 2   • Cholecalciferol (VITAMIN D-3 PO) Take 1 tablet by mouth Daily.     • finasteride (PROSCAR) 5 MG tablet TAKE ONE TABLET BY MOUTH DAILY 90 tablet 2   • fluticasone (FLONASE) 50 MCG/ACT nasal spray 1 spray into the nostril(s) as directed by provider Daily.     • Glucosamine-Chondroitin (OSTEO BI-FLEX REGULAR STRENGTH PO) Take 1 tablet by mouth 2 (two) times a day.     • levothyroxine (SYNTHROID, LEVOTHROID) 137 MCG tablet Take 1 tablet by mouth Daily. 90 tablet 3   • montelukast (Singulair) 10 MG tablet Take 1 tablet by mouth Every Night. 90 tablet 3   • Multiple Vitamins-Minerals (CENTRUM SILVER ADULT 50+) tablet Take 1 tablet by mouth daily.     • Omega-3 Fatty Acids (FISH OIL) 1200 MG capsule capsule Take 1,200 mg by mouth 2 (Two) Times a Day With Meals.     • Potassium 75 MG tablet Take 595 mg by mouth Daily.     • sildenafil (REVATIO) 20 MG tablet 1 to 3 tablets daily as needed for ED 30 tablet 11   •  triamcinolone (KENALOG) 0.1 % cream Apply 1 application topically to the appropriate area as directed As Needed.     • vitamin B-12 (CYANOCOBALAMIN) 1000 MCG tablet Take 1,000 mcg by mouth Daily.     • Vytorin 10-20 MG per tablet Take 1 tablet by mouth Every Night. 30 tablet 11   • [DISCONTINUED] levothyroxine (SYNTHROID, LEVOTHROID) 137 MCG tablet Take 1 tablet by mouth Daily. 90 tablet 3   • [DISCONTINUED] montelukast (SINGULAIR) 10 MG tablet Take 1 tablet by mouth Every Night. 90 tablet 3   • [DISCONTINUED] VYTORIN 10-20 MG per tablet TAKE ONE TABLET BY MOUTH ONCE NIGHTLY 30 tablet 3     No facility-administered encounter medications on file as of 10/20/2020.        Reviewed use of high risk medication in the elderly: yes  Reviewed for potential of harmful drug interactions in the elderly: yes    Follow Up:  No follow-ups on file.     An After Visit Summary and PPPS with all of these plans were given to the patient.             Diagnoses and all orders for this visit:    1. Routine general medical examination at a health care facility (Primary)    2. Hypercholesteremia  -     Vytorin 10-20 MG per tablet; Take 1 tablet by mouth Every Night.  Dispense: 30 tablet; Refill: 11    3. Acquired hypothyroidism  -     levothyroxine (SYNTHROID, LEVOTHROID) 137 MCG tablet; Take 1 tablet by mouth Daily.  Dispense: 90 tablet; Refill: 3    Other orders  -     montelukast (Singulair) 10 MG tablet; Take 1 tablet by mouth Every Night.  Dispense: 90 tablet; Refill: 3  -     Fluad Quad 65+ yrs ([id number])

## 2020-10-27 ENCOUNTER — TELEPHONE (OUTPATIENT)
Dept: INTERNAL MEDICINE | Facility: CLINIC | Age: 75
End: 2020-10-27

## 2020-10-27 NOTE — TELEPHONE ENCOUNTER
NICOLE CALLED FROM [facility] IN [place] AND NEEDS A COPY OF MOST RECENT LAB ON PATIENT    FAX: [phone] ATTN: NICOLE  PHONE: [phone]

## 2020-10-31 ENCOUNTER — HOSPITAL ENCOUNTER (OUTPATIENT)
Facility: HOSPITAL | Age: 75
Discharge: HOME | End: 2020-10-31
Payer: MEDICARE

## 2020-10-31 LAB
C-REACTIVE PROTEIN: 0.9 MG/L (ref 0–5.1)
SEDIMENTATION RATE, ERYTHROCYTE: 5 MM/HR (ref 0–20)

## 2020-10-31 PROCEDURE — 86140 C-REACTIVE PROTEIN: CPT

## 2020-10-31 PROCEDURE — 36415 COLL VENOUS BLD VENIPUNCTURE: CPT

## 2020-10-31 PROCEDURE — 85652 RBC SED RATE AUTOMATED: CPT

## 2020-10-31 PROCEDURE — 86200 CCP ANTIBODY: CPT

## 2020-10-31 PROCEDURE — 83516 IMMUNOASSAY NONANTIBODY: CPT

## 2020-10-31 PROCEDURE — 86038 ANTINUCLEAR ANTIBODIES: CPT

## 2020-11-01 LAB
ANTI-NUCLEAR ANTIBODY (ANA): NEGATIVE
CYCLIC CITRULLINATED PEPTIDE ANTIBODY IGG: <0.5 U/ML (ref 0–2.9)

## 2020-11-02 ENCOUNTER — HOSPITAL ENCOUNTER (OUTPATIENT)
Dept: GENERAL RADIOLOGY | Facility: HOSPITAL | Age: 75
Discharge: HOME | End: 2020-11-02
Payer: MEDICARE

## 2020-11-02 ENCOUNTER — HOSPITAL ENCOUNTER (OUTPATIENT)
Facility: HOSPITAL | Age: 75
Discharge: HOME | End: 2020-11-02
Payer: MEDICARE

## 2020-11-02 PROCEDURE — 73120 X-RAY EXAM OF HAND: CPT

## 2020-11-02 PROCEDURE — 73620 X-RAY EXAM OF FOOT: CPT

## 2020-11-02 PROCEDURE — 73630 X-RAY EXAM OF FOOT: CPT

## 2020-11-05 LAB — MISCELLANEOUS LAB TEST ORDER: NORMAL

## 2020-11-15 ENCOUNTER — APPOINTMENT (OUTPATIENT)
Dept: CT IMAGING | Facility: HOSPITAL | Age: 75
End: 2020-11-15

## 2020-11-15 ENCOUNTER — ANESTHESIA EVENT (OUTPATIENT)
Dept: PERIOP | Facility: HOSPITAL | Age: 75
End: 2020-11-15

## 2020-11-15 ENCOUNTER — HOSPITAL ENCOUNTER (INPATIENT)
Facility: HOSPITAL | Age: 75
LOS: 2 days | Discharge: HOME | End: 2020-11-17
Attending: STUDENT IN AN ORGANIZED HEALTH CARE EDUCATION/TRAINING PROGRAM | Admitting: FAMILY MEDICINE

## 2020-11-15 ENCOUNTER — ANESTHESIA (OUTPATIENT)
Dept: PERIOP | Facility: HOSPITAL | Age: 75
End: 2020-11-15

## 2020-11-15 DIAGNOSIS — N20.1 OBSTRUCTION OF LEFT URETEROPELVIC JUNCTION (UPJ) DUE TO STONE: ICD-10-CM

## 2020-11-15 DIAGNOSIS — N17.9 AKI (ACUTE KIDNEY INJURY) (HCC): ICD-10-CM

## 2020-11-15 DIAGNOSIS — N20.0 NEPHROLITHIASIS: Primary | ICD-10-CM

## 2020-11-15 LAB
ALBUMIN SERPL-MCNC: 4.5 G/DL (ref 3.5–5.2)
ALBUMIN/GLOB SERPL: 1.3 G/DL
ALP SERPL-CCNC: 80 U/L (ref 39–117)
ALT SERPL W P-5'-P-CCNC: 23 U/L (ref 1–41)
ANION GAP SERPL CALCULATED.3IONS-SCNC: 10.9 MMOL/L (ref 5–15)
AST SERPL-CCNC: 18 U/L (ref 1–40)
BACTERIA UR QL AUTO: ABNORMAL /HPF
BASOPHILS # BLD AUTO: 0.02 10*3/MM3 (ref 0–0.2)
BASOPHILS NFR BLD AUTO: 0.2 % (ref 0–1.5)
BILIRUB SERPL-MCNC: 0.8 MG/DL (ref 0–1.2)
BILIRUB UR QL STRIP: NEGATIVE
BUN SERPL-MCNC: 30 MG/DL (ref 8–23)
BUN/CREAT SERPL: 19 (ref 7–25)
CALCIUM SPEC-SCNC: 10.2 MG/DL (ref 8.6–10.5)
CHLORIDE SERPL-SCNC: 99 MMOL/L (ref 98–107)
CLARITY UR: CLEAR
CO2 SERPL-SCNC: 25.1 MMOL/L (ref 22–29)
COLOR UR: YELLOW
CREAT SERPL-MCNC: 1.58 MG/DL (ref 0.76–1.27)
DEPRECATED RDW RBC AUTO: 44.6 FL (ref 37–54)
EOSINOPHIL # BLD AUTO: 0.09 10*3/MM3 (ref 0–0.4)
EOSINOPHIL NFR BLD AUTO: 0.8 % (ref 0.3–6.2)
ERYTHROCYTE [DISTWIDTH] IN BLOOD BY AUTOMATED COUNT: 12.3 % (ref 12.3–15.4)
GFR SERPL CREATININE-BSD FRML MDRD: 43 ML/MIN/1.73
GLOBULIN UR ELPH-MCNC: 3.4 GM/DL
GLUCOSE SERPL-MCNC: 147 MG/DL (ref 65–99)
GLUCOSE UR STRIP-MCNC: NEGATIVE MG/DL
HCT VFR BLD AUTO: 46.6 % (ref 37.5–51)
HGB BLD-MCNC: 15.8 G/DL (ref 13–17.7)
HGB UR QL STRIP.AUTO: ABNORMAL
HOLD SPECIMEN: NORMAL
HOLD SPECIMEN: NORMAL
HYALINE CASTS UR QL AUTO: ABNORMAL /LPF
IMM GRANULOCYTES # BLD AUTO: 0.04 10*3/MM3 (ref 0–0.05)
IMM GRANULOCYTES NFR BLD AUTO: 0.3 % (ref 0–0.5)
KETONES UR QL STRIP: NEGATIVE
LEUKOCYTE ESTERASE UR QL STRIP.AUTO: ABNORMAL
LIPASE SERPL-CCNC: 54 U/L (ref 13–60)
LYMPHOCYTES # BLD AUTO: 0.99 10*3/MM3 (ref 0.7–3.1)
LYMPHOCYTES NFR BLD AUTO: 8.6 % (ref 19.6–45.3)
MCH RBC QN AUTO: 32.9 PG (ref 26.6–33)
MCHC RBC AUTO-ENTMCNC: 33.9 G/DL (ref 31.5–35.7)
MCV RBC AUTO: 97.1 FL (ref 79–97)
MONOCYTES # BLD AUTO: 0.86 10*3/MM3 (ref 0.1–0.9)
MONOCYTES NFR BLD AUTO: 7.5 % (ref 5–12)
NEUTROPHILS NFR BLD AUTO: 82.6 % (ref 42.7–76)
NEUTROPHILS NFR BLD AUTO: 9.49 10*3/MM3 (ref 1.7–7)
NITRITE UR QL STRIP: NEGATIVE
NRBC BLD AUTO-RTO: 0 /100 WBC (ref 0–0.2)
PH UR STRIP.AUTO: 6 [PH] (ref 5–8)
PLATELET # BLD AUTO: 102 10*3/MM3 (ref 140–450)
PMV BLD AUTO: 8.5 FL (ref 6–12)
POTASSIUM SERPL-SCNC: 4.5 MMOL/L (ref 3.5–5.2)
PROT SERPL-MCNC: 7.9 G/DL (ref 6–8.5)
PROT UR QL STRIP: NEGATIVE
RBC # BLD AUTO: 4.8 10*6/MM3 (ref 4.14–5.8)
RBC # UR: ABNORMAL /HPF
REF LAB TEST METHOD: ABNORMAL
SARS-COV-2 RNA PNL SPEC NAA+PROBE: NOT DETECTED
SODIUM SERPL-SCNC: 135 MMOL/L (ref 136–145)
SP GR UR STRIP: 1.02 (ref 1–1.03)
SQUAMOUS #/AREA URNS HPF: ABNORMAL /HPF
TROPONIN T SERPL-MCNC: <0.01 NG/ML (ref 0–0.03)
UROBILINOGEN UR QL STRIP: ABNORMAL
WBC # BLD AUTO: 11.49 10*3/MM3 (ref 3.4–10.8)
WBC UR QL AUTO: ABNORMAL /HPF
WHOLE BLOOD HOLD SPECIMEN: NORMAL
WHOLE BLOOD HOLD SPECIMEN: NORMAL

## 2020-11-15 PROCEDURE — 84484 ASSAY OF TROPONIN QUANT: CPT | Performed by: FAMILY MEDICINE

## 2020-11-15 PROCEDURE — 74420 UROGRAPHY RTRGR +-KUB: CPT | Performed by: UROLOGY

## 2020-11-15 PROCEDURE — 0T778DZ DILATION OF LEFT URETER WITH INTRALUMINAL DEVICE, VIA NATURAL OR ARTIFICIAL OPENING ENDOSCOPIC: ICD-10-PCS | Performed by: UROLOGY

## 2020-11-15 PROCEDURE — 74176 CT ABD & PELVIS W/O CONTRAST: CPT

## 2020-11-15 PROCEDURE — 25010000002 FENTANYL CITRATE (PF) 100 MCG/2ML SOLUTION: Performed by: NURSE ANESTHETIST, CERTIFIED REGISTERED

## 2020-11-15 PROCEDURE — 0TC18ZZ EXTIRPATION OF MATTER FROM LEFT KIDNEY, VIA NATURAL OR ARTIFICIAL OPENING ENDOSCOPIC: ICD-10-PCS | Performed by: UROLOGY

## 2020-11-15 PROCEDURE — BT1F1ZZ FLUOROSCOPY OF LEFT KIDNEY, URETER AND BLADDER USING LOW OSMOLAR CONTRAST: ICD-10-PCS | Performed by: UROLOGY

## 2020-11-15 PROCEDURE — 81001 URINALYSIS AUTO W/SCOPE: CPT | Performed by: NURSE PRACTITIONER

## 2020-11-15 PROCEDURE — 85025 COMPLETE CBC W/AUTO DIFF WBC: CPT | Performed by: NURSE PRACTITIONER

## 2020-11-15 PROCEDURE — 0TC78ZZ EXTIRPATION OF MATTER FROM LEFT URETER, VIA NATURAL OR ARTIFICIAL OPENING ENDOSCOPIC: ICD-10-PCS | Performed by: UROLOGY

## 2020-11-15 PROCEDURE — C2617 STENT, NON-COR, TEM W/O DEL: HCPCS | Performed by: UROLOGY

## 2020-11-15 PROCEDURE — 94799 UNLISTED PULMONARY SVC/PX: CPT

## 2020-11-15 PROCEDURE — 25010000002 IOPAMIDOL 61 % SOLUTION: Performed by: UROLOGY

## 2020-11-15 PROCEDURE — 93005 ELECTROCARDIOGRAM TRACING: CPT | Performed by: UROLOGY

## 2020-11-15 PROCEDURE — 87635 SARS-COV-2 COVID-19 AMP PRB: CPT | Performed by: NURSE PRACTITIONER

## 2020-11-15 PROCEDURE — 82365 CALCULUS SPECTROSCOPY: CPT | Performed by: FAMILY MEDICINE

## 2020-11-15 PROCEDURE — 25010000002 CEFTRIAXONE: Performed by: NURSE PRACTITIONER

## 2020-11-15 PROCEDURE — 25010000002 PROPOFOL 200 MG/20ML EMULSION: Performed by: NURSE ANESTHETIST, CERTIFIED REGISTERED

## 2020-11-15 PROCEDURE — 80053 COMPREHEN METABOLIC PANEL: CPT | Performed by: NURSE PRACTITIONER

## 2020-11-15 PROCEDURE — 83690 ASSAY OF LIPASE: CPT | Performed by: NURSE PRACTITIONER

## 2020-11-15 PROCEDURE — C1769 GUIDE WIRE: HCPCS | Performed by: UROLOGY

## 2020-11-15 PROCEDURE — 25010000002 ONDANSETRON PER 1 MG: Performed by: NURSE ANESTHETIST, CERTIFIED REGISTERED

## 2020-11-15 PROCEDURE — 25010000002 FENTANYL CITRATE (PF) 100 MCG/2ML SOLUTION: Performed by: NURSE PRACTITIONER

## 2020-11-15 PROCEDURE — 25010000002 ONDANSETRON PER 1 MG: Performed by: NURSE PRACTITIONER

## 2020-11-15 PROCEDURE — 94660 CPAP INITIATION&MGMT: CPT

## 2020-11-15 PROCEDURE — 25010000002 DEXAMETHASONE PER 1 MG: Performed by: NURSE ANESTHETIST, CERTIFIED REGISTERED

## 2020-11-15 PROCEDURE — 99222 1ST HOSP IP/OBS MODERATE 55: CPT | Performed by: UROLOGY

## 2020-11-15 PROCEDURE — 99222 1ST HOSP IP/OBS MODERATE 55: CPT | Performed by: FAMILY MEDICINE

## 2020-11-15 PROCEDURE — 99284 EMERGENCY DEPT VISIT MOD MDM: CPT

## 2020-11-15 PROCEDURE — 52356 CYSTO/URETERO W/LITHOTRIPSY: CPT | Performed by: UROLOGY

## 2020-11-15 PROCEDURE — C1894 INTRO/SHEATH, NON-LASER: HCPCS | Performed by: UROLOGY

## 2020-11-15 PROCEDURE — C1758 CATHETER, URETERAL: HCPCS | Performed by: UROLOGY

## 2020-11-15 DEVICE — URETERAL STENT
Type: IMPLANTABLE DEVICE | Status: FUNCTIONAL
Brand: CONTOUR™

## 2020-11-15 RX ORDER — SODIUM CHLORIDE 0.9 % (FLUSH) 0.9 %
10 SYRINGE (ML) INJECTION EVERY 12 HOURS SCHEDULED
Status: DISCONTINUED | OUTPATIENT
Start: 2020-11-15 | End: 2020-11-17 | Stop reason: HOSPADM

## 2020-11-15 RX ORDER — CIPROFLOXACIN 250 MG/1
250 TABLET, FILM COATED ORAL 2 TIMES DAILY
Qty: 6 TABLET | Refills: 0 | Status: SHIPPED | OUTPATIENT
Start: 2020-11-15 | End: 2020-11-18

## 2020-11-15 RX ORDER — ONDANSETRON 2 MG/ML
4 INJECTION INTRAMUSCULAR; INTRAVENOUS EVERY 6 HOURS PRN
Status: DISCONTINUED | OUTPATIENT
Start: 2020-11-15 | End: 2020-11-17 | Stop reason: HOSPADM

## 2020-11-15 RX ORDER — PHENAZOPYRIDINE HYDROCHLORIDE 100 MG/1
100 TABLET, FILM COATED ORAL 3 TIMES DAILY PRN
Qty: 21 TABLET | Refills: 0 | Status: SHIPPED | OUTPATIENT
Start: 2020-11-15 | End: 2020-11-23

## 2020-11-15 RX ORDER — BISACODYL 10 MG
10 SUPPOSITORY, RECTAL RECTAL DAILY PRN
Status: DISCONTINUED | OUTPATIENT
Start: 2020-11-15 | End: 2020-11-17 | Stop reason: HOSPADM

## 2020-11-15 RX ORDER — ONDANSETRON 2 MG/ML
INJECTION INTRAMUSCULAR; INTRAVENOUS AS NEEDED
Status: DISCONTINUED | OUTPATIENT
Start: 2020-11-15 | End: 2020-11-15 | Stop reason: SURG

## 2020-11-15 RX ORDER — ACETAMINOPHEN 650 MG/1
650 SUPPOSITORY RECTAL EVERY 4 HOURS PRN
Status: DISCONTINUED | OUTPATIENT
Start: 2020-11-15 | End: 2020-11-17 | Stop reason: HOSPADM

## 2020-11-15 RX ORDER — BISOPROLOL FUMARATE 5 MG/1
2.5 TABLET, FILM COATED ORAL DAILY
Status: DISCONTINUED | OUTPATIENT
Start: 2020-11-16 | End: 2020-11-17 | Stop reason: HOSPADM

## 2020-11-15 RX ORDER — FENTANYL CITRATE 50 UG/ML
25 INJECTION, SOLUTION INTRAMUSCULAR; INTRAVENOUS ONCE
Status: COMPLETED | OUTPATIENT
Start: 2020-11-15 | End: 2020-11-15

## 2020-11-15 RX ORDER — ONDANSETRON 2 MG/ML
4 INJECTION INTRAMUSCULAR; INTRAVENOUS ONCE
Status: COMPLETED | OUTPATIENT
Start: 2020-11-15 | End: 2020-11-15

## 2020-11-15 RX ORDER — FENTANYL CITRATE 50 UG/ML
INJECTION, SOLUTION INTRAMUSCULAR; INTRAVENOUS AS NEEDED
Status: DISCONTINUED | OUTPATIENT
Start: 2020-11-15 | End: 2020-11-15 | Stop reason: SURG

## 2020-11-15 RX ORDER — LIDOCAINE HYDROCHLORIDE 20 MG/ML
INJECTION, SOLUTION INTRAVENOUS AS NEEDED
Status: DISCONTINUED | OUTPATIENT
Start: 2020-11-15 | End: 2020-11-15 | Stop reason: SURG

## 2020-11-15 RX ORDER — OXYBUTYNIN CHLORIDE 10 MG/1
10 TABLET, EXTENDED RELEASE ORAL DAILY PRN
Qty: 10 TABLET | Refills: 0 | Status: SHIPPED | OUTPATIENT
Start: 2020-11-15 | End: 2022-01-27

## 2020-11-15 RX ORDER — OXYCODONE HYDROCHLORIDE 5 MG/1
5 TABLET ORAL EVERY 6 HOURS PRN
Qty: 5 TABLET | Refills: 0 | Status: SHIPPED | OUTPATIENT
Start: 2020-11-15 | End: 2020-11-23

## 2020-11-15 RX ORDER — HYDROCODONE BITARTRATE AND ACETAMINOPHEN 5; 325 MG/1; MG/1
1 TABLET ORAL EVERY 4 HOURS PRN
Status: DISCONTINUED | OUTPATIENT
Start: 2020-11-15 | End: 2020-11-17 | Stop reason: HOSPADM

## 2020-11-15 RX ORDER — KETAMINE HCL IN NACL, ISO-OSM 100MG/10ML
SYRINGE (ML) INJECTION AS NEEDED
Status: DISCONTINUED | OUTPATIENT
Start: 2020-11-15 | End: 2020-11-15 | Stop reason: SURG

## 2020-11-15 RX ORDER — MONTELUKAST SODIUM 10 MG/1
10 TABLET ORAL NIGHTLY
Status: DISCONTINUED | OUTPATIENT
Start: 2020-11-15 | End: 2020-11-17 | Stop reason: HOSPADM

## 2020-11-15 RX ORDER — CHOLECALCIFEROL (VITAMIN D3) 125 MCG
5 CAPSULE ORAL NIGHTLY PRN
Status: DISCONTINUED | OUTPATIENT
Start: 2020-11-15 | End: 2020-11-17 | Stop reason: HOSPADM

## 2020-11-15 RX ORDER — FINASTERIDE 5 MG/1
5 TABLET, FILM COATED ORAL DAILY
Status: DISCONTINUED | OUTPATIENT
Start: 2020-11-16 | End: 2020-11-17 | Stop reason: HOSPADM

## 2020-11-15 RX ORDER — SODIUM CHLORIDE 0.9 % (FLUSH) 0.9 %
10 SYRINGE (ML) INJECTION AS NEEDED
Status: DISCONTINUED | OUTPATIENT
Start: 2020-11-15 | End: 2020-11-17 | Stop reason: HOSPADM

## 2020-11-15 RX ORDER — MULTIPLE VITAMINS W/ MINERALS TAB 9MG-400MCG
1 TAB ORAL DAILY
Status: DISCONTINUED | OUTPATIENT
Start: 2020-11-16 | End: 2020-11-17 | Stop reason: HOSPADM

## 2020-11-15 RX ORDER — ALBUTEROL SULFATE 2.5 MG/3ML
2.5 SOLUTION RESPIRATORY (INHALATION) EVERY 6 HOURS PRN
Status: DISCONTINUED | OUTPATIENT
Start: 2020-11-15 | End: 2020-11-17 | Stop reason: HOSPADM

## 2020-11-15 RX ORDER — OXYCODONE AND ACETAMINOPHEN 7.5; 325 MG/1; MG/1
1 TABLET ORAL EVERY 4 HOURS PRN
Status: DISCONTINUED | OUTPATIENT
Start: 2020-11-15 | End: 2020-11-17 | Stop reason: HOSPADM

## 2020-11-15 RX ORDER — ALUMINA, MAGNESIA, AND SIMETHICONE 2400; 2400; 240 MG/30ML; MG/30ML; MG/30ML
15 SUSPENSION ORAL EVERY 6 HOURS PRN
Status: DISCONTINUED | OUTPATIENT
Start: 2020-11-15 | End: 2020-11-17 | Stop reason: HOSPADM

## 2020-11-15 RX ORDER — DOCUSATE SODIUM 100 MG/1
100 CAPSULE, LIQUID FILLED ORAL 2 TIMES DAILY
Qty: 15 CAPSULE | Refills: 1 | Status: SHIPPED | OUTPATIENT
Start: 2020-11-15 | End: 2021-07-06

## 2020-11-15 RX ORDER — SODIUM CHLORIDE, SODIUM LACTATE, POTASSIUM CHLORIDE, CALCIUM CHLORIDE 600; 310; 30; 20 MG/100ML; MG/100ML; MG/100ML; MG/100ML
100 INJECTION, SOLUTION INTRAVENOUS CONTINUOUS
Status: DISCONTINUED | OUTPATIENT
Start: 2020-11-15 | End: 2020-11-16

## 2020-11-15 RX ORDER — ONDANSETRON 4 MG/1
4 TABLET, FILM COATED ORAL EVERY 6 HOURS PRN
Status: DISCONTINUED | OUTPATIENT
Start: 2020-11-15 | End: 2020-11-17 | Stop reason: HOSPADM

## 2020-11-15 RX ORDER — ACETAMINOPHEN 325 MG/1
650 TABLET ORAL EVERY 6 HOURS
Qty: 30 TABLET | Refills: 0 | Status: SHIPPED | OUTPATIENT
Start: 2020-11-15 | End: 2020-11-18

## 2020-11-15 RX ORDER — ATROPA BELLADONNA AND OPIUM 16.2; 3 MG/1; MG/1
SUPPOSITORY RECTAL AS NEEDED
Status: DISCONTINUED | OUTPATIENT
Start: 2020-11-15 | End: 2020-11-15 | Stop reason: HOSPADM

## 2020-11-15 RX ORDER — ACETAMINOPHEN 325 MG/1
650 TABLET ORAL EVERY 4 HOURS PRN
Status: DISCONTINUED | OUTPATIENT
Start: 2020-11-15 | End: 2020-11-17 | Stop reason: HOSPADM

## 2020-11-15 RX ORDER — PROPOFOL 10 MG/ML
INJECTION, EMULSION INTRAVENOUS AS NEEDED
Status: DISCONTINUED | OUTPATIENT
Start: 2020-11-15 | End: 2020-11-15 | Stop reason: SURG

## 2020-11-15 RX ORDER — IPRATROPIUM BROMIDE AND ALBUTEROL SULFATE 2.5; .5 MG/3ML; MG/3ML
3 SOLUTION RESPIRATORY (INHALATION) ONCE AS NEEDED
Status: DISCONTINUED | OUTPATIENT
Start: 2020-11-15 | End: 2020-11-15 | Stop reason: HOSPADM

## 2020-11-15 RX ORDER — DEXAMETHASONE SODIUM PHOSPHATE 4 MG/ML
INJECTION, SOLUTION INTRA-ARTICULAR; INTRALESIONAL; INTRAMUSCULAR; INTRAVENOUS; SOFT TISSUE AS NEEDED
Status: DISCONTINUED | OUTPATIENT
Start: 2020-11-15 | End: 2020-11-15 | Stop reason: SURG

## 2020-11-15 RX ORDER — ATORVASTATIN CALCIUM 10 MG/1
10 TABLET, FILM COATED ORAL NIGHTLY
Status: DISCONTINUED | OUTPATIENT
Start: 2020-11-15 | End: 2020-11-17 | Stop reason: HOSPADM

## 2020-11-15 RX ORDER — TAMSULOSIN HYDROCHLORIDE 0.4 MG/1
1 CAPSULE ORAL NIGHTLY
Qty: 10 CAPSULE | Refills: 0 | Status: SHIPPED | OUTPATIENT
Start: 2020-11-15 | End: 2021-05-17 | Stop reason: SDUPTHER

## 2020-11-15 RX ORDER — ACETAMINOPHEN 160 MG/5ML
650 SOLUTION ORAL EVERY 4 HOURS PRN
Status: DISCONTINUED | OUTPATIENT
Start: 2020-11-15 | End: 2020-11-17 | Stop reason: HOSPADM

## 2020-11-15 RX ORDER — SODIUM CHLORIDE 9 MG/ML
100 INJECTION, SOLUTION INTRAVENOUS CONTINUOUS
Status: DISCONTINUED | OUTPATIENT
Start: 2020-11-15 | End: 2020-11-17 | Stop reason: HOSPADM

## 2020-11-15 RX ADMIN — FENTANYL CITRATE 50 MCG: 50 INJECTION INTRAMUSCULAR; INTRAVENOUS at 20:21

## 2020-11-15 RX ADMIN — SODIUM CHLORIDE 1000 ML: 9 INJECTION, SOLUTION INTRAVENOUS at 14:47

## 2020-11-15 RX ADMIN — EPHEDRINE SULFATE 10 MG: 50 INJECTION INTRAMUSCULAR; INTRAVENOUS; SUBCUTANEOUS at 20:00

## 2020-11-15 RX ADMIN — SODIUM CHLORIDE, PRESERVATIVE FREE 10 ML: 5 INJECTION INTRAVENOUS at 23:02

## 2020-11-15 RX ADMIN — Medication 20 MG: at 19:53

## 2020-11-15 RX ADMIN — ONDANSETRON 4 MG: 2 INJECTION, SOLUTION INTRAMUSCULAR; INTRAVENOUS at 15:05

## 2020-11-15 RX ADMIN — PROPOFOL 150 MG: 10 INJECTION, EMULSION INTRAVENOUS at 19:56

## 2020-11-15 RX ADMIN — ONDANSETRON 4 MG: 2 INJECTION INTRAMUSCULAR; INTRAVENOUS at 19:56

## 2020-11-15 RX ADMIN — FENTANYL CITRATE 50 MCG: 50 INJECTION INTRAMUSCULAR; INTRAVENOUS at 20:00

## 2020-11-15 RX ADMIN — CEFTRIAXONE 1 G: 1 INJECTION, POWDER, FOR SOLUTION INTRAMUSCULAR; INTRAVENOUS at 17:28

## 2020-11-15 RX ADMIN — SODIUM CHLORIDE 125 ML/HR: 9 INJECTION, SOLUTION INTRAVENOUS at 22:18

## 2020-11-15 RX ADMIN — EPHEDRINE SULFATE 10 MG: 50 INJECTION INTRAMUSCULAR; INTRAVENOUS; SUBCUTANEOUS at 19:57

## 2020-11-15 RX ADMIN — SODIUM CHLORIDE 125 ML/HR: 9 INJECTION, SOLUTION INTRAVENOUS at 22:42

## 2020-11-15 RX ADMIN — SODIUM CHLORIDE, POTASSIUM CHLORIDE, SODIUM LACTATE AND CALCIUM CHLORIDE: 600; 310; 30; 20 INJECTION, SOLUTION INTRAVENOUS at 19:46

## 2020-11-15 RX ADMIN — FENTANYL CITRATE 25 MCG: 50 INJECTION, SOLUTION INTRAMUSCULAR; INTRAVENOUS at 15:05

## 2020-11-15 RX ADMIN — DEXAMETHASONE SODIUM PHOSPHATE 4 MG: 4 INJECTION, SOLUTION INTRAMUSCULAR; INTRAVENOUS at 19:56

## 2020-11-15 RX ADMIN — LIDOCAINE HYDROCHLORIDE 100 MG: 20 INJECTION, SOLUTION INTRAVENOUS at 19:56

## 2020-11-15 NOTE — CONSULTS
Chief Complaint  Kidney Stone    Consulting Provider  KELVIN Roque  Mr. Dickson is a 75 y.o. male who presents for further management of nephrolithiasis.    He presented to ER tonight and was diagnosed with a 7mm left UVJ stone. He presents today for follow up.  He is having intermittent colicky severe LLQ radiating pain to groin and flank .  No fever or chills, but + nausea, no vomiting.  No dysuria.    Past Medical History  Past Medical History:   Diagnosis Date   • Arthritis     OA   • Basal cell carcinoma 02/2020    left ear   • Cancer (CMS/HCC)     Skin cancer removed 3x    • Coronary artery disease     Probable Non obstructive    • Dyslipidemia    • Elevated prostate specific antigen (PSA)    • Essential hypertension, benign    • Full dentures     Instructed no adhesives the DOS   • GERD (gastroesophageal reflux disease)    • H/O benign prostatic hypertrophy    • H/O cardiovascular stress test 07/19/2019    Patient reported done by Dr Ojeda around 3-4 years ago and reported that all was wnl's    • High cholesterol    • History of bronchitis    • History of hemorrhoids    • History of idiopathic thrombocytopenic purpura    • History of malignant neoplasm of skin    • History of sleep apnea    • [place] (hard of hearing)     No use of hearing aids   • Hypertension    • Hypothyroidism    • JUWAN on CPAP     Patient instructed to bring mask and machine the DOS   • Rectal bleeding    • S/P right knee surgery 07/12/2017   • Seasonal allergies    • Tobacco abuse    • Wears glasses     Rx wears       Past Surgical History  Past Surgical History:   Procedure Laterality Date   • APPENDECTOMY     • BASAL CELL CARCINOMA EXCISION  2020    left ear   • COLONOSCOPY      Complete Colonoscopy   • COLONOSCOPY N/A 7/22/2019    Procedure: COLONOSCOPY;  Surgeon: Lisa Rhodes MD;  Location: [facility] ENDOSCOPY;  Service: Gastroenterology   • HERNIA REPAIR      patient reported by Dr Banuelos - patient unsure extact  "location   • JOINT REPLACEMENT Left     partial knee replacement   • JOINT REPLACEMENT Right     partial knee replacement   • KNEE SURGERY      Left Knee   • MOUTH SURGERY      full mouth extraction   • OTHER SURGICAL HISTORY      Surgery Testis Orchiopexy around age 20   • OTHER SURGICAL HISTORY      Cyst removed from a finger - patient reported to this as a \"growth\" and is unsure laterality   • SKIN BIOPSY      Patient reported skin cancer removed 3x.  Nose, back and face       Medications    Current Facility-Administered Medications:   •  cefTRIAXone (ROCEPHIN) 1 g/100 mL 0.9% NS VTB (mbp), 1 g, Intravenous, Once, Conner, Leslie L, APRN, 1 g at 11/15/20 1728  •  sodium chloride 0.9 % flush 10 mL, 10 mL, Intravenous, PRN, Mukul Carballo MD  •  [COMPLETED] Insert peripheral IV, , , Once **AND** sodium chloride 0.9 % flush 10 mL, 10 mL, Intravenous, PRN, Conner, Leslie L, APRN    Current Outpatient Medications:   •  aspirin 81 MG EC tablet, Take 1 tablet by mouth Daily., Disp: 90 tablet, Rfl: 1  •  bisoprolol (ZEBeta) 5 MG tablet, TAKE ONE-HALF TABLET BY MOUTH DAILY, Disp: 45 tablet, Rfl: 1  •  celecoxib (CeleBREX) 100 MG capsule, TAKE ONE CAPSULE BY MOUTH TWICE A DAY, Disp: 90 capsule, Rfl: 2  •  Cholecalciferol (VITAMIN D-3 PO), Take 1 tablet by mouth Daily., Disp: , Rfl:   •  finasteride (PROSCAR) 5 MG tablet, TAKE ONE TABLET BY MOUTH DAILY, Disp: 90 tablet, Rfl: 2  •  fluticasone (FLONASE) 50 MCG/ACT nasal spray, 1 spray into the nostril(s) as directed by provider Daily., Disp: , Rfl:   •  Glucosamine-Chondroitin (OSTEO BI-FLEX REGULAR STRENGTH PO), Take 1 tablet by mouth 2 (two) times a day., Disp: , Rfl:   •  levothyroxine (SYNTHROID, LEVOTHROID) 137 MCG tablet, Take 1 tablet by mouth Daily., Disp: 90 tablet, Rfl: 3  •  montelukast (Singulair) 10 MG tablet, Take 1 tablet by mouth Every Night., Disp: 90 tablet, Rfl: 3  •  Multiple Vitamins-Minerals (CENTRUM SILVER ADULT 50+) tablet, Take 1 tablet by mouth " daily., Disp: , Rfl:   •  Omega-3 Fatty Acids (FISH OIL) 1200 MG capsule capsule, Take 1,200 mg by mouth 2 (Two) Times a Day With Meals., Disp: , Rfl:   •  Potassium 75 MG tablet, Take 595 mg by mouth Daily., Disp: , Rfl:   •  sildenafil (REVATIO) 20 MG tablet, 1 to 3 tablets daily as needed for ED, Disp: 30 tablet, Rfl: 11  •  triamcinolone (KENALOG) 0.1 % cream, Apply 1 application topically to the appropriate area as directed As Needed., Disp: , Rfl:   •  vitamin B-12 (CYANOCOBALAMIN) 1000 MCG tablet, Take 1,000 mcg by mouth Daily., Disp: , Rfl:   •  Vytorin 10-20 MG per tablet, Take 1 tablet by mouth Every Night., Disp: 30 tablet, Rfl: 11    Allergies  Allergies   Allergen Reactions   • Penicillins Itching and Rash       Social History  Social History     Socioeconomic History   • Marital status:      Spouse name: Not on file   • Number of children: Not on file   • Years of education: Not on file   • Highest education level: Not on file   Occupational History     Employer: RETIRED   Tobacco Use   • Smoking status: Former Smoker     Packs/day: 2.00     Years: 15.00     Pack years: 30.00     Types: Cigarettes     Quit date: 1985     Years since quittin.4   • Smokeless tobacco: Former User     Types: Chew     Quit date:    Substance and Sexual Activity   • Alcohol use: No   • Drug use: No   • Sexual activity: Defer       Family History  Family History   Problem Relation Age of Onset   • Migraines Mother    • Mental illness Mother    • Heart disease Mother    • Alcohol abuse Father    • Cancer Father    • Lung cancer Father    • Brain cancer Sister    • Colon cancer Paternal Grandmother    • Prostate cancer Other    • Colon cancer Other        Review of Systems  Constitutional: No fevers or chills  Skin: Negative for rash  Endocrine: No heat/cold intolerance   Cardiovascular: Negative for chest pain or dyspnea on exertion  Respiratory: Negative for shortness of breath or  "wheezing  Gastrointestinal: No constipation, nausea or vomiting  Genitourinary: No gross hematuria   Musculoskeletal: Negative for low back pain  Neurological:  Negative for frequent headaches or dizziness  Lymph/Heme: Negative for leg swelling or calf pain.    All other systems reviewed and negative    Physical Exam  Visit Vitals  /69   Pulse 63   Temp 98.8 °F (37.1 °C) (Oral)   Resp 17   Ht 179.1 cm (70.5\")   Wt 91.2 kg (201 lb)   SpO2 96%   BMI 28.43 kg/m²     Constitutional: NAD, WDWN.   HEENT: NCAT. Conjunctivae normal.  MMM.  Endocrine: no clear thyromegaly    Cardiovascular: Regular rate.  Pulmonary/Chest: Respirations are even and non-labored bilaterally.  Back:  Mod left CVA tenderness.  Neurological: A + O x 3.  Cranial Nerves II-XII grossly intact.  Extremities: DIVYA x 4, Warm. No clubbing.  No cyanosis.    Skin: Pink, warm and dry.  No rashes noted.    Labs  Lab Results   Component Value Date    GLUCOSE 147 (H) 11/15/2020    BUN 30 (H) 11/15/2020    CREATININE 1.58 (H) 11/15/2020    EGFRIFNONA 43 (L) 11/15/2020    EGFRIFAFRI 118 08/09/2019    BCR 19.0 11/15/2020    K 4.5 11/15/2020    CO2 25.1 11/15/2020    CALCIUM 10.2 11/15/2020    PROTENTOTREF 7.1 08/09/2019    ALBUMIN 4.50 11/15/2020    LABIL2 2.1 08/09/2019    AST 18 11/15/2020    ALT 23 11/15/2020       Lab Results   Component Value Date    WBC 11.49 (H) 11/15/2020    HGB 15.8 11/15/2020    HCT 46.6 11/15/2020    MCV 97.1 (H) 11/15/2020     (L) 11/15/2020       No results found for: LDH, URICACID    Lab Results   Component Value Date    CALCIUM 10.2 11/15/2020       Brief Urine Lab Results  (Last result in the past 365 days)      Color   Clarity   Blood   Leuk Est   Nitrite   Protein   CREAT   Urine HCG        11/15/20 1458 Yellow Clear Small (1+) Moderate (2+) Negative Negative               No results found for: URINECX    )No components found for: STONEANALYSI      Radiologic Studies  Ct Abdomen Pelvis Without Contrast    Result " Date: 11/15/2020  Impression: Obstructing 5 mm stone at the left UVJ. Mild left hydronephrosis.  This report was finalized on 11/15/2020 4:12 PM by Dr Vijay Bro DO.      I have personally reviewed these labs and images.      Assessment  Mr. Dickson is a 75 y.o. male with 7mm Left UVJ stone, hydro, azotemia, dehydration.    We had informed discussion about the causes of stones, in the main treatments for nephrolithiasis in 2019.  The 3 main surgical treatments for stones are percutaneous nephrolithotomy, ureteroscopy and laser lithotripsy, and shockwave lithotripsy.  Based on the stone size and location, I have recommended ureteroscopy and laser lithotripsy.  We discussed the risks, benefits, and alternatives to this therapy.  The main risks that we discussed were bleeding, urinary infection, damage to nearby structures, need for further procedures, and cardiopulmonary complications from anesthesia.  The patient voiced understanding and wished to proceed.     Plan  1. Consented for Left URS/HLL stent    2. Rapid covid  3. Appreciate hospitalist admission

## 2020-11-15 NOTE — H&P
[facility]      Name:  Deniz Dickson   Age:  75 y.o.  Sex:  male  :  1945  MRN:  [id number]   Visit Number:  [id number]  Admission Date:  11/15/2020  Date Of Service:  11/15/20  Primary Care Physician:  Zane Magallon MD    Chief Complaint: Left lower quadrant abdominal pain over the past 10 days        History Of Presenting Illness: The patient is a 75-year-old gentleman with past medical history of arthritis on chronic NSAIDs, GERD, Gilbert's syndrome, chronic thrombocytopenia due to ITP, obstructive sleep apnea on CPAP, hypertension, hyperlipidemia.  The patient presented to the emergency room today after feeling himself until 10 days ago.  He has had progressively worsening left lower quadrant pain.  The only medication change that he is made recently was starting prednisone for his arthritis from the rheumatologist.  He denies fever, chills, chest pain, shortness of breath, nausea, vomiting, abdominal pain.  He denies any exposure to COVID-19.    In the emergency room, vital signs were well-appearing with mildly elevated blood pressure 153/80.  His urinalysis showed 2+ leukocytes with 3-5 white blood cells and 1+ bacteria.  A CT scan of the abdomen and pelvis showed an obstructive 5 mm stone at the left UVJ with left mild hydronephrosis.  His Covid screen is negative.  Lab work: Sodium 135, potassium 4.5, BUN 30, creatinine 1.58 with a baseline of 0.78, glucose 147.  The patient was provided 1 g of Rocephin, Zofran, and 1 L normal saline bolus.  The hospitalist service was asked to admit.  Dr. Carter with urology agreed to follow in consultation and plans surgery this evening.    The patient currently has no shortness of breath, but had recently seen Dr Mccormick in [facility] for dyspnea. Reportedly PFTs were normal and he was referred for stress test at . 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        Review Of Systems:     General ROS: Patient denies any fevers, chills or loss of consciousness.  Denies generalized weakness.   Psychological ROS: Denies any hallucinations and delusions.  Ophthalmic ROS: Denies any diplopia or transient loss of vision.  ENT ROS: Denies sore throat, nasal congestion or ear pain.   Allergy and Immunology ROS: Denies rash or itching.  Hematological and Lymphatic ROS: Denies neck swelling or easy bleeding.  Endocrine ROS: Denies any recent unintentional weight gain or loss.  Breast ROS: Denies any pain or swelling.  Respiratory ROS: Denies cough. Has had chronic intermittent shortness of breath.   Cardiovascular ROS: Denies chest pain or palpitations. No history of exertional chest pain.  Gastrointestinal ROS: Denies nausea and vomiting.  Left lower quadrant abdominal pain. No diarrhea.  Genitourinary ROS: Denies dysuria or hematuria.  Musculoskeletal ROS: Complains of chronic arthritis pain. No muscle pain. No calf pain.   Neurological ROS: Denies any focal weakness. No loss of consciousness. Denies any numbness. Denies neck pain.   Dermatological ROS: Denies any redness or pruritis.       Past Medical History: Reviewed    Past Medical History:   Diagnosis Date   • Arthritis     OA   • Basal cell carcinoma 02/2020    left ear   • Cancer (CMS/HCC)     Skin cancer removed 3x    • Coronary artery disease     Probable Non obstructive    • Dyslipidemia    • Elevated prostate specific antigen (PSA)    • Essential hypertension, benign    • Full dentures     Instructed no adhesives the DOS   • GERD (gastroesophageal reflux disease)    • H/O benign prostatic hypertrophy    • H/O cardiovascular stress test 07/19/2019    Patient reported done by Dr Ojeda around 3-4 years ago and reported that all was wnl's    • High cholesterol    • History of bronchitis    • History of hemorrhoids    • History of idiopathic thrombocytopenic purpura    • History of  "malignant neoplasm of skin    • History of sleep apnea    • [place] (hard of hearing)     No use of hearing aids   • Hypertension    • Hypothyroidism    • JUWAN on CPAP     Patient instructed to bring mask and machine the DOS   • Rectal bleeding    • S/P right knee surgery 07/12/2017   • Seasonal allergies    • Tobacco abuse    • Wears glasses     Rx wears       Past Surgical history: Reviewed    Past Surgical History:   Procedure Laterality Date   • APPENDECTOMY     • BASAL CELL CARCINOMA EXCISION  2020    left ear   • COLONOSCOPY      Complete Colonoscopy   • COLONOSCOPY N/A 7/22/2019    Procedure: COLONOSCOPY;  Surgeon: Lisa Rhodes MD;  Location: [facility] ENDOSCOPY;  Service: Gastroenterology   • HERNIA REPAIR      patient reported by Dr Banuelos - patient unsure extact location   • JOINT REPLACEMENT Left     partial knee replacement   • JOINT REPLACEMENT Right     partial knee replacement   • KNEE SURGERY      Left Knee   • MOUTH SURGERY      full mouth extraction   • OTHER SURGICAL HISTORY      Surgery Testis Orchiopexy around age 20   • OTHER SURGICAL HISTORY      Cyst removed from a finger - patient reported to this as a \"growth\" and is unsure laterality   • SKIN BIOPSY      Patient reported skin cancer removed 3x.  Nose, back and face       Social History: Former smoker.  Former tobacco chewer.  He denies current tobacco, alcohol, drug use.  He is  and previously worked in [employer].  Pediatric History   Patient Parents   • Not on file     Other Topics Concern   • Not on file   Social History Narrative   • Not on file       Family History: Reviewed    Family History   Problem Relation Age of Onset   • Migraines Mother    • Mental illness Mother    • Heart disease Mother    • Alcohol abuse Father    • Cancer Father    • Lung cancer Father    • Brain cancer Sister    • Colon cancer Paternal Grandmother    • Prostate cancer Other    • Colon cancer Other        Allergies: " Reviewed    Penicillins    Home Medications: Reviewed    Prior to Admission Medications     Prescriptions Last Dose Informant Patient Reported? Taking?    aspirin 81 MG EC tablet  Self No No    Take 1 tablet by mouth Daily.    bisoprolol (ZEBeta) 5 MG tablet   No No    TAKE ONE-HALF TABLET BY MOUTH DAILY    celecoxib (CeleBREX) 100 MG capsule   No No    TAKE ONE CAPSULE BY MOUTH TWICE A DAY    Cholecalciferol (VITAMIN D-3 PO)  Self Yes No    Take 1 tablet by mouth Daily.    finasteride (PROSCAR) 5 MG tablet   No No    TAKE ONE TABLET BY MOUTH DAILY    fluticasone (FLONASE) 50 MCG/ACT nasal spray  Self Yes No    1 spray into the nostril(s) as directed by provider Daily.    Glucosamine-Chondroitin (OSTEO BI-FLEX REGULAR STRENGTH PO)  Self Yes No    Take 1 tablet by mouth 2 (two) times a day.    levothyroxine (SYNTHROID, LEVOTHROID) 137 MCG tablet   No No    Take 1 tablet by mouth Daily.    montelukast (Singulair) 10 MG tablet   No No    Take 1 tablet by mouth Every Night.    Multiple Vitamins-Minerals (CENTRUM SILVER ADULT 50+) tablet  Self Yes No    Take 1 tablet by mouth daily.    Omega-3 Fatty Acids (FISH OIL) 1200 MG capsule capsule  Self Yes No    Take 1,200 mg by mouth 2 (Two) Times a Day With Meals.    Potassium 75 MG tablet  Self Yes No    Take 595 mg by mouth Daily.    sildenafil (REVATIO) 20 MG tablet   No No    1 to 3 tablets daily as needed for ED    triamcinolone (KENALOG) 0.1 % cream  Pharmacy Yes No    Apply 1 application topically to the appropriate area as directed As Needed.    vitamin B-12 (CYANOCOBALAMIN) 1000 MCG tablet  Self Yes No    Take 1,000 mcg by mouth Daily.    Vytorin 10-20 MG per tablet   No No    Take 1 tablet by mouth Every Night.             ED Medications:    Medications   sodium chloride 0.9 % flush 10 mL (has no administration in time range)   sodium chloride 0.9 % flush 10 mL (has no administration in time range)   sodium chloride 0.9 % bolus 1,000 mL (0 mL Intravenous Stopped  11/15/20 1558)   fentaNYL citrate (PF) (SUBLIMAZE) injection 25 mcg (25 mcg Intravenous Given 11/15/20 1505)   ondansetron (ZOFRAN) injection 4 mg (4 mg Intravenous Given 11/15/20 1505)   cefTRIAXone (ROCEPHIN) 1 g/100 mL 0.9% NS VTB (mbp) (1 g Intravenous New Bag 11/15/20 1728)       Vital Signs:    Temp:  [98.8 °F (37.1 °C)] 98.8 °F (37.1 °C)  Heart Rate:  [63-64] 63  Resp:  [17-18] 17  BP: (135-153)/(69-80) 135/69      Intake/Output Summary (Last 24 hours) at 11/15/2020 1817  Last data filed at 11/15/2020 1558  Gross per 24 hour   Intake 1000 ml   Output --   Net 1000 ml           11/15/20  1412   Weight: 91.2 kg (201 lb)       Body mass index is 28.43 kg/m².    Physical Exam:      General Appearance:   Elderly man.  Hard of Alert and cooperative, no acute distress, oriented x 3   Head:    Atraumatic and normocephalic, without obvious defect.   Eyes:            PERRLA, conjunctivae and sclerae normal, no icterus. No pallor. Extraocular movements are within normal limits.   Ears:    Ears appear intact with no abnormalities noted.   Throat:   No oral lesions, no thrush, oral mucosa moist.   Neck:   Supple, trachea midline, no thyromegaly       Lungs:     Chest shape is normal. Breath sounds heard bilaterally equally.  No crackles or wheezing. No Pleural rub or bronchial breathing.      Heart:    Normal S1 and S2, no murmur, no gallop, no rub. No JVD   Abdomen:     Normal bowel sounds, no masses, no organomegaly. Soft        non-tender, non-distended, no guarding, no rebound                tenderness   Extremities:   Moves all extremities well, no edema, no cyanosis, no             clubbing   Pulses:   Pulses palpable and equal bilaterally   Skin:   No bleeding, bruising or rash   Lymph nodes:   No palpable adenopathy   Neurologic:   No tremors seen, sensation intact, Motor power is normal and equal bilaterally.       EKG:    Normal sinus rhythm no acute st change.    Labs:    I have reviewed the labs done in the  emergency room.  Results from last 7 days   Lab Units 11/15/20  1420   SODIUM mmol/L 135*   POTASSIUM mmol/L 4.5   CHLORIDE mmol/L 99   CO2 mmol/L 25.1   BUN mg/dL 30*   CREATININE mg/dL 1.58*   CALCIUM mg/dL 10.2   BILIRUBIN mg/dL 0.8   ALK PHOS U/L 80   ALT (SGPT) U/L 23   AST (SGOT) U/L 18   GLUCOSE mg/dL 147*     Results from last 7 days   Lab Units 11/15/20  1420   WBC 10*3/mm3 11.49*   HEMOGLOBIN g/dL 15.8   HEMATOCRIT % 46.6   PLATELETS 10*3/mm3 102*                     Results from last 7 days   Lab Units 11/15/20  1420   LIPASE U/L 54         Results from last 7 days   Lab Units 11/15/20  1458   COLOR UA  Yellow   GLUCOSE UA  Negative   KETONES UA  Negative   LEUKOCYTES UA  Moderate (2+)*   PH, URINE  6.0   BILIRUBIN UA  Negative   UROBILINOGEN UA  0.2 E.U./dL     Pain Management Panel     There is no flowsheet data to display.              Radiology:    Imaging Results (Last 72 Hours)     Procedure Component Value Units Date/Time    CT Abdomen Pelvis Without Contrast [[id number]] Collected: 11/15/20 1608     Updated: 11/15/20 1614    Narrative:      CT SCAN OF THE ABDOMEN AND PELVIS WITHOUT CONTRAST     11/15/2020 3:46  PM      HISTORY: Left lower quadrant abdominal pain.     COMPARISON: None.     PROCEDURE: Axial images were obtained from the lung bases to the pubic  symphysis by computed tomography. This study was performed with  techniques to keep radiation doses as low as reasonably achievable,  (ALARA). Individualized dose reduction techniques using automated  exposure control or adjustment of mA and/or kV according to the patient  size were employed.     FINDINGS:      ABDOMEN: Chronic changes in the lung bases.     Tiny gallstones suspected. Liver and spleen unremarkable. Pancreas and  adrenals are unremarkable. Mild left hydronephrosis and hydroureter  secondary to a 5 mm left UVJ stone. No intrarenal stones. No bowel  obstruction or acute inflammatory process. Moderate stool throughout the  colon.  "Small fat-containing umbilical hernia. No free air, free fluid,  or lymphadenopathy. Mild calcified plaque of the abdominal aorta without  aneurysm.     PELVIS: Mild prostate enlargement. No free fluid or adenopathy. Previous  hernia repair of the left pelvic wall. No acute osseous abnormalities.             Impression:      Obstructing 5 mm stone at the left UVJ. Mild left  hydronephrosis.     This report was finalized on 11/15/2020 4:12 PM by Dr Vijay Bro DO.          Assessment:    CATHY (acute kidney injury) (CMS/[facility])    1. Acute kidney injury, prior to admission multifactorial: with dehydration, NSAIDs, and obstructive ureteral stone  2. Left 5mm UVJ Ureteral stone with mild hydronephrosis  3. Dehydration  4. Chronic hypertension  5. Chronic arthritis taking NSAIDs  6. \"Probable nonobstructive CAD\" per cardiology Dr Ojeda   7. Chronic ITP related thrombocytopenia  8. Chronic intermittent dyspnea, reportedly negative PFT testing     Plan:    Admit to hospitalist service. EKG normal. Troponin pending.He will continue to require cessation of NSAIDs with the addition of iv fluid normal saline. I expect improvement over the next 2-3 days. Patient NPO for left ureteroscopy and possible left ureteral stent placement to be performed by Dr Raul jacome. Repeat labs daily and adjust medications accordingly. Ceftriaxone given in ER x 1.     Further recommendations depend on the clinical course.      SCDs ordered for DVT prophylaxis.  Medication risks and benefits were discussed in detail. Patient reported being satisfied with current treatment plan.  Daughter staying to assist patient with severe hearing deficit.  Advance Care Planning   ACP discussion was held with the patient during this visit. Patient does not have an advance directive, declines further assistance. Full code     Yoly Harmon DO  11/15/20  18:17 EST      "

## 2020-11-15 NOTE — ED PROVIDER NOTES
Subjective   History of Present Illness  This is a 75-year-old gentleman who comes in today complaining of left lower quadrant pain x10 days.  He reports that symptoms have progressively gotten worse.  He states he noticed the symptoms shortly after starting prednisone from his rheumatologist.  He denies any fever chills nausea or vomiting.  Review of Systems   Constitutional: Negative.    HENT: Negative.    Eyes: Negative.    Respiratory: Negative.    Cardiovascular: Negative.    Gastrointestinal: Positive for abdominal pain.   Genitourinary: Negative.    Skin: Negative.    Neurological: Negative.    Psychiatric/Behavioral: Negative.        Past Medical History:   Diagnosis Date   • Arthritis     OA   • Basal cell carcinoma 02/2020    left ear   • Cancer (CMS/HCC)     Skin cancer removed 3x    • Coronary artery disease     Probable Non obstructive    • Dyslipidemia    • Elevated prostate specific antigen (PSA)    • Essential hypertension, benign    • Full dentures     Instructed no adhesives the DOS   • GERD (gastroesophageal reflux disease)    • H/O benign prostatic hypertrophy    • H/O cardiovascular stress test 07/19/2019    Patient reported done by Dr Ojeda around 3-4 years ago and reported that all was wnl's    • High cholesterol    • History of bronchitis    • History of hemorrhoids    • History of idiopathic thrombocytopenic purpura    • History of malignant neoplasm of skin    • History of sleep apnea    • [place] (hard of hearing)     No use of hearing aids   • Hypertension    • Hypothyroidism    • JUWAN on CPAP     Patient instructed to bring mask and machine the DOS   • Rectal bleeding    • S/P right knee surgery 07/12/2017   • Seasonal allergies    • Tobacco abuse    • Wears glasses     Rx wears       Allergies   Allergen Reactions   • Penicillins Itching and Rash       Past Surgical History:   Procedure Laterality Date   • APPENDECTOMY     • BASAL CELL CARCINOMA EXCISION  2020    left ear   • COLONOSCOPY    "   Complete Colonoscopy   • COLONOSCOPY N/A 2019    Procedure: COLONOSCOPY;  Surgeon: Lisa Rhodes MD;  Location: [facility] ENDOSCOPY;  Service: Gastroenterology   • HERNIA REPAIR      patient reported by Dr Banuelos - patient unsure extact location   • JOINT REPLACEMENT Left     partial knee replacement   • JOINT REPLACEMENT Right     partial knee replacement   • KNEE SURGERY      Left Knee   • MOUTH SURGERY      full mouth extraction   • OTHER SURGICAL HISTORY      Surgery Testis Orchiopexy around age 20   • OTHER SURGICAL HISTORY      Cyst removed from a finger - patient reported to this as a \"growth\" and is unsure laterality   • SKIN BIOPSY      Patient reported skin cancer removed 3x.  Nose, back and face       Family History   Problem Relation Age of Onset   • Migraines Mother    • Mental illness Mother    • Heart disease Mother    • Alcohol abuse Father    • Cancer Father    • Lung cancer Father    • Brain cancer Sister    • Colon cancer Paternal Grandmother    • Prostate cancer Other    • Colon cancer Other        Social History     Socioeconomic History   • Marital status:      Spouse name: Not on file   • Number of children: Not on file   • Years of education: Not on file   • Highest education level: Not on file   Occupational History     Employer: RETIRED   Tobacco Use   • Smoking status: Former Smoker     Packs/day: 2.00     Years: 15.00     Pack years: 30.00     Types: Cigarettes     Quit date: 1985     Years since quittin.4   • Smokeless tobacco: Former User     Types: Chew     Quit date:    Substance and Sexual Activity   • Alcohol use: No   • Drug use: No   • Sexual activity: Defer           Objective   Physical Exam  Vitals signs and nursing note reviewed.   Constitutional:       Appearance: He is well-developed and normal weight.   Neurological:      Mental Status: He is alert.     GEN: No acute distress  Head: Normocephalic, atraumatic  Eyes: Pupils equal round reactive to " light  ENT: Posterior pharynx normal in appearance, oral mucosa is moist  Chest: Nontender to palpation  Cardiovascular: Regular rate  Lungs: Clear to auscultation bilaterally  Abdomen: Soft, nontender, nondistended, no peritoneal signs  Extremities: No edema, normal appearance  Neuro: GCS 15  Psych: Mood and affect are appropriate      Procedures           ED Course  ED Course as of Nov 15 1738   Sun Nov 15, 2020   1506 Started having some pain.     [TW]   1647 Call to dr. Carter left message.     [TW]   1718 Consulted with Dr. Carter. Will take to surgery tonight. Keep patient Npo. Rocephin ordered.     [TW]   1719 Call to Dr Harmon left message.     [TW]      ED Course User Index  [TW] Leslie Conner, APRN                                           MDM  Number of Diagnoses or Management Options     Amount and/or Complexity of Data Reviewed  Clinical lab tests: ordered and reviewed  Tests in the radiology section of CPT®: ordered and reviewed  Review and summarize past medical records: yes  Discuss the patient with other providers: yes  Independent visualization of images, tracings, or specimens: yes    Risk of Complications, Morbidity, and/or Mortality  Presenting problems: moderate  Diagnostic procedures: moderate  Management options: moderate        Final diagnoses:   CATHY (acute kidney injury) (CMS/[facility])   Obstruction of left ureteropelvic junction (UPJ) due to stone            Leslie Conner, APRN  11/15/20 1738

## 2020-11-15 NOTE — ANESTHESIA PREPROCEDURE EVALUATION
Anesthesia Evaluation     Patient summary reviewed and Nursing notes reviewed   NPO Solid Status: > 8 hours  NPO Liquid Status: > 8 hours           Airway   Mallampati: II  TM distance: >3 FB  Neck ROM: full  Possible difficult intubation and Large neck circumference  Dental - normal exam   (+) edentulous    Pulmonary - normal exam   (+) sleep apnea,   Cardiovascular - normal exam  Exercise tolerance: good (4-7 METS)    PT is on anticoagulation therapy  Patient on routine beta blocker and Beta blocker given within 24 hours of surgery    (+) hypertension well controlled less than 2 medications, CAD, hyperlipidemia,       Neuro/Psych- negative ROS  GI/Hepatic/Renal/Endo    (+)  GERD,  renal disease ARF and stones,     Musculoskeletal     Abdominal  - normal exam    Bowel sounds: normal.   Substance History - negative use     OB/GYN negative ob/gyn ROS         Other   arthritis, blood dyscrasia,   history of cancer    ROS/Med Hx Other: Aug 2020  Cardiology-PLAN:  Hypertension largely controlled follows at home closely on bisoprolol     Dyslipidemia on Vytorin historical LDL greater than 200 prior to Vytorin     Palpitations suppressed on bisoprolol with preservation of LV function continue observation and rediscussed as little as necessary nonsteroidal anti-inflammatory                   Anesthesia Plan    ASA 2 - emergent     general     intravenous induction     Anesthetic plan, all risks, benefits, and alternatives have been provided, discussed and informed consent has been obtained with: patient.    Plan discussed with attending.

## 2020-11-16 LAB
ANION GAP SERPL CALCULATED.3IONS-SCNC: 10.5 MMOL/L (ref 5–15)
BASOPHILS # BLD AUTO: 0.01 10*3/MM3 (ref 0–0.2)
BASOPHILS NFR BLD AUTO: 0.1 % (ref 0–1.5)
BUN SERPL-MCNC: 19 MG/DL (ref 8–23)
BUN/CREAT SERPL: 22.4 (ref 7–25)
CALCIUM SPEC-SCNC: 8.9 MG/DL (ref 8.6–10.5)
CHLORIDE SERPL-SCNC: 101 MMOL/L (ref 98–107)
CO2 SERPL-SCNC: 25.5 MMOL/L (ref 22–29)
CREAT SERPL-MCNC: 0.85 MG/DL (ref 0.76–1.27)
DEPRECATED RDW RBC AUTO: 44.1 FL (ref 37–54)
EOSINOPHIL # BLD AUTO: 0 10*3/MM3 (ref 0–0.4)
EOSINOPHIL NFR BLD AUTO: 0 % (ref 0.3–6.2)
ERYTHROCYTE [DISTWIDTH] IN BLOOD BY AUTOMATED COUNT: 12.4 % (ref 12.3–15.4)
GFR SERPL CREATININE-BSD FRML MDRD: 88 ML/MIN/1.73
GLUCOSE SERPL-MCNC: 148 MG/DL (ref 65–99)
HCT VFR BLD AUTO: 40.5 % (ref 37.5–51)
HGB BLD-MCNC: 13.8 G/DL (ref 13–17.7)
IMM GRANULOCYTES # BLD AUTO: 0.02 10*3/MM3 (ref 0–0.05)
IMM GRANULOCYTES NFR BLD AUTO: 0.2 % (ref 0–0.5)
LYMPHOCYTES # BLD AUTO: 0.74 10*3/MM3 (ref 0.7–3.1)
LYMPHOCYTES NFR BLD AUTO: 9.2 % (ref 19.6–45.3)
MCH RBC QN AUTO: 32.9 PG (ref 26.6–33)
MCHC RBC AUTO-ENTMCNC: 34.1 G/DL (ref 31.5–35.7)
MCV RBC AUTO: 96.4 FL (ref 79–97)
MONOCYTES # BLD AUTO: 0.37 10*3/MM3 (ref 0.1–0.9)
MONOCYTES NFR BLD AUTO: 4.6 % (ref 5–12)
NEUTROPHILS NFR BLD AUTO: 6.88 10*3/MM3 (ref 1.7–7)
NEUTROPHILS NFR BLD AUTO: 85.9 % (ref 42.7–76)
NRBC BLD AUTO-RTO: 0 /100 WBC (ref 0–0.2)
PLATELET # BLD AUTO: 88 10*3/MM3 (ref 140–450)
PMV BLD AUTO: 9 FL (ref 6–12)
POTASSIUM SERPL-SCNC: 5.2 MMOL/L (ref 3.5–5.2)
RBC # BLD AUTO: 4.2 10*6/MM3 (ref 4.14–5.8)
SODIUM SERPL-SCNC: 137 MMOL/L (ref 136–145)
WBC # BLD AUTO: 8.02 10*3/MM3 (ref 3.4–10.8)

## 2020-11-16 PROCEDURE — 85025 COMPLETE CBC W/AUTO DIFF WBC: CPT | Performed by: FAMILY MEDICINE

## 2020-11-16 PROCEDURE — 80048 BASIC METABOLIC PNL TOTAL CA: CPT | Performed by: FAMILY MEDICINE

## 2020-11-16 PROCEDURE — 99232 SBSQ HOSP IP/OBS MODERATE 35: CPT | Performed by: INTERNAL MEDICINE

## 2020-11-16 PROCEDURE — 94799 UNLISTED PULMONARY SVC/PX: CPT

## 2020-11-16 PROCEDURE — 99232 SBSQ HOSP IP/OBS MODERATE 35: CPT | Performed by: UROLOGY

## 2020-11-16 RX ORDER — FAMOTIDINE 40 MG/1
40 TABLET, FILM COATED ORAL NIGHTLY PRN
COMMUNITY

## 2020-11-16 RX ADMIN — MONTELUKAST 10 MG: 10 TABLET, FILM COATED ORAL at 20:38

## 2020-11-16 RX ADMIN — HYDROCODONE BITARTRATE AND ACETAMINOPHEN 1 TABLET: 5; 325 TABLET ORAL at 06:50

## 2020-11-16 RX ADMIN — SODIUM CHLORIDE 100 ML/HR: 9 INJECTION, SOLUTION INTRAVENOUS at 16:09

## 2020-11-16 RX ADMIN — SODIUM CHLORIDE, PRESERVATIVE FREE 10 ML: 5 INJECTION INTRAVENOUS at 20:42

## 2020-11-16 RX ADMIN — BISOPROLOL FUMARATE 2.5 MG: 5 TABLET ORAL at 08:45

## 2020-11-16 RX ADMIN — SODIUM CHLORIDE 125 ML/HR: 9 INJECTION, SOLUTION INTRAVENOUS at 07:17

## 2020-11-16 RX ADMIN — SODIUM CHLORIDE, PRESERVATIVE FREE 10 ML: 5 INJECTION INTRAVENOUS at 08:46

## 2020-11-16 RX ADMIN — FINASTERIDE 5 MG: 5 TABLET, FILM COATED ORAL at 08:45

## 2020-11-16 RX ADMIN — ATORVASTATIN CALCIUM 10 MG: 10 TABLET, FILM COATED ORAL at 20:38

## 2020-11-16 RX ADMIN — MULTIPLE VITAMINS W/ MINERALS TAB 1 TABLET: TAB at 08:45

## 2020-11-16 RX ADMIN — LEVOTHYROXINE SODIUM 137 MCG: 25 TABLET ORAL at 08:45

## 2020-11-16 NOTE — PROGRESS NOTES
[facility]    Name:  Deniz Dickson   Age:  75 y.o.  Sex:  male  :  1945  MRN:  [id number]   Visit Number:  [id number]  Admission Date:  11/15/2020  Date Of Service:  20  Primary Care Physician:  Zane Magallon MD     LOS: 1 day :  Patient Care Team:  Zane Magallon MD as PCP - General  RhodesLisa MD as Surgeon (General Surgery):      Subjective / Interval History:     Patient's chart has been reviewed and events noted since admission.  75-year-old who has been admitted because of ureteric stone left-sided with acute kidney injury and has had ureteroscopy with stent insertion plan by Dr. Carter.  He is going to get  laser lithotripsy     At present patient is complaining of burning only when he urinates.  The pain is tolerable with the medication.  He does not have any nausea now which has resolved.  Patient is able to drink liquids well.      Vital Signs:    Temp:  [96.9 °F (36.1 °C)-98.8 °F (37.1 °C)] 96.9 °F (36.1 °C)  Heart Rate:  [62-77] 62  Resp:  [17-28] 20  BP: (102-153)/(53-80) 102/53    Intake and output:    I/O last 3 completed shifts:  In: 2840 [P.O.:240; I.V.:1500; IV Piggyback:1100]  Out: 775 [Urine:775]  No intake/output data recorded.    Physical Examination:    General Appearance:    Alert and cooperative, not in any acute distress.   Head:    Atraumatic and normocephalic, without obvious abnormality.   Eyes:            PERRLA,  No pallor. Extraocular movements are within normal limits.   Neck:   Supple,  No lymph glands, no bruit   Lungs:     Chest shape is normal. Breath sounds heard bilaterally equally.  No crackles or wheezing.     Heart:    Normal S1 and S2, no murmur,  No JVD   Abdomen:     Normal bowel sounds, no masses, no organomegaly. Soft     non-tender, no guarding, no rebound tenderness   Extremities:   Moves all extremities well, no edema, no cyanosis,    Skin:   No  bruising or rash.   Neurologic:   Grossly  nonfocal and moves all extremities.      Laboratory results:  Results from last 7 days   Lab Units 11/16/20  0620 11/15/20  1420   SODIUM mmol/L 137 135*   POTASSIUM mmol/L 5.2 4.5   CHLORIDE mmol/L 101 99   CO2 mmol/L 25.5 25.1   BUN mg/dL 19 30*   CREATININE mg/dL 0.85 1.58*   CALCIUM mg/dL 8.9 10.2   BILIRUBIN mg/dL  --  0.8   ALK PHOS U/L  --  80   ALT (SGPT) U/L  --  23   AST (SGOT) U/L  --  18   GLUCOSE mg/dL 148* 147*     Results from last 7 days   Lab Units 11/16/20  0620 11/15/20  1420   WBC 10*3/mm3 8.02 11.49*   HEMOGLOBIN g/dL 13.8 15.8   HEMATOCRIT % 40.5 46.6   PLATELETS 10*3/mm3 88* 102*         Results from last 7 days   Lab Units 11/15/20  1909   TROPONIN T ng/mL <0.010           Radiology results:    Imaging Results (Last 24 Hours)     Procedure Component Value Units Date/Time    CT Abdomen Pelvis Without Contrast [[id number]] Collected: 11/15/20 1608     Updated: 11/15/20 1614    Narrative:      CT SCAN OF THE ABDOMEN AND PELVIS WITHOUT CONTRAST     11/15/2020 3:46  PM      HISTORY: Left lower quadrant abdominal pain.     COMPARISON: None.     PROCEDURE: Axial images were obtained from the lung bases to the pubic  symphysis by computed tomography. This study was performed with  techniques to keep radiation doses as low as reasonably achievable,  (ALARA). Individualized dose reduction techniques using automated  exposure control or adjustment of mA and/or kV according to the patient  size were employed.     FINDINGS:      ABDOMEN: Chronic changes in the lung bases.     Tiny gallstones suspected. Liver and spleen unremarkable. Pancreas and  adrenals are unremarkable. Mild left hydronephrosis and hydroureter  secondary to a 5 mm left UVJ stone. No intrarenal stones. No bowel  obstruction or acute inflammatory process. Moderate stool throughout the  colon. Small fat-containing umbilical hernia. No free air, free fluid,  or lymphadenopathy. Mild calcified plaque of the abdominal aorta  without  aneurysm.     PELVIS: Mild prostate enlargement. No free fluid or adenopathy. Previous  hernia repair of the left pelvic wall. No acute osseous abnormalities.             Impression:      Obstructing 5 mm stone at the left UVJ. Mild left  hydronephrosis.     This report was finalized on 11/15/2020 4:12 PM by Dr Vijay Bro DO.          I have reviewed the patient's radiology reports.    Medication Review:     I have reviewed the patients active and prn medications.     Assessment:      CATHY (acute kidney injury) (CMS/HCC)    BPH (benign prostatic hyperplasia)    JUWAN on CPAP    History of ITP    Coronary artery disease    Arthritis    Nephrolithiasis    Ureterolithiasis          Plan:    Acute kidney injury-he came in with creatinine around 1.6 and it has improved back to baseline and his GFR is normal.  Continue with hydration for the next 24 hours    Left-sided ureteric stone-Dr. Carter is managing has had a stent placed and laser lithotripsy to be done    We will continue rest of his medication for other medical issues and with stable possible discharge in next 24 hours    Jude Oakley MD  11/16/20  07:58 EST      Please note that portions of this note were completed with a voice recognition program.

## 2020-11-16 NOTE — DISCHARGE INSTRUCTIONS
Home Care After Ureteroscopy and Laser Lithotripsy  The following instructions will help you care for yourself, or be cared for upon your return home today.    These are guidelines for your care right after surgery only.     Diet  Drink plenty of liquids and eat light meals today.    Start your regular diet tomorrow.    Activity  Start normal activities in twenty-four (24) hours.    Wound Care and Hygiene  No restrictions, start normal routine.    Anesthesia Precautions & Expectations  After anesthesia, rest for 24 hours.    Do not drive, drink alcoholic beverages or make any important decisions during this time.  General anesthesia may cause a sore throat, jaw discomfort or muscle aches.    These symptoms can last for one or two days.     What to Expect after Surgery  Mild pain with voiding.  Frequency or urgency.  Bladder cramps.  Minimal bleeding with voiding.    Call your Doctor  Passing clots in urine preventing bladder emptying  Severe pain not controlled by oral medication  Temperature above 101.5 degrees  Inability to urinate within eight (8) hours after surgery    After Stent Placement  It is common to have blood tinged urine for 3-5 days.  It is common to have pain in your side and in your back when you urinate for 3-5 days.  It is common to have urgency with urination.  This is a temporary stent and will need to be removed in the office in 1 week.    Other Contacts  Urology Office:  [address] [facility] [id number]   [place] [id number] [phone] office  [phone] fax    Other Instructions  Take tamsulosin daily until the stent comes out.  Take oxybutynin daily as needed for bladder spasm while the stent is in.  Take Pyridium up to 3 times daily as needed for burning with urination.   Take Tylenol scheduled every 6 hours for the first 3 days.  Take the oxycodone on top of that as needed.  Take all of the antibiotics.     Follow up Appointment  Please call Dr. Carter's office to schedule a follow-up  appointment in 1 week for stent removal.

## 2020-11-16 NOTE — OP NOTE
Preoperative diagnosis  left ureteral stone    Postoperative diagnosis  left ureteral stone    Procedure performed  1.  Flexible cystoscopy  2.  left retrograde pyelogram  3.  left ureteroscopy with holmium-YAG laser lithotripsy and basket extraction of stone fragments ([id number])  4.  left ureteral stent placement 6 Fr x 28 cm  5.  Fluoroscopy time < 1 hour    Surgeon  Bobo Carter MD    Anesthesia  General    Complications  None    Specimen  Stone fragments for biochemical analysis    Findings  Cystoscopy revealed no tumors, stones or other mucosal abnormalities.  Retrograde pyelogram revealed a delicate system with identification of the stones seen on imaging.    Ureteroscopy revealed large distal left ureteral stone.     Indications  75 y.o. male with a history of 7mm Left UVJ stone only agreed to undergo the above named procedure after discussion of the alternatives, risks and benefits. Informed consent was obtained.      Procedure  The patient was taken to the operating room and placed supine on the operating table.  Pre-operative antibiotics were administered.  Bilateral lower extremity SCDs were placed.  After induction of general anesthesia the patient was positioned in dorsal lithotomy, prepped and draped in a sterile fashion.  A time-out was performed.      A 14 Macedonian flexible cystoscope was passed carefully via urethra into the bladder.  The left ureteral orifice was identified and a Sensor wire was passed retrograde to the level of the kidney and confirmed by fluoroscopy.  The flexible scope was off-loaded and the bladder emptied with a straight catheter.  An 8-10 coaxial dilator was passed without difficulty and then removed.  The semirigid ureteroscope was passed carefully along the Sensor wire through the urethra and into the distal ureter.  The stone was encountered and fragmented with a 200-micron holmium YAG laser fiber at laser settings of 0.2 Joules and a frequency of 50 Hz.  The fragments were  basket extracted.  At the completion of the procedure, all clinically significant fragments were removed from the ureter and only dust-like fragments remained.  The ureteroscope was withdrawn.  A 5-English open-ended was passed over the wire and the wire removed.  A retrograde pyelogram was performed by slowly injecting 5 mL of 50% Omnipaque contrast via the 5 English catheter with findings described above.  An Amplatz super-stiff was placed to the level of the renal pelvis confirmed by fluoroscopy.  A 6 Fr x 28 cm stent was positioned with the upper end in the upper pole and the lower in the bladder confirmed by fluoroscopy. The bladder was emptied and the procedure was complete. The patient tolerated the procedure well and was stable throughout.    Plan  The patient will follow up with me in 1 week for stent removal. He will be kept overnight for observation and can hopefully go home tomorrow if Cr improves

## 2020-11-16 NOTE — PLAN OF CARE
Goal Outcome Evaluation:  Plan of Care Reviewed With: patient   No reports of pain this shift.  Vitals unremarkable.  No difficulty with voiding.  Slightly blood tinged urine this am.  Currently kiki colored.  No acute events this shift.

## 2020-11-16 NOTE — PROGRESS NOTES
"Patient: Deniz Dickson  Procedure(s):  URETEROSCOPY LASER LITHOTRIPSY WITH STENT INSERTION, Rertrograde  Anesthesia type: General    Patient location: [facility] Surgical Floor  Last vitals: /53 (BP Location: Right arm, Patient Position: Lying)   Pulse 62   Temp 96.9 °F (36.1 °C) (Axillary)   Resp 20   Ht 179.1 cm (70.5\")   Wt 91.2 kg (201 lb)   SpO2 94%   BMI 28.43 kg/m²   Level of consciousness: awake, alert and oriented    Post-anesthesia pain: adequate analgesia  Airway patency: patent  Respiratory: unassisted  Cardiovascular: stable and blood pressure at baseline  Hydration: euvolemic    Anesthetic complications: no  "

## 2020-11-16 NOTE — ANESTHESIA PROCEDURE NOTES
Airway  Urgency: elective    Date/Time: 11/15/2020 7:58 PM  Airway not difficult    General Information and Staff    Patient location during procedure: OR  CRNA: Topher Peña CRNA    Indications and Patient Condition  Indications for airway management: airway protection    Preoxygenated: yes  Mask difficulty assessment: 0 - not attempted    Final Airway Details  Final airway type: supraglottic airway      Successful airway: classic  Size 5    Number of attempts at approach: 1  Assessment: lips, teeth, and gum same as pre-op

## 2020-11-16 NOTE — PROGRESS NOTES
"Urology Inpatient Progress Note    Subjective  Patient says he feels much better this morning.  He is tolerating regular diet.  He is ambulating.  He complains of mild discomfort with urinating, but is overall not very bothered by the stent.    Physical Exam  Visit Vitals  /53 (BP Location: Right arm, Patient Position: Lying)   Pulse 62   Temp 96.9 °F (36.1 °C) (Axillary)   Resp 20   Ht 179.1 cm (70.5\")   Wt 91.2 kg (201 lb)   SpO2 94%   BMI 28.43 kg/m²     Constitutional: NAD, WDWN.  Cardiovascular: Regular rate.  Pulmonary/Chest: Respirations are even and non-labored bilaterally.  Abdominal: Soft. No distension, tenderness, masses or guarding. No CVA tenderness.  Extremities: DIVYA x 4, Warm. No clubbing.  No cyanosis.    Skin: Pink, warm and dry.  No rashes noted.    Urine kiki-colored    I/O last 3 completed shifts:  In: 2840 [P.O.:240; I.V.:1500; IV Piggyback:1100]  Out: 775 [Urine:775]      Current Facility-Administered Medications:   •  acetaminophen (TYLENOL) tablet 650 mg, 650 mg, Oral, Q4H PRN **OR** acetaminophen (TYLENOL) 160 MG/5ML solution 650 mg, 650 mg, Oral, Q4H PRN **OR** acetaminophen (TYLENOL) suppository 650 mg, 650 mg, Rectal, Q4H PRN, Yoly Harmon, DO  •  albuterol (PROVENTIL) nebulizer solution 0.083% 2.5 mg/3mL, 2.5 mg, Nebulization, Q6H PRN, Yoly Harmon, DO  •  aluminum-magnesium hydroxide-simethicone (MAALOX MAX) 400-400-40 MG/5ML suspension 15 mL, 15 mL, Oral, Q6H PRN, Yoly Harmon, DO  •  atorvastatin (LIPITOR) tablet 10 mg, 10 mg, Oral, Nightly, Yoly Harmon, DO  •  bisacodyl (DULCOLAX) suppository 10 mg, 10 mg, Rectal, Daily PRN, Yoly Harmon, DO  •  bisoprolol (ZEBeta) tablet 2.5 mg, 2.5 mg, Oral, Daily, Yoly Harmon DO  •  finasteride (PROSCAR) tablet 5 mg, 5 mg, Oral, Daily, Yoly Harmon DO  •  HYDROcodone-acetaminophen (NORCO) 5-325 MG per tablet 1 tablet, 1 tablet, Oral, Q4H PRN, Yoly Harmon DO, 1 tablet at 11/16/20 0650  •  levothyroxine " (SYNTHROID, LEVOTHROID) tablet 137 mcg, 137 mcg, Oral, Daily, Yoly Harmon, DO  •  melatonin tablet 5 mg, 5 mg, Oral, Nightly PRN, Yoly Harmon DO  •  montelukast (SINGULAIR) tablet 10 mg, 10 mg, Oral, Nightly, Yoly Harmon, DO  •  multivitamin with minerals 1 tablet, 1 tablet, Oral, Daily, Yoly Harmon, DO  •  ondansetron (ZOFRAN) tablet 4 mg, 4 mg, Oral, Q6H PRN **OR** ondansetron (ZOFRAN) injection 4 mg, 4 mg, Intravenous, Q6H PRN, Yoly Harmon, DO  •  oxyCODONE-acetaminophen (PERCOCET) 7.5-325 MG per tablet 1 tablet, 1 tablet, Oral, Q4H PRN, Yoly Harmon, DO  •  sodium chloride 0.9 % flush 10 mL, 10 mL, Intravenous, PRN, Yoly Harmon DO  •  [COMPLETED] Insert peripheral IV, , , Once **AND** sodium chloride 0.9 % flush 10 mL, 10 mL, Intravenous, PRN, Yoly Harmon, DO  •  sodium chloride 0.9 % flush 10 mL, 10 mL, Intravenous, Q12H, Yoly Harmon DO, 10 mL at 11/15/20 2302  •  sodium chloride 0.9 % flush 10 mL, 10 mL, Intravenous, PRN, Yoly Harmon DO  •  sodium chloride 0.9 % infusion, 100 mL/hr, Intravenous, Continuous, Jude Oakley MD, Last Rate: 125 mL/hr at 11/16/20 0717, 125 mL/hr at 11/16/20 0717    Labs  Lab Results   Component Value Date    GLUCOSE 148 (H) 11/16/2020    CALCIUM 8.9 11/16/2020     11/16/2020    K 5.2 11/16/2020    CO2 25.5 11/16/2020     11/16/2020    BUN 19 11/16/2020    CREATININE 0.85 11/16/2020    EGFRIFAFRI 118 08/09/2019    EGFRIFNONA 88 11/16/2020    BCR 22.4 11/16/2020    ANIONGAP 10.5 11/16/2020       Lab Results   Component Value Date    WBC 8.02 11/16/2020    HGB 13.8 11/16/2020    HCT 40.5 11/16/2020    MCV 96.4 11/16/2020    PLT 88 (L) 11/16/2020       No results found for: URINECX    Brief Urine Lab Results  (Last result in the past 365 days)      Color   Clarity   Blood   Leuk Est   Nitrite   Protein   CREAT   Urine HCG        11/15/20 1458 Yellow Clear Small (1+) Moderate (2+) Negative Negative                 Radiographic  Studies  Ct Abdomen Pelvis Without Contrast    Result Date: 11/15/2020  Obstructing 5 mm stone at the left UVJ. Mild left hydronephrosis.  This report was finalized on 11/15/2020 4:12 PM by Dr Vijay Bro DO.      Patient Active Problem List   Diagnosis   • Atopic rhinitis   • BPH (benign prostatic hyperplasia)   • Gastroesophageal reflux disease   • Granulomatous disease (CMS/HCC)   • Hemorrhoids   • Hyperlipidemia   • Hypertension   • Hypothyroidism   • Idiopathic thrombocytopenic purpura (CMS/HCC)   • JUWAN on CPAP   • Vitamin D deficiency   • Skin lesion   • Diastasis recti   • Palpitations   • Osteoarthritis   • History of ITP   • Arthritic-like pain   • Coronary artery disease   • Dyslipidemia   • Hypertension   • Contracture of joint of right foot   • Bronchitis   • History of adenomatous polyp of colon   • Arthritis   • Chronic bilateral low back pain without sciatica   • CATHY (acute kidney injury) (CMS/HCC)   • Nephrolithiasis   • Ureterolithiasis       Assessment  75 y.o. male with a PMHx of  has a past medical history of Arthritis, Basal cell carcinoma (02/2020), Cancer (CMS/HCC), Coronary artery disease, Dyslipidemia, Elevated prostate specific antigen (PSA), Essential hypertension, benign, Full dentures, GERD (gastroesophageal reflux disease), H/O benign prostatic hypertrophy, H/O cardiovascular stress test (07/19/2019), High cholesterol, History of bronchitis, History of hemorrhoids, History of idiopathic thrombocytopenic purpura, History of malignant neoplasm of skin, History of sleep apnea, [place] (hard of hearing), Hypertension, Hypothyroidism, JUWAN on CPAP, Rectal bleeding, S/P right knee surgery (07/12/2017), Seasonal allergies, Tobacco abuse, and Wears glasses., who is 1 Day Post-Op left ureteroscopy and laser lithotripsy for an obstructing 7 mm left distal ureteral stone.  He is doing well and his renal function has normalized.    Plan  1.  Patient is appropriate for discharge from my standpoint.  I  have written his discharge medications and instructions.  2.  He should follow-up with me on 11/24/2020 for stent removal.

## 2020-11-16 NOTE — ANESTHESIA POSTPROCEDURE EVALUATION
Patient: Deniz Dickson    Procedure Summary     Date: 11/15/20 Room / Location: [facility] /  [facility] OR    Anesthesia Start: 1946 Anesthesia Stop: 2035    Procedure: URETEROSCOPY LASER LITHOTRIPSY WITH STENT INSERTION, Rertrograde (Left ) Diagnosis:       Nephrolithiasis      (Nephrolithiasis [N20.0])    Surgeon: Bobo Carter MD Provider: Topher Peña CRNA    Anesthesia Type: general ASA Status: 2 - Emergent          Anesthesia Type: general    Vitals  Vitals Value Taken Time   /71 11/15/20 2132   Temp 98.8 °F (37.1 °C) 11/15/20 2132   Pulse 73 11/15/20 2132   Resp 20 11/15/20 2132   SpO2 97 % 11/15/20 2132           Post Anesthesia Care and Evaluation    Patient location during evaluation: PACU  Patient participation: complete - patient participated  Level of consciousness: lethargic  Pain score: 0  Pain management: satisfactory to patient  Airway patency: patent  Anesthetic complications: No anesthetic complications  PONV Status: none  Cardiovascular status: acceptable and hemodynamically stable  Respiratory status: acceptable  Hydration status: acceptable

## 2020-11-17 ENCOUNTER — READMISSION MANAGEMENT (OUTPATIENT)
Dept: CALL CENTER | Facility: HOSPITAL | Age: 75
End: 2020-11-17

## 2020-11-17 VITALS
WEIGHT: 201 LBS | SYSTOLIC BLOOD PRESSURE: 145 MMHG | DIASTOLIC BLOOD PRESSURE: 72 MMHG | TEMPERATURE: 97.8 F | OXYGEN SATURATION: 97 % | RESPIRATION RATE: 16 BRPM | BODY MASS INDEX: 28.14 KG/M2 | HEIGHT: 71 IN | HEART RATE: 60 BPM

## 2020-11-17 LAB
ALBUMIN SERPL-MCNC: 3.9 G/DL (ref 3.5–5.2)
ALBUMIN/GLOB SERPL: 1.3 G/DL
ALP SERPL-CCNC: 65 U/L (ref 39–117)
ALT SERPL W P-5'-P-CCNC: 22 U/L (ref 1–41)
ANION GAP SERPL CALCULATED.3IONS-SCNC: 8.9 MMOL/L (ref 5–15)
AST SERPL-CCNC: 19 U/L (ref 1–40)
BASOPHILS # BLD AUTO: 0.03 10*3/MM3 (ref 0–0.2)
BASOPHILS NFR BLD AUTO: 0.3 % (ref 0–1.5)
BILIRUB SERPL-MCNC: 0.8 MG/DL (ref 0–1.2)
BUN SERPL-MCNC: 17 MG/DL (ref 8–23)
BUN/CREAT SERPL: 23.3 (ref 7–25)
CALCIUM SPEC-SCNC: 9.1 MG/DL (ref 8.6–10.5)
CHLORIDE SERPL-SCNC: 100 MMOL/L (ref 98–107)
CO2 SERPL-SCNC: 26.1 MMOL/L (ref 22–29)
CREAT SERPL-MCNC: 0.73 MG/DL (ref 0.76–1.27)
DEPRECATED RDW RBC AUTO: 45.2 FL (ref 37–54)
EOSINOPHIL # BLD AUTO: 0.14 10*3/MM3 (ref 0–0.4)
EOSINOPHIL NFR BLD AUTO: 1.6 % (ref 0.3–6.2)
ERYTHROCYTE [DISTWIDTH] IN BLOOD BY AUTOMATED COUNT: 12.4 % (ref 12.3–15.4)
GFR SERPL CREATININE-BSD FRML MDRD: 105 ML/MIN/1.73
GLOBULIN UR ELPH-MCNC: 2.9 GM/DL
GLUCOSE SERPL-MCNC: 104 MG/DL (ref 65–99)
HCT VFR BLD AUTO: 44.1 % (ref 37.5–51)
HGB BLD-MCNC: 14.9 G/DL (ref 13–17.7)
IMM GRANULOCYTES # BLD AUTO: 0.04 10*3/MM3 (ref 0–0.05)
IMM GRANULOCYTES NFR BLD AUTO: 0.5 % (ref 0–0.5)
LYMPHOCYTES # BLD AUTO: 2.19 10*3/MM3 (ref 0.7–3.1)
LYMPHOCYTES NFR BLD AUTO: 25.2 % (ref 19.6–45.3)
MCH RBC QN AUTO: 33.2 PG (ref 26.6–33)
MCHC RBC AUTO-ENTMCNC: 33.8 G/DL (ref 31.5–35.7)
MCV RBC AUTO: 98.2 FL (ref 79–97)
MONOCYTES # BLD AUTO: 0.86 10*3/MM3 (ref 0.1–0.9)
MONOCYTES NFR BLD AUTO: 9.9 % (ref 5–12)
NEUTROPHILS NFR BLD AUTO: 5.43 10*3/MM3 (ref 1.7–7)
NEUTROPHILS NFR BLD AUTO: 62.5 % (ref 42.7–76)
NRBC BLD AUTO-RTO: 0 /100 WBC (ref 0–0.2)
PLATELET # BLD AUTO: 95 10*3/MM3 (ref 140–450)
PMV BLD AUTO: 8.9 FL (ref 6–12)
POTASSIUM SERPL-SCNC: 4.4 MMOL/L (ref 3.5–5.2)
PROT SERPL-MCNC: 6.8 G/DL (ref 6–8.5)
RBC # BLD AUTO: 4.49 10*6/MM3 (ref 4.14–5.8)
SODIUM SERPL-SCNC: 135 MMOL/L (ref 136–145)
WBC # BLD AUTO: 8.69 10*3/MM3 (ref 3.4–10.8)

## 2020-11-17 PROCEDURE — 85025 COMPLETE CBC W/AUTO DIFF WBC: CPT | Performed by: INTERNAL MEDICINE

## 2020-11-17 PROCEDURE — 94799 UNLISTED PULMONARY SVC/PX: CPT

## 2020-11-17 PROCEDURE — 94660 CPAP INITIATION&MGMT: CPT

## 2020-11-17 PROCEDURE — 99238 HOSP IP/OBS DSCHRG MGMT 30/<: CPT | Performed by: INTERNAL MEDICINE

## 2020-11-17 PROCEDURE — 80053 COMPREHEN METABOLIC PANEL: CPT | Performed by: INTERNAL MEDICINE

## 2020-11-17 RX ADMIN — SODIUM CHLORIDE 100 ML/HR: 9 INJECTION, SOLUTION INTRAVENOUS at 02:32

## 2020-11-17 RX ADMIN — LEVOTHYROXINE SODIUM 137 MCG: 25 TABLET ORAL at 08:02

## 2020-11-17 RX ADMIN — FINASTERIDE 5 MG: 5 TABLET, FILM COATED ORAL at 08:02

## 2020-11-17 RX ADMIN — SODIUM CHLORIDE, PRESERVATIVE FREE 10 ML: 5 INJECTION INTRAVENOUS at 08:02

## 2020-11-17 RX ADMIN — MULTIPLE VITAMINS W/ MINERALS TAB 1 TABLET: TAB at 08:02

## 2020-11-17 RX ADMIN — BISOPROLOL FUMARATE 2.5 MG: 5 TABLET ORAL at 08:02

## 2020-11-17 NOTE — PLAN OF CARE
Goal Outcome Evaluation:  Plan of Care Reviewed With: patient  Vitals unremarkable.  No reports of pain.  Voiding without difficulty.  To be discharged home with followup with Dr. Carter.

## 2020-11-17 NOTE — PROGRESS NOTES
Discharge Planning Assessment  [facility]     Patient Name: Deniz Dickson  MRN: [id number]  Today's Date: 11/17/2020    Admit Date: 11/15/2020    Discharge Needs Assessment     Row Name 11/17/20 0951       Living Environment    Lives With  child(betsy), adult    Name(s) of Who Lives With Patient  daughter lives with him    Current Living Arrangements  home/apartment/condo    Duration at Residence  no issues per pt    Primary Care Provided by  self    Provides Primary Care For  no one    Family Caregiver if Needed  child(betsy), adult    Able to Return to Prior Arrangements  yes       Resource/Environmental Concerns    Resource/Environmental Concerns  none       Transition Planning    Patient/Family Anticipates Transition to  home with family    Patient/Family Anticipated Services at Transition  none    Transportation Anticipated  family or friend will provide daughter will transport       Discharge Needs Assessment    Readmission Within the Last 30 Days  no previous admission in last 30 days    Concerns to be Addressed  no discharge needs identified    Anticipated Changes Related to Illness  none    Equipment Needed After Discharge  none stated he has no equipment but has some from his wife that passed    Provided Post Acute Provider List?  N/A    Provided Post Acute Provider Quality & Resource List?  N/A        Discharge Plan     Row Name 11/17/20 0953       Plan    Plan  pt resting in bed, plans to go home today, daughter that lives with him will transport; zero issues or concerns at this time; no poa or living will, does not want information; card given and discussed meds to beds, call center        Continued Care and Services - Admitted Since 11/15/2020    Coordination has not been started for this encounter.       Expected Discharge Date and Time     Expected Discharge Date Expected Discharge Time    Nov 18, 2020         Demographic Summary     Row Name 11/17/20 0950       General Information    Admission  Type  inpatient    Arrived From  emergency department    Referral Source  admission list    Reason for Consult  discharge planning    Preferred Language  English     Used During This Interaction  no        Functional Status     Row Name 11/17/20 0951       Functional Status    Usual Activity Tolerance  fair    Current Activity Tolerance  fair    Functional Status Comments  cares for self at home but daughter lives with him and is available if needed       Functional Status, IADL    Medications  independent    Meal Preparation  independent    Housekeeping  independent    Laundry  independent    Shopping  independent       Mental Status    General Appearance WDL  WDL       Mental Status Summary    Recent Changes in Mental Status/Cognitive Functioning  no changes                Karla Braun, RN

## 2020-11-17 NOTE — PLAN OF CARE
Goal Outcome Evaluation:  Plan of Care Reviewed With: patient  Progress: improving  Outcome Summary: Patient did well throughout the night. Urine remains a dark kiki color. Pain upon urination. Vital have remained stable and will continue to monitor. no overnight events to report.

## 2020-11-17 NOTE — DISCHARGE SUMMARY
[facility]   DISCHARGE SUMMARY      Name:  Deniz Dickson   Age:  75 y.o.  Sex:  male  :  1945  MRN:  [id number]   Visit Number:  [id number]  Primary Care Physician:  Zane Magallon MD  Date of Discharge:  2020  Admission Date:  11/15/2020      Discharge Diagnosis:         CATHY (acute kidney injury) (CMS/Formerly [facility])    BPH (benign prostatic hyperplasia)    JUWAN on CPAP    History of ITP    Coronary artery disease    Arthritis    Nephrolithiasis    Ureterolithiasis          Consults:     Consults     Date and Time Order Name Status Description    11/15/2020 1719 Urology (on-call MD unless specified) Completed           Procedures Performed:      Procedure(s):  URETEROSCOPY LASER LITHOTRIPSY WITH STENT INSERTION, Rertrograde           Hospital Course:   The patient was admitted on 11/15/2020  Please see H&P for details on admission HPI and ROS.  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  The patient presented to the emergency room today after feeling himself until 10 days ago.  He has had progressively worsening left lower quadrant pain.  The only medication change that he is made recently was starting prednisone for his arthritis from the rheumatologist.  He denies fever, chills, chest pain, shortness of breath, nausea, vomiting, abdominal pain.  He denies any exposure to COVID-19.     In the emergency room, vital signs were well-appearing with mildly elevated blood pressure 153/80.  His urinalysis showed 2+ leukocytes with 3-5 white blood cells and 1+ bacteria.  A CT scan of the abdomen and pelvis showed an obstructive 5 mm stone at the left UVJ with left mild hydronephrosis.  His Covid screen is negative.  Lab work: Sodium 135, potassium 4.5, BUN 30, creatinine 1.58 with a baseline of 0.78,  glucose 147.  The patient was provided 1 g of Rocephin, Zofran, and 1 L normal saline bolus.  The hospitalist service was asked to admit.  Dr. Carter with urology agreed to follow in consultation    After admission Dr. Carter took him to the OR to do ureteroscopy with stent placement and laser lithotripsy for his stone.  He has improved and his with his IV fluids and the IV antibiotic his white count has improved and his renal function has improved to normal.  He is feeling better and he is going to be discharged on Cipro and pain medicine and further instructions as per Dr. Carter.  Patient to be continued his other home medications upon discharge and follow-up with PCP.  Patient has had unremarkable course and further close follow-up and rest of the instructions will be as per Dr. Carter    Vital Signs:     Temp:  [97.8 °F (36.6 °C)-98.2 °F (36.8 °C)] 97.8 °F (36.6 °C)  Heart Rate:  [60-74] 60  Resp:  [16] 16  BP: (112-145)/(58-81) 145/72    Physical Exam:     General Appearance:    Alert and cooperative, not in any acute distress.   Head:    Atraumatic and normocephalic, without obvious abnormality.   Eyes:            PERRLA,  No pallor. Extraocular movements are within normal limits.   Neck:   Supple,  No lymph glands, no bruit   Lungs:     Chest shape is normal. Breath sounds heard bilaterally equally.  No crackles or wheezing.     Heart:    Normal S1 and S2, no murmur,  No JVD   Abdomen:     Normal bowel sounds, no masses, no organomegaly. Soft     nontender, no guarding, no rebound tenderness   Extremities:   Moves all extremities well, no edema, no cyanosis,    Skin:   No  bruising or rash.   Neurologic:   Grossly non focal and moves all extrimities.        Pertinent Lab Results:     Results from last 7 days   Lab Units 11/17/20  0708 11/16/20  0620 11/15/20  1420   SODIUM mmol/L 135* 137 135*   POTASSIUM mmol/L 4.4 5.2 4.5   CHLORIDE mmol/L 100 101 99   CO2 mmol/L 26.1 25.5 25.1   BUN mg/dL 17 19 30*   CREATININE  mg/dL 0.73* 0.85 1.58*   CALCIUM mg/dL 9.1 8.9 10.2   BILIRUBIN mg/dL 0.8  --  0.8   ALK PHOS U/L 65  --  80   ALT (SGPT) U/L 22  --  23   AST (SGOT) U/L 19  --  18   GLUCOSE mg/dL 104* 148* 147*     Results from last 7 days   Lab Units 11/17/20  0708 11/16/20  0620 11/15/20  1420   WBC 10*3/mm3 8.69 8.02 11.49*   HEMOGLOBIN g/dL 14.9 13.8 15.8   HEMATOCRIT % 44.1 40.5 46.6   PLATELETS 10*3/mm3 95* 88* 102*         No results found for: BLOODCX, URINECX, WOUNDCX, MRSACX    Pertinent Radiology Results:    Imaging Results (Most Recent)     Procedure Component Value Units Date/Time    CT Abdomen Pelvis Without Contrast [[id number]] Collected: 11/15/20 1608     Updated: 11/15/20 1614    Narrative:      CT SCAN OF THE ABDOMEN AND PELVIS WITHOUT CONTRAST     11/15/2020 3:46  PM      HISTORY: Left lower quadrant abdominal pain.     COMPARISON: None.     PROCEDURE: Axial images were obtained from the lung bases to the pubic  symphysis by computed tomography. This study was performed with  techniques to keep radiation doses as low as reasonably achievable,  (ALARA). Individualized dose reduction techniques using automated  exposure control or adjustment of mA and/or kV according to the patient  size were employed.     FINDINGS:      ABDOMEN: Chronic changes in the lung bases.     Tiny gallstones suspected. Liver and spleen unremarkable. Pancreas and  adrenals are unremarkable. Mild left hydronephrosis and hydroureter  secondary to a 5 mm left UVJ stone. No intrarenal stones. No bowel  obstruction or acute inflammatory process. Moderate stool throughout the  colon. Small fat-containing umbilical hernia. No free air, free fluid,  or lymphadenopathy. Mild calcified plaque of the abdominal aorta without  aneurysm.     PELVIS: Mild prostate enlargement. No free fluid or adenopathy. Previous  hernia repair of the left pelvic wall. No acute osseous abnormalities.             Impression:      Obstructing 5 mm stone at the left UVJ.  Mild left  hydronephrosis.     This report was finalized on 11/15/2020 4:12 PM by Dr Vijay Bro DO.                  Discharge Disposition:      Home or Self Care    Discharge Medication:         Discharge Medications      New Medications      Instructions Start Date   acetaminophen 325 MG tablet  Commonly known as: Tylenol   650 mg, Oral, Every 6 Hours      ciprofloxacin 250 MG tablet  Commonly known as: Cipro   250 mg, Oral, 2 Times Daily      docusate sodium 100 MG capsule  Commonly known as: Colace   100 mg, Oral, 2 Times Daily, If taking percocet      oxybutynin XL 10 MG 24 hr tablet  Commonly known as: Ditropan XL   10 mg, Oral, Daily PRN      oxyCODONE 5 MG immediate release tablet  Commonly known as: Roxicodone   5 mg, Oral, Every 6 Hours PRN      phenazopyridine 100 MG tablet  Commonly known as: PYRIDIUM   100 mg, Oral, 3 Times Daily PRN      tamsulosin 0.4 MG capsule 24 hr capsule  Commonly known as: FLOMAX   0.4 mg, Oral, Nightly         Continue These Medications      Instructions Start Date   aspirin 81 MG EC tablet   81 mg, Oral, Daily      bisoprolol 5 MG tablet  Commonly known as: ZEBeta   TAKE ONE-HALF TABLET BY MOUTH DAILY      Centrum Silver Adult 50+ tablet tablet  Generic drug: multivitamin with minerals   1 tablet, Oral, Daily      famotidine 40 MG tablet  Commonly known as: PEPCID   40 mg, Oral, 2 Times Daily      finasteride 5 MG tablet  Commonly known as: PROSCAR   TAKE ONE TABLET BY MOUTH DAILY      fish oil 1200 MG capsule capsule   1,200 mg, Oral, 2 Times Daily With Meals      Flonase 50 MCG/ACT nasal spray  Generic drug: fluticasone   1 spray, Nasal, Daily      levothyroxine 137 MCG tablet  Commonly known as: SYNTHROID, LEVOTHROID   137 mcg, Oral, Daily      montelukast 10 MG tablet  Commonly known as: Singulair   10 mg, Oral, Nightly      OSTEO BI-FLEX REGULAR STRENGTH PO   1 tablet, Oral, 2 Times Daily      Potassium 75 MG tablet   595 mg, Oral, Daily      sildenafil 20 MG  tablet  Commonly known as: REVATIO   1 to 3 tablets daily as needed for ED      triamcinolone 0.1 % cream  Commonly known as: KENALOG   1 application, Topical, As Needed      vitamin B-12 1000 MCG tablet  Commonly known as: CYANOCOBALAMIN   1,000 mcg, Oral, Daily      VITAMIN D-3 PO   1 tablet, Oral, Daily      Vytorin 10-20 MG per tablet  Generic drug: ezetimibe-simvastatin   1 tablet, Oral, Nightly         Stop These Medications    celecoxib 100 MG capsule  Commonly known as: CeleBREX            Discharge Diet:       Regular diet      Follow-up Appointments:      Future Appointments   Date Time Provider Department Center   11/24/2020  1:00 PM Bobo Carter MD [facility] None   12/28/2020 11:00 AM Jerome Whiting MD [facility]   4/20/2021 10:00 AM Zane Magallon MD [facility]   7/6/2021  9:00 AM Doroteo Justice MD [facility] None   8/9/2021  1:45 PM Suresh Ojeda MD [facility] None     Follow-up with PCP in a week    Test Results Pending at Discharge:      Pending Labs     Order Current Status    Green Top (No Gel) In process    [place] Draw In process    STONE ANALYSIS - Calculus, Kidney, Left In process             Jude Oakley MD  11/17/20  09:11 EST    Time:  Discharge 25 min    Please note that portions of this note were completed with a voice recognition program.

## 2020-11-17 NOTE — OUTREACH NOTE
Prep Survey      Responses   [facility] patient discharged from?  [place]   Is LACE score < 7 ?  No   Eligibility  [facility]   Date of Admission  11/15/20   Date of Discharge  11/17/20   Discharge Disposition  Home or Self Care   Discharge diagnosis  URETEROSCOPY LASER LITHOTRIPSY WITH STENT INSERTION   Does the patient have one of the following disease processes/diagnoses(primary or secondary)?  Other   Does the patient have Home health ordered?  No   Is there a DME ordered?  No   Prep survey completed?  Yes          Yoly Johnson RN

## 2020-11-18 ENCOUNTER — TRANSITIONAL CARE MANAGEMENT TELEPHONE ENCOUNTER (OUTPATIENT)
Dept: CALL CENTER | Facility: HOSPITAL | Age: 75
End: 2020-11-18

## 2020-11-18 NOTE — OUTREACH NOTE
Call Center TCM Note      Responses   [facility] patient discharged from?  [place]   Does the patient have one of the following disease processes/diagnoses(primary or secondary)?  Other   TCM attempt successful?  No   Unsuccessful attempts  Attempt 1          Radha Osborn RN    11/18/2020, 15:21 EST

## 2020-11-18 NOTE — OUTREACH NOTE
Call Center TCM Note      Responses   [facility] patient discharged from?  [facility]   Does the patient have one of the following disease processes/diagnoses(primary or secondary)?  Other   TCM attempt successful?  No   Unsuccessful attempts  Attempt 2          Radha Osborn RN    11/18/2020, 17:03 EST

## 2020-11-18 NOTE — PROGRESS NOTES
Case Management Discharge Note           Provided Post Acute Provider List?: N/A  Provided Post Acute Provider Quality & Resource List?: N/A    Selected Continued Care - Discharged on 11/17/2020 Admission date: 11/15/2020 - Discharge disposition: Home or Self Care            Transportation Services  Private: Car    Final Discharge Disposition Code: 01 - home or self-care

## 2020-11-19 ENCOUNTER — TRANSITIONAL CARE MANAGEMENT TELEPHONE ENCOUNTER (OUTPATIENT)
Dept: CALL CENTER | Facility: HOSPITAL | Age: 75
End: 2020-11-19

## 2020-11-19 NOTE — OUTREACH NOTE
Call Center TCM Note      Responses   [facility] patient discharged from?  [facility]   Does the patient have one of the following disease processes/diagnoses(primary or secondary)?  Other   TCM attempt successful?  No   Unsuccessful attempts  Attempt 3          Veena Phoenix RN    11/19/2020, 10:46 EST

## 2020-11-20 LAB
QT INTERVAL: 410 MS
QTC INTERVAL: 433 MS

## 2020-11-23 ENCOUNTER — OFFICE VISIT (OUTPATIENT)
Dept: INTERNAL MEDICINE | Facility: CLINIC | Age: 75
End: 2020-11-23

## 2020-11-23 VITALS
DIASTOLIC BLOOD PRESSURE: 72 MMHG | RESPIRATION RATE: 16 BRPM | HEART RATE: 69 BPM | SYSTOLIC BLOOD PRESSURE: 118 MMHG | TEMPERATURE: 98.2 F | HEIGHT: 71 IN | BODY MASS INDEX: 28.98 KG/M2 | WEIGHT: 207 LBS | OXYGEN SATURATION: 97 %

## 2020-11-23 DIAGNOSIS — N20.0 NEPHROLITHIASIS: ICD-10-CM

## 2020-11-23 DIAGNOSIS — Z00.00 ROUTINE GENERAL MEDICAL EXAMINATION AT A HEALTH CARE FACILITY: Primary | ICD-10-CM

## 2020-11-23 DIAGNOSIS — E78.2 MIXED HYPERLIPIDEMIA: ICD-10-CM

## 2020-11-23 PROCEDURE — 99213 OFFICE O/P EST LOW 20 MIN: CPT | Performed by: INTERNAL MEDICINE

## 2020-11-23 RX ORDER — PNEUMOCOCCAL VACCINE POLYVALENT 25; 25; 25; 25; 25; 25; 25; 25; 25; 25; 25; 25; 25; 25; 25; 25; 25; 25; 25; 25; 25; 25; 25 UG/.5ML; UG/.5ML; UG/.5ML; UG/.5ML; UG/.5ML; UG/.5ML; UG/.5ML; UG/.5ML; UG/.5ML; UG/.5ML; UG/.5ML; UG/.5ML; UG/.5ML; UG/.5ML; UG/.5ML; UG/.5ML; UG/.5ML; UG/.5ML; UG/.5ML; UG/.5ML; UG/.5ML; UG/.5ML; UG/.5ML
0.5 INJECTION, SOLUTION INTRAMUSCULAR; SUBCUTANEOUS ONCE
Qty: 0.5 ML | Refills: 0 | Status: SHIPPED | OUTPATIENT
Start: 2020-11-23 | End: 2020-11-23

## 2020-11-23 NOTE — PROGRESS NOTES
Subjective     Patient ID: Deniz Dickson is a 75 y.o. male. Patient is here for management of multiple medical problems.     Chief Complaint   Patient presents with   • Nephrolithiasis     Hospital Follow up     History of Present Illness     Recent renal stone removed and getting stent removed.   Increase urinary frequency.  Discharge improved.    finished abx.      The following portions of the patient's history were reviewed and updated as appropriate: allergies, current medications, past family history, past medical history, past social history, past surgical history and problem list.    Review of Systems   Constitutional: Negative for diaphoresis and fatigue.   All other systems reviewed and are negative.      Current Outpatient Medications:   •  aspirin 81 MG EC tablet, Take 1 tablet by mouth Daily., Disp: 90 tablet, Rfl: 1  •  bisoprolol (ZEBeta) 5 MG tablet, TAKE ONE-HALF TABLET BY MOUTH DAILY, Disp: 45 tablet, Rfl: 1  •  Cholecalciferol (VITAMIN D-3 PO), Take 1 tablet by mouth Daily., Disp: , Rfl:   •  docusate sodium (Colace) 100 MG capsule, Take 1 capsule by mouth 2 (Two) Times a Day. If taking percocet, Disp: 15 capsule, Rfl: 1  •  famotidine (PEPCID) 40 MG tablet, Take 40 mg by mouth 2 (Two) Times a Day., Disp: , Rfl:   •  finasteride (PROSCAR) 5 MG tablet, TAKE ONE TABLET BY MOUTH DAILY, Disp: 90 tablet, Rfl: 2  •  fluticasone (FLONASE) 50 MCG/ACT nasal spray, 1 spray into the nostril(s) as directed by provider Daily., Disp: , Rfl:   •  Glucosamine-Chondroitin (OSTEO BI-FLEX REGULAR STRENGTH PO), Take 1 tablet by mouth 2 (two) times a day., Disp: , Rfl:   •  levothyroxine (SYNTHROID, LEVOTHROID) 137 MCG tablet, Take 1 tablet by mouth Daily., Disp: 90 tablet, Rfl: 3  •  montelukast (Singulair) 10 MG tablet, Take 1 tablet by mouth Every Night., Disp: 90 tablet, Rfl: 3  •  Multiple Vitamins-Minerals (CENTRUM SILVER ADULT 50+) tablet, Take 1 tablet by mouth daily., Disp: , Rfl:   •  Omega-3 Fatty  "Acids (FISH OIL) 1200 MG capsule capsule, Take 1,200 mg by mouth 2 (Two) Times a Day With Meals., Disp: , Rfl:   •  oxybutynin XL (Ditropan XL) 10 MG 24 hr tablet, Take 1 tablet by mouth Daily As Needed (bladder spasm)., Disp: 10 tablet, Rfl: 0  •  Potassium 75 MG tablet, Take 595 mg by mouth Daily., Disp: , Rfl:   •  sildenafil (REVATIO) 20 MG tablet, 1 to 3 tablets daily as needed for ED, Disp: 30 tablet, Rfl: 11  •  tamsulosin (FLOMAX) 0.4 MG capsule 24 hr capsule, Take 1 capsule by mouth Every Night., Disp: 10 capsule, Rfl: 0  •  vitamin B-12 (CYANOCOBALAMIN) 1000 MCG tablet, Take 1,000 mcg by mouth Daily., Disp: , Rfl:   •  Vytorin 10-20 MG per tablet, Take 1 tablet by mouth Every Night., Disp: 30 tablet, Rfl: 11  •  triamcinolone (KENALOG) 0.1 % cream, Apply 1 application topically to the appropriate area as directed As Needed., Disp: , Rfl:     Objective      Blood pressure 118/72, pulse 69, temperature 98.2 °F (36.8 °C), resp. rate 16, height 179.1 cm (70.5\"), weight 93.9 kg (207 lb), SpO2 97 %.    Physical Exam     General Appearance:    Alert, cooperative, no distress, appears stated age   Head:    Normocephalic, without obvious abnormality, atraumatic   Eyes:    PERRL, conjunctiva/corneas clear, EOM's intact   Ears:    Normal TM's and external ear canals, both ears   Nose:   Nares normal, septum midline, mucosa normal, no drainage   or sinus tenderness   Throat:   Lips, mucosa, and tongue normal; teeth and gums normal   Neck:   Supple, symmetrical, trachea midline, no adenopathy;        thyroid:  No enlargement/tenderness/nodules; no carotid    bruit or JVD   Back:     Symmetric, no curvature, ROM normal, no CVA tenderness   Lungs:     Clear to auscultation bilaterally, respirations unlabored   Chest wall:    No tenderness or deformity   Heart:    Regular rate and rhythm, S1 and S2 normal, no murmur,        rub or gallop   Abdomen:     Soft, non-tender, bowel sounds active all four quadrants,     no " masses, no organomegaly   Extremities:   Extremities normal, atraumatic, no cyanosis or edema   Pulses:   2+ and symmetric all extremities   Skin:   Skin color, texture, turgor normal, no rashes or lesions   Lymph nodes:   Cervical, supraclavicular, and axillary nodes normal   Neurologic:   CNII-XII intact. Normal strength, sensation and reflexes       throughout      Results for orders placed or performed during the hospital encounter of 11/15/20   COVID-19,[facility] IN-HOUSE,Nasal Swab No Transport Media 3-4 HR TAT - Swab, Nasal Cavity    Specimen: Nasal Cavity; Swab   Result Value Ref Range    COVID19 Not Detected Not Detected - Ref. Range   Comprehensive Metabolic Panel    Specimen: Blood   Result Value Ref Range    Glucose 147 (H) 65 - 99 mg/dL    BUN 30 (H) 8 - 23 mg/dL    Creatinine 1.58 (H) 0.76 - 1.27 mg/dL    Sodium 135 (L) 136 - 145 mmol/L    Potassium 4.5 3.5 - 5.2 mmol/L    Chloride 99 98 - 107 mmol/L    CO2 25.1 22.0 - 29.0 mmol/L    Calcium 10.2 8.6 - 10.5 mg/dL    Total Protein 7.9 6.0 - 8.5 g/dL    Albumin 4.50 3.50 - 5.20 g/dL    ALT (SGPT) 23 1 - 41 U/L    AST (SGOT) 18 1 - 40 U/L    Alkaline Phosphatase 80 39 - 117 U/L    Total Bilirubin 0.8 0.0 - 1.2 mg/dL    eGFR Non African Amer 43 (L) >60 mL/min/1.73    Globulin 3.4 gm/dL    A/G Ratio 1.3 g/dL    BUN/Creatinine Ratio 19.0 7.0 - 25.0    Anion Gap 10.9 5.0 - 15.0 mmol/L   Lipase    Specimen: Blood   Result Value Ref Range    Lipase 54 13 - 60 U/L   Urinalysis With Microscopic If Indicated (No Culture) - Urine, Clean Catch    Specimen: Urine, Clean Catch   Result Value Ref Range    Color, UA Yellow Yellow, Straw    Appearance, UA Clear Clear    pH, UA 6.0 5.0 - 8.0    Specific Gravity, UA 1.020 1.005 - 1.030    Glucose, UA Negative Negative    Ketones, UA Negative Negative    Bilirubin, UA Negative Negative    Blood, UA Small (1+) (A) Negative    Protein, UA Negative Negative    Leuk Esterase, UA Moderate (2+) (A) Negative    Nitrite, UA Negative  Negative    Urobilinogen, UA 0.2 E.U./dL 0.2 - 1.0 E.U./dL   CBC Auto Differential    Specimen: Blood   Result Value Ref Range    WBC 11.49 (H) 3.40 - 10.80 10*3/mm3    RBC 4.80 4.14 - 5.80 10*6/mm3    Hemoglobin 15.8 13.0 - 17.7 g/dL    Hematocrit 46.6 37.5 - 51.0 %    MCV 97.1 (H) 79.0 - 97.0 fL    MCH 32.9 26.6 - 33.0 pg    MCHC 33.9 31.5 - 35.7 g/dL    RDW 12.3 12.3 - 15.4 %    RDW-SD 44.6 37.0 - 54.0 fl    MPV 8.5 6.0 - 12.0 fL    Platelets 102 (L) 140 - 450 10*3/mm3    Neutrophil % 82.6 (H) 42.7 - 76.0 %    Lymphocyte % 8.6 (L) 19.6 - 45.3 %    Monocyte % 7.5 5.0 - 12.0 %    Eosinophil % 0.8 0.3 - 6.2 %    Basophil % 0.2 0.0 - 1.5 %    Immature Grans % 0.3 0.0 - 0.5 %    Neutrophils, Absolute 9.49 (H) 1.70 - 7.00 10*3/mm3    Lymphocytes, Absolute 0.99 0.70 - 3.10 10*3/mm3    Monocytes, Absolute 0.86 0.10 - 0.90 10*3/mm3    Eosinophils, Absolute 0.09 0.00 - 0.40 10*3/mm3    Basophils, Absolute 0.02 0.00 - 0.20 10*3/mm3    Immature Grans, Absolute 0.04 0.00 - 0.05 10*3/mm3    nRBC 0.0 0.0 - 0.2 /100 WBC   Urinalysis, Microscopic Only - Urine, Clean Catch    Specimen: Urine, Clean Catch   Result Value Ref Range    RBC, UA 3-5 (A) None Seen /HPF    WBC, UA 3-5 (A) None Seen /HPF    Bacteria, UA Trace (A) None Seen /HPF    Squamous Epithelial Cells, UA 0-2 None Seen, 0-2 /HPF    Hyaline Casts, UA None Seen None Seen /LPF    Methodology Manual Light Microscopy    Troponin    Specimen: Blood   Result Value Ref Range    Troponin T <0.010 0.000 - 0.030 ng/mL   Basic Metabolic Panel    Specimen: Blood   Result Value Ref Range    Glucose 148 (H) 65 - 99 mg/dL    BUN 19 8 - 23 mg/dL    Creatinine 0.85 0.76 - 1.27 mg/dL    Sodium 137 136 - 145 mmol/L    Potassium 5.2 3.5 - 5.2 mmol/L    Chloride 101 98 - 107 mmol/L    CO2 25.5 22.0 - 29.0 mmol/L    Calcium 8.9 8.6 - 10.5 mg/dL    eGFR Non African Amer 88 >60 mL/min/1.73    BUN/Creatinine Ratio 22.4 7.0 - 25.0    Anion Gap 10.5 5.0 - 15.0 mmol/L   CBC Auto Differential     Specimen: Blood   Result Value Ref Range    WBC 8.02 3.40 - 10.80 10*3/mm3    RBC 4.20 4.14 - 5.80 10*6/mm3    Hemoglobin 13.8 13.0 - 17.7 g/dL    Hematocrit 40.5 37.5 - 51.0 %    MCV 96.4 79.0 - 97.0 fL    MCH 32.9 26.6 - 33.0 pg    MCHC 34.1 31.5 - 35.7 g/dL    RDW 12.4 12.3 - 15.4 %    RDW-SD 44.1 37.0 - 54.0 fl    MPV 9.0 6.0 - 12.0 fL    Platelets 88 (L) 140 - 450 10*3/mm3    Neutrophil % 85.9 (H) 42.7 - 76.0 %    Lymphocyte % 9.2 (L) 19.6 - 45.3 %    Monocyte % 4.6 (L) 5.0 - 12.0 %    Eosinophil % 0.0 (L) 0.3 - 6.2 %    Basophil % 0.1 0.0 - 1.5 %    Immature Grans % 0.2 0.0 - 0.5 %    Neutrophils, Absolute 6.88 1.70 - 7.00 10*3/mm3    Lymphocytes, Absolute 0.74 0.70 - 3.10 10*3/mm3    Monocytes, Absolute 0.37 0.10 - 0.90 10*3/mm3    Eosinophils, Absolute 0.00 0.00 - 0.40 10*3/mm3    Basophils, Absolute 0.01 0.00 - 0.20 10*3/mm3    Immature Grans, Absolute 0.02 0.00 - 0.05 10*3/mm3    nRBC 0.0 0.0 - 0.2 /100 WBC   Comprehensive Metabolic Panel    Specimen: Blood   Result Value Ref Range    Glucose 104 (H) 65 - 99 mg/dL    BUN 17 8 - 23 mg/dL    Creatinine 0.73 (L) 0.76 - 1.27 mg/dL    Sodium 135 (L) 136 - 145 mmol/L    Potassium 4.4 3.5 - 5.2 mmol/L    Chloride 100 98 - 107 mmol/L    CO2 26.1 22.0 - 29.0 mmol/L    Calcium 9.1 8.6 - 10.5 mg/dL    Total Protein 6.8 6.0 - 8.5 g/dL    Albumin 3.90 3.50 - 5.20 g/dL    ALT (SGPT) 22 1 - 41 U/L    AST (SGOT) 19 1 - 40 U/L    Alkaline Phosphatase 65 39 - 117 U/L    Total Bilirubin 0.8 0.0 - 1.2 mg/dL    eGFR Non African Amer 105 >60 mL/min/1.73    Globulin 2.9 gm/dL    A/G Ratio 1.3 g/dL    BUN/Creatinine Ratio 23.3 7.0 - 25.0    Anion Gap 8.9 5.0 - 15.0 mmol/L   CBC Auto Differential    Specimen: Blood   Result Value Ref Range    WBC 8.69 3.40 - 10.80 10*3/mm3    RBC 4.49 4.14 - 5.80 10*6/mm3    Hemoglobin 14.9 13.0 - 17.7 g/dL    Hematocrit 44.1 37.5 - 51.0 %    MCV 98.2 (H) 79.0 - 97.0 fL    MCH 33.2 (H) 26.6 - 33.0 pg    MCHC 33.8 31.5 - 35.7 g/dL    RDW 12.4 12.3  - 15.4 %    RDW-SD 45.2 37.0 - 54.0 fl    MPV 8.9 6.0 - 12.0 fL    Platelets 95 (L) 140 - 450 10*3/mm3    Neutrophil % 62.5 42.7 - 76.0 %    Lymphocyte % 25.2 19.6 - 45.3 %    Monocyte % 9.9 5.0 - 12.0 %    Eosinophil % 1.6 0.3 - 6.2 %    Basophil % 0.3 0.0 - 1.5 %    Immature Grans % 0.5 0.0 - 0.5 %    Neutrophils, Absolute 5.43 1.70 - 7.00 10*3/mm3    Lymphocytes, Absolute 2.19 0.70 - 3.10 10*3/mm3    Monocytes, Absolute 0.86 0.10 - 0.90 10*3/mm3    Eosinophils, Absolute 0.14 0.00 - 0.40 10*3/mm3    Basophils, Absolute 0.03 0.00 - 0.20 10*3/mm3    Immature Grans, Absolute 0.04 0.00 - 0.05 10*3/mm3    nRBC 0.0 0.0 - 0.2 /100 WBC   ECG 12 Lead   Result Value Ref Range    QT Interval 410 ms    QTC Interval 433 ms   Light Blue Top   Result Value Ref Range    Extra Tube hold for add-on    Green Top (Gel)   Result Value Ref Range    Extra Tube Hold for add-ons.    Lavender Top   Result Value Ref Range    Extra Tube hold for add-on    Gold Top - SST   Result Value Ref Range    Extra Tube Hold for add-ons.          Assessment/Plan   Cr back to nomral.    Pt doing well post op      Pt still followed by Dr Mccormick at .        Diagnoses and all orders for this visit:    1. Routine general medical examination at a health care facility (Primary)  -     Lipid Panel  -     CBC & Differential  -     Vitamin B12  -     Comprehensive Metabolic Panel  -     TSH  -     T4, Free    2. Nephrolithiasis  -     Lipid Panel  -     CBC & Differential  -     Vitamin B12  -     Comprehensive Metabolic Panel  -     TSH  -     T4, Free    3. Mixed hyperlipidemia  -     Lipid Panel  -     CBC & Differential  -     Vitamin B12  -     Comprehensive Metabolic Panel  -     TSH  -     T4, Free    Other orders  -     Discontinue: pneumococcal polysaccharide 23-valent (Pneumovax 23) 25 MCG/0.5ML vaccine; Inject 0.5 mL into the appropriate muscle as directed by prescriber 1 (One) Time for 1 dose.  Dispense: 0.5 mL; Refill: 0      Return in about 6  months (around 5/23/2021).          There are no Patient Instructions on file for this visit.     Zane Magallon MD    Assessment/Plan

## 2020-11-24 ENCOUNTER — PROCEDURE VISIT (OUTPATIENT)
Dept: UROLOGY | Facility: CLINIC | Age: 75
End: 2020-11-24

## 2020-11-24 VITALS — HEIGHT: 71 IN | BODY MASS INDEX: 28.98 KG/M2 | TEMPERATURE: 98.1 F | WEIGHT: 207 LBS

## 2020-11-24 DIAGNOSIS — N20.0 NEPHROLITHIASIS: Primary | ICD-10-CM

## 2020-11-24 LAB
CALCIUM OXALATE DIHYDRATE MFR STONE IR: 80 %
COLOR STONE: NORMAL
COM MFR STONE: 15 %
COMPN STONE: NORMAL
HYDROXYAPATITE 24H ENGDIFF UR: 5 %
LABORATORY COMMENT REPORT: NORMAL
Lab: NORMAL
Lab: NORMAL
PHOTO: NORMAL
SIZE STONE: NORMAL MM
SPEC SOURCE SUBJ: NORMAL
WT STONE: 4 MG

## 2020-11-24 PROCEDURE — 52310 CYSTOSCOPY AND TREATMENT: CPT | Performed by: UROLOGY

## 2020-11-24 NOTE — PROGRESS NOTES
Preoperative diagnosis  Foreign body in genitourinary tract    Postoperative diagnosis  Foreign body in genitourinary tract    Procedure  Flexible cystourethroscopy with stent removal    Attending surgeon  Bobo Carter MD    Anesthesia  2% lidocaine jelly intraurethrally    Complications  None    Indications  75 y.o. male who is status post left ureteroscopy with holmium laser lithotripsy who presents for stent removal.    Procedure  Detailed information of all possible complications and side effects were discussed with the patient.  Informed consent was obtained. Patient was given one dose of antibiotics. The patient was placed in supine position and a timeout was performed. The patient was prepped and draped in sterile fashion.  Next, 2% lidocaine jelly was bluntly injected per urethra without difficulty. The 14 Malay flexible cystoscope was passed through the urethra and into the bladder.  The stent was visualized, grasped and removed in its entirety.  The patient tolerated the procedure well.    Plan  1. Provided education regarding water intake of at least 2 liters per day  2. F/u in 8 weeks with a renal ultrasound  3. Litholink

## 2020-12-10 ENCOUNTER — TELEPHONE (OUTPATIENT)
Dept: ONCOLOGY | Facility: CLINIC | Age: 75
End: 2020-12-10

## 2020-12-10 NOTE — TELEPHONE ENCOUNTER
Spoke w/PT and he was agreeable to do a TV.  Labs were drawn mid November 2020 and are in Epic.  [id number]  ~Shell

## 2020-12-10 NOTE — TELEPHONE ENCOUNTER
Patient wants to cancel 12/18 1 year follow up.  He wants to come next December (2021) due to COVID-19.    Is this ok with Dr. Whiting?    [phone]

## 2020-12-14 RX ORDER — BISOPROLOL FUMARATE 5 MG/1
TABLET, FILM COATED ORAL
Qty: 45 TABLET | Refills: 3 | Status: SHIPPED | OUTPATIENT
Start: 2020-12-14 | End: 2022-01-10

## 2020-12-14 RX ORDER — CELECOXIB 100 MG/1
CAPSULE ORAL
Qty: 180 CAPSULE | Refills: 1 | Status: SHIPPED | OUTPATIENT
Start: 2020-12-14 | End: 2020-12-28 | Stop reason: DRUGHIGH

## 2020-12-22 ENCOUNTER — APPOINTMENT (OUTPATIENT)
Dept: GENERAL RADIOLOGY | Facility: HOSPITAL | Age: 75
End: 2020-12-22
Payer: MEDICARE

## 2020-12-22 ENCOUNTER — HOSPITAL ENCOUNTER (EMERGENCY)
Facility: HOSPITAL | Age: 75
Discharge: HOME | End: 2020-12-22
Attending: EMERGENCY MEDICINE
Payer: MEDICARE

## 2020-12-22 VITALS
WEIGHT: 205 LBS | DIASTOLIC BLOOD PRESSURE: 65 MMHG | HEIGHT: 70 IN | OXYGEN SATURATION: 96 % | SYSTOLIC BLOOD PRESSURE: 133 MMHG | TEMPERATURE: 99 F | RESPIRATION RATE: 18 BRPM | HEART RATE: 74 BPM | BODY MASS INDEX: 29.35 KG/M2

## 2020-12-22 PROCEDURE — 6370000000 HC RX 637 (ALT 250 FOR IP): Performed by: EMERGENCY MEDICINE

## 2020-12-22 PROCEDURE — 72110 X-RAY EXAM L-2 SPINE 4/>VWS: CPT

## 2020-12-22 PROCEDURE — 73502 X-RAY EXAM HIP UNI 2-3 VIEWS: CPT

## 2020-12-22 PROCEDURE — 73130 X-RAY EXAM OF HAND: CPT

## 2020-12-22 PROCEDURE — 99283 EMERGENCY DEPT VISIT LOW MDM: CPT

## 2020-12-22 PROCEDURE — 72100 X-RAY EXAM L-S SPINE 2/3 VWS: CPT

## 2020-12-22 RX ORDER — CELECOXIB 100 MG/1
200 CAPSULE ORAL 2 TIMES DAILY
COMMUNITY
Start: 2020-12-14

## 2020-12-22 RX ORDER — ACETAMINOPHEN 500 MG
1000 TABLET ORAL ONCE
Status: COMPLETED | OUTPATIENT
Start: 2020-12-22 | End: 2020-12-22

## 2020-12-22 RX ORDER — BISOPROLOL FUMARATE 5 MG/1
5 TABLET ORAL DAILY
COMMUNITY
Start: 2020-12-14

## 2020-12-22 RX ADMIN — ACETAMINOPHEN 1000 MG: 500 TABLET, FILM COATED ORAL at 14:09

## 2020-12-22 ASSESSMENT — PAIN DESCRIPTION - PAIN TYPE
TYPE: CHRONIC PAIN;ACUTE PAIN
TYPE: ACUTE PAIN

## 2020-12-22 ASSESSMENT — PAIN DESCRIPTION - ORIENTATION
ORIENTATION: LEFT
ORIENTATION: LEFT

## 2020-12-22 ASSESSMENT — PAIN SCALES - GENERAL
PAINLEVEL_OUTOF10: 4
PAINLEVEL_OUTOF10: 3

## 2020-12-22 ASSESSMENT — PAIN DESCRIPTION - LOCATION
LOCATION: HAND
LOCATION: HIP

## 2020-12-22 ASSESSMENT — PAIN DESCRIPTION - DESCRIPTORS
DESCRIPTORS: ACHING;SORE
DESCRIPTORS: ACHING

## 2020-12-22 NOTE — ED NOTES
Pt reports that he was standing on a milk crate, while loading hay onto the trailer, reports the crate flipped and he feel backwards. Reports that he landed on his back. C/O worsening to his chronic left hip pain. And reports pain and swelling to left hand.         Lori Vines RN  12/22/20 0244

## 2020-12-22 NOTE — ED NOTES
Dc instructions given to patient at this time. Patient verbalized understanding and no further questions or concerns. Patient encouraged to follow up with pcp or return to ER for worsening symptoms.      Rishi Moore RN  12/22/20 9923

## 2020-12-22 NOTE — ED PROVIDER NOTES
[facility] ENCOUNTER      Pt Name: Ismael Pace  MRN: [id number]  [place] 1945  Date of evaluation: 12/22/2020  Provider: Leonel Amos DO    CHIEF COMPLAINT       Chief Complaint   Patient presents with    Fall    Back Pain    Hand Injury         HISTORY OF PRESENT ILLNESS   (Location/Symptom, Timing/Onset, Context/Setting, Quality, Duration, Modifying Factors, Severity)  Note limiting factors. Ismael Pace is a 76 y.o. male who presents to the emergency department with complaint of back pain, hand pain and left hip pain. Patient reports around an hour and a half prior to arrival he was loading hay into his truck. He stepped down onto a metal milk carton that he states was about 12 to 18 inches off the ground. The milk carton flipped and he fell to the ground. Reports he landed on his left hip, his lower back and left hand. Reports swelling to the palm of his left hand as well as left hip pain with ambulation. Still is able to walk just having pain with it. Has not taken any medications for the symptoms. Denies any head trauma, loss of consciousness, being on any blood thinners or anticoagulation. Denies any lower extremity numbness weakness or tingling, problems with bowel or bladder, saddle anesthesia. Appropriate PPE was used including an eye shield, gloves, N95 mask, surgical mask during the entire patient encounter and exam.  If necessary (pt being intubated or aerosolizing equipment in use) a gown was also used. Nursing Notes were reviewed.       PAST MEDICAL HISTORY     Past Medical History:   Diagnosis Date    Allergic rhinitis     Hyperlipidemia     Hypertension     Hypothyroidism     ITP (idiopathic thrombocytopenic purpura) ([facility])     Dr. Lalia Cerna Unspecified sleep apnea          SURGICAL HISTORY       Past Surgical History:   Procedure Laterality Date    APPENDECTOMY      EXPLORATION OF UNDESCENDED TESTICLE      HERNIA REPAIR      KNEE SURGERY Left 2013         CURRENT MEDICATIONS       Previous Medications    BISOPROLOL (ZEBETA) 5 MG TABLET    Take 5 mg by mouth daily    CELECOXIB (CELEBREX) 100 MG CAPSULE    Take 200 mg by mouth 2 times daily    EZETIMIBE-SIMVASTATIN (VYTORIN) 10-20 MG PER TABLET    Take 1 tablet by mouth nightly. FINASTERIDE (PROSCAR) 5 MG TABLET    Take 1 tablet by mouth daily. LEVOTHYROXINE (SYNTHROID) 50 MCG TABLET    Take 1 tablet by mouth Daily. LISINOPRIL (PRINIVIL;ZESTRIL) 10 MG TABLET    Take 1 tablet by mouth daily. ALLERGIES     Pcn [penicillins]    FAMILY HISTORY     No family history on file. SOCIAL HISTORY       Social History     Socioeconomic History    Marital status:       Spouse name: Not on file    Number of children: Not on file    Years of education: Not on file    Highest education level: Not on file   Occupational History    Not on file   Social Needs    Financial resource strain: Not on file    Food insecurity     Worry: Not on file     Inability: Not on file    Transportation needs     Medical: Not on file     Non-medical: Not on file   Tobacco Use    Smoking status: Former Smoker    Smokeless tobacco: Never Used   Substance and Sexual Activity    Alcohol use: No    Drug use: Not on file    Sexual activity: Not Currently   Lifestyle    Physical activity     Days per week: Not on file     Minutes per session: Not on file    Stress: Not on file   Relationships    Social connections     Talks on phone: Not on file     Gets together: Not on file     Attends Quaker service: Not on file     Active member of club or organization: Not on file     Attends meetings of clubs or organizations: Not on file     Relationship status: Not on file    Intimate partner violence     Fear of current or ex partner: Not on file     Emotionally abused: Not on file     Physically abused: Not on file     Forced sexual activity: Not on file Other Topics Concern    Not on file   Social History Narrative    Not on file       SCREENINGS               Review of Systems  Constitutional:  Denies fever, chills  Eyes: denies eye problems  HEENT: denies sore throat or ear pain  Respiratory: denies cough or shortness of breath  Cardiovascular: denies chest pain, palpitations  GI: denies abdominal pain, nausea, vomiting, or diarrhea  Musculoskeletal: Reports left hip and lower back pain as well as left hand pain  Skin: denies rash  Neurologic: denies focal weakness or sensory changes    Except as noted above the remainder of the review of systems was reviewed and negative. PHYSICAL EXAM    (up to 7 for level 4, 8 or more for level 5)     ED Triage Vitals   BP Temp Temp Source Pulse Resp SpO2 Height Weight   12/22/20 1323 12/22/20 1325 12/22/20 1325 12/22/20 1323 12/22/20 1323 12/22/20 1323 12/22/20 1323 12/22/20 1323   (!) 158/70 99 °F (37.2 °C) Oral 85 18 98 % 5' 10\" (1.778 m) 205 lb (93 kg)       General appearance: well-developed, well-nourished, no acute distress, nontoxic appearance  HENT: normocephalic, atraumatic, oropharynx moist, nares patent  Neck: normal range of motion, no tenderness, trachea midline, no stridor  Respiratory: normal breath sounds, non labored breathing pattern  Cardiovascular: normal heart rate, normal rhythm  GI: nontender, bowel sounds normal, soft, nondistended, no pulsatile masses  Musculoskeletal: There are some swelling noted to the left thenar eminence however no tenderness palpation in the anatomic snuffbox, no joint laxity of the ulnar collateral ligament, patient still has full range of motion with flexion extension at the thumb and 5 out of 5  strength, cap refill less than 2 seconds.   Patient with full range of motion in internal and external rotation of bilateral hips, 5 out of 5 strength with hip flexion, normal sensation distally, 2+ DP pulse bilaterally, no edema, intact distal pulses, no clubbing, cyanosis. Good range of motion  Back: No midline tenderness palpation, step-off or deformity  Integument: warm, dry, no erythema, no rash, < 2 second cap refill  Neurologic: alert and oriented ×3, no focal deficits appreciated    DIAGNOSTIC RESULTS         RADIOLOGY:   Interpretation per the Radiologist below, if available at the time of this note:    XR LUMBAR SPINE (2-3 VIEWS)   Final Result      Right convexity scoliosis. Multilevel degenerative changes similar to prior study            Left hip 3 views including AP pelvis      HISTORY: Left hip pain      Small metallic coils overlie the pelvis and left ilium, similar to 2011 study. Both hip joint spaces preserved and are symmetric. No fracture or dislocation of the left hip. Pubic rami intact. No bone destruction. Right hip unremarkable      IMPRESSION:      Negative left hip. Right hand 3 views      HISTORY: Pain      Radiocarpal alignment anatomical. No fracture or dislocation. Focal cortical irregularity at the base of the first distal phalanx, doubtful for fracture       Degenerative changes at the trapezium-first metacarpal articulation with joint space narrowing and mild periarticular sclerosis. Metacarpals and phalanges intact. No arthropathic erosions or periarticular calcifications. IMPRESSION:      Degenerative changes at the trapezium-first metacarpal articulation      No fracture or dislocation. Focal cortical irregularity at the first proximal phalangeal base is unlikely to represent fracture-please correlate clinically      XR HAND LEFT (MIN 3 VIEWS)   Final Result      Right convexity scoliosis. Multilevel degenerative changes similar to prior study            Left hip 3 views including AP pelvis      HISTORY: Left hip pain      Small metallic coils overlie the pelvis and left ilium, similar to 2011 study. Both hip joint spaces preserved and are symmetric. No fracture or dislocation of the left hip.  Pubic rami intact. No bone destruction. Right hip unremarkable      IMPRESSION:      Negative left hip. Right hand 3 views      HISTORY: Pain      Radiocarpal alignment anatomical. No fracture or dislocation. Focal cortical irregularity at the base of the first distal phalanx, doubtful for fracture       Degenerative changes at the trapezium-first metacarpal articulation with joint space narrowing and mild periarticular sclerosis. Metacarpals and phalanges intact. No arthropathic erosions or periarticular calcifications. IMPRESSION:      Degenerative changes at the trapezium-first metacarpal articulation      No fracture or dislocation. Focal cortical irregularity at the first proximal phalangeal base is unlikely to represent fracture-please correlate clinically      XR HIP 2-3 VW W PELVIS LEFT   Final Result      Right convexity scoliosis. Multilevel degenerative changes similar to prior study            Left hip 3 views including AP pelvis      HISTORY: Left hip pain      Small metallic coils overlie the pelvis and left ilium, similar to 2011 study. Both hip joint spaces preserved and are symmetric. No fracture or dislocation of the left hip. Pubic rami intact. No bone destruction. Right hip unremarkable      IMPRESSION:      Negative left hip. Right hand 3 views      HISTORY: Pain      Radiocarpal alignment anatomical. No fracture or dislocation. Focal cortical irregularity at the base of the first distal phalanx, doubtful for fracture       Degenerative changes at the trapezium-first metacarpal articulation with joint space narrowing and mild periarticular sclerosis. Metacarpals and phalanges intact. No arthropathic erosions or periarticular calcifications. IMPRESSION:      Degenerative changes at the trapezium-first metacarpal articulation      No fracture or dislocation.  Focal cortical irregularity at the first proximal phalangeal base is unlikely to represent fracture-please correlate clinically            LABS:  Labs Reviewed - No data to display    All other labs were within normal range or not returned as of this dictation. EMERGENCY DEPARTMENT COURSE and DIFFERENTIAL DIAGNOSIS/MDM:   Vitals:    Vitals:    12/22/20 1323 12/22/20 1325   BP: (!) 158/70    Pulse: 85    Resp: 18    Temp:  99 °F (37.2 °C)   TempSrc:  Oral   SpO2: 98%    Weight: 205 lb (93 kg)    Height: 5' 10\" (1.778 m)          MDM  77-year-old male presents the emergency department after a mechanical fall. Was about 12 to 18 inches off the ground when he fell off a box. Vital signs stable. Physical exam as above. He is alert awake and appears nontoxic. Denies head trauma or loss of consciousness and is not on any anticoagulation or major blood thinners. Head, C-spine, T-spine, chest, abdomen cleared clinically. Does report pain in his left hip however he still has full range of motion with internal/external rotation. Will obtain x-ray of the lumbar spine, left hip as well as the left hand. With the left hand there is no signs or symptoms of scaphoid fracture or ulnar collateral ligament rupture. X-ray lumbar spine shows right convexity scoliosis and multilevel degenerative changes similar to prior study. X-ray of the hip shows no acute fracture or dislocation. X-ray of the hand shows degenerative changes at the trapezium and first metacarpal articulation. No fracture or dislocation. He has no tenderness to palpation on exam to some swelling. Discussed results with patient. He is able to ambulate just with some pain. He states he takes Aleve at home for pain. Discussed with him he can alternate with Tylenol and use heating pads and ice as well. Does appear stable for discharge. Return precautions given. Patient agrees with discharge plan. CONSULTS:  None      FINAL IMPRESSION      1. Contusion of left hip, initial encounter    2.  Contusion of left hand, initial encounter 3. Fall from standing, initial encounter          DISPOSITION/PLAN   DISPOSITION Decision To Discharge 12/22/2020 01:55:42 PM      PATIENT REFERRED TO:  Samantha Myers  [phone]    Schedule an appointment as soon as possible for a visit in 2 days  As needed, If symptoms worsen      DISCHARGE MEDICATIONS:  New Prescriptions    No medications on file          (Please note that portions of this note were completed with a voice recognition program.  Efforts were made to edit the dictations but occasionally words are mis-transcribed.)    Vipul Segovia DO (electronically signed)  Attending Emergency Physician      Vipul Segovia DO  12/22/20 1570

## 2020-12-28 ENCOUNTER — OFFICE VISIT (OUTPATIENT)
Dept: ONCOLOGY | Facility: CLINIC | Age: 75
End: 2020-12-28

## 2020-12-28 VITALS — WEIGHT: 205 LBS | BODY MASS INDEX: 28.7 KG/M2 | HEIGHT: 71 IN

## 2020-12-28 DIAGNOSIS — D69.3 IDIOPATHIC THROMBOCYTOPENIC PURPURA (HCC): Primary | Chronic | ICD-10-CM

## 2020-12-28 PROCEDURE — 99442 PR PHYS/QHP TELEPHONE EVALUATION 11-20 MIN: CPT | Performed by: INTERNAL MEDICINE

## 2020-12-28 RX ORDER — CELECOXIB 200 MG/1
CAPSULE ORAL
COMMUNITY
Start: 2020-11-27

## 2020-12-28 NOTE — PROGRESS NOTES
Telehealth follow-up visit: This visit has been rescheduled as a phone visit to comply with patient safety concerns in accordance with CDC recommendations. Total time of discussion was 15 minutes. Verbal consent given.      CHIEF COMPLAINT: ITP    Problem List:  Oncology/Hematology History Overview Note   1. Chronic ITP  2. Degenerative joint disease per rheumatology  3. Obstructive sleep apnea  4. Hypertension  5. Hyperlipidemia  6. History of right UKA  7. History of basal cell carcinomas     Idiopathic thrombocytopenic purpura (CMS/HCC)   6/8/2016 Initial Diagnosis    Idiopathic thrombocytopenic purpura (CMS/HCC)         HISTORY OF PRESENT ILLNESS:  The patient is a 75 y.o. male, here for follow up on management of ITP.        Past Medical History:   Diagnosis Date   • Arthritis     OA   • Basal cell carcinoma 02/2020    left ear   • Cancer (CMS/HCC)     Skin cancer removed 3x    • Coronary artery disease     Probable Non obstructive    • Dyslipidemia    • Elevated prostate specific antigen (PSA)    • Essential hypertension, benign    • Full dentures     Instructed no adhesives the DOS   • GERD (gastroesophageal reflux disease)    • H/O benign prostatic hypertrophy    • H/O cardiovascular stress test 07/19/2019    Patient reported done by Dr Ojeda around 3-4 years ago and reported that all was wnl's    • High cholesterol    • History of bronchitis    • History of hemorrhoids    • History of idiopathic thrombocytopenic purpura    • History of malignant neoplasm of skin    • History of sleep apnea    • [place] (hard of hearing)     No use of hearing aids   • Hypertension    • Hypothyroidism    • JUWAN on CPAP     Patient instructed to bring mask and machine the DOS   • Rectal bleeding    • S/P right knee surgery 07/12/2017   • Seasonal allergies    • Tobacco abuse    • Wears glasses     Rx wears     Past Surgical History:   Procedure Laterality Date   • APPENDECTOMY     • BASAL CELL CARCINOMA EXCISION  2020    left ear  "  • COLONOSCOPY      Complete Colonoscopy   • COLONOSCOPY N/A 7/22/2019    Procedure: COLONOSCOPY;  Surgeon: Lisa Rhodes MD;  Location: [facility] ENDOSCOPY;  Service: Gastroenterology   • HERNIA REPAIR      patient reported by Dr Banuelos - patient unsure extact location   • JOINT REPLACEMENT Left     partial knee replacement   • JOINT REPLACEMENT Right     partial knee replacement   • KNEE SURGERY      Left Knee   • MOUTH SURGERY      full mouth extraction   • OTHER SURGICAL HISTORY      Surgery Testis Orchiopexy around age 20   • OTHER SURGICAL HISTORY      Cyst removed from a finger - patient reported to this as a \"growth\" and is unsure laterality   • SKIN BIOPSY      Patient reported skin cancer removed 3x.  Nose, back and face   • URETEROSCOPY LASER LITHOTRIPSY WITH STENT INSERTION Left 11/15/2020    Procedure: URETEROSCOPY LASER LITHOTRIPSY WITH STENT INSERTION, Rertrograde;  Surgeon: Bobo Carter MD;  Location: [facility] OR;  Service: Urology;  Laterality: Left;       Allergies   Allergen Reactions   • Penicillins Itching and Rash       Family History and Social History reviewed and changed as necessary      REVIEW OF SYSTEM:   Review of Systems   Constitutional: Negative for appetite change, chills, diaphoresis, fatigue, fever and unexpected weight change.   HENT:   Negative for mouth sores, sore throat and trouble swallowing.    Eyes: Negative for icterus.   Respiratory: Negative for cough, hemoptysis and shortness of breath.    Cardiovascular: Negative for chest pain, leg swelling and palpitations.   Gastrointestinal: Negative for abdominal distention, abdominal pain, blood in stool, constipation, diarrhea, nausea and vomiting.   Endocrine: Negative for hot flashes.   Genitourinary: Negative for bladder incontinence, difficulty urinating, dysuria, frequency and hematuria.    Musculoskeletal: Negative for gait problem, neck pain and neck stiffness.   Skin: Negative for rash.   Neurological: Negative " "for dizziness, gait problem, headaches, light-headedness and numbness.   Hematological: Negative for adenopathy. Does not bruise/bleed easily.   Psychiatric/Behavioral: Negative for depression. The patient is not nervous/anxious.    All other systems reviewed and are negative.       PHYSICAL EXAM    Vitals:    12/28/20 1059   Weight: 93 kg (205 lb)   Height: 179.1 cm (70.5\")     Vitals:    12/28/20 1059   PainSc: 0-No pain        Phone Visit       Lab Results   Component Value Date    HGB 14.9 11/17/2020    HCT 44.1 11/17/2020    MCV 98.2 (H) 11/17/2020    PLT 95 (L) 11/17/2020    WBC 8.69 11/17/2020    NEUTROABS 5.43 11/17/2020    LYMPHSABS 2.19 11/17/2020    MONOSABS 0.86 11/17/2020    EOSABS 0.14 11/17/2020    BASOSABS 0.03 11/17/2020       Lab Results   Component Value Date    GLUCOSE 104 (H) 11/17/2020    BUN 17 11/17/2020    CREATININE 0.73 (L) 11/17/2020     (L) 11/17/2020    K 4.4 11/17/2020     11/17/2020    CO2 26.1 11/17/2020    CALCIUM 9.1 11/17/2020    PROTEINTOT 6.8 11/17/2020    ALBUMIN 3.90 11/17/2020    BILITOT 0.8 11/17/2020    ALKPHOS 65 11/17/2020    AST 19 11/17/2020    ALT 22 11/17/2020                   ASSESSMENT & PLAN:    1. Chronic ITP: Plt stable 90 to low 100 k's.  Hemostatically stable.  Sees rheumatology for his arthritis and Dr. Magallon for all other issues.  His platelets have been stable for years.  I would not give him steroids for the ITP unless the platelets drop below 30,000 or bleeding occurred and he is never been particularly low on the platelet count.  I will see him back on an as-needed basis and he will get a blood count once or twice a year with Dr. Magallon or when easy bruising or bleeding occurs but that has not been an issue.      Jerome Whiting MD  "

## 2021-01-19 ENCOUNTER — HOSPITAL ENCOUNTER (OUTPATIENT)
Dept: ULTRASOUND IMAGING | Facility: HOSPITAL | Age: 76
Discharge: HOME | End: 2021-01-19
Admitting: UROLOGY

## 2021-01-19 DIAGNOSIS — N20.0 NEPHROLITHIASIS: ICD-10-CM

## 2021-01-19 PROCEDURE — 76775 US EXAM ABDO BACK WALL LIM: CPT

## 2021-01-25 ENCOUNTER — OFFICE VISIT (OUTPATIENT)
Dept: UROLOGY | Facility: CLINIC | Age: 76
End: 2021-01-25

## 2021-01-25 VITALS
BODY MASS INDEX: 28.84 KG/M2 | HEART RATE: 87 BPM | TEMPERATURE: 97.8 F | OXYGEN SATURATION: 95 % | SYSTOLIC BLOOD PRESSURE: 118 MMHG | HEIGHT: 71 IN | RESPIRATION RATE: 18 BRPM | DIASTOLIC BLOOD PRESSURE: 82 MMHG | WEIGHT: 206 LBS

## 2021-01-25 DIAGNOSIS — N20.0 NEPHROLITHIASIS: Primary | ICD-10-CM

## 2021-01-25 PROCEDURE — 99214 OFFICE O/P EST MOD 30 MIN: CPT | Performed by: UROLOGY

## 2021-01-25 NOTE — PROGRESS NOTES
"Chief Complaint  History of kidney stones    HPI  Mr. Dickson is a 75 y.o. male who presents for follow up.  He is s/p left ureteroscopy and laser lithotripsy on 11/15/2020.  He is doing well.  No fever, chills, nausea, vomiting, hematuria, or flank pain.    Stone prevention medications: None      Review of Systems  Constitutional: No fevers or chills  Endocrine: No heat/cold intolerance   Cardiovascular: Negative for chest pain or dyspnea on exertion  Respiratory: Negative for shortness of breath or wheezing  Gastrointestinal: Negative for constipation, nausea or vomiting  Genitourinary: per HPI  Lymph/Heme: Negative for easily bruises / bleeds    Physical Exam  Visit Vitals  /82   Pulse 87   Temp 97.8 °F (36.6 °C)   Resp 18   Ht 179.1 cm (70.5\")   Wt 93.4 kg (206 lb)   SpO2 95%   BMI 29.14 kg/m²     Constitutional: NAD, WDWN.   Back: No CVA tenderness.  Extremities: DIVYA x 4, Warm. No clubbing.  No cyanosis.    Skin: Pink, warm and dry.  No rashes noted.    Urinalysis  @LASTLABOSUSHORT(APPEARANCE,COLOR,PH,SPECIFICGRAV,PROTEIN,KETONES,GLUCOSE,BILIRUBIN,UROBILINOGEN,BLOOD,NITRITE,LEUKOCYTE)@      Labs  @LASTLABOSUSHORT(PTH,CALCIUM,CALCIUMPTH,CALCIUMIONIZ,PHOSPHORUS)@  @LASTLABOSUSHORT(URIC)@    Lab Results   Component Value Date    PSA 1.260 06/15/2020    PSA 1.20 12/03/2019    PSA 0.915 06/11/2019       Stone Analysis  Results for JENNY DICKSON (MRN [id number]) as of 1/25/2021 12:06   Ref. Range 11/15/2020 20:19   Ca Oxalate-Dihydrate, Stone Latest Units: % 80   Ca Oxalate - Monohydrate, Stone Latest Units: % 15   Size Latest Units: mm 1x1     Radiographic Studies  Us Renal Bilateral  Result Date: 1/19/2021  Normal renal ultrasound.      Images were reviewed, interpreted, and dictated by Dr. Gena Don M.D. Transcribed by Antonella Monroe PA-C.  This report was finalized on 1/19/2021 1:37 PM by Gena Don M.D..  .    Assessment  Mr. Dickson is a 75 y.o. male with history of  kidney " stones, LUTS and ED.  He is status post left ureteroscopy and laser lithotripsy on 11/15/2020.  Follow-up with renal ultrasound is normal.     Plan  1. Fluid intake goal ~ 2.5L per day.  2. Continue sildenafil and finasteride   3. Fu in 1 yr w/ KUB  4. Stop PSA screening -we discussed AUA guidelines    No follow-ups on file.    Bobo Carter MD

## 2021-02-11 DIAGNOSIS — E78.00 HYPERCHOLESTEREMIA: ICD-10-CM

## 2021-02-11 NOTE — TELEPHONE ENCOUNTER
PT CALLED REQUESTING A PRIOR AUTH FOR:  Vytorin 10-20 MG per tablet    SUZETTE FARLEY [address] [facility], [place] - [address] AT [facility] - [phone] [facility] [phone] FX

## 2021-02-15 RX ORDER — EZETIMIBE/SIMVASTATIN 10 MG-20MG
1 TABLET ORAL NIGHTLY
Qty: 90 TABLET | Refills: 3 | Status: SHIPPED | OUTPATIENT
Start: 2021-02-15 | End: 2021-03-04

## 2021-03-02 ENCOUNTER — PRIOR AUTHORIZATION (OUTPATIENT)
Dept: INTERNAL MEDICINE | Facility: CLINIC | Age: 76
End: 2021-03-02

## 2021-03-02 NOTE — TELEPHONE ENCOUNTER
Patient contacted office stating that Vytorin is requiring a PA. Does provider want to PA or change medication?    Please advise

## 2021-03-04 RX ORDER — SIMVASTATIN 20 MG
20 TABLET ORAL NIGHTLY
Qty: 90 TABLET | Refills: 3 | Status: SHIPPED | OUTPATIENT
Start: 2021-03-04 | End: 2021-05-17 | Stop reason: SDUPTHER

## 2021-03-04 RX ORDER — EZETIMIBE 10 MG/1
10 TABLET ORAL DAILY
Qty: 90 TABLET | Refills: 3 | Status: SHIPPED | OUTPATIENT
Start: 2021-03-04 | End: 2021-05-17 | Stop reason: SDUPTHER

## 2021-03-04 NOTE — TELEPHONE ENCOUNTER
Patient said he would not object to switching, but was under the impression that the Vytorin was keeping his levels where they needed to be and thought Dr. Magallon wanted to keep him on this. He said whatever you suggest.

## 2021-03-17 DIAGNOSIS — N40.0 BENIGN NON-NODULAR PROSTATIC HYPERPLASIA WITHOUT LOWER URINARY TRACT SYMPTOMS: ICD-10-CM

## 2021-03-22 RX ORDER — FINASTERIDE 5 MG/1
TABLET, FILM COATED ORAL
Qty: 90 TABLET | Refills: 3 | Status: SHIPPED | OUTPATIENT
Start: 2021-03-22 | End: 2022-03-07 | Stop reason: SDUPTHER

## 2021-03-31 DIAGNOSIS — N52.01 ERECTILE DYSFUNCTION DUE TO ARTERIAL INSUFFICIENCY: ICD-10-CM

## 2021-04-01 RX ORDER — SILDENAFIL CITRATE 20 MG/1
TABLET ORAL
Qty: 30 TABLET | Refills: 4 | Status: SHIPPED | OUTPATIENT
Start: 2021-04-01 | End: 2021-05-17 | Stop reason: SDUPTHER

## 2021-04-22 ENCOUNTER — HOSPITAL ENCOUNTER (OUTPATIENT)
Facility: HOSPITAL | Age: 76
Discharge: HOME | End: 2021-04-22
Payer: MEDICARE

## 2021-04-22 LAB
A/G RATIO: 1.8 (ref 0.8–2)
ALBUMIN SERPL-MCNC: 4.5 G/DL (ref 3.4–4.8)
ALP BLD-CCNC: 60 U/L (ref 25–100)
ALT SERPL-CCNC: 30 U/L (ref 4–36)
ANION GAP SERPL CALCULATED.3IONS-SCNC: 10 MMOL/L (ref 3–16)
AST SERPL-CCNC: 27 U/L (ref 8–33)
BASOPHILS ABSOLUTE: 0 K/UL (ref 0–0.1)
BASOPHILS RELATIVE PERCENT: 0.2 %
BILIRUB SERPL-MCNC: 1.2 MG/DL (ref 0.3–1.2)
BUN BLDV-MCNC: 13 MG/DL (ref 6–20)
CALCIUM SERPL-MCNC: 9.3 MG/DL (ref 8.5–10.5)
CHLORIDE BLD-SCNC: 101 MMOL/L (ref 98–107)
CO2: 27 MMOL/L (ref 20–30)
CREAT SERPL-MCNC: 0.9 MG/DL (ref 0.4–1.2)
EOSINOPHILS ABSOLUTE: 0.6 K/UL (ref 0–0.4)
EOSINOPHILS RELATIVE PERCENT: 10.8 %
GFR AFRICAN AMERICAN: >59
GFR NON-AFRICAN AMERICAN: >59
GLOBULIN: 2.5 G/DL
GLUCOSE BLD-MCNC: 106 MG/DL (ref 74–106)
HCT VFR BLD CALC: 45 % (ref 40–54)
HEMOGLOBIN: 15.3 G/DL (ref 13–18)
IMMATURE GRANULOCYTES #: 0 K/UL
IMMATURE GRANULOCYTES %: 0.4 % (ref 0–5)
LYMPHOCYTES ABSOLUTE: 1.2 K/UL (ref 1.5–4)
LYMPHOCYTES RELATIVE PERCENT: 23.7 %
MCH RBC QN AUTO: 33.3 PG (ref 27–32)
MCHC RBC AUTO-ENTMCNC: 34 G/DL (ref 31–35)
MCV RBC AUTO: 97.8 FL (ref 80–100)
MONOCYTES ABSOLUTE: 0.5 K/UL (ref 0.2–0.8)
MONOCYTES RELATIVE PERCENT: 10 %
NEUTROPHILS ABSOLUTE: 2.9 K/UL (ref 2–7.5)
NEUTROPHILS RELATIVE PERCENT: 54.9 %
PDW BLD-RTO: 12.9 % (ref 11–16)
PLATELET # BLD: 91 K/UL (ref 150–400)
PMV BLD AUTO: 8.2 FL (ref 6–10)
POTASSIUM SERPL-SCNC: 4.4 MMOL/L (ref 3.4–5.1)
RBC # BLD: 4.6 M/UL (ref 4.5–6)
SODIUM BLD-SCNC: 138 MMOL/L (ref 136–145)
TOTAL PROTEIN: 7 G/DL (ref 6.4–8.3)
WBC # BLD: 5.2 K/UL (ref 4–11)

## 2021-04-22 PROCEDURE — 80053 COMPREHEN METABOLIC PANEL: CPT

## 2021-04-22 PROCEDURE — 85025 COMPLETE CBC W/AUTO DIFF WBC: CPT

## 2021-04-22 PROCEDURE — 36415 COLL VENOUS BLD VENIPUNCTURE: CPT

## 2021-05-04 ENCOUNTER — TELEPHONE (OUTPATIENT)
Dept: INTERNAL MEDICINE | Facility: CLINIC | Age: 76
End: 2021-05-04

## 2021-05-04 NOTE — TELEPHONE ENCOUNTER
Caller: Deniz Dickson    Relationship: Self    Best call back number: [phone]    What orders are you requesting (i.e. lab or imaging): BLOOD WORK ORDERS FOR THE APPOINTMENT 05/17    In what timeframe would the patient need to come in: ASAP    Where will you receive your lab/imaging services: [facility], [place]    Additional notes:

## 2021-05-05 ENCOUNTER — TELEPHONE (OUTPATIENT)
Dept: CARDIOLOGY | Facility: CLINIC | Age: 76
End: 2021-05-05

## 2021-05-05 ENCOUNTER — OFFICE VISIT (OUTPATIENT)
Dept: INTERNAL MEDICINE | Facility: CLINIC | Age: 76
End: 2021-05-05

## 2021-05-05 VITALS
HEART RATE: 82 BPM | TEMPERATURE: 97.7 F | WEIGHT: 212 LBS | RESPIRATION RATE: 16 BRPM | SYSTOLIC BLOOD PRESSURE: 110 MMHG | OXYGEN SATURATION: 94 % | BODY MASS INDEX: 29.68 KG/M2 | HEIGHT: 71 IN | DIASTOLIC BLOOD PRESSURE: 70 MMHG

## 2021-05-05 DIAGNOSIS — R42 DIZZY: Primary | ICD-10-CM

## 2021-05-05 DIAGNOSIS — R00.2 PALPITATIONS: ICD-10-CM

## 2021-05-05 PROCEDURE — 99214 OFFICE O/P EST MOD 30 MIN: CPT | Performed by: INTERNAL MEDICINE

## 2021-05-05 PROCEDURE — 93005 ELECTROCARDIOGRAM TRACING: CPT | Performed by: INTERNAL MEDICINE

## 2021-05-05 RX ORDER — HYDROXYCHLOROQUINE SULFATE 200 MG/1
TABLET, FILM COATED ORAL
COMMUNITY
Start: 2021-02-09 | End: 2022-01-27 | Stop reason: SDUPTHER

## 2021-05-05 NOTE — PROGRESS NOTES
Subjective     Patient ID: Deniz Dickson is a 75 y.o. male. Patient is here for management of multiple medical problems.     Chief Complaint   Patient presents with   • Shortness of Breath   • Dizziness     History of Present Illness   Spells of dizzinies  Not sure if missed beat.  No worse than normal.    Pt was on prednisone and plaquing.       The following portions of the patient's history were reviewed and updated as appropriate: allergies, current medications, past family history, past medical history, past social history, past surgical history and problem list.    Review of Systems   Respiratory: Negative for cough, shortness of breath and stridor.    Psychiatric/Behavioral: Negative for self-injury and sleep disturbance. The patient is not nervous/anxious.        Current Outpatient Medications:   •  aspirin 81 MG EC tablet, Take 1 tablet by mouth Daily., Disp: 90 tablet, Rfl: 1  •  bisoprolol (ZEBeta) 5 MG tablet, TAKE ONE-HALF TABLET BY MOUTH DAILY, Disp: 45 tablet, Rfl: 3  •  celecoxib (CeleBREX) 200 MG capsule, , Disp: , Rfl:   •  Cholecalciferol (VITAMIN D-3 PO), Take 1 tablet by mouth Daily., Disp: , Rfl:   •  docusate sodium (Colace) 100 MG capsule, Take 1 capsule by mouth 2 (Two) Times a Day. If taking percocet, Disp: 15 capsule, Rfl: 1  •  ezetimibe (Zetia) 10 MG tablet, Take 1 tablet by mouth Daily., Disp: 90 tablet, Rfl: 3  •  famotidine (PEPCID) 40 MG tablet, Take 40 mg by mouth 2 (Two) Times a Day., Disp: , Rfl:   •  finasteride (PROSCAR) 5 MG tablet, TAKE ONE TABLET BY MOUTH DAILY, Disp: 90 tablet, Rfl: 3  •  fluticasone (FLONASE) 50 MCG/ACT nasal spray, 1 spray into the nostril(s) as directed by provider Daily., Disp: , Rfl:   •  Glucosamine-Chondroitin (OSTEO BI-FLEX REGULAR STRENGTH PO), Take 1 tablet by mouth 2 (two) times a day., Disp: , Rfl:   •  levothyroxine (SYNTHROID, LEVOTHROID) 137 MCG tablet, Take 1 tablet by mouth Daily., Disp: 90 tablet, Rfl: 3  •  montelukast (Singulair) 10  "MG tablet, Take 1 tablet by mouth Every Night., Disp: 90 tablet, Rfl: 3  •  Multiple Vitamins-Minerals (CENTRUM SILVER ADULT 50+) tablet, Take 1 tablet by mouth daily., Disp: , Rfl:   •  Omega-3 Fatty Acids (FISH OIL) 1200 MG capsule capsule, Take 1,200 mg by mouth 2 (Two) Times a Day With Meals., Disp: , Rfl:   •  oxybutynin XL (Ditropan XL) 10 MG 24 hr tablet, Take 1 tablet by mouth Daily As Needed (bladder spasm)., Disp: 10 tablet, Rfl: 0  •  Potassium 75 MG tablet, Take 595 mg by mouth Daily., Disp: , Rfl:   •  sildenafil (REVATIO) 20 MG tablet, TAKE ONE TO THREE TABLETS BY MOUTH DAILY AS NEEDED FOR ERECTILE DYSFUNCTION, Disp: 30 tablet, Rfl: 4  •  simvastatin (Zocor) 20 MG tablet, Take 1 tablet by mouth Every Night., Disp: 90 tablet, Rfl: 3  •  tamsulosin (FLOMAX) 0.4 MG capsule 24 hr capsule, Take 1 capsule by mouth Every Night., Disp: 10 capsule, Rfl: 0  •  triamcinolone (KENALOG) 0.1 % cream, Apply 1 application topically to the appropriate area as directed As Needed., Disp: , Rfl:   •  vitamin B-12 (CYANOCOBALAMIN) 1000 MCG tablet, Take 1,000 mcg by mouth Daily., Disp: , Rfl:   •  hydroxychloroquine (PLAQUENIL) 200 MG tablet, , Disp: , Rfl:     Objective      Blood pressure 110/70, pulse 82, temperature 97.7 °F (36.5 °C), resp. rate 16, height 179.1 cm (70.5\"), weight 96.2 kg (212 lb), SpO2 94 %.    Physical Exam     General Appearance:    Alert, cooperative, no distress, appears stated age   Head:    Normocephalic, without obvious abnormality, atraumatic   Eyes:    PERRL, conjunctiva/corneas clear, EOM's intact   Ears:    Normal TM's and external ear canals, both ears   Nose:   Nares normal, septum midline, mucosa normal, no drainage   or sinus tenderness   Throat:   Lips, mucosa, and tongue normal; teeth and gums normal   Neck:   Supple, symmetrical, trachea midline, no adenopathy;        thyroid:  No enlargement/tenderness/nodules; no carotid    bruit or JVD   Back:     Symmetric, no curvature, ROM " normal, no CVA tenderness   Lungs:     Clear to auscultation bilaterally, respirations unlabored   Chest wall:    No tenderness or deformity   Heart:    Regular rate and rhythm, S1 and S2 normal, no murmur,        rub or gallop   Abdomen:     Soft, non-tender, bowel sounds active all four quadrants,     no masses, no organomegaly   Extremities:   Extremities normal, atraumatic, no cyanosis or edema   Pulses:   2+ and symmetric all extremities   Skin:   Skin color, texture, turgor normal, no rashes or lesions   Lymph nodes:   Cervical, supraclavicular, and axillary nodes normal   Neurologic:   CNII-XII intact. Normal strength, sensation and reflexes       throughout      Results for orders placed or performed during the hospital encounter of 11/15/20   COVID-19,[facility] IN-HOUSE,Nasal Swab No Transport Media 3-4 HR TAT - Swab, Nasal Cavity    Specimen: Nasal Cavity; Swab   Result Value Ref Range    COVID19 Not Detected Not Detected - Ref. Range   Comprehensive Metabolic Panel    Specimen: Blood   Result Value Ref Range    Glucose 147 (H) 65 - 99 mg/dL    BUN 30 (H) 8 - 23 mg/dL    Creatinine 1.58 (H) 0.76 - 1.27 mg/dL    Sodium 135 (L) 136 - 145 mmol/L    Potassium 4.5 3.5 - 5.2 mmol/L    Chloride 99 98 - 107 mmol/L    CO2 25.1 22.0 - 29.0 mmol/L    Calcium 10.2 8.6 - 10.5 mg/dL    Total Protein 7.9 6.0 - 8.5 g/dL    Albumin 4.50 3.50 - 5.20 g/dL    ALT (SGPT) 23 1 - 41 U/L    AST (SGOT) 18 1 - 40 U/L    Alkaline Phosphatase 80 39 - 117 U/L    Total Bilirubin 0.8 0.0 - 1.2 mg/dL    eGFR Non African Amer 43 (L) >60 mL/min/1.73    Globulin 3.4 gm/dL    A/G Ratio 1.3 g/dL    BUN/Creatinine Ratio 19.0 7.0 - 25.0    Anion Gap 10.9 5.0 - 15.0 mmol/L   Lipase    Specimen: Blood   Result Value Ref Range    Lipase 54 13 - 60 U/L   Urinalysis With Microscopic If Indicated (No Culture) - Urine, Clean Catch    Specimen: Urine, Clean Catch   Result Value Ref Range    Color, UA Yellow Yellow, Straw    Appearance, UA Clear Clear     pH, UA 6.0 5.0 - 8.0    Specific Gravity, UA 1.020 1.005 - 1.030    Glucose, UA Negative Negative    Ketones, UA Negative Negative    Bilirubin, UA Negative Negative    Blood, UA Small (1+) (A) Negative    Protein, UA Negative Negative    Leuk Esterase, UA Moderate (2+) (A) Negative    Nitrite, UA Negative Negative    Urobilinogen, UA 0.2 E.U./dL 0.2 - 1.0 E.U./dL   CBC Auto Differential    Specimen: Blood   Result Value Ref Range    WBC 11.49 (H) 3.40 - 10.80 10*3/mm3    RBC 4.80 4.14 - 5.80 10*6/mm3    Hemoglobin 15.8 13.0 - 17.7 g/dL    Hematocrit 46.6 37.5 - 51.0 %    MCV 97.1 (H) 79.0 - 97.0 fL    MCH 32.9 26.6 - 33.0 pg    MCHC 33.9 31.5 - 35.7 g/dL    RDW 12.3 12.3 - 15.4 %    RDW-SD 44.6 37.0 - 54.0 fl    MPV 8.5 6.0 - 12.0 fL    Platelets 102 (L) 140 - 450 10*3/mm3    Neutrophil % 82.6 (H) 42.7 - 76.0 %    Lymphocyte % 8.6 (L) 19.6 - 45.3 %    Monocyte % 7.5 5.0 - 12.0 %    Eosinophil % 0.8 0.3 - 6.2 %    Basophil % 0.2 0.0 - 1.5 %    Immature Grans % 0.3 0.0 - 0.5 %    Neutrophils, Absolute 9.49 (H) 1.70 - 7.00 10*3/mm3    Lymphocytes, Absolute 0.99 0.70 - 3.10 10*3/mm3    Monocytes, Absolute 0.86 0.10 - 0.90 10*3/mm3    Eosinophils, Absolute 0.09 0.00 - 0.40 10*3/mm3    Basophils, Absolute 0.02 0.00 - 0.20 10*3/mm3    Immature Grans, Absolute 0.04 0.00 - 0.05 10*3/mm3    nRBC 0.0 0.0 - 0.2 /100 WBC   Urinalysis, Microscopic Only - Urine, Clean Catch    Specimen: Urine, Clean Catch   Result Value Ref Range    RBC, UA 3-5 (A) None Seen /HPF    WBC, UA 3-5 (A) None Seen /HPF    Bacteria, UA Trace (A) None Seen /HPF    Squamous Epithelial Cells, UA 0-2 None Seen, 0-2 /HPF    Hyaline Casts, UA None Seen None Seen /LPF    Methodology Manual Light Microscopy    Troponin    Specimen: Blood   Result Value Ref Range    Troponin T <0.010 0.000 - 0.030 ng/mL   STONE ANALYSIS - Calculus, Kidney, Left    Specimen: Kidney, Left; Calculus   Result Value Ref Range    Stone Source Comment     Color Brown     Size 1x1 mm     Stone Weight 4 mg    Composition Comment     Ca Oxalate - Monohydrate, Stone 15 %    Ca Oxalate-Dihydrate, Stone 80 %    HYDROXYAPATITE 5 %    Photo Comment     Comments: Comment     Please note Comment     Disclaimer: Comment    Basic Metabolic Panel    Specimen: Blood   Result Value Ref Range    Glucose 148 (H) 65 - 99 mg/dL    BUN 19 8 - 23 mg/dL    Creatinine 0.85 0.76 - 1.27 mg/dL    Sodium 137 136 - 145 mmol/L    Potassium 5.2 3.5 - 5.2 mmol/L    Chloride 101 98 - 107 mmol/L    CO2 25.5 22.0 - 29.0 mmol/L    Calcium 8.9 8.6 - 10.5 mg/dL    eGFR Non African Amer 88 >60 mL/min/1.73    BUN/Creatinine Ratio 22.4 7.0 - 25.0    Anion Gap 10.5 5.0 - 15.0 mmol/L   CBC Auto Differential    Specimen: Blood   Result Value Ref Range    WBC 8.02 3.40 - 10.80 10*3/mm3    RBC 4.20 4.14 - 5.80 10*6/mm3    Hemoglobin 13.8 13.0 - 17.7 g/dL    Hematocrit 40.5 37.5 - 51.0 %    MCV 96.4 79.0 - 97.0 fL    MCH 32.9 26.6 - 33.0 pg    MCHC 34.1 31.5 - 35.7 g/dL    RDW 12.4 12.3 - 15.4 %    RDW-SD 44.1 37.0 - 54.0 fl    MPV 9.0 6.0 - 12.0 fL    Platelets 88 (L) 140 - 450 10*3/mm3    Neutrophil % 85.9 (H) 42.7 - 76.0 %    Lymphocyte % 9.2 (L) 19.6 - 45.3 %    Monocyte % 4.6 (L) 5.0 - 12.0 %    Eosinophil % 0.0 (L) 0.3 - 6.2 %    Basophil % 0.1 0.0 - 1.5 %    Immature Grans % 0.2 0.0 - 0.5 %    Neutrophils, Absolute 6.88 1.70 - 7.00 10*3/mm3    Lymphocytes, Absolute 0.74 0.70 - 3.10 10*3/mm3    Monocytes, Absolute 0.37 0.10 - 0.90 10*3/mm3    Eosinophils, Absolute 0.00 0.00 - 0.40 10*3/mm3    Basophils, Absolute 0.01 0.00 - 0.20 10*3/mm3    Immature Grans, Absolute 0.02 0.00 - 0.05 10*3/mm3    nRBC 0.0 0.0 - 0.2 /100 WBC   Comprehensive Metabolic Panel    Specimen: Blood   Result Value Ref Range    Glucose 104 (H) 65 - 99 mg/dL    BUN 17 8 - 23 mg/dL    Creatinine 0.73 (L) 0.76 - 1.27 mg/dL    Sodium 135 (L) 136 - 145 mmol/L    Potassium 4.4 3.5 - 5.2 mmol/L    Chloride 100 98 - 107 mmol/L    CO2 26.1 22.0 - 29.0 mmol/L    Calcium 9.1  8.6 - 10.5 mg/dL    Total Protein 6.8 6.0 - 8.5 g/dL    Albumin 3.90 3.50 - 5.20 g/dL    ALT (SGPT) 22 1 - 41 U/L    AST (SGOT) 19 1 - 40 U/L    Alkaline Phosphatase 65 39 - 117 U/L    Total Bilirubin 0.8 0.0 - 1.2 mg/dL    eGFR Non African Amer 105 >60 mL/min/1.73    Globulin 2.9 gm/dL    A/G Ratio 1.3 g/dL    BUN/Creatinine Ratio 23.3 7.0 - 25.0    Anion Gap 8.9 5.0 - 15.0 mmol/L   CBC Auto Differential    Specimen: Blood   Result Value Ref Range    WBC 8.69 3.40 - 10.80 10*3/mm3    RBC 4.49 4.14 - 5.80 10*6/mm3    Hemoglobin 14.9 13.0 - 17.7 g/dL    Hematocrit 44.1 37.5 - 51.0 %    MCV 98.2 (H) 79.0 - 97.0 fL    MCH 33.2 (H) 26.6 - 33.0 pg    MCHC 33.8 31.5 - 35.7 g/dL    RDW 12.4 12.3 - 15.4 %    RDW-SD 45.2 37.0 - 54.0 fl    MPV 8.9 6.0 - 12.0 fL    Platelets 95 (L) 140 - 450 10*3/mm3    Neutrophil % 62.5 42.7 - 76.0 %    Lymphocyte % 25.2 19.6 - 45.3 %    Monocyte % 9.9 5.0 - 12.0 %    Eosinophil % 1.6 0.3 - 6.2 %    Basophil % 0.3 0.0 - 1.5 %    Immature Grans % 0.5 0.0 - 0.5 %    Neutrophils, Absolute 5.43 1.70 - 7.00 10*3/mm3    Lymphocytes, Absolute 2.19 0.70 - 3.10 10*3/mm3    Monocytes, Absolute 0.86 0.10 - 0.90 10*3/mm3    Eosinophils, Absolute 0.14 0.00 - 0.40 10*3/mm3    Basophils, Absolute 0.03 0.00 - 0.20 10*3/mm3    Immature Grans, Absolute 0.04 0.00 - 0.05 10*3/mm3    nRBC 0.0 0.0 - 0.2 /100 WBC   ECG 12 Lead   Result Value Ref Range    QT Interval 410 ms    QTC Interval 433 ms   Light Blue Top   Result Value Ref Range    Extra Tube hold for add-on    Green Top (Gel)   Result Value Ref Range    Extra Tube Hold for add-ons.    Lavender Top   Result Value Ref Range    Extra Tube hold for add-on    Gold Top - SST   Result Value Ref Range    Extra Tube Hold for add-ons.          Assessment/Plan       ECG 12 Lead    Date/Time: 5/5/2021 1:33 PM  Performed by: Zane Magallon MD  Authorized by: Zane Magallon MD   Comparison: compared with previous ECG from 11/17/2020  Comparison to previous ECG:  New frequent pvc/.  Rhythm: sinus rhythm  Ectopy: unifocal PVCs  Rate: normal  Conduction: conduction normal  ST Segments: ST segments normal  QRS axis: normal    Clinical impression: abnormal EKG  Comments: New frequent pvc's              Diagnoses and all orders for this visit:    1. Dizzy (Primary)  -     ECG 12 Lead  -     Ambulatory Referral to Cardiology    2. Palpitations  -     ECG 12 Lead  -     ECG 12 Lead  -     Ambulatory Referral to Cardiology      No follow-ups on file.          There are no Patient Instructions on file for this visit.     Zane Magallon MD    Assessment/Plan

## 2021-05-07 ENCOUNTER — TELEPHONE (OUTPATIENT)
Dept: INTERNAL MEDICINE | Facility: CLINIC | Age: 76
End: 2021-05-07

## 2021-05-07 ENCOUNTER — TELEPHONE (OUTPATIENT)
Dept: CARDIOLOGY | Facility: CLINIC | Age: 76
End: 2021-05-07

## 2021-05-07 NOTE — TELEPHONE ENCOUNTER
LVM with patient that he is too come to [facility] on Monday to get a holter monitor placed.

## 2021-05-07 NOTE — TELEPHONE ENCOUNTER
Caller: Deniz Dickson    Relationship: Self    Best call back number: [phone]    What orders are you requesting (i.e. lab or imaging): LABS    In what timeframe would the patient need to come in: 05/07/2021    Where will you receive your lab/imaging services: [facility]     Additional notes: PATIENT WOULD LIKE TO HAVE LAB ORDERS TO HAVE THEM DRAWN AT THE [facility] [place]

## 2021-05-08 ENCOUNTER — HOSPITAL ENCOUNTER (OUTPATIENT)
Facility: HOSPITAL | Age: 76
Discharge: HOME | End: 2021-05-08
Payer: MEDICARE

## 2021-05-08 LAB
A/G RATIO: 2 (ref 0.8–2)
ALBUMIN SERPL-MCNC: 4.5 G/DL (ref 3.4–4.8)
ALP BLD-CCNC: 69 U/L (ref 25–100)
ALT SERPL-CCNC: 25 U/L (ref 4–36)
ANION GAP SERPL CALCULATED.3IONS-SCNC: 7 MMOL/L (ref 3–16)
AST SERPL-CCNC: 25 U/L (ref 8–33)
BASOPHILS ABSOLUTE: 0 K/UL (ref 0–0.1)
BASOPHILS RELATIVE PERCENT: 0.2 %
BILIRUB SERPL-MCNC: 1.4 MG/DL (ref 0.3–1.2)
BUN BLDV-MCNC: 15 MG/DL (ref 6–20)
CALCIUM SERPL-MCNC: 9.3 MG/DL (ref 8.5–10.5)
CHLORIDE BLD-SCNC: 102 MMOL/L (ref 98–107)
CHOLESTEROL, TOTAL: 151 MG/DL (ref 0–200)
CO2: 28 MMOL/L (ref 20–30)
CREAT SERPL-MCNC: 0.8 MG/DL (ref 0.4–1.2)
EOSINOPHILS ABSOLUTE: 0.1 K/UL (ref 0–0.4)
EOSINOPHILS RELATIVE PERCENT: 1.1 %
GFR AFRICAN AMERICAN: >59
GFR NON-AFRICAN AMERICAN: >59
GLOBULIN: 2.3 G/DL
GLUCOSE BLD-MCNC: 117 MG/DL (ref 74–106)
HCT VFR BLD CALC: 44.7 % (ref 40–54)
HDLC SERPL-MCNC: 65 MG/DL (ref 40–60)
HEMOGLOBIN: 14.9 G/DL (ref 13–18)
IMMATURE GRANULOCYTES #: 0 K/UL
IMMATURE GRANULOCYTES %: 0.2 % (ref 0–5)
LDL CHOLESTEROL CALCULATED: 63 MG/DL
LYMPHOCYTES ABSOLUTE: 1.4 K/UL (ref 1.5–4)
LYMPHOCYTES RELATIVE PERCENT: 25.6 %
MCH RBC QN AUTO: 33.2 PG (ref 27–32)
MCHC RBC AUTO-ENTMCNC: 33.3 G/DL (ref 31–35)
MCV RBC AUTO: 99.6 FL (ref 80–100)
MONOCYTES ABSOLUTE: 0.6 K/UL (ref 0.2–0.8)
MONOCYTES RELATIVE PERCENT: 10.9 %
NEUTROPHILS ABSOLUTE: 3.3 K/UL (ref 2–7.5)
NEUTROPHILS RELATIVE PERCENT: 62 %
PDW BLD-RTO: 13.2 % (ref 11–16)
PLATELET # BLD: 88 K/UL (ref 150–400)
PMV BLD AUTO: 8.9 FL (ref 6–10)
POTASSIUM SERPL-SCNC: 4.9 MMOL/L (ref 3.4–5.1)
RBC # BLD: 4.49 M/UL (ref 4.5–6)
SODIUM BLD-SCNC: 137 MMOL/L (ref 136–145)
T4 FREE: 1.46 NG/DL (ref 0.89–1.76)
TOTAL PROTEIN: 6.8 G/DL (ref 6.4–8.3)
TRIGL SERPL-MCNC: 116 MG/DL (ref 0–249)
TSH SERPL DL<=0.05 MIU/L-ACNC: 2.02 UIU/ML (ref 0.27–4.2)
VITAMIN B-12: 864 PG/ML (ref 211–911)
VLDLC SERPL CALC-MCNC: 23 MG/DL
WBC # BLD: 5.4 K/UL (ref 4–11)

## 2021-05-08 PROCEDURE — 82607 VITAMIN B-12: CPT

## 2021-05-08 PROCEDURE — 36415 COLL VENOUS BLD VENIPUNCTURE: CPT

## 2021-05-08 PROCEDURE — 85025 COMPLETE CBC W/AUTO DIFF WBC: CPT

## 2021-05-08 PROCEDURE — 80061 LIPID PANEL: CPT

## 2021-05-08 PROCEDURE — 84443 ASSAY THYROID STIM HORMONE: CPT

## 2021-05-08 PROCEDURE — 80053 COMPREHEN METABOLIC PANEL: CPT

## 2021-05-08 PROCEDURE — 84439 ASSAY OF FREE THYROXINE: CPT

## 2021-05-10 DIAGNOSIS — R00.2 PALPITATIONS: Primary | ICD-10-CM

## 2021-05-17 ENCOUNTER — OFFICE VISIT (OUTPATIENT)
Dept: INTERNAL MEDICINE | Facility: CLINIC | Age: 76
End: 2021-05-17

## 2021-05-17 VITALS
OXYGEN SATURATION: 96 % | BODY MASS INDEX: 29.96 KG/M2 | SYSTOLIC BLOOD PRESSURE: 112 MMHG | HEART RATE: 60 BPM | WEIGHT: 214 LBS | DIASTOLIC BLOOD PRESSURE: 72 MMHG | TEMPERATURE: 97.1 F | RESPIRATION RATE: 16 BRPM | HEIGHT: 71 IN

## 2021-05-17 DIAGNOSIS — I10 ESSENTIAL HYPERTENSION: Primary | ICD-10-CM

## 2021-05-17 DIAGNOSIS — R79.9 ABNORMAL FINDING OF BLOOD CHEMISTRY, UNSPECIFIED: ICD-10-CM

## 2021-05-17 DIAGNOSIS — Z12.5 PROSTATE CANCER SCREENING: ICD-10-CM

## 2021-05-17 DIAGNOSIS — N40.0 BENIGN PROSTATIC HYPERPLASIA WITHOUT LOWER URINARY TRACT SYMPTOMS: ICD-10-CM

## 2021-05-17 DIAGNOSIS — N20.0 NEPHROLITHIASIS: ICD-10-CM

## 2021-05-17 DIAGNOSIS — E78.00 HYPERCHOLESTEREMIA: ICD-10-CM

## 2021-05-17 DIAGNOSIS — N52.01 ERECTILE DYSFUNCTION DUE TO ARTERIAL INSUFFICIENCY: ICD-10-CM

## 2021-05-17 PROCEDURE — 99214 OFFICE O/P EST MOD 30 MIN: CPT | Performed by: INTERNAL MEDICINE

## 2021-05-17 RX ORDER — SIMVASTATIN 20 MG
20 TABLET ORAL NIGHTLY
Qty: 90 TABLET | Refills: 3 | Status: SHIPPED | OUTPATIENT
Start: 2021-05-17 | End: 2022-05-19 | Stop reason: SDUPTHER

## 2021-05-17 RX ORDER — EZETIMIBE 10 MG/1
10 TABLET ORAL DAILY
Qty: 90 TABLET | Refills: 3 | Status: SHIPPED | OUTPATIENT
Start: 2021-05-17 | End: 2022-05-19 | Stop reason: SDUPTHER

## 2021-05-17 RX ORDER — SILDENAFIL CITRATE 20 MG/1
TABLET ORAL
Qty: 30 TABLET | Refills: 4 | Status: SHIPPED | OUTPATIENT
Start: 2021-05-17 | End: 2022-05-19 | Stop reason: SDUPTHER

## 2021-05-17 RX ORDER — TAMSULOSIN HYDROCHLORIDE 0.4 MG/1
1 CAPSULE ORAL NIGHTLY
Qty: 90 CAPSULE | Refills: 3 | Status: SHIPPED | OUTPATIENT
Start: 2021-05-17 | End: 2022-01-27

## 2021-05-17 NOTE — PROGRESS NOTES
Subjective     Patient ID: Deniz Dickson is a 76 y.o. male. Patient is here for management of multiple medical problems.     Chief Complaint   Patient presents with   • Dizziness     History of Present Illness       Dizziness    Monitor for heart done today.  Will see cardiology on Wed.      Hypercholest.   Doing well on Zocor and Zetia sperate.      The following portions of the patient's history were reviewed and updated as appropriate: allergies, current medications, past family history, past medical history, past social history, past surgical history and problem list.    Review of Systems   Constitutional: Negative for fatigue.   Musculoskeletal: Negative for joint swelling and myalgias.   Psychiatric/Behavioral: Negative for self-injury and sleep disturbance.   All other systems reviewed and are negative.      Current Outpatient Medications:   •  aspirin 81 MG EC tablet, Take 1 tablet by mouth Daily., Disp: 90 tablet, Rfl: 1  •  bisoprolol (ZEBeta) 5 MG tablet, TAKE ONE-HALF TABLET BY MOUTH DAILY, Disp: 45 tablet, Rfl: 3  •  celecoxib (CeleBREX) 200 MG capsule, , Disp: , Rfl:   •  Cholecalciferol (VITAMIN D-3 PO), Take 1 tablet by mouth Daily., Disp: , Rfl:   •  docusate sodium (Colace) 100 MG capsule, Take 1 capsule by mouth 2 (Two) Times a Day. If taking percocet, Disp: 15 capsule, Rfl: 1  •  ezetimibe (Zetia) 10 MG tablet, Take 1 tablet by mouth Daily., Disp: 90 tablet, Rfl: 3  •  famotidine (PEPCID) 40 MG tablet, Take 40 mg by mouth 2 (Two) Times a Day., Disp: , Rfl:   •  finasteride (PROSCAR) 5 MG tablet, TAKE ONE TABLET BY MOUTH DAILY, Disp: 90 tablet, Rfl: 3  •  fluticasone (FLONASE) 50 MCG/ACT nasal spray, 1 spray into the nostril(s) as directed by provider Daily., Disp: , Rfl:   •  Glucosamine-Chondroitin (OSTEO BI-FLEX REGULAR STRENGTH PO), Take 1 tablet by mouth 2 (two) times a day., Disp: , Rfl:   •  hydroxychloroquine (PLAQUENIL) 200 MG tablet, , Disp: , Rfl:   •  levothyroxine (SYNTHROID,  "LEVOTHROID) 137 MCG tablet, Take 1 tablet by mouth Daily., Disp: 90 tablet, Rfl: 3  •  montelukast (Singulair) 10 MG tablet, Take 1 tablet by mouth Every Night., Disp: 90 tablet, Rfl: 3  •  Multiple Vitamins-Minerals (CENTRUM SILVER ADULT 50+) tablet, Take 1 tablet by mouth daily., Disp: , Rfl:   •  Omega-3 Fatty Acids (FISH OIL) 1200 MG capsule capsule, Take 1,200 mg by mouth 2 (Two) Times a Day With Meals., Disp: , Rfl:   •  oxybutynin XL (Ditropan XL) 10 MG 24 hr tablet, Take 1 tablet by mouth Daily As Needed (bladder spasm)., Disp: 10 tablet, Rfl: 0  •  Potassium 75 MG tablet, Take 595 mg by mouth Daily., Disp: , Rfl:   •  sildenafil (REVATIO) 20 MG tablet, TAKE ONE TO THREE TABLETS BY MOUTH DAILY AS NEEDED FOR ERECTILE DYSFUNCTION, Disp: 30 tablet, Rfl: 4  •  simvastatin (Zocor) 20 MG tablet, Take 1 tablet by mouth Every Night., Disp: 90 tablet, Rfl: 3  •  tamsulosin (FLOMAX) 0.4 MG capsule 24 hr capsule, Take 1 capsule by mouth Every Night., Disp: 90 capsule, Rfl: 3  •  triamcinolone (KENALOG) 0.1 % cream, Apply 1 application topically to the appropriate area as directed As Needed., Disp: , Rfl:   •  vitamin B-12 (CYANOCOBALAMIN) 1000 MCG tablet, Take 1,000 mcg by mouth Daily., Disp: , Rfl:     Objective      Blood pressure 112/72, pulse 60, temperature 97.1 °F (36.2 °C), resp. rate 16, height 179.1 cm (70.5\"), weight 97.1 kg (214 lb), SpO2 96 %.    Physical Exam     General Appearance:    Alert, cooperative, no distress, appears stated age   Head:    Normocephalic, without obvious abnormality, atraumatic   Eyes:    PERRL, conjunctiva/corneas clear, EOM's intact   Ears:    Normal TM's and external ear canals, both ears   Nose:   Nares normal, septum midline, mucosa normal, no drainage   or sinus tenderness   Throat:   Lips, mucosa, and tongue normal; teeth and gums normal   Neck:   Supple, symmetrical, trachea midline, no adenopathy;        thyroid:  No enlargement/tenderness/nodules; no carotid    bruit or " JVD   Back:     Symmetric, no curvature, ROM normal, no CVA tenderness   Lungs:     Clear to auscultation bilaterally, respirations unlabored   Chest wall:    No tenderness or deformity   Heart:    Regular rate and rhythm, S1 and S2 normal, no murmur,        rub or gallop   Abdomen:     Soft, non-tender, bowel sounds active all four quadrants,     no masses, no organomegaly   Extremities:   Extremities normal, atraumatic, no cyanosis or edema   Pulses:   2+ and symmetric all extremities   Skin:   Skin color, texture, turgor normal, no rashes or lesions   Lymph nodes:   Cervical, supraclavicular, and axillary nodes normal   Neurologic:   CNII-XII intact. Normal strength, sensation and reflexes       throughout      Results for orders placed or performed during the hospital encounter of 11/15/20   COVID-19,[facility] IN-HOUSE,Nasal Swab No Transport Media 3-4 HR TAT - Swab, Nasal Cavity    Specimen: Nasal Cavity; Swab   Result Value Ref Range    COVID19 Not Detected Not Detected - Ref. Range   Comprehensive Metabolic Panel    Specimen: Blood   Result Value Ref Range    Glucose 147 (H) 65 - 99 mg/dL    BUN 30 (H) 8 - 23 mg/dL    Creatinine 1.58 (H) 0.76 - 1.27 mg/dL    Sodium 135 (L) 136 - 145 mmol/L    Potassium 4.5 3.5 - 5.2 mmol/L    Chloride 99 98 - 107 mmol/L    CO2 25.1 22.0 - 29.0 mmol/L    Calcium 10.2 8.6 - 10.5 mg/dL    Total Protein 7.9 6.0 - 8.5 g/dL    Albumin 4.50 3.50 - 5.20 g/dL    ALT (SGPT) 23 1 - 41 U/L    AST (SGOT) 18 1 - 40 U/L    Alkaline Phosphatase 80 39 - 117 U/L    Total Bilirubin 0.8 0.0 - 1.2 mg/dL    eGFR Non African Amer 43 (L) >60 mL/min/1.73    Globulin 3.4 gm/dL    A/G Ratio 1.3 g/dL    BUN/Creatinine Ratio 19.0 7.0 - 25.0    Anion Gap 10.9 5.0 - 15.0 mmol/L   Lipase    Specimen: Blood   Result Value Ref Range    Lipase 54 13 - 60 U/L   Urinalysis With Microscopic If Indicated (No Culture) - Urine, Clean Catch    Specimen: Urine, Clean Catch   Result Value Ref Range    Color, UA Yellow  Yellow, Straw    Appearance, UA Clear Clear    pH, UA 6.0 5.0 - 8.0    Specific Gravity, UA 1.020 1.005 - 1.030    Glucose, UA Negative Negative    Ketones, UA Negative Negative    Bilirubin, UA Negative Negative    Blood, UA Small (1+) (A) Negative    Protein, UA Negative Negative    Leuk Esterase, UA Moderate (2+) (A) Negative    Nitrite, UA Negative Negative    Urobilinogen, UA 0.2 E.U./dL 0.2 - 1.0 E.U./dL   CBC Auto Differential    Specimen: Blood   Result Value Ref Range    WBC 11.49 (H) 3.40 - 10.80 10*3/mm3    RBC 4.80 4.14 - 5.80 10*6/mm3    Hemoglobin 15.8 13.0 - 17.7 g/dL    Hematocrit 46.6 37.5 - 51.0 %    MCV 97.1 (H) 79.0 - 97.0 fL    MCH 32.9 26.6 - 33.0 pg    MCHC 33.9 31.5 - 35.7 g/dL    RDW 12.3 12.3 - 15.4 %    RDW-SD 44.6 37.0 - 54.0 fl    MPV 8.5 6.0 - 12.0 fL    Platelets 102 (L) 140 - 450 10*3/mm3    Neutrophil % 82.6 (H) 42.7 - 76.0 %    Lymphocyte % 8.6 (L) 19.6 - 45.3 %    Monocyte % 7.5 5.0 - 12.0 %    Eosinophil % 0.8 0.3 - 6.2 %    Basophil % 0.2 0.0 - 1.5 %    Immature Grans % 0.3 0.0 - 0.5 %    Neutrophils, Absolute 9.49 (H) 1.70 - 7.00 10*3/mm3    Lymphocytes, Absolute 0.99 0.70 - 3.10 10*3/mm3    Monocytes, Absolute 0.86 0.10 - 0.90 10*3/mm3    Eosinophils, Absolute 0.09 0.00 - 0.40 10*3/mm3    Basophils, Absolute 0.02 0.00 - 0.20 10*3/mm3    Immature Grans, Absolute 0.04 0.00 - 0.05 10*3/mm3    nRBC 0.0 0.0 - 0.2 /100 WBC   Urinalysis, Microscopic Only - Urine, Clean Catch    Specimen: Urine, Clean Catch   Result Value Ref Range    RBC, UA 3-5 (A) None Seen /HPF    WBC, UA 3-5 (A) None Seen /HPF    Bacteria, UA Trace (A) None Seen /HPF    Squamous Epithelial Cells, UA 0-2 None Seen, 0-2 /HPF    Hyaline Casts, UA None Seen None Seen /LPF    Methodology Manual Light Microscopy    Troponin    Specimen: Blood   Result Value Ref Range    Troponin T <0.010 0.000 - 0.030 ng/mL   STONE ANALYSIS - Calculus, Kidney, Left    Specimen: Kidney, Left; Calculus   Result Value Ref Range    Stone  Source Comment     Color Brown     Size 1x1 mm    Stone Weight 4 mg    Composition Comment     Ca Oxalate - Monohydrate, Stone 15 %    Ca Oxalate-Dihydrate, Stone 80 %    HYDROXYAPATITE 5 %    Photo Comment     Comments: Comment     Please note Comment     Disclaimer: Comment    Basic Metabolic Panel    Specimen: Blood   Result Value Ref Range    Glucose 148 (H) 65 - 99 mg/dL    BUN 19 8 - 23 mg/dL    Creatinine 0.85 0.76 - 1.27 mg/dL    Sodium 137 136 - 145 mmol/L    Potassium 5.2 3.5 - 5.2 mmol/L    Chloride 101 98 - 107 mmol/L    CO2 25.5 22.0 - 29.0 mmol/L    Calcium 8.9 8.6 - 10.5 mg/dL    eGFR Non African Amer 88 >60 mL/min/1.73    BUN/Creatinine Ratio 22.4 7.0 - 25.0    Anion Gap 10.5 5.0 - 15.0 mmol/L   CBC Auto Differential    Specimen: Blood   Result Value Ref Range    WBC 8.02 3.40 - 10.80 10*3/mm3    RBC 4.20 4.14 - 5.80 10*6/mm3    Hemoglobin 13.8 13.0 - 17.7 g/dL    Hematocrit 40.5 37.5 - 51.0 %    MCV 96.4 79.0 - 97.0 fL    MCH 32.9 26.6 - 33.0 pg    MCHC 34.1 31.5 - 35.7 g/dL    RDW 12.4 12.3 - 15.4 %    RDW-SD 44.1 37.0 - 54.0 fl    MPV 9.0 6.0 - 12.0 fL    Platelets 88 (L) 140 - 450 10*3/mm3    Neutrophil % 85.9 (H) 42.7 - 76.0 %    Lymphocyte % 9.2 (L) 19.6 - 45.3 %    Monocyte % 4.6 (L) 5.0 - 12.0 %    Eosinophil % 0.0 (L) 0.3 - 6.2 %    Basophil % 0.1 0.0 - 1.5 %    Immature Grans % 0.2 0.0 - 0.5 %    Neutrophils, Absolute 6.88 1.70 - 7.00 10*3/mm3    Lymphocytes, Absolute 0.74 0.70 - 3.10 10*3/mm3    Monocytes, Absolute 0.37 0.10 - 0.90 10*3/mm3    Eosinophils, Absolute 0.00 0.00 - 0.40 10*3/mm3    Basophils, Absolute 0.01 0.00 - 0.20 10*3/mm3    Immature Grans, Absolute 0.02 0.00 - 0.05 10*3/mm3    nRBC 0.0 0.0 - 0.2 /100 WBC   Comprehensive Metabolic Panel    Specimen: Blood   Result Value Ref Range    Glucose 104 (H) 65 - 99 mg/dL    BUN 17 8 - 23 mg/dL    Creatinine 0.73 (L) 0.76 - 1.27 mg/dL    Sodium 135 (L) 136 - 145 mmol/L    Potassium 4.4 3.5 - 5.2 mmol/L    Chloride 100 98 - 107  mmol/L    CO2 26.1 22.0 - 29.0 mmol/L    Calcium 9.1 8.6 - 10.5 mg/dL    Total Protein 6.8 6.0 - 8.5 g/dL    Albumin 3.90 3.50 - 5.20 g/dL    ALT (SGPT) 22 1 - 41 U/L    AST (SGOT) 19 1 - 40 U/L    Alkaline Phosphatase 65 39 - 117 U/L    Total Bilirubin 0.8 0.0 - 1.2 mg/dL    eGFR Non African Amer 105 >60 mL/min/1.73    Globulin 2.9 gm/dL    A/G Ratio 1.3 g/dL    BUN/Creatinine Ratio 23.3 7.0 - 25.0    Anion Gap 8.9 5.0 - 15.0 mmol/L   CBC Auto Differential    Specimen: Blood   Result Value Ref Range    WBC 8.69 3.40 - 10.80 10*3/mm3    RBC 4.49 4.14 - 5.80 10*6/mm3    Hemoglobin 14.9 13.0 - 17.7 g/dL    Hematocrit 44.1 37.5 - 51.0 %    MCV 98.2 (H) 79.0 - 97.0 fL    MCH 33.2 (H) 26.6 - 33.0 pg    MCHC 33.8 31.5 - 35.7 g/dL    RDW 12.4 12.3 - 15.4 %    RDW-SD 45.2 37.0 - 54.0 fl    MPV 8.9 6.0 - 12.0 fL    Platelets 95 (L) 140 - 450 10*3/mm3    Neutrophil % 62.5 42.7 - 76.0 %    Lymphocyte % 25.2 19.6 - 45.3 %    Monocyte % 9.9 5.0 - 12.0 %    Eosinophil % 1.6 0.3 - 6.2 %    Basophil % 0.3 0.0 - 1.5 %    Immature Grans % 0.5 0.0 - 0.5 %    Neutrophils, Absolute 5.43 1.70 - 7.00 10*3/mm3    Lymphocytes, Absolute 2.19 0.70 - 3.10 10*3/mm3    Monocytes, Absolute 0.86 0.10 - 0.90 10*3/mm3    Eosinophils, Absolute 0.14 0.00 - 0.40 10*3/mm3    Basophils, Absolute 0.03 0.00 - 0.20 10*3/mm3    Immature Grans, Absolute 0.04 0.00 - 0.05 10*3/mm3    nRBC 0.0 0.0 - 0.2 /100 WBC   ECG 12 Lead   Result Value Ref Range    QT Interval 410 ms    QTC Interval 433 ms   Light Blue Top   Result Value Ref Range    Extra Tube hold for add-on    Green Top (Gel)   Result Value Ref Range    Extra Tube Hold for add-ons.    Lavender Top   Result Value Ref Range    Extra Tube hold for add-on    Gold Top - SST   Result Value Ref Range    Extra Tube Hold for add-ons.          Assessment/Plan       Diagnoses and all orders for this visit:    1. Essential hypertension (Primary)  -     Vitamin B12  -     Comprehensive Metabolic Panel  -     CBC &  Differential  -     Lipid Panel  -     TSH  -     T4, Free  -     Hemoglobin A1c    2. Hypercholesteremia  -     Vitamin B12  -     Comprehensive Metabolic Panel  -     CBC & Differential  -     Lipid Panel  -     TSH  -     T4, Free  -     Hemoglobin A1c    3. Erectile dysfunction due to arterial insufficiency  -     sildenafil (REVATIO) 20 MG tablet; TAKE ONE TO THREE TABLETS BY MOUTH DAILY AS NEEDED FOR ERECTILE DYSFUNCTION  Dispense: 30 tablet; Refill: 4  -     Vitamin B12  -     Comprehensive Metabolic Panel  -     CBC & Differential  -     Lipid Panel  -     TSH  -     T4, Free  -     Hemoglobin A1c    4. Nephrolithiasis  -     tamsulosin (FLOMAX) 0.4 MG capsule 24 hr capsule; Take 1 capsule by mouth Every Night.  Dispense: 90 capsule; Refill: 3  -     Vitamin B12  -     Comprehensive Metabolic Panel  -     CBC & Differential  -     Lipid Panel  -     TSH  -     T4, Free  -     Hemoglobin A1c    5. Benign prostatic hyperplasia without lower urinary tract symptoms  -     Vitamin B12  -     Comprehensive Metabolic Panel  -     CBC & Differential  -     Lipid Panel  -     TSH  -     T4, Free  -     Hemoglobin A1c    6. Abnormal finding of blood chemistry, unspecified   -     Hemoglobin A1c    7. Prostate cancer screening  -     PSA Screen    Other orders  -     ezetimibe (Zetia) 10 MG tablet; Take 1 tablet by mouth Daily.  Dispense: 90 tablet; Refill: 3  -     simvastatin (Zocor) 20 MG tablet; Take 1 tablet by mouth Every Night.  Dispense: 90 tablet; Refill: 3      Return in about 6 months (around 11/17/2021) for Medicare Wellness.          There are no Patient Instructions on file for this visit.     Zane Magallon MD    Assessment/Plan

## 2021-05-19 ENCOUNTER — OFFICE VISIT (OUTPATIENT)
Dept: CARDIOLOGY | Facility: CLINIC | Age: 76
End: 2021-05-19

## 2021-05-19 VITALS
SYSTOLIC BLOOD PRESSURE: 118 MMHG | OXYGEN SATURATION: 96 % | BODY MASS INDEX: 30.02 KG/M2 | HEART RATE: 75 BPM | WEIGHT: 214.4 LBS | HEIGHT: 71 IN | DIASTOLIC BLOOD PRESSURE: 68 MMHG

## 2021-05-19 DIAGNOSIS — I10 ESSENTIAL HYPERTENSION: Primary | ICD-10-CM

## 2021-05-19 DIAGNOSIS — R00.2 PALPITATIONS: ICD-10-CM

## 2021-05-19 DIAGNOSIS — E78.2 MIXED HYPERLIPIDEMIA: ICD-10-CM

## 2021-05-19 PROCEDURE — 99214 OFFICE O/P EST MOD 30 MIN: CPT | Performed by: INTERNAL MEDICINE

## 2021-05-19 NOTE — PROGRESS NOTES
[facility]  Office Progress Note  Deniz Dickson  1945      Visit Date: 05/19/21    PCP: Zane Magallno MD  [address] [address]    IDENTIFICATION: A 76 y.o. male , retired ,  2016 from [place].     PROBLEM LIST:  1. 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 Dyslipidemia   a. December 2015: Total cholesterol 178, triglycerides 119, HDL 69, LDL 85.   b. 12/5/17   HL 68 LDL 82  c. 8/9/2019   HDL 73 LDL 66  3. Hypertension, controlled on current regimen.   4. Remote bilateral knee surgery per Dr. Smith. Redo R 8/18  5. Obstructive sleep apnea, on CPAP greater than 5 years.   6. Reformed smoker, times greater than 12 years.   7. Hypothyroidism.       Chief Complaint   Patient presents with   • Coronary Artery Disease   • Hyperlipidemia       Allergies  Allergies   Allergen Reactions   • Penicillins Itching and Rash       Current Medications    Current Outpatient Medications:   •  aspirin 81 MG EC tablet, Take 1 tablet by mouth Daily., Disp: 90 tablet, Rfl: 1  •  bisoprolol (ZEBeta) 5 MG tablet, TAKE ONE-HALF TABLET BY MOUTH DAILY, Disp: 45 tablet, Rfl: 3  •  celecoxib (CeleBREX) 200 MG capsule, , Disp: , Rfl:   •  Cholecalciferol (VITAMIN D-3 PO), Take 1 tablet by mouth Daily., Disp: , Rfl:   •  docusate sodium (Colace) 100 MG capsule, Take 1 capsule by mouth 2 (Two) Times a Day. If taking percocet, Disp: 15 capsule, Rfl: 1  •  ezetimibe (Zetia) 10 MG tablet, Take 1 tablet by mouth Daily., Disp: 90 tablet, Rfl: 3  •  famotidine (PEPCID) 40 MG tablet, Take 40 mg by mouth 2 (Two) Times a Day., Disp: , Rfl:   •  finasteride (PROSCAR) 5 MG tablet, TAKE ONE TABLET BY MOUTH DAILY, Disp: 90 tablet, Rfl: 3  •  fluticasone (FLONASE) 50 MCG/ACT nasal spray, 1 spray into the nostril(s) as directed by provider Daily., Disp: , Rfl:   •   "Glucosamine-Chondroitin (OSTEO BI-FLEX REGULAR STRENGTH PO), Take 1 tablet by mouth 2 (two) times a day., Disp: , Rfl:   •  hydroxychloroquine (PLAQUENIL) 200 MG tablet, , Disp: , Rfl:   •  levothyroxine (SYNTHROID, LEVOTHROID) 137 MCG tablet, Take 1 tablet by mouth Daily., Disp: 90 tablet, Rfl: 3  •  montelukast (Singulair) 10 MG tablet, Take 1 tablet by mouth Every Night., Disp: 90 tablet, Rfl: 3  •  Multiple Vitamins-Minerals (CENTRUM SILVER ADULT 50+) tablet, Take 1 tablet by mouth daily., Disp: , Rfl:   •  Omega-3 Fatty Acids (FISH OIL) 1200 MG capsule capsule, Take 1,200 mg by mouth 2 (Two) Times a Day With Meals., Disp: , Rfl:   •  oxybutynin XL (Ditropan XL) 10 MG 24 hr tablet, Take 1 tablet by mouth Daily As Needed (bladder spasm)., Disp: 10 tablet, Rfl: 0  •  Potassium 75 MG tablet, Take 595 mg by mouth Daily., Disp: , Rfl:   •  sildenafil (REVATIO) 20 MG tablet, TAKE ONE TO THREE TABLETS BY MOUTH DAILY AS NEEDED FOR ERECTILE DYSFUNCTION, Disp: 30 tablet, Rfl: 4  •  simvastatin (Zocor) 20 MG tablet, Take 1 tablet by mouth Every Night., Disp: 90 tablet, Rfl: 3  •  tamsulosin (FLOMAX) 0.4 MG capsule 24 hr capsule, Take 1 capsule by mouth Every Night., Disp: 90 capsule, Rfl: 3  •  triamcinolone (KENALOG) 0.1 % cream, Apply 1 application topically to the appropriate area as directed As Needed., Disp: , Rfl:   •  vitamin B-12 (CYANOCOBALAMIN) 1000 MCG tablet, Take 1,000 mcg by mouth Daily., Disp: , Rfl:       History of Present Illness   Deniz Dickson is a 76 y.o. year old male here for follow up.  No change of symptoms.  He is largely been staying at home due to the Covid pandemic.  He is now status post both vaccination and is interested in returning to gym exercise        OBJECTIVE:  Vitals:    05/19/21 1036   BP: 118/68   BP Location: Right arm   Patient Position: Sitting   Pulse: 75   SpO2: 96%   Weight: 97.3 kg (214 lb 6.4 oz)   Height: 180.3 cm (71\")     Physical Exam  Constitutional:       " Appearance: He is well-developed.   Neck:      Thyroid: No thyromegaly.      Vascular: No carotid bruit, hepatojugular reflux or JVD.      Trachea: No tracheal deviation.   Cardiovascular:      Rate and Rhythm: Normal rate and regular rhythm.      Chest Wall: PMI is not displaced.      Pulses: Normal pulses and intact distal pulses. No midsystolic click and no opening snap.           Radial pulses are 2+ on the right side and 2+ on the left side.        Dorsalis pedis pulses are 2+ on the right side and 2+ on the left side.        Posterior tibial pulses are 2+ on the right side and 2+ on the left side.      Heart sounds: S1 normal and S2 normal. Heart sounds not distant. No murmur heard.   No friction rub. No gallop.    Pulmonary:      Effort: Pulmonary effort is normal.      Breath sounds: Normal breath sounds. No wheezing or rales.   Abdominal:      General: Bowel sounds are normal.      Palpations: Abdomen is soft. There is no mass.      Tenderness: There is no abdominal tenderness. There is no guarding.   Musculoskeletal:      Cervical back: Normal range of motion and neck supple.         Diagnostic Data:  Procedures      ASSESSMENT:   Diagnosis Plan   1. Essential hypertension     2. Mixed hyperlipidemia     3. Palpitations         PLAN:  Hypertension largely controlled follows at home closely on bisoprolol    Dyslipidemia on Vytorin historical LDL greater than 200 prior to Vytorin    Palpitations suppressed on bisoprolol with preservation of LV function continue observation and rediscussed as little as necessary nonsteroidal anti-inflammatory      Zane Magallon MD, thank you for referring Mr. Dickson for evaluation.  I have forwarded my electronically generated recommendations to you for review.  Please do not hesitate to call with any questions.      Suresh Ojeda MD, [facility]

## 2021-05-28 ENCOUNTER — TELEPHONE (OUTPATIENT)
Dept: CARDIOLOGY | Facility: CLINIC | Age: 76
End: 2021-05-28

## 2021-05-28 DIAGNOSIS — I10 ESSENTIAL HYPERTENSION: ICD-10-CM

## 2021-05-28 DIAGNOSIS — I49.3 FREQUENT PVCS: ICD-10-CM

## 2021-05-28 DIAGNOSIS — I25.10 CORONARY ARTERY DISEASE INVOLVING NATIVE CORONARY ARTERY OF NATIVE HEART WITHOUT ANGINA PECTORIS: Primary | ICD-10-CM

## 2021-05-28 DIAGNOSIS — E78.5 DYSLIPIDEMIA: ICD-10-CM

## 2021-05-28 DIAGNOSIS — R00.2 PALPITATIONS: ICD-10-CM

## 2021-05-28 DIAGNOSIS — R94.31 ABNORMAL HOLTER EXAM: ICD-10-CM

## 2021-05-28 NOTE — TELEPHONE ENCOUNTER
Spoke with patient and advised per  he is having PVCs 20 % of the time according to his recent holter exam and it is encouraged he have a stress test done. PT verbalized understanding and okay to go on and proceed.

## 2021-06-16 ENCOUNTER — HOSPITAL ENCOUNTER (OUTPATIENT)
Dept: CARDIOLOGY | Facility: HOSPITAL | Age: 76
Discharge: HOME | End: 2021-06-16

## 2021-06-16 DIAGNOSIS — I10 ESSENTIAL HYPERTENSION: ICD-10-CM

## 2021-06-16 DIAGNOSIS — R94.31 ABNORMAL HOLTER EXAM: ICD-10-CM

## 2021-06-16 DIAGNOSIS — I25.10 CORONARY ARTERY DISEASE INVOLVING NATIVE CORONARY ARTERY OF NATIVE HEART WITHOUT ANGINA PECTORIS: ICD-10-CM

## 2021-06-16 DIAGNOSIS — I49.3 FREQUENT PVCS: ICD-10-CM

## 2021-06-16 DIAGNOSIS — R00.2 PALPITATIONS: ICD-10-CM

## 2021-06-16 DIAGNOSIS — E78.5 DYSLIPIDEMIA: ICD-10-CM

## 2021-06-16 PROCEDURE — 93017 CV STRESS TEST TRACING ONLY: CPT

## 2021-06-16 PROCEDURE — 25010000002 REGADENOSON 0.4 MG/5ML SOLUTION: Performed by: INTERNAL MEDICINE

## 2021-06-16 PROCEDURE — 93018 CV STRESS TEST I&R ONLY: CPT | Performed by: INTERNAL MEDICINE

## 2021-06-16 PROCEDURE — A9500 TC99M SESTAMIBI: HCPCS | Performed by: INTERNAL MEDICINE

## 2021-06-16 PROCEDURE — 0 TECHNETIUM SESTAMIBI: Performed by: INTERNAL MEDICINE

## 2021-06-16 PROCEDURE — 78452 HT MUSCLE IMAGE SPECT MULT: CPT | Performed by: INTERNAL MEDICINE

## 2021-06-16 PROCEDURE — 78452 HT MUSCLE IMAGE SPECT MULT: CPT

## 2021-06-16 RX ADMIN — REGADENOSON 0.4 MG: 0.08 INJECTION, SOLUTION INTRAVENOUS at 10:30

## 2021-06-16 RX ADMIN — TECHNETIUM TC 99M SESTAMIBI 1 DOSE: 1 INJECTION INTRAVENOUS at 08:25

## 2021-06-16 RX ADMIN — TECHNETIUM TC 99M SESTAMIBI 1 DOSE: 1 INJECTION INTRAVENOUS at 10:37

## 2021-06-17 ENCOUNTER — TELEPHONE (OUTPATIENT)
Dept: CARDIOLOGY | Facility: CLINIC | Age: 76
End: 2021-06-17

## 2021-06-18 LAB
BH CV REST NUCLEAR ISOTOPE DOSE: 9.2 MCI
BH CV STRESS BP STAGE 2: NORMAL
BH CV STRESS BP STAGE 4: NORMAL
BH CV STRESS COMMENTS STAGE 1: NORMAL
BH CV STRESS DOSE REGADENOSON STAGE 1: 0.4
BH CV STRESS DURATION MIN STAGE 1: 1
BH CV STRESS DURATION MIN STAGE 2: 1
BH CV STRESS DURATION MIN STAGE 3: 1
BH CV STRESS DURATION MIN STAGE 4: 1
BH CV STRESS DURATION SEC STAGE 1: 0
BH CV STRESS DURATION SEC STAGE 2: 0
BH CV STRESS DURATION SEC STAGE 3: 0
BH CV STRESS DURATION SEC STAGE 4: 0
BH CV STRESS HR STAGE 1: 64
BH CV STRESS HR STAGE 2: 83
BH CV STRESS HR STAGE 3: 81
BH CV STRESS NUCLEAR ISOTOPE DOSE: 33 MCI
BH CV STRESS PROTOCOL 1: NORMAL
BH CV STRESS PROTOCOL 2 STAGE 1: 1
BH CV STRESS RECOVERY BP: NORMAL MMHG
BH CV STRESS RECOVERY HR: 79 BPM
BH CV STRESS STAGE 1: 1
BH CV STRESS STAGE 2: 2
BH CV STRESS STAGE 3: 3
BH CV STRESS STAGE 4: 4
LV EF NUC BP: 65 %
MAXIMAL PREDICTED HEART RATE: 144 BPM
PERCENT MAX PREDICTED HR: 57.64 %
STRESS BASELINE BP: NORMAL MMHG
STRESS BASELINE HR: 58 BPM
STRESS O2 SAT REST: 96 %
STRESS PERCENT HR: 68 %
STRESS POST PEAK BP: NORMAL MMHG
STRESS POST PEAK HR: 83 BPM
STRESS TARGET HR: 122 BPM

## 2021-06-29 ENCOUNTER — LAB (OUTPATIENT)
Dept: LAB | Facility: HOSPITAL | Age: 76
End: 2021-06-29

## 2021-06-29 LAB — PSA SERPL-MCNC: 1.38 NG/ML (ref 0–4)

## 2021-06-29 PROCEDURE — G0103 PSA SCREENING: HCPCS | Performed by: INTERNAL MEDICINE

## 2021-06-29 PROCEDURE — 36415 COLL VENOUS BLD VENIPUNCTURE: CPT | Performed by: INTERNAL MEDICINE

## 2021-07-02 ENCOUNTER — APPOINTMENT (OUTPATIENT)
Dept: CT IMAGING | Facility: HOSPITAL | Age: 76
End: 2021-07-02

## 2021-07-02 ENCOUNTER — HOSPITAL ENCOUNTER (EMERGENCY)
Facility: HOSPITAL | Age: 76
Discharge: HOME | End: 2021-07-02
Attending: EMERGENCY MEDICINE | Admitting: EMERGENCY MEDICINE

## 2021-07-02 VITALS
TEMPERATURE: 98.2 F | HEART RATE: 54 BPM | WEIGHT: 211 LBS | OXYGEN SATURATION: 93 % | HEIGHT: 70 IN | RESPIRATION RATE: 16 BRPM | BODY MASS INDEX: 30.21 KG/M2 | SYSTOLIC BLOOD PRESSURE: 137 MMHG | DIASTOLIC BLOOD PRESSURE: 79 MMHG

## 2021-07-02 DIAGNOSIS — N20.1 RIGHT URETERAL STONE: Primary | ICD-10-CM

## 2021-07-02 DIAGNOSIS — R11.0 NAUSEA: ICD-10-CM

## 2021-07-02 DIAGNOSIS — N23 RENAL COLIC: ICD-10-CM

## 2021-07-02 LAB
ALBUMIN SERPL-MCNC: 4.4 G/DL (ref 3.5–5.2)
ALBUMIN/GLOB SERPL: 1.6 G/DL
ALP SERPL-CCNC: 59 U/L (ref 39–117)
ALT SERPL W P-5'-P-CCNC: 27 U/L (ref 1–41)
ANION GAP SERPL CALCULATED.3IONS-SCNC: 12.7 MMOL/L (ref 5–15)
AST SERPL-CCNC: 24 U/L (ref 1–40)
BACTERIA UR QL AUTO: ABNORMAL /HPF
BASOPHILS # BLD AUTO: 0.01 10*3/MM3 (ref 0–0.2)
BASOPHILS NFR BLD AUTO: 0.1 % (ref 0–1.5)
BILIRUB SERPL-MCNC: 1.2 MG/DL (ref 0–1.2)
BILIRUB UR QL STRIP: NEGATIVE
BUN SERPL-MCNC: 13 MG/DL (ref 8–23)
BUN/CREAT SERPL: 16 (ref 7–25)
CALCIUM SPEC-SCNC: 8.9 MG/DL (ref 8.6–10.5)
CHLORIDE SERPL-SCNC: 101 MMOL/L (ref 98–107)
CLARITY UR: CLEAR
CO2 SERPL-SCNC: 24.3 MMOL/L (ref 22–29)
COLOR UR: YELLOW
CREAT SERPL-MCNC: 0.81 MG/DL (ref 0.76–1.27)
DEPRECATED RDW RBC AUTO: 44 FL (ref 37–54)
EOSINOPHIL # BLD AUTO: 0.04 10*3/MM3 (ref 0–0.4)
EOSINOPHIL NFR BLD AUTO: 0.5 % (ref 0.3–6.2)
ERYTHROCYTE [DISTWIDTH] IN BLOOD BY AUTOMATED COUNT: 12.4 % (ref 12.3–15.4)
GFR SERPL CREATININE-BSD FRML MDRD: 93 ML/MIN/1.73
GLOBULIN UR ELPH-MCNC: 2.7 GM/DL
GLUCOSE SERPL-MCNC: 119 MG/DL (ref 65–99)
GLUCOSE UR STRIP-MCNC: NEGATIVE MG/DL
HCT VFR BLD AUTO: 44.5 % (ref 37.5–51)
HGB BLD-MCNC: 15.4 G/DL (ref 13–17.7)
HGB UR QL STRIP.AUTO: ABNORMAL
HYALINE CASTS UR QL AUTO: ABNORMAL /LPF
IMM GRANULOCYTES # BLD AUTO: 0.04 10*3/MM3 (ref 0–0.05)
IMM GRANULOCYTES NFR BLD AUTO: 0.5 % (ref 0–0.5)
KETONES UR QL STRIP: ABNORMAL
LEUKOCYTE ESTERASE UR QL STRIP.AUTO: NEGATIVE
LIPASE SERPL-CCNC: 30 U/L (ref 13–60)
LYMPHOCYTES # BLD AUTO: 0.8 10*3/MM3 (ref 0.7–3.1)
LYMPHOCYTES NFR BLD AUTO: 9.4 % (ref 19.6–45.3)
MCH RBC QN AUTO: 33.7 PG (ref 26.6–33)
MCHC RBC AUTO-ENTMCNC: 34.6 G/DL (ref 31.5–35.7)
MCV RBC AUTO: 97.4 FL (ref 79–97)
MONOCYTES # BLD AUTO: 0.7 10*3/MM3 (ref 0.1–0.9)
MONOCYTES NFR BLD AUTO: 8.2 % (ref 5–12)
MUCOUS THREADS URNS QL MICRO: ABNORMAL /HPF
NEUTROPHILS NFR BLD AUTO: 6.91 10*3/MM3 (ref 1.7–7)
NEUTROPHILS NFR BLD AUTO: 81.3 % (ref 42.7–76)
NITRITE UR QL STRIP: NEGATIVE
NRBC BLD AUTO-RTO: 0 /100 WBC (ref 0–0.2)
PH UR STRIP.AUTO: 5.5 [PH] (ref 5–8)
PLATELET # BLD AUTO: 95 10*3/MM3 (ref 140–450)
PMV BLD AUTO: 9.1 FL (ref 6–12)
POTASSIUM SERPL-SCNC: 4.2 MMOL/L (ref 3.5–5.2)
PROT SERPL-MCNC: 7.1 G/DL (ref 6–8.5)
PROT UR QL STRIP: NEGATIVE
RBC # BLD AUTO: 4.57 10*6/MM3 (ref 4.14–5.8)
RBC # UR: ABNORMAL /HPF
REF LAB TEST METHOD: ABNORMAL
SODIUM SERPL-SCNC: 138 MMOL/L (ref 136–145)
SP GR UR STRIP: 1.01 (ref 1–1.03)
SQUAMOUS #/AREA URNS HPF: ABNORMAL /HPF
UROBILINOGEN UR QL STRIP: ABNORMAL
WBC # BLD AUTO: 8.5 10*3/MM3 (ref 3.4–10.8)
WBC UR QL AUTO: ABNORMAL /HPF

## 2021-07-02 PROCEDURE — 80053 COMPREHEN METABOLIC PANEL: CPT | Performed by: PHYSICIAN ASSISTANT

## 2021-07-02 PROCEDURE — 83690 ASSAY OF LIPASE: CPT | Performed by: PHYSICIAN ASSISTANT

## 2021-07-02 PROCEDURE — 25010000002 ONDANSETRON PER 1 MG: Performed by: PHYSICIAN ASSISTANT

## 2021-07-02 PROCEDURE — 74176 CT ABD & PELVIS W/O CONTRAST: CPT

## 2021-07-02 PROCEDURE — 81001 URINALYSIS AUTO W/SCOPE: CPT | Performed by: PHYSICIAN ASSISTANT

## 2021-07-02 PROCEDURE — 25010000002 MORPHINE SULFATE (PF) 2 MG/ML SOLUTION: Performed by: EMERGENCY MEDICINE

## 2021-07-02 PROCEDURE — 96375 TX/PRO/DX INJ NEW DRUG ADDON: CPT

## 2021-07-02 PROCEDURE — 99283 EMERGENCY DEPT VISIT LOW MDM: CPT

## 2021-07-02 PROCEDURE — 96374 THER/PROPH/DIAG INJ IV PUSH: CPT

## 2021-07-02 PROCEDURE — 85025 COMPLETE CBC W/AUTO DIFF WBC: CPT | Performed by: PHYSICIAN ASSISTANT

## 2021-07-02 RX ORDER — SODIUM CHLORIDE 0.9 % (FLUSH) 0.9 %
10 SYRINGE (ML) INJECTION AS NEEDED
Status: DISCONTINUED | OUTPATIENT
Start: 2021-07-02 | End: 2021-07-02 | Stop reason: HOSPADM

## 2021-07-02 RX ORDER — MORPHINE SULFATE 2 MG/ML
2 INJECTION, SOLUTION INTRAMUSCULAR; INTRAVENOUS ONCE
Status: COMPLETED | OUTPATIENT
Start: 2021-07-02 | End: 2021-07-02

## 2021-07-02 RX ORDER — ONDANSETRON 4 MG/1
4 TABLET, ORALLY DISINTEGRATING ORAL EVERY 6 HOURS PRN
Qty: 10 TABLET | Refills: 0 | Status: SHIPPED | OUTPATIENT
Start: 2021-07-02 | End: 2021-07-12

## 2021-07-02 RX ORDER — ONDANSETRON 2 MG/ML
4 INJECTION INTRAMUSCULAR; INTRAVENOUS ONCE
Status: COMPLETED | OUTPATIENT
Start: 2021-07-02 | End: 2021-07-02

## 2021-07-02 RX ORDER — HYDROCODONE BITARTRATE AND ACETAMINOPHEN 5; 325 MG/1; MG/1
1 TABLET ORAL EVERY 6 HOURS PRN
Qty: 12 TABLET | Refills: 0 | Status: SHIPPED | OUTPATIENT
Start: 2021-07-02 | End: 2021-07-05

## 2021-07-02 RX ADMIN — MORPHINE SULFATE 2 MG: 2 INJECTION, SOLUTION INTRAMUSCULAR; INTRAVENOUS at 15:29

## 2021-07-02 RX ADMIN — ONDANSETRON 4 MG: 2 INJECTION INTRAMUSCULAR; INTRAVENOUS at 15:28

## 2021-07-02 RX ADMIN — SODIUM CHLORIDE 1000 ML: 9 INJECTION, SOLUTION INTRAVENOUS at 15:28

## 2021-07-02 RX ADMIN — LIDOCAINE HYDROCHLORIDE 150 MG: 10 INJECTION, SOLUTION INFILTRATION; PERINEURAL at 15:48

## 2021-07-02 NOTE — DISCHARGE INSTRUCTIONS
You have an approximate 4 mm stone in the right mid ureter which is likely causing your symptoms and pain.  Take Norco as needed for severe pain.  Drink plenty of fluids to stay well-hydrated.  Take Zofran to help with nausea and vomiting.  Use the urine strainer to ensure that you pass the stone.  You can follow-up with Dr. Carter in the next few days if you have not passed the stone.  Return to the ER for any change, worsening symptoms, or any additional concerns including but not limited to severe or worsening pain, inability urinate, fever greater 100.4, intractable vomiting.

## 2021-07-02 NOTE — ED PROVIDER NOTES
"Subjective   Patient is a 76-year-old male with a history of basal cell carcinoma, dyslipidemia, CAD, GERD, sleep apnea, hypothyroidism, and kidney stones presenting to ER for evaluation of flank pain.  Patient states his pain began on Wednesday 06/30/21 in his right flank.  He states it was a dull pain but he would have severe sharp stabbing pains that would make him bend over and pain.  He states it feels very similar to when he had his kidney stone last year.  He states he is also been nauseous but denies any vomiting, change in his bowel movements.  Denies any fever, chills, testicular pain or swelling, chest pain, cough, shortness of breath, or any other symptoms.  He states his urine has had a \"smell\" to it but denies any hematuria or dysuria. He has not had any medication prior to arrival.  He states the pain comes in waves and he is not currently experiencing the pain.  Denies any injury, trauma or strain to the back.          Review of Systems   Constitutional: Negative for chills and fever.   HENT: Negative.    Eyes: Negative.    Respiratory: Negative.    Cardiovascular: Negative.    Gastrointestinal: Positive for nausea. Negative for abdominal pain, blood in stool, diarrhea and vomiting.   Genitourinary: Positive for flank pain.   Musculoskeletal: Positive for back pain.   Skin: Negative.    Neurological: Negative.    Psychiatric/Behavioral: Negative.        Past Medical History:   Diagnosis Date   • Arthritis     OA   • Basal cell carcinoma 02/2020    left ear   • Cancer (CMS/[facility])     Skin cancer removed 3x    • Coronary artery disease     Probable Non obstructive    • Dyslipidemia    • Elevated prostate specific antigen (PSA)    • Essential hypertension, benign    • Full dentures     Instructed no adhesives the DOS   • GERD (gastroesophageal reflux disease)    • H/O benign prostatic hypertrophy    • H/O cardiovascular stress test 07/19/2019    Patient reported done by Dr Ojeda around 3-4 years ago and " "reported that all was wnl's    • High cholesterol    • History of bronchitis    • History of hemorrhoids    • History of idiopathic thrombocytopenic purpura    • History of malignant neoplasm of skin    • History of sleep apnea    • [place] (hard of hearing)     No use of hearing aids   • Hypertension    • Hypothyroidism    • JUWAN on CPAP     Patient instructed to bring mask and machine the DOS   • Rectal bleeding    • S/P right knee surgery 07/12/2017   • Seasonal allergies    • Tobacco abuse    • Wears glasses     Rx wears       Allergies   Allergen Reactions   • Penicillins Itching and Rash       Past Surgical History:   Procedure Laterality Date   • APPENDECTOMY     • BASAL CELL CARCINOMA EXCISION  2020    left ear   • COLONOSCOPY      Complete Colonoscopy   • COLONOSCOPY N/A 7/22/2019    Procedure: COLONOSCOPY;  Surgeon: Lisa Rhodes MD;  Location: [facility];  Service: Gastroenterology   • HERNIA REPAIR      patient reported by Dr Banuelos - patient unsure extact location   • JOINT REPLACEMENT Left     partial knee replacement   • JOINT REPLACEMENT Right     partial knee replacement   • KNEE SURGERY      Left Knee   • MOUTH SURGERY      full mouth extraction   • OTHER SURGICAL HISTORY      Surgery Testis Orchiopexy around age 20   • OTHER SURGICAL HISTORY      Cyst removed from a finger - patient reported to this as a \"growth\" and is unsure laterality   • SKIN BIOPSY      Patient reported skin cancer removed 3x.  Nose, back and face   • URETEROSCOPY LASER LITHOTRIPSY WITH STENT INSERTION Left 11/15/2020    Procedure: URETEROSCOPY LASER LITHOTRIPSY WITH STENT INSERTION, Rertrograde;  Surgeon: Bobo Carter MD;  Location: [facility] OR;  Service: Urology;  Laterality: Left;       Family History   Problem Relation Age of Onset   • Migraines Mother    • Mental illness Mother    • Heart disease Mother    • Alcohol abuse Father    • Cancer Father    • Lung cancer Father    • Brain cancer Sister    • Colon " "cancer Paternal Grandmother    • Prostate cancer Other    • Colon cancer Other        Social History     Socioeconomic History   • Marital status:      Spouse name: Not on file   • Number of children: Not on file   • Years of education: Not on file   • Highest education level: Not on file   Tobacco Use   • Smoking status: Former Smoker     Packs/day: 2.00     Years: 15.00     Pack years: 30.00     Types: Cigarettes     Quit date: 1985     Years since quittin.0   • Smokeless tobacco: Former User     Types: Chew     Quit date:    Substance and Sexual Activity   • Alcohol use: No   • Drug use: No   • Sexual activity: Defer           Objective   Physical Exam  Vitals and nursing note reviewed.     /79   Pulse 54   Temp 98.2 °F (36.8 °C) (Oral)   Resp 16   Ht 177.8 cm (70\")   Wt 95.7 kg (211 lb)   SpO2 93%   BMI 30.28 kg/m²     GEN: Pleasant gentleman in no acute distress, sitting upright in stretcher.  Awake and alert. Does not appear septic or toxic.  Head: Normocephalic, atraumatic  Eyes: EOM intact  ENT: Mask in place per protocol  Cardiovascular: Regular rate and rhythm  Lungs: Clear to auscultation bilaterally without adventitious sounds  Abdomen: Soft, nontender, nondistended, no peritoneal signs, no guarding  Back: No significant CVA tenderness bilaterally  Extremities: No edema, normal appearance  Neuro: GCS 15  Psych: Mood and affect are appropriate    Procedures           ED Course  ED Course as of  1258      1548 WBC: 8.50 [LA]   1548 Hemoglobin: 15.4 [LA]   1548 Color, UA: Yellow [LA]   1548 Appearance, UA: Clear [LA]   1548 pH, UA: 5.5 [LA]   1548 Specific Gravity, UA: 1.015 [LA]   1548 Glucose: Negative [LA]   1548 Ketones, UA(!): Trace [LA]   1548 Bilirubin, UA: Negative [LA]   1548 Blood, UA(!): Large (3+) [LA]   1548 Protein, UA: Negative [LA]   1548 Leukocytes, UA: Negative [LA]   1548 Nitrite, UA: Negative [LA]   1548 Urobilinogen, UA: 0.2 E.U./dL " [LA]   1555 Glucose(!): 119 [LA]   1555 BUN: 13 [LA]   1555 Creatinine: 0.81 [LA]   1555 Sodium: 138 [LA]   1555 Potassium: 4.2 [LA]   1555 Chloride: 101 [LA]   1555 CO2: 24.3 [LA]   1555 Calcium: 8.9 [LA]   1555 Total Protein: 7.1 [LA]   1555 Albumin: 4.40 [LA]   1555 ALT (SGPT): 27 [LA]   1555 AST (SGOT): 24 [LA]   1555 Alkaline Phosphatase: 59 [LA]   1555 Total Bilirubin: 1.2 [LA]   1555 eGFR Non  Am: 93 [LA]   1555 Globulin: 2.7 [LA]   1555 A/G Ratio: 1.6 [LA]   1555 BUN/Creatinine Ratio: 16.0 [LA]   1555 Anion Gap: 12.7 [LA]   1555 Lipase: 30 [LA]   1611 HISTORY: Right-sided flank pain, nausea, history of kidney stones     COMPARISON:None     Note: Noncontrast exam is less sensitive for assessment of the bowel and  solid abdominal organs     TECHNIQUE: Noncontrast exam      CT ABDOMEN:  Mild right hydronephrosis is present. Findings are  secondary to a 4 mm mid right ureteral stone. Remaining solid organs are  unremarkable. The bowel shows no evidence of obstruction. There is no  free air or free fluid within the abdomen. Cholelithiasis is noted.     CT PELVIS: No bowel dilatation is seen. There is no free fluid. Appendix  is not visualized. However, no secondary findings of appendicitis are  present. Surgical changes of left lower quadrant abdominal wall hernia  repair are noted. Mild prostate enlargement is seen..     IMPRESSION:  1. Mild right hydronephrosis secondary to a 4 mm mid right ureteral  stone.  2. No evidence of bowel obstruction     This study was performed with techniques to keep radiation doses as low  as reasonably achievable (ALARA). Individualized dose reduction  techniques using automated exposure control or adjustment of vA and/or  kV according to the patient size were employed.     [LA]   1617 Discussed findings with patient and daughter.  His urine does not look infected, he does not appear septic or toxic and his pain has been controlled.  He felt comfortable going home with pain  medications at this time.  We will give him a urine strainer and Zofran as well.    [LA]      ED Course User Index  [LA] Prachi Bañuelos PA-C                                           MDM  Number of Diagnoses or Management Options  Nausea  Renal colic  Right ureteral stone  Diagnosis management comments: On arrival, patient is stable.  Differential could include nephrolithiasis, muscle strain or spasm, cystitis, and other concerns.  Will obtain basic labs, urinalysis.  Will give pain and nausea medicine with IV fluids.  Will obtain CT scan to further evaluate.    Labs are stable.  Urine had blood but no signs of infection.  CT revealed a 4 mm mid ureter stone in the right side which is likely causing patient's pain.  His vital signs are I stable.  His pain was well controlled.  He does not appear septic or toxic, believe he could be discharged with pain medication and follow-up.  Attending was in agreement, patient felt comfortable with this plan.  We will give him pain and nausea medicine, discussed follow-up with Dr. Carter.  Discussed very strict return precautions as well.  He verbalized understanding and was in agreement with this plan of care.       Amount and/or Complexity of Data Reviewed  Clinical lab tests: reviewed and ordered  Tests in the radiology section of CPT®: reviewed and ordered  Discussion of test results with the performing providers: yes  Review and summarize past medical records: yes  Discuss the patient with other providers: yes    Risk of Complications, Morbidity, and/or Mortality  Presenting problems: moderate  Diagnostic procedures: moderate  Management options: low    Patient Progress  Patient progress: stable      Final diagnoses:   Right ureteral stone   Renal colic   Nausea       ED Disposition  ED Disposition     ED Disposition Condition Comment    Discharge Stable           Zane Magallon MD  [address] [place] [address] [phone]    Schedule an appointment as  soon as possible for a visit       Bobo Carter MD  [address] [id number] [phone]    Schedule an appointment as soon as possible for a visit   As needed         Medication List      New Prescriptions    HYDROcodone-acetaminophen 5-325 MG per tablet  Commonly known as: NORCO  Take 1 tablet by mouth Every 6 (Six) Hours As Needed for Severe Pain  for up to 3 days.     ondansetron ODT 4 MG disintegrating tablet  Commonly known as: ZOFRAN-ODT  Place 1 tablet on the tongue Every 6 (Six) Hours As Needed for Nausea or Vomiting for up to 10 days.           Where to Get Your Medications      These medications were sent to [facility] [address] [place] [address] - [address] AT [facility] - [phone]  - [phone] [facility] [place] [address]    Phone: [phone]   · HYDROcodone-acetaminophen 5-325 MG per tablet  · ondansetron ODT 4 MG disintegrating tablet          Prachi Bañuelos PA-C  07/03/21 1258

## 2021-07-06 ENCOUNTER — OFFICE VISIT (OUTPATIENT)
Dept: UROLOGY | Facility: CLINIC | Age: 76
End: 2021-07-06

## 2021-07-06 VITALS
RESPIRATION RATE: 16 BRPM | SYSTOLIC BLOOD PRESSURE: 124 MMHG | DIASTOLIC BLOOD PRESSURE: 80 MMHG | OXYGEN SATURATION: 96 % | TEMPERATURE: 98.5 F | HEART RATE: 68 BPM

## 2021-07-06 DIAGNOSIS — N20.0 NEPHROLITHIASIS: Primary | ICD-10-CM

## 2021-07-06 DIAGNOSIS — R97.20 ELEVATED PROSTATE SPECIFIC ANTIGEN (PSA): ICD-10-CM

## 2021-07-06 DIAGNOSIS — N52.01 ERECTILE DYSFUNCTION DUE TO ARTERIAL INSUFFICIENCY: ICD-10-CM

## 2021-07-06 LAB
BILIRUB BLD-MCNC: NEGATIVE MG/DL
CLARITY, POC: CLEAR
COLOR UR: YELLOW
GLUCOSE UR STRIP-MCNC: NEGATIVE MG/DL
KETONES UR QL: NEGATIVE
LEUKOCYTE EST, POC: NEGATIVE
NITRITE UR-MCNC: NEGATIVE MG/ML
PH UR: 6 [PH] (ref 5–8)
PROT UR STRIP-MCNC: NEGATIVE MG/DL
RBC # UR STRIP: NEGATIVE /UL
SP GR UR: 1.02 (ref 1–1.03)
UROBILINOGEN UR QL: NORMAL

## 2021-07-06 PROCEDURE — 99214 OFFICE O/P EST MOD 30 MIN: CPT | Performed by: UROLOGY

## 2021-07-06 PROCEDURE — 81003 URINALYSIS AUTO W/O SCOPE: CPT | Performed by: UROLOGY

## 2021-07-06 NOTE — PROGRESS NOTES
Chief Complaint  nephrolithiasis (yearly)        ELMER Dickson is a 76 y.o. male who returns today for annual checkup difficult to communicate with because of his deafness.  He was originally referred for elevated PSA and was found to have a markedly enlarged prostate.  Since taking Proscar to shrink the gland any symptoms of outlet obstruction have improved and his PSA has been down to normal including a recent 1 of 1.3.  His voided urine today is clear.    A new problem is a stone described as less than 4 mm in the right mid ureter that prompted an ER visit over the weekend.  He has had no recurrence of his pain and is anxious to pass the stone spontaneously.  He states he had to have 1 surgically removed by Dr. Carter 8 months ago.    He has a mild problem with erectile dysfunction that is assisted with generic sildenafil which he request refill on.    Vitals:    07/06/21 0905   BP: 124/80   Pulse: 68   Resp: 16   Temp: 98.5 °F (36.9 °C)   SpO2: 96%       Past Medical History  Past Medical History:   Diagnosis Date   • Arthritis     OA   • Basal cell carcinoma 02/2020    left ear   • Cancer (CMS/HCC)     Skin cancer removed 3x    • Coronary artery disease     Probable Non obstructive    • Dyslipidemia    • Elevated prostate specific antigen (PSA)    • Essential hypertension, benign    • Full dentures     Instructed no adhesives the DOS   • GERD (gastroesophageal reflux disease)    • H/O benign prostatic hypertrophy    • H/O cardiovascular stress test 07/19/2019    Patient reported done by Dr Ojeda around 3-4 years ago and reported that all was wnl's    • High cholesterol    • History of bronchitis    • History of hemorrhoids    • History of idiopathic thrombocytopenic purpura    • History of malignant neoplasm of skin    • History of sleep apnea    • [place] (hard of hearing)     No use of hearing aids   • Hypertension    • Hypothyroidism    • JUWAN on CPAP     Patient instructed to bring mask and machine the DOS   •  "Rectal bleeding    • S/P right knee surgery 07/12/2017   • Seasonal allergies    • Tobacco abuse    • Wears glasses     Rx wears       Past Surgical History  Past Surgical History:   Procedure Laterality Date   • APPENDECTOMY     • BASAL CELL CARCINOMA EXCISION  2020    left ear   • COLONOSCOPY      Complete Colonoscopy   • COLONOSCOPY N/A 7/22/2019    Procedure: COLONOSCOPY;  Surgeon: Lisa Rhodes MD;  Location: [facility];  Service: Gastroenterology   • HERNIA REPAIR      patient reported by Dr Banuelos - patient unsure extact location   • JOINT REPLACEMENT Left     partial knee replacement   • JOINT REPLACEMENT Right     partial knee replacement   • KNEE SURGERY      Left Knee   • MOUTH SURGERY      full mouth extraction   • OTHER SURGICAL HISTORY      Surgery Testis Orchiopexy around age 20   • OTHER SURGICAL HISTORY      Cyst removed from a finger - patient reported to this as a \"growth\" and is unsure laterality   • SKIN BIOPSY      Patient reported skin cancer removed 3x.  Nose, back and face   • URETEROSCOPY LASER LITHOTRIPSY WITH STENT INSERTION Left 11/15/2020    Procedure: URETEROSCOPY LASER LITHOTRIPSY WITH STENT INSERTION, Rertrograde;  Surgeon: Bobo Carter MD;  Location: [facility] OR;  Service: Urology;  Laterality: Left;       Medications  has a current medication list which includes the following prescription(s): aspirin, cholecalciferol, famotidine, finasteride, fluticasone, glucosamine-chondroitin, levothyroxine, montelukast, centrum silver adult 50+, fish oil, sildenafil, simvastatin, tamsulosin, vitamin b-12, bisoprolol, celecoxib, ezetimibe, hydroxychloroquine, ondansetron odt, oxybutynin xl, and potassium.      Allergies  Allergies   Allergen Reactions   • Penicillins Itching and Rash       Social History  Social History     Socioeconomic History   • Marital status:      Spouse name: Not on file   • Number of children: Not on file   • Years of education: Not on file   • " Highest education level: Not on file   Tobacco Use   • Smoking status: Former Smoker     Packs/day: 2.00     Years: 15.00     Pack years: 30.00     Types: Cigarettes     Quit date: 1985     Years since quittin.1   • Smokeless tobacco: Former User     Types: Chew     Quit date:    Substance and Sexual Activity   • Alcohol use: No   • Drug use: No   • Sexual activity: Defer       Family History  He has no family history of bladder or kidney cancer  He has no family history of kidney stones      AUA Symptom Score:      Review of Systems  Review of Systems   Constitutional: Negative for activity change, appetite change, chills, fatigue, fever, unexpected weight gain and unexpected weight loss.   Respiratory: Negative for apnea, cough, chest tightness, shortness of breath, wheezing and stridor.    Cardiovascular: Negative for chest pain, palpitations and leg swelling.   Gastrointestinal: Negative for abdominal distention, abdominal pain, anal bleeding, blood in stool, constipation, diarrhea, nausea, rectal pain, vomiting, GERD and indigestion.   Genitourinary: Positive for erectile dysfunction. Negative for decreased libido, decreased urine volume, difficulty urinating, discharge, dysuria, flank pain, frequency, genital sores, hematuria, nocturia, penile pain, penile swelling, scrotal swelling, testicular pain, urgency and urinary incontinence.   Musculoskeletal: Negative for back pain and joint swelling.   Neurological: Negative for tremors, seizures, speech difficulty, weakness and numbness.   Psychiatric/Behavioral: Negative for agitation, decreased concentration, sleep disturbance, depressed mood and stress. The patient is not nervous/anxious.        Physical Exam  Physical Exam  Vitals reviewed.   Constitutional:       Appearance: He is well-developed.   HENT:      Head: Normocephalic and atraumatic.   Pulmonary:      Effort: Pulmonary effort is normal. No respiratory distress.   Abdominal:       General: There is no distension.      Palpations: Abdomen is soft. There is no mass.      Tenderness: There is no abdominal tenderness.      Hernia: No hernia is present.   Genitourinary:     Prostate: Normal.      Rectum: Normal.   Musculoskeletal:         General: Normal range of motion.      Cervical back: Normal range of motion.   Lymphadenopathy:      Cervical: No cervical adenopathy.   Skin:     General: Skin is warm and dry.   Neurological:      Mental Status: He is alert and oriented to person, place, and time.   Psychiatric:         Behavior: Behavior normal.         Labs Recent and today in the office:  Results for orders placed or performed in visit on 07/06/21   POC Urinalysis Dipstick, Automated    Specimen: Urine   Result Value Ref Range    Color Yellow Yellow, Straw, Dark Yellow, Christal    Clarity, UA Clear Clear    Specific Gravity  1.025 1.005 - 1.030    pH, Urine 6.0 5.0 - 8.0    Leukocytes Negative Negative    Nitrite, UA Negative Negative    Protein, POC Negative Negative mg/dL    Glucose, UA Negative Negative, 1000 mg/dL (3+) mg/dL    Ketones, UA Negative Negative    Urobilinogen, UA Normal Normal    Bilirubin Negative Negative    Blood, UA Negative Negative         Assessment & Plan  Erectile dysfunction: Sildenafil prescribed at his request    Nephrolithiasis: I reviewed his CT scan today and only see the 1 small stone in his right mid ureter on July 2.  Currently he has got clear urine and no more pain and wants to pass this spontaneously.  He is encouraged to return to the office immediately for insertion of a stent or extraction of the calculus if his pain returns.    BPH/elevated PSA: Digital rectal exam is benign today and PSA was recently 1.3.  He will continue the Proscar and return on an annual basis.

## 2021-08-05 ENCOUNTER — HOSPITAL ENCOUNTER (OUTPATIENT)
Facility: HOSPITAL | Age: 76
Discharge: HOME | End: 2021-08-05
Payer: MEDICARE

## 2021-08-05 LAB
A/G RATIO: 2.5 (ref 0.8–2)
ALBUMIN SERPL-MCNC: 4.7 G/DL (ref 3.4–4.8)
ALP BLD-CCNC: 55 U/L (ref 25–100)
ALT SERPL-CCNC: 26 U/L (ref 4–36)
ANION GAP SERPL CALCULATED.3IONS-SCNC: 8 MMOL/L (ref 3–16)
AST SERPL-CCNC: 24 U/L (ref 8–33)
BASOPHILS ABSOLUTE: 0 K/UL (ref 0–0.1)
BASOPHILS RELATIVE PERCENT: 0.2 %
BILIRUB SERPL-MCNC: 1 MG/DL (ref 0.3–1.2)
BUN BLDV-MCNC: 13 MG/DL (ref 6–20)
CALCIUM SERPL-MCNC: 9.1 MG/DL (ref 8.5–10.5)
CHLORIDE BLD-SCNC: 102 MMOL/L (ref 98–107)
CO2: 30 MMOL/L (ref 20–30)
CREAT SERPL-MCNC: 0.8 MG/DL (ref 0.4–1.2)
EOSINOPHILS ABSOLUTE: 0.1 K/UL (ref 0–0.4)
EOSINOPHILS RELATIVE PERCENT: 1.4 %
GFR AFRICAN AMERICAN: >59
GFR NON-AFRICAN AMERICAN: >59
GLOBULIN: 1.9 G/DL
GLUCOSE BLD-MCNC: 96 MG/DL (ref 74–106)
HCT VFR BLD CALC: 42.9 % (ref 40–54)
HEMOGLOBIN: 14.5 G/DL (ref 13–18)
IMMATURE GRANULOCYTES #: 0 K/UL
IMMATURE GRANULOCYTES %: 0.2 % (ref 0–5)
LYMPHOCYTES ABSOLUTE: 1.3 K/UL (ref 1.5–4)
LYMPHOCYTES RELATIVE PERCENT: 30.7 %
MCH RBC QN AUTO: 33.3 PG (ref 27–32)
MCHC RBC AUTO-ENTMCNC: 33.8 G/DL (ref 31–35)
MCV RBC AUTO: 98.6 FL (ref 80–100)
MONOCYTES ABSOLUTE: 0.5 K/UL (ref 0.2–0.8)
MONOCYTES RELATIVE PERCENT: 11.1 %
NEUTROPHILS ABSOLUTE: 2.4 K/UL (ref 2–7.5)
NEUTROPHILS RELATIVE PERCENT: 56.4 %
PDW BLD-RTO: 12.6 % (ref 11–16)
PLATELET # BLD: 81 K/UL (ref 150–400)
PMV BLD AUTO: 9.1 FL (ref 6–10)
POTASSIUM SERPL-SCNC: 4.7 MMOL/L (ref 3.4–5.1)
RBC # BLD: 4.35 M/UL (ref 4.5–6)
SODIUM BLD-SCNC: 140 MMOL/L (ref 136–145)
TOTAL PROTEIN: 6.6 G/DL (ref 6.4–8.3)
WBC # BLD: 4.3 K/UL (ref 4–11)

## 2021-08-05 PROCEDURE — 80053 COMPREHEN METABOLIC PANEL: CPT

## 2021-08-05 PROCEDURE — 85025 COMPLETE CBC W/AUTO DIFF WBC: CPT

## 2021-08-05 PROCEDURE — 36415 COLL VENOUS BLD VENIPUNCTURE: CPT

## 2021-11-09 ENCOUNTER — TELEPHONE (OUTPATIENT)
Dept: INTERNAL MEDICINE | Facility: CLINIC | Age: 76
End: 2021-11-09

## 2021-11-09 NOTE — TELEPHONE ENCOUNTER
Caller: Deniz Dickson    Relationship: Self    Best call back number: [phone]     What orders are you requesting (i.e. lab or imaging): [phone]     In what timeframe would the patient need to come in: 11-12-21    Where will you receive your lab/imaging services: [facility] [place] [facility]    Additional notes:

## 2021-11-10 NOTE — TELEPHONE ENCOUNTER
PT called stating his home BP machine said his heart was skipping a beat and so he went to  office and had an ekg done. Image has not been scanned in yet, but pt stated that  had submitted a referral for patient to be seen sooner with . Please review and advise when patient needs to be seen if at all    Statement Selected

## 2021-11-12 ENCOUNTER — HOSPITAL ENCOUNTER (OUTPATIENT)
Facility: HOSPITAL | Age: 76
Discharge: HOME | End: 2021-11-12
Payer: MEDICARE

## 2021-11-12 LAB
A/G RATIO: 2.5 (ref 0.8–2)
ALBUMIN SERPL-MCNC: 4.9 G/DL (ref 3.4–4.8)
ALP BLD-CCNC: 59 U/L (ref 25–100)
ALT SERPL-CCNC: 27 U/L (ref 4–36)
ANION GAP SERPL CALCULATED.3IONS-SCNC: 11 MMOL/L (ref 3–16)
AST SERPL-CCNC: 25 U/L (ref 8–33)
BASOPHILS ABSOLUTE: 0 K/UL (ref 0–0.1)
BASOPHILS RELATIVE PERCENT: 0.2 %
BILIRUB SERPL-MCNC: 1.4 MG/DL (ref 0.3–1.2)
BUN BLDV-MCNC: 14 MG/DL (ref 6–20)
CALCIUM SERPL-MCNC: 9.2 MG/DL (ref 8.5–10.5)
CHLORIDE BLD-SCNC: 102 MMOL/L (ref 98–107)
CHOLESTEROL, TOTAL: 159 MG/DL (ref 0–200)
CO2: 25 MMOL/L (ref 20–30)
CREAT SERPL-MCNC: 0.7 MG/DL (ref 0.4–1.2)
EOSINOPHILS ABSOLUTE: 0 K/UL (ref 0–0.4)
EOSINOPHILS RELATIVE PERCENT: 0.9 %
GFR AFRICAN AMERICAN: >59
GFR NON-AFRICAN AMERICAN: >59
GLOBULIN: 2 G/DL
GLUCOSE BLD-MCNC: 99 MG/DL (ref 74–106)
HBA1C MFR BLD: 5.3 %
HCT VFR BLD CALC: 43.9 % (ref 40–54)
HDLC SERPL-MCNC: 77 MG/DL (ref 40–60)
HEMOGLOBIN: 15 G/DL (ref 13–18)
IMMATURE GRANULOCYTES #: 0 K/UL
IMMATURE GRANULOCYTES %: 0.2 % (ref 0–5)
LDL CHOLESTEROL CALCULATED: 60 MG/DL
LYMPHOCYTES ABSOLUTE: 1.2 K/UL (ref 1.5–4)
LYMPHOCYTES RELATIVE PERCENT: 25.1 %
MCH RBC QN AUTO: 33.9 PG (ref 27–32)
MCHC RBC AUTO-ENTMCNC: 34.2 G/DL (ref 31–35)
MCV RBC AUTO: 99.1 FL (ref 80–100)
MONOCYTES ABSOLUTE: 0.5 K/UL (ref 0.2–0.8)
MONOCYTES RELATIVE PERCENT: 10.6 %
NEUTROPHILS ABSOLUTE: 2.9 K/UL (ref 2–7.5)
NEUTROPHILS RELATIVE PERCENT: 63 %
PDW BLD-RTO: 13.1 % (ref 11–16)
PLATELET # BLD: 91 K/UL (ref 150–400)
PMV BLD AUTO: 8.9 FL (ref 6–10)
POTASSIUM SERPL-SCNC: 4.2 MMOL/L (ref 3.4–5.1)
RBC # BLD: 4.43 M/UL (ref 4.5–6)
SODIUM BLD-SCNC: 138 MMOL/L (ref 136–145)
T4 FREE: 1.52 NG/DL (ref 0.89–1.76)
TOTAL PROTEIN: 6.9 G/DL (ref 6.4–8.3)
TRIGL SERPL-MCNC: 111 MG/DL (ref 0–249)
TSH SERPL DL<=0.05 MIU/L-ACNC: 1.58 UIU/ML (ref 0.27–4.2)
VITAMIN B-12: 904 PG/ML (ref 211–911)
VLDLC SERPL CALC-MCNC: 22 MG/DL
WBC # BLD: 4.6 K/UL (ref 4–11)

## 2021-11-12 PROCEDURE — 83036 HEMOGLOBIN GLYCOSYLATED A1C: CPT

## 2021-11-12 PROCEDURE — 82607 VITAMIN B-12: CPT

## 2021-11-12 PROCEDURE — 80053 COMPREHEN METABOLIC PANEL: CPT

## 2021-11-12 PROCEDURE — 85025 COMPLETE CBC W/AUTO DIFF WBC: CPT

## 2021-11-12 PROCEDURE — 84439 ASSAY OF FREE THYROXINE: CPT

## 2021-11-12 PROCEDURE — 36415 COLL VENOUS BLD VENIPUNCTURE: CPT

## 2021-11-12 PROCEDURE — 80061 LIPID PANEL: CPT

## 2021-11-12 PROCEDURE — 84443 ASSAY THYROID STIM HORMONE: CPT

## 2021-11-19 ENCOUNTER — OFFICE VISIT (OUTPATIENT)
Dept: INTERNAL MEDICINE | Facility: CLINIC | Age: 76
End: 2021-11-19

## 2021-11-19 VITALS
DIASTOLIC BLOOD PRESSURE: 78 MMHG | TEMPERATURE: 97.7 F | HEART RATE: 70 BPM | RESPIRATION RATE: 16 BRPM | WEIGHT: 211 LBS | SYSTOLIC BLOOD PRESSURE: 130 MMHG | BODY MASS INDEX: 30.21 KG/M2 | HEIGHT: 70 IN | OXYGEN SATURATION: 97 %

## 2021-11-19 DIAGNOSIS — D69.6 THROMBOCYTOPENIA (HCC): ICD-10-CM

## 2021-11-19 DIAGNOSIS — R77.9 ABNORMAL PLASMA PROTEIN TEST: ICD-10-CM

## 2021-11-19 DIAGNOSIS — Z23 NEED FOR INFLUENZA VACCINATION: Primary | ICD-10-CM

## 2021-11-19 DIAGNOSIS — E78.2 MIXED HYPERLIPIDEMIA: ICD-10-CM

## 2021-11-19 DIAGNOSIS — I10 PRIMARY HYPERTENSION: ICD-10-CM

## 2021-11-19 DIAGNOSIS — Z00.00 ROUTINE GENERAL MEDICAL EXAMINATION AT A HEALTH CARE FACILITY: ICD-10-CM

## 2021-11-19 PROCEDURE — G0008 ADMIN INFLUENZA VIRUS VAC: HCPCS | Performed by: INTERNAL MEDICINE

## 2021-11-19 PROCEDURE — 90662 IIV NO PRSV INCREASED AG IM: CPT | Performed by: INTERNAL MEDICINE

## 2021-11-19 PROCEDURE — 1159F MED LIST DOCD IN RCRD: CPT | Performed by: INTERNAL MEDICINE

## 2021-11-19 PROCEDURE — G0439 PPPS, SUBSEQ VISIT: HCPCS | Performed by: INTERNAL MEDICINE

## 2021-11-19 PROCEDURE — 99397 PER PM REEVAL EST PAT 65+ YR: CPT | Performed by: INTERNAL MEDICINE

## 2021-11-19 PROCEDURE — 1170F FXNL STATUS ASSESSED: CPT | Performed by: INTERNAL MEDICINE

## 2021-11-19 PROCEDURE — 1126F AMNT PAIN NOTED NONE PRSNT: CPT | Performed by: INTERNAL MEDICINE

## 2021-11-19 NOTE — PROGRESS NOTES
QUICK REFERENCE INFORMATION:  The ABCs of the Annual Wellness Visit    Medicare Annual Wellness Visit    Subjective   History of Present Illness    Deniz Dickson is a 76 y.o. male who presents for an Annual Wellness Visit. In addition, we addressed the following health issues:              PMH, PSH, SocHx, FamHx, Allergies, and Medications: Reviewed and updated.     Outpatient Medications Prior to Visit   Medication Sig Dispense Refill   • aspirin 81 MG EC tablet Take 1 tablet by mouth Daily. 90 tablet 1   • bisoprolol (ZEBeta) 5 MG tablet TAKE ONE-HALF TABLET BY MOUTH DAILY 45 tablet 3   • celecoxib (CeleBREX) 200 MG capsule      • Cholecalciferol (VITAMIN D-3 PO) Take 1 tablet by mouth Daily.     • ezetimibe (Zetia) 10 MG tablet Take 1 tablet by mouth Daily. 90 tablet 3   • famotidine (PEPCID) 40 MG tablet Take 40 mg by mouth 2 (Two) Times a Day.     • finasteride (PROSCAR) 5 MG tablet TAKE ONE TABLET BY MOUTH DAILY 90 tablet 3   • fluticasone (FLONASE) 50 MCG/ACT nasal spray 1 spray into the nostril(s) as directed by provider Daily.     • Glucosamine-Chondroitin (OSTEO BI-FLEX REGULAR STRENGTH PO) Take 1 tablet by mouth 2 (two) times a day.     • hydroxychloroquine (PLAQUENIL) 200 MG tablet      • levothyroxine (SYNTHROID, LEVOTHROID) 137 MCG tablet Take 1 tablet by mouth Daily. 90 tablet 3   • montelukast (Singulair) 10 MG tablet Take 1 tablet by mouth Every Night. 90 tablet 3   • Multiple Vitamins-Minerals (CENTRUM SILVER ADULT 50+) tablet Take 1 tablet by mouth daily.     • Omega-3 Fatty Acids (FISH OIL) 1200 MG capsule capsule Take 1,200 mg by mouth 2 (Two) Times a Day With Meals.     • oxybutynin XL (Ditropan XL) 10 MG 24 hr tablet Take 1 tablet by mouth Daily As Needed (bladder spasm). 10 tablet 0   • Potassium 75 MG tablet Take 595 mg by mouth Daily.     • sildenafil (REVATIO) 20 MG tablet TAKE ONE TO THREE TABLETS BY MOUTH DAILY AS NEEDED FOR ERECTILE DYSFUNCTION 30 tablet 4   • simvastatin (Zocor) 20 MG  tablet Take 1 tablet by mouth Every Night. 90 tablet 3   • tamsulosin (FLOMAX) 0.4 MG capsule 24 hr capsule Take 1 capsule by mouth Every Night. 90 capsule 3   • vitamin B-12 (CYANOCOBALAMIN) 1000 MCG tablet Take 1,000 mcg by mouth Daily.       No facility-administered medications prior to visit.       Patient Active Problem List   Diagnosis   • Atopic rhinitis   • BPH (benign prostatic hyperplasia)   • Gastroesophageal reflux disease   • Granulomatous disease (HCC)   • Hemorrhoids   • Hyperlipidemia   • Hypertension   • Hypothyroidism   • Idiopathic thrombocytopenic purpura (HCC)   • JUWAN on CPAP   • Vitamin D deficiency   • Skin lesion   • Diastasis recti   • Palpitations   • Osteoarthritis   • History of ITP   • Arthritic-like pain   • Coronary artery disease   • Dyslipidemia   • Hypertension   • Contracture of joint of right foot   • Bronchitis   • History of adenomatous polyp of colon   • Arthritis   • Chronic bilateral low back pain without sciatica   • CATHY (acute kidney [facility]) ([facility])   • Nephrolithiasis   • Ureterolithiasis       Health Habits:  Dental Exam. up to date  Eye Exam. up to date  No exam data present  Exercise: 7 times/week.  Current exercise activities include: walking    Health Risk Assessment:  The patient has completed a Health Risk Assessment. This has been reviewed with them and has been scanned into the patient's chart.    Current Medical Providers:  Patient Care Team:  Zane Magallon MD as PCP - General (Internal Medicine)  Lisa Rhodes MD as Surgeon (General Surgery)    The [facility] providers who are involved in the care of this patient are listed above. Additional providers and suppliers are listed below:      Recent Hospitalizations:  No hospitalization(s) within the last year..    Recent Lab Results:  CMP:  Lab Results   Component Value Date    BUN 13 07/02/2021    CREATININE 0.81 07/02/2021    EGFRIFNONA 93 07/02/2021    EGFRIFAFRI 118 08/09/2019    BCR 16.0 07/02/2021      07/02/2021    K 4.2 07/02/2021    CO2 24.3 07/02/2021    CALCIUM 8.9 07/02/2021    PROTENTOTREF 7.1 08/09/2019    ALBUMIN 4.40 07/02/2021    LABGLOBREF 2.3 08/09/2019    LABIL2 2.1 08/09/2019    BILITOT 1.2 07/02/2021    ALKPHOS 59 07/02/2021    AST 24 07/02/2021    ALT 27 07/02/2021       LIPID PANEL:  Lab Results   Component Value Date    CHLPL 159 11/12/2021    TRIG 111 11/12/2021    HDL 77 (H) 11/12/2021    VLDL 22 11/12/2021    LDL 60 11/12/2021       HbA1c:  Lab Results   Component Value Date    HGBA1C 5.3 11/12/2021       URINE MICROALBUMIN:  No results found for: MICROALBUR, POCMALB    PSA:  Lab Results   Component Value Date    PSA 1.380 06/29/2021       Age-appropriate Screening Schedule:  Refer to the list below for future screening recommendations based on patient's age, sex and/or medical conditions. Orders for these recommended tests are listed in the plan section. The patient has been provided with a written plan.    Health Maintenance   Topic Date Due   • ZOSTER VACCINE (2 of 3) 03/29/2015   • TDAP/TD VACCINES (2 - Td or Tdap) 07/31/2022   • LIPID PANEL  11/12/2022   • INFLUENZA VACCINE  Completed       Depression Screen:   PHQ-2/PHQ-9 Depression Screening 5/17/2021   Little interest or pleasure in doing things 0   Feeling down, depressed, or hopeless 0   Trouble falling or staying asleep, or sleeping too much -   Feeling tired or having little energy -   Poor appetite or overeating -   Feeling bad about yourself - or that you are a failure or have let yourself or your family down -   Trouble concentrating on things, such as reading the newspaper or watching television -   Moving or speaking so slowly that other people could have noticed. Or the opposite - being so fidgety or restless that you have been moving around a lot more than usual -   Thoughts that you would be better off dead, or of hurting yourself in some way -   Total Score 0         Functional and Cognitive Screening:  Functional  & Cognitive Status 10/20/2020   Do you have difficulty preparing food and eating? No   Do you have difficulty bathing yourself, getting dressed or grooming yourself? No   Do you have difficulty using the toilet? No   Do you have difficulty moving around from place to place? No   Do you have trouble with steps or getting out of a bed or a chair? No   Current Diet Well Balanced Diet   Dental Exam Up to date   Eye Exam Up to date   Exercise (times per week) 7 times per week   Current Exercises Include Walking   Current Exercise Activities Include -   Do you need help using the phone?  Yes   Are you deaf or do you have serious difficulty hearing?  Yes   Do you need help with transportation? No   Do you need help shopping? No   Do you need help preparing meals?  No   Do you need help with housework?  No   Do you need help with laundry? No   Do you need help taking your medications? No   Do you need help managing money? No   Do you ever drive or ride in a car without wearing a seat belt? No   Have you felt unusual stress, anger or loneliness in the last month? No   Who do you live with? Child   If you need help, do you have trouble finding someone available to you? No   Have you been bothered in the last four weeks by sexual problems? No   Do you have difficulty concentrating, remembering or making decisions? No       Does the patient have evidence of cognitive impairment? No    Advanced Care Planning:  ACP discussion was held with the patient during this visit. Patient does not have an advance directive, declines further assistance.    Identification of Risk Factors:  Risk factors include: Advance Directive Discussion  Fall Risk  Obesity/Overweight .    Review of Systems    Compared to one year ago, the patient feels his physical health is the same.  Compared to one year ago, the patient feels his mental health is the same.    Objective     Physical Exam    Vitals:    11/19/21 0956   BP: 130/78   Pulse: 70   Resp: 16  "  Temp: 97.7 °F (36.5 °C)   SpO2: 97%   Weight: 95.7 kg (211 lb)   Height: 177.8 cm (70\")   PainSc: 0-No pain       Body mass index is 30.28 kg/m².  Discussed the patient's BMI with him. The BMI is in the acceptable range.    Assessment/Plan   Patient Self-Management and Personalized Health Advice  The patient has been provided with information about: exercise and fall prevention and preventive services including:   · Annual Wellness Visit (AWV).    Visit Diagnoses:    ICD-10-CM ICD-9-CM   1. Need for influenza vaccination  Z23 V04.81   2. Primary hypertension  I10 401.9   3. Mixed hyperlipidemia  E78.2 272.2   4. Routine general medical examination at a health care facility  Z00.00 V70.0   5. Abnormal plasma protein test  R77.9 790.99   6. Thrombocytopenia (HCC)  D69.6 287.5       Orders Placed This Encounter   Procedures   • Fluzone High-Dose 65+yrs (3441-9756)   • Free K+L Left Chains,Qn,Ur - Urine, Clean Catch     Order Specific Question:   Release to patient     Answer:   Immediate   • Immunoglobulin Free LT Chains Blood     Order Specific Question:   Release to patient     Answer:   Immediate   • Bence [place] Protein, Urine, Random - Urine, Clean Catch     Order Specific Question:   Release to patient     Answer:   Immediate   • MIGUEL + PE     Order Specific Question:   Release to patient     Answer:   Immediate       Outpatient Encounter Medications as of 11/19/2021   Medication Sig Dispense Refill   • aspirin 81 MG EC tablet Take 1 tablet by mouth Daily. 90 tablet 1   • bisoprolol (ZEBeta) 5 MG tablet TAKE ONE-HALF TABLET BY MOUTH DAILY 45 tablet 3   • celecoxib (CeleBREX) 200 MG capsule      • Cholecalciferol (VITAMIN D-3 PO) Take 1 tablet by mouth Daily.     • ezetimibe (Zetia) 10 MG tablet Take 1 tablet by mouth Daily. 90 tablet 3   • famotidine (PEPCID) 40 MG tablet Take 40 mg by mouth 2 (Two) Times a Day.     • finasteride (PROSCAR) 5 MG tablet TAKE ONE TABLET BY MOUTH DAILY 90 tablet 3   • fluticasone " (FLONASE) 50 MCG/ACT nasal spray 1 spray into the nostril(s) as directed by provider Daily.     • Glucosamine-Chondroitin (OSTEO BI-FLEX REGULAR STRENGTH PO) Take 1 tablet by mouth 2 (two) times a day.     • hydroxychloroquine (PLAQUENIL) 200 MG tablet      • levothyroxine (SYNTHROID, LEVOTHROID) 137 MCG tablet Take 1 tablet by mouth Daily. 90 tablet 3   • montelukast (Singulair) 10 MG tablet Take 1 tablet by mouth Every Night. 90 tablet 3   • Multiple Vitamins-Minerals (CENTRUM SILVER ADULT 50+) tablet Take 1 tablet by mouth daily.     • Omega-3 Fatty Acids (FISH OIL) 1200 MG capsule capsule Take 1,200 mg by mouth 2 (Two) Times a Day With Meals.     • oxybutynin XL (Ditropan XL) 10 MG 24 hr tablet Take 1 tablet by mouth Daily As Needed (bladder spasm). 10 tablet 0   • Potassium 75 MG tablet Take 595 mg by mouth Daily.     • sildenafil (REVATIO) 20 MG tablet TAKE ONE TO THREE TABLETS BY MOUTH DAILY AS NEEDED FOR ERECTILE DYSFUNCTION 30 tablet 4   • simvastatin (Zocor) 20 MG tablet Take 1 tablet by mouth Every Night. 90 tablet 3   • tamsulosin (FLOMAX) 0.4 MG capsule 24 hr capsule Take 1 capsule by mouth Every Night. 90 capsule 3   • vitamin B-12 (CYANOCOBALAMIN) 1000 MCG tablet Take 1,000 mcg by mouth Daily.       No facility-administered encounter medications on file as of 11/19/2021.       Reviewed use of high risk medication in the elderly: no  Reviewed for potential of harmful drug interactions in the elderly: no    Follow Up:  Return in about 6 months (around 5/19/2022).     An After Visit Summary and PPPS with all of these plans were given to the patient.             Diagnoses and all orders for this visit:    1. Need for influenza vaccination (Primary)  -     Fluzone High-Dose 65+yrs ([id number])  -     Free K+L Left Chains,Qn,Ur - Urine, Clean Catch  -     Immunoglobulin Free LT Chains Blood  -     Bence [place] Protein, Urine, Random - Urine, Clean Catch  -     MIGUEL + PE    2. Primary hypertension  -     Free  K+L Left Chains,Qn,Ur - Urine, Clean Catch  -     Immunoglobulin Free LT Chains Blood  -     Bence [place] Protein, Urine, Random - Urine, Clean Catch  -     MIGUEL + PE    3. Mixed hyperlipidemia  -     Free K+L Left Chains,Qn,Ur - Urine, Clean Catch  -     Immunoglobulin Free LT Chains Blood  -     Bence [place] Protein, Urine, Random - Urine, Clean Catch  -     MIGUEL + PE    4. Routine general medical examination at a health care facility  -     Free K+L Left Chains,Qn,Ur - Urine, Clean Catch  -     Immunoglobulin Free LT Chains Blood  -     Bence [place] Protein, Urine, Random - Urine, Clean Catch  -     MIGUEL + PE    5. Abnormal plasma protein test  -     Free K+L Left Chains,Qn,Ur - Urine, Clean Catch  -     Immunoglobulin Free LT Chains Blood  -     Bence [place] Protein, Urine, Random - Urine, Clean Catch  -     MIGUEL + PE    6. Thrombocytopenia (HCC)  -     Free K+L Left Chains,Qn,Ur - Urine, Clean Catch  -     Immunoglobulin Free LT Chains Blood  -     Bence [place] Protein, Urine, Random - Urine, Clean Catch  -     MIGUEL + PE

## 2021-11-21 DIAGNOSIS — E03.9 ACQUIRED HYPOTHYROIDISM: ICD-10-CM

## 2021-11-22 LAB
ALBUMIN SERPL ELPH-MCNC: 4.2 G/DL (ref 2.9–4.4)
ALBUMIN/GLOB SERPL: 1.8 {RATIO} (ref 0.7–1.7)
ALPHA1 GLOB SERPL ELPH-MCNC: 0.2 G/DL (ref 0–0.4)
ALPHA2 GLOB SERPL ELPH-MCNC: 0.7 G/DL (ref 0.4–1)
B-GLOBULIN SERPL ELPH-MCNC: 1 G/DL (ref 0.7–1.3)
GAMMA GLOB SERPL ELPH-MCNC: 0.5 G/DL (ref 0.4–1.8)
GLOBULIN SER-MCNC: 2.4 G/DL (ref 2.2–3.9)
IGA SERPL-MCNC: 207 MG/DL (ref 61–437)
IGG SERPL-MCNC: 551 MG/DL (ref 603–1613)
IGM SERPL-MCNC: 59 MG/DL (ref 15–143)
INTERPRETATION SERPL IEP-IMP: ABNORMAL
KAPPA LC FREE SER-MCNC: 16.1 MG/L (ref 3.3–19.4)
KAPPA LC FREE/LAMBDA FREE SER: 1.38 {RATIO} (ref 0.26–1.65)
LABORATORY COMMENT REPORT: ABNORMAL
LAMBDA LC FREE SERPL-MCNC: 11.7 MG/L (ref 5.7–26.3)
M PROTEIN SERPL ELPH-MCNC: ABNORMAL G/DL
PROT SERPL-MCNC: 6.6 G/DL (ref 6–8.5)

## 2021-11-22 RX ORDER — LEVOTHYROXINE SODIUM 137 UG/1
TABLET ORAL
Qty: 90 TABLET | Refills: 3 | Status: SHIPPED | OUTPATIENT
Start: 2021-11-22 | End: 2022-11-07

## 2021-11-22 RX ORDER — MONTELUKAST SODIUM 10 MG/1
TABLET ORAL
Qty: 90 TABLET | Refills: 3 | Status: SHIPPED | OUTPATIENT
Start: 2021-11-22 | End: 2022-11-21

## 2021-11-23 LAB
ALBUMIN 24H MFR UR ELPH: 35 %
ALPHA1 GLOB 24H MFR UR ELPH: 2.4 %
ALPHA2 GLOB 24H MFR UR ELPH: 15.9 %
B-GLOBULIN MFR UR ELPH: 29.4 %
GAMMA GLOB 24H MFR UR ELPH: 17.3 %
HIV 1 & 2 AB SER-IMP: NORMAL
INTERPRETATION UR IFE-IMP: NORMAL
KAPPA LC UR-MCNC: 24.97 MG/L (ref 0.63–113.79)
KAPPA LC/LAMBDA UR: 7.88 {RATIO} (ref 1.03–31.76)
LAMBDA LC UR-MCNC: 3.17 MG/L (ref 0.47–11.77)
M PROTEIN 24H MFR UR ELPH: NORMAL %
PROT UR-MCNC: 11.8 MG/DL

## 2022-01-10 RX ORDER — BISOPROLOL FUMARATE 5 MG/1
TABLET, FILM COATED ORAL
Qty: 45 TABLET | Refills: 1 | Status: SHIPPED | OUTPATIENT
Start: 2022-01-10 | End: 2022-06-01 | Stop reason: SDUPTHER

## 2022-01-24 ENCOUNTER — HOSPITAL ENCOUNTER (OUTPATIENT)
Dept: GENERAL RADIOLOGY | Facility: HOSPITAL | Age: 77
Discharge: HOME | End: 2022-01-24
Admitting: UROLOGY

## 2022-01-24 DIAGNOSIS — N20.0 NEPHROLITHIASIS: ICD-10-CM

## 2022-01-24 PROCEDURE — 74018 RADEX ABDOMEN 1 VIEW: CPT

## 2022-01-27 ENCOUNTER — OFFICE VISIT (OUTPATIENT)
Dept: UROLOGY | Facility: CLINIC | Age: 77
End: 2022-01-27

## 2022-01-27 VITALS
SYSTOLIC BLOOD PRESSURE: 130 MMHG | HEART RATE: 66 BPM | TEMPERATURE: 96.7 F | OXYGEN SATURATION: 97 % | HEIGHT: 70 IN | DIASTOLIC BLOOD PRESSURE: 70 MMHG | BODY MASS INDEX: 30.21 KG/M2 | WEIGHT: 211 LBS

## 2022-01-27 DIAGNOSIS — N20.0 NEPHROLITHIASIS: Primary | ICD-10-CM

## 2022-01-27 DIAGNOSIS — R39.9 LOWER URINARY TRACT SYMPTOMS (LUTS): ICD-10-CM

## 2022-01-27 PROCEDURE — 99212 OFFICE O/P EST SF 10 MIN: CPT | Performed by: UROLOGY

## 2022-01-27 RX ORDER — HYDROXYCHLOROQUINE SULFATE 200 MG/1
1 TABLET, FILM COATED ORAL EVERY 12 HOURS
COMMUNITY
Start: 2022-01-11

## 2022-01-27 NOTE — PROGRESS NOTES
Chief Complaint   Patient presents with   • Nephrolithiasis     1 Year follow up with KUB        HPI  Ms. Dickson is a 76 y.o. male with history below in assessment, who presents for follow up.     At this visit no flank pain, gross hematuria. He is happy with LUTS and on MMT.    Past Medical History:   Diagnosis Date   • Arthritis     OA   • Basal cell carcinoma 02/2020    left ear   • Cancer (HCC)     Skin cancer removed 3x    • Coronary artery disease     Probable Non obstructive    • Dyslipidemia    • Elevated prostate specific antigen (PSA)    • Essential hypertension, benign    • Full dentures     Instructed no adhesives the DOS   • GERD (gastroesophageal reflux disease)    • H/O benign prostatic hypertrophy    • H/O cardiovascular stress test 07/19/2019    Patient reported done by Dr Ojeda around 3-4 years ago and reported that all was wnl's    • High cholesterol    • History of bronchitis    • History of hemorrhoids    • History of idiopathic thrombocytopenic purpura    • History of malignant neoplasm of skin    • History of sleep apnea    • [place] (hard of hearing)     No use of hearing aids   • Hypertension    • Hypothyroidism    • JUWAN on CPAP     Patient instructed to bring mask and machine the DOS   • Rectal bleeding    • S/P right knee surgery 07/12/2017   • Seasonal allergies    • Tobacco abuse    • Wears glasses     Rx wears       Past Surgical History:   Procedure Laterality Date   • APPENDECTOMY     • BASAL CELL CARCINOMA EXCISION  2020    left ear   • COLONOSCOPY      Complete Colonoscopy   • COLONOSCOPY N/A 7/22/2019    Procedure: COLONOSCOPY;  Surgeon: Lisa Rhodes MD;  Location[facility] ENDOSCOPY;  Service: Gastroenterology   • HERNIA REPAIR      patient reported by Dr Banuelos - patient unsure extact location   • JOINT REPLACEMENT Left     partial knee replacement   • JOINT REPLACEMENT Right     partial knee replacement   • KNEE SURGERY      Left Knee   • MOUTH SURGERY      full mouth extraction  "  • OTHER SURGICAL HISTORY      Surgery Testis Orchiopexy around age 20   • OTHER SURGICAL HISTORY      Cyst removed from a finger - patient reported to this as a \"growth\" and is unsure laterality   • SKIN BIOPSY      Patient reported skin cancer removed 3x.  Nose, back and face   • URETEROSCOPY LASER LITHOTRIPSY WITH STENT INSERTION Left 11/15/2020    Procedure: URETEROSCOPY LASER LITHOTRIPSY WITH STENT INSERTION, Rertrograde;  Surgeon: Bobo Carter MD;  Location: [facility];  Service: Urology;  Laterality: Left;         Current Outpatient Medications:   •  aspirin 81 MG EC tablet, Take 1 tablet by mouth Daily., Disp: 90 tablet, Rfl: 1  •  bisoprolol (ZEBeta) 5 MG tablet, TAKE 1/2 TABLET BY MOUTH DAILY, Disp: 45 tablet, Rfl: 1  •  celecoxib (CeleBREX) 200 MG capsule, , Disp: , Rfl:   •  Cholecalciferol (VITAMIN D-3 PO), Take 1 tablet by mouth Daily., Disp: , Rfl:   •  ezetimibe (Zetia) 10 MG tablet, Take 1 tablet by mouth Daily., Disp: 90 tablet, Rfl: 3  •  famotidine (PEPCID) 40 MG tablet, Take 40 mg by mouth 2 (Two) Times a Day., Disp: , Rfl:   •  finasteride (PROSCAR) 5 MG tablet, TAKE ONE TABLET BY MOUTH DAILY, Disp: 90 tablet, Rfl: 3  •  fluticasone (FLONASE) 50 MCG/ACT nasal spray, 1 spray into the nostril(s) as directed by provider Daily., Disp: , Rfl:   •  Glucosamine-Chondroitin (OSTEO BI-FLEX REGULAR STRENGTH PO), Take 1 tablet by mouth 2 (two) times a day., Disp: , Rfl:   •  hydroxychloroquine (PLAQUENIL) 200 MG tablet, Take 1 tablet by mouth Every 12 (Twelve) Hours., Disp: , Rfl:   •  levothyroxine (SYNTHROID, LEVOTHROID) 137 MCG tablet, TAKE ONE TABLET BY MOUTH DAILY, Disp: 90 tablet, Rfl: 3  •  montelukast (SINGULAIR) 10 MG tablet, TAKE ONE TABLET BY MOUTH ONCE NIGHTLY, Disp: 90 tablet, Rfl: 3  •  Multiple Vitamins-Minerals (CENTRUM SILVER ADULT 50+) tablet, Take 1 tablet by mouth daily., Disp: , Rfl:   •  Omega-3 Fatty Acids (FISH OIL) 1200 MG capsule capsule, Take 1,200 mg by mouth 2 (Two) " "Times a Day With Meals., Disp: , Rfl:   •  oxybutynin XL (Ditropan XL) 10 MG 24 hr tablet, Take 1 tablet by mouth Daily As Needed (bladder spasm)., Disp: 10 tablet, Rfl: 0  •  Potassium 75 MG tablet, Take 595 mg by mouth Daily., Disp: , Rfl:   •  sildenafil (REVATIO) 20 MG tablet, TAKE ONE TO THREE TABLETS BY MOUTH DAILY AS NEEDED FOR ERECTILE DYSFUNCTION, Disp: 30 tablet, Rfl: 4  •  simvastatin (Zocor) 20 MG tablet, Take 1 tablet by mouth Every Night., Disp: 90 tablet, Rfl: 3  •  tamsulosin (FLOMAX) 0.4 MG capsule 24 hr capsule, Take 1 capsule by mouth Every Night., Disp: 90 capsule, Rfl: 3  •  vitamin B-12 (CYANOCOBALAMIN) 1000 MCG tablet, Take 1,000 mcg by mouth Daily., Disp: , Rfl:      Physical Exam  Visit Vitals  /70   Pulse 66   Temp 96.7 °F (35.9 °C)   Ht 177.8 cm (70\")   Wt 95.7 kg (211 lb)   SpO2 97%   BMI 30.28 kg/m²       Labs  Brief Urine Lab Results  (Last result in the past 365 days)      Color   Clarity   Blood   Leuk Est   Nitrite   Protein   CREAT   Urine HCG        07/06/21 0906 Yellow   Clear   Negative   Negative   Negative   Negative                 Lab Results   Component Value Date    GLUCOSE 119 (H) 07/02/2021    CALCIUM 8.9 07/02/2021     07/02/2021    K 4.2 07/02/2021    CO2 24.3 07/02/2021     07/02/2021    BUN 13 07/02/2021    CREATININE 0.81 07/02/2021    EGFRIFAFRI 118 08/09/2019    EGFRIFNONA 93 07/02/2021    BCR 16.0 07/02/2021    ANIONGAP 12.7 07/02/2021       Lab Results   Component Value Date    WBC 4.6 11/12/2021    HGB 15.0 11/12/2021    HCT 43.9 11/12/2021    MCV 99.1 11/12/2021    PLT 91 (L) 11/12/2021            Lab Results   Component Value Date    PSA 1.380 06/29/2021    PSA 1.260 06/15/2020    PSA 1.20 12/03/2019           Radiographic Studies  XR Abdomen KUB  Result Date: 1/24/2022  No radiopaque stones identified.    This report was finalized on 1/24/2022 12:36 PM by Gena Don M.D..      I have reviewed above labs and imaging. "     Assessment  76 y.o. male with history of kidney stones, LUTS and ED.  He is status post left ureteroscopy and laser lithotripsy on 11/15/2020.  Follow-up KUB no new stones. LUTS well controlled.  Both these diagnoses are stable.    Plan  1. Decrease soda intake.  I recommended lemon therapy or moon stone twice daily for stone prevention .    2. FU PRN. I am hopeful that Dr. Magallon can refill finasteride and sildenafil in future    I spent a total of 12 minutes with the patient and the chart engaging in data gathering and interpretation, patient interaction, as well as counseling on the risks, benefits, and alternatives of the therapy and coordinating care.

## 2022-02-10 ENCOUNTER — HOSPITAL ENCOUNTER (OUTPATIENT)
Facility: HOSPITAL | Age: 77
Discharge: HOME | End: 2022-02-10
Payer: MEDICARE

## 2022-02-10 LAB
A/G RATIO: 2.4 (ref 0.8–2)
ALBUMIN SERPL-MCNC: 4.7 G/DL (ref 3.4–4.8)
ALP BLD-CCNC: 58 U/L (ref 25–100)
ALT SERPL-CCNC: 24 U/L (ref 4–36)
ANION GAP SERPL CALCULATED.3IONS-SCNC: 10 MMOL/L (ref 3–16)
AST SERPL-CCNC: 24 U/L (ref 8–33)
BASOPHILS ABSOLUTE: 0 K/UL (ref 0–0.1)
BASOPHILS RELATIVE PERCENT: 0 %
BILIRUB SERPL-MCNC: 1.4 MG/DL (ref 0.3–1.2)
BUN BLDV-MCNC: 16 MG/DL (ref 6–20)
CALCIUM SERPL-MCNC: 9.3 MG/DL (ref 8.5–10.5)
CHLORIDE BLD-SCNC: 102 MMOL/L (ref 98–107)
CO2: 28 MMOL/L (ref 20–30)
CREAT SERPL-MCNC: 0.8 MG/DL (ref 0.4–1.2)
EOSINOPHILS ABSOLUTE: 0.1 K/UL (ref 0–0.4)
EOSINOPHILS RELATIVE PERCENT: 1.5 %
GFR AFRICAN AMERICAN: >59
GFR NON-AFRICAN AMERICAN: >59
GLOBULIN: 2 G/DL
GLUCOSE BLD-MCNC: 111 MG/DL (ref 74–106)
HCT VFR BLD CALC: 44.3 % (ref 40–54)
HEMOGLOBIN: 15.2 G/DL (ref 13–18)
IMMATURE GRANULOCYTES #: 0 K/UL
IMMATURE GRANULOCYTES %: 0 % (ref 0–5)
LYMPHOCYTES ABSOLUTE: 1.4 K/UL (ref 1.5–4)
LYMPHOCYTES RELATIVE PERCENT: 29.8 %
MCH RBC QN AUTO: 33.9 PG (ref 27–32)
MCHC RBC AUTO-ENTMCNC: 34.3 G/DL (ref 31–35)
MCV RBC AUTO: 98.7 FL (ref 80–100)
MONOCYTES ABSOLUTE: 0.6 K/UL (ref 0.2–0.8)
MONOCYTES RELATIVE PERCENT: 12.2 %
NEUTROPHILS ABSOLUTE: 2.7 K/UL (ref 2–7.5)
NEUTROPHILS RELATIVE PERCENT: 56.5 %
PDW BLD-RTO: 12.3 % (ref 11–16)
PLATELET # BLD: 94 K/UL (ref 150–400)
PMV BLD AUTO: 9.1 FL (ref 6–10)
POTASSIUM SERPL-SCNC: 4.9 MMOL/L (ref 3.4–5.1)
RBC # BLD: 4.49 M/UL (ref 4.5–6)
SODIUM BLD-SCNC: 140 MMOL/L (ref 136–145)
TOTAL PROTEIN: 6.7 G/DL (ref 6.4–8.3)
VITAMIN D 25-HYDROXY: 28.6 (ref 32–100)
WBC # BLD: 4.8 K/UL (ref 4–11)

## 2022-02-10 PROCEDURE — 80053 COMPREHEN METABOLIC PANEL: CPT

## 2022-02-10 PROCEDURE — 82306 VITAMIN D 25 HYDROXY: CPT

## 2022-02-10 PROCEDURE — 36415 COLL VENOUS BLD VENIPUNCTURE: CPT

## 2022-02-10 PROCEDURE — 85025 COMPLETE CBC W/AUTO DIFF WBC: CPT

## 2022-03-07 DIAGNOSIS — N40.0 BENIGN NON-NODULAR PROSTATIC HYPERPLASIA WITHOUT LOWER URINARY TRACT SYMPTOMS: ICD-10-CM

## 2022-03-07 RX ORDER — FINASTERIDE 5 MG/1
5 TABLET, FILM COATED ORAL DAILY
Qty: 90 TABLET | Refills: 3 | Status: SHIPPED | OUTPATIENT
Start: 2022-03-07 | End: 2023-03-06

## 2022-03-07 NOTE — TELEPHONE ENCOUNTER
Caller: Deniz Dickson    Relationship: Self    Best call back number: [phone]    Requested Prescriptions:   Requested Prescriptions     Pending Prescriptions Disp Refills   • finasteride (PROSCAR) 5 MG tablet 90 tablet 3     Sig: Take 1 tablet by mouth Daily.        Pharmacy where request should be sent: [facility] - [phone]  - [phone] FX     Additional details provided by patient: patient has a week left    Does the patient have less than a 3 day supply:  [] Yes  [x] No    Javier Mayers Rep   03/07/22 09:45 EST

## 2022-05-09 ENCOUNTER — TELEPHONE (OUTPATIENT)
Dept: INTERNAL MEDICINE | Facility: CLINIC | Age: 77
End: 2022-05-09

## 2022-05-09 DIAGNOSIS — Z12.5 PROSTATE CANCER SCREENING: ICD-10-CM

## 2022-05-09 DIAGNOSIS — Z00.00 ROUTINE GENERAL MEDICAL EXAMINATION AT A HEALTH CARE FACILITY: ICD-10-CM

## 2022-05-09 DIAGNOSIS — E03.9 ACQUIRED HYPOTHYROIDISM: Primary | ICD-10-CM

## 2022-05-09 DIAGNOSIS — E79.0 HYPERURICEMIA: ICD-10-CM

## 2022-05-09 DIAGNOSIS — R79.9 ABNORMAL FINDING OF BLOOD CHEMISTRY, UNSPECIFIED: ICD-10-CM

## 2022-05-09 NOTE — TELEPHONE ENCOUNTER
PATIENT WOULD LIKE TO DO LABS PRIOR TO APPOINTMENT.  PLEASE FAX ORDER TO [facility] IN [place].      PLEASE CALL [phone]

## 2022-05-11 NOTE — TELEPHONE ENCOUNTER
PATIENT CALLED TO FOLLOW UP ON REQUEST TO FAX LAB ORDER REQUEST.    PLEASE FAX TO:  [phone]    PATIENT REQUESTS CALL BACK AS SOON AS ORDER HAS BEEN SENT.      PATIENT WOULD LIKE TO DO LABS PRIOR TO APPOINTMENT.  PLEASE FAX ORDER TO [facility] IN [place].

## 2022-05-16 ENCOUNTER — HOSPITAL ENCOUNTER (OUTPATIENT)
Facility: HOSPITAL | Age: 77
Discharge: HOME | End: 2022-05-16
Payer: MEDICARE

## 2022-05-16 LAB
A/G RATIO: 2.3 (ref 0.8–2)
ALBUMIN SERPL-MCNC: 4.9 G/DL (ref 3.4–4.8)
ALP BLD-CCNC: 61 U/L (ref 25–100)
ALT SERPL-CCNC: 24 U/L (ref 4–36)
ANION GAP SERPL CALCULATED.3IONS-SCNC: 11 MMOL/L (ref 3–16)
AST SERPL-CCNC: 24 U/L (ref 8–33)
BASOPHILS ABSOLUTE: 0 K/UL (ref 0–0.1)
BASOPHILS RELATIVE PERCENT: 0.2 %
BILIRUB SERPL-MCNC: 1.5 MG/DL (ref 0.3–1.2)
BUN BLDV-MCNC: 18 MG/DL (ref 6–20)
CALCIUM SERPL-MCNC: 9.3 MG/DL (ref 8.5–10.5)
CHLORIDE BLD-SCNC: 99 MMOL/L (ref 98–107)
CHOLESTEROL, TOTAL: 169 MG/DL (ref 0–200)
CO2: 25 MMOL/L (ref 20–30)
CREAT SERPL-MCNC: 0.8 MG/DL (ref 0.4–1.2)
EOSINOPHILS ABSOLUTE: 0.1 K/UL (ref 0–0.4)
EOSINOPHILS RELATIVE PERCENT: 1.1 %
GFR AFRICAN AMERICAN: >59
GFR NON-AFRICAN AMERICAN: >59
GLOBULIN: 2.1 G/DL
GLUCOSE BLD-MCNC: 103 MG/DL (ref 74–106)
HBA1C MFR BLD: 5.5 %
HCT VFR BLD CALC: 46.3 % (ref 40–54)
HDLC SERPL-MCNC: 75 MG/DL (ref 40–60)
HEMOGLOBIN: 15.6 G/DL (ref 13–18)
IMMATURE GRANULOCYTES #: 0 K/UL
IMMATURE GRANULOCYTES %: 0.4 % (ref 0–5)
LDL CHOLESTEROL CALCULATED: 67 MG/DL
LYMPHOCYTES ABSOLUTE: 1.2 K/UL (ref 1.5–4)
LYMPHOCYTES RELATIVE PERCENT: 21.5 %
MCH RBC QN AUTO: 33.1 PG (ref 27–32)
MCHC RBC AUTO-ENTMCNC: 33.7 G/DL (ref 31–35)
MCV RBC AUTO: 98.3 FL (ref 80–100)
MONOCYTES ABSOLUTE: 0.6 K/UL (ref 0.2–0.8)
MONOCYTES RELATIVE PERCENT: 11 %
NEUTROPHILS ABSOLUTE: 3.5 K/UL (ref 2–7.5)
NEUTROPHILS RELATIVE PERCENT: 65.8 %
PDW BLD-RTO: 12.5 % (ref 11–16)
PLATELET # BLD: 95 K/UL (ref 150–400)
PMV BLD AUTO: 8.8 FL (ref 6–10)
POTASSIUM SERPL-SCNC: 4.3 MMOL/L (ref 3.4–5.1)
RBC # BLD: 4.71 M/UL (ref 4.5–6)
SODIUM BLD-SCNC: 135 MMOL/L (ref 136–145)
T4 FREE: 1.42 NG/DL (ref 0.89–1.76)
TOTAL PROTEIN: 7 G/DL (ref 6.4–8.3)
TRIGL SERPL-MCNC: 136 MG/DL (ref 0–249)
TSH SERPL DL<=0.05 MIU/L-ACNC: 1.76 UIU/ML (ref 0.27–4.2)
URIC ACID, SERUM: 4.3 MG/DL (ref 3.7–8.6)
VITAMIN B-12: 812 PG/ML (ref 211–911)
VLDLC SERPL CALC-MCNC: 27 MG/DL
WBC # BLD: 5.4 K/UL (ref 4–11)

## 2022-05-16 PROCEDURE — 83036 HEMOGLOBIN GLYCOSYLATED A1C: CPT

## 2022-05-16 PROCEDURE — 84443 ASSAY THYROID STIM HORMONE: CPT

## 2022-05-16 PROCEDURE — 36415 COLL VENOUS BLD VENIPUNCTURE: CPT

## 2022-05-16 PROCEDURE — 80061 LIPID PANEL: CPT

## 2022-05-16 PROCEDURE — 84439 ASSAY OF FREE THYROXINE: CPT

## 2022-05-16 PROCEDURE — 84550 ASSAY OF BLOOD/URIC ACID: CPT

## 2022-05-16 PROCEDURE — 85025 COMPLETE CBC W/AUTO DIFF WBC: CPT

## 2022-05-16 PROCEDURE — 80053 COMPREHEN METABOLIC PANEL: CPT

## 2022-05-16 PROCEDURE — 82607 VITAMIN B-12: CPT

## 2022-05-19 ENCOUNTER — OFFICE VISIT (OUTPATIENT)
Dept: INTERNAL MEDICINE | Facility: CLINIC | Age: 77
End: 2022-05-19

## 2022-05-19 VITALS
HEART RATE: 70 BPM | RESPIRATION RATE: 16 BRPM | TEMPERATURE: 98.7 F | BODY MASS INDEX: 30.06 KG/M2 | HEIGHT: 70 IN | DIASTOLIC BLOOD PRESSURE: 82 MMHG | OXYGEN SATURATION: 100 % | SYSTOLIC BLOOD PRESSURE: 132 MMHG | WEIGHT: 210 LBS

## 2022-05-19 DIAGNOSIS — Z12.11 COLON CANCER SCREENING: ICD-10-CM

## 2022-05-19 DIAGNOSIS — I10 PRIMARY HYPERTENSION: Primary | ICD-10-CM

## 2022-05-19 DIAGNOSIS — M19.90 ARTHRITIS: ICD-10-CM

## 2022-05-19 DIAGNOSIS — Z12.5 PROSTATE CANCER SCREENING: ICD-10-CM

## 2022-05-19 DIAGNOSIS — N52.01 ERECTILE DYSFUNCTION DUE TO ARTERIAL INSUFFICIENCY: ICD-10-CM

## 2022-05-19 PROCEDURE — 99214 OFFICE O/P EST MOD 30 MIN: CPT | Performed by: INTERNAL MEDICINE

## 2022-05-19 RX ORDER — EZETIMIBE 10 MG/1
10 TABLET ORAL DAILY
Qty: 90 TABLET | Refills: 3 | Status: SHIPPED | OUTPATIENT
Start: 2022-05-19 | End: 2022-10-18

## 2022-05-19 RX ORDER — SILDENAFIL CITRATE 20 MG/1
TABLET ORAL
Qty: 30 TABLET | Refills: 4 | Status: SHIPPED | OUTPATIENT
Start: 2022-05-19 | End: 2022-11-21

## 2022-05-19 RX ORDER — SIMVASTATIN 20 MG
20 TABLET ORAL NIGHTLY
Qty: 90 TABLET | Refills: 3 | Status: SHIPPED | OUTPATIENT
Start: 2022-05-19 | End: 2022-10-18

## 2022-05-19 NOTE — PROGRESS NOTES
Subjective     Patient ID: Deniz Dickson is a 77 y.o. male. Patient is here for management of multiple medical problems.     Chief Complaint   Patient presents with   • Hypertension     History of Present Illness     htn        The following portions of the patient's history were reviewed and updated as appropriate: allergies, current medications, past family history, past medical history, past social history, past surgical history and problem list.    Review of Systems   Constitutional: Negative for fatigue.   Psychiatric/Behavioral: Negative for self-injury and sleep disturbance. The patient is not nervous/anxious.    All other systems reviewed and are negative.      Current Outpatient Medications:   •  aspirin 81 MG EC tablet, Take 1 tablet by mouth Daily., Disp: 90 tablet, Rfl: 1  •  bisoprolol (ZEBeta) 5 MG tablet, TAKE 1/2 TABLET BY MOUTH DAILY, Disp: 45 tablet, Rfl: 1  •  celecoxib (CeleBREX) 200 MG capsule, , Disp: , Rfl:   •  Cholecalciferol (VITAMIN D-3 PO), Take 1 tablet by mouth Daily., Disp: , Rfl:   •  ezetimibe (Zetia) 10 MG tablet, Take 1 tablet by mouth Daily., Disp: 90 tablet, Rfl: 3  •  famotidine (PEPCID) 40 MG tablet, Take 40 mg by mouth 2 (Two) Times a Day., Disp: , Rfl:   •  finasteride (PROSCAR) 5 MG tablet, Take 1 tablet by mouth Daily., Disp: 90 tablet, Rfl: 3  •  fluticasone (FLONASE) 50 MCG/ACT nasal spray, 1 spray into the nostril(s) as directed by provider Daily., Disp: , Rfl:   •  Glucosamine-Chondroitin (OSTEO BI-FLEX REGULAR STRENGTH PO), Take 1 tablet by mouth 2 (two) times a day., Disp: , Rfl:   •  hydroxychloroquine (PLAQUENIL) 200 MG tablet, Take 1 tablet by mouth Every 12 (Twelve) Hours., Disp: , Rfl:   •  levothyroxine (SYNTHROID, LEVOTHROID) 137 MCG tablet, TAKE ONE TABLET BY MOUTH DAILY, Disp: 90 tablet, Rfl: 3  •  montelukast (SINGULAIR) 10 MG tablet, TAKE ONE TABLET BY MOUTH ONCE NIGHTLY, Disp: 90 tablet, Rfl: 3  •  Multiple Vitamins-Minerals (CENTRUM SILVER ADULT 50+)  "tablet, Take 1 tablet by mouth daily., Disp: , Rfl:   •  Omega-3 Fatty Acids (FISH OIL) 1200 MG capsule capsule, Take 1,200 mg by mouth 2 (Two) Times a Day With Meals., Disp: , Rfl:   •  Potassium 75 MG tablet, Take 595 mg by mouth Daily., Disp: , Rfl:   •  sildenafil (REVATIO) 20 MG tablet, TAKE ONE TO THREE TABLETS BY MOUTH DAILY AS NEEDED FOR ERECTILE DYSFUNCTION, Disp: 30 tablet, Rfl: 4  •  simvastatin (Zocor) 20 MG tablet, Take 1 tablet by mouth Every Night., Disp: 90 tablet, Rfl: 3  •  vitamin B-12 (CYANOCOBALAMIN) 1000 MCG tablet, Take 1,000 mcg by mouth Daily., Disp: , Rfl:     Objective      Blood pressure 132/82, pulse 70, temperature 98.7 °F (37.1 °C), resp. rate 16, height 177.8 cm (70\"), weight 95.3 kg (210 lb), SpO2 100 %.    Physical Exam     General Appearance:    Alert, cooperative, no distress, appears stated age   Head:    Normocephalic, without obvious abnormality, atraumatic   Eyes:    PERRL, conjunctiva/corneas clear, EOM's intact   Ears:    Normal TM's and external ear canals, both ears   Nose:   Nares normal, septum midline, mucosa normal, no drainage   or sinus tenderness   Throat:   Lips, mucosa, and tongue normal; teeth and gums normal   Neck:   Supple, symmetrical, trachea midline, no adenopathy;        thyroid:  No enlargement/tenderness/nodules; no carotid    bruit or JVD   Back:     Symmetric, no curvature, ROM normal, no CVA tenderness   Lungs:     Clear to auscultation bilaterally, respirations unlabored   Chest wall:    No tenderness or deformity   Heart:    Regular rate and rhythm, S1 and S2 normal, no murmur,        rub or gallop   Abdomen:     Soft, non-tender, bowel sounds active all four quadrants,     no masses, no organomegaly   Extremities:   Extremities normal, atraumatic, no cyanosis or edema   Pulses:   2+ and symmetric all extremities   Skin:   Skin color, texture, turgor normal, no rashes or lesions   Lymph nodes:   Cervical, supraclavicular, and axillary nodes normal "   Neurologic:   CNII-XII intact. Normal strength, sensation and reflexes       throughout      Results for orders placed or performed in visit on 11/19/21   MIGUEL & PE, Random Urine -   Result Value Ref Range    Total Protein, Urine 11.8 Not Estab. mg/dL    Albumin, U 35.0 %    Alpha-1-Globulin, U 2.4 %    Alpha-2-Globulin, U 15.9 %    Beta Globulin, U 29.4 %    Gamma Globulin, Urine 17.3 %    M-Sebas, % U Not Observed Not Observed %    Immunofixation Result, Urine Comment     Note: Comment    Free K+L Left Chains,Qn,Ur -   Result Value Ref Range    Free Wray Lt Chains,Ur 24.97 0.63 - 113.79 mg/L    Free Lambda Lt Chains,Ur 3.17 0.47 - 11.77 mg/L    Kappa/Lambda Ratio,U 7.88 1.03 - 31.76         Assessment & Plan     Bili mild elevation above abnormal base line.  Ct ab/pel. reviewed. A lot of bowel gas.        Diagnoses and all orders for this visit:    1. Primary hypertension (Primary)    2. Erectile dysfunction due to arterial insufficiency  -     sildenafil (REVATIO) 20 MG tablet; TAKE ONE TO THREE TABLETS BY MOUTH DAILY AS NEEDED FOR ERECTILE DYSFUNCTION  Dispense: 30 tablet; Refill: 4    3. Arthritis    4. Prostate cancer screening    5. Colon cancer screening    Other orders  -     ezetimibe (Zetia) 10 MG tablet; Take 1 tablet by mouth Daily.  Dispense: 90 tablet; Refill: 3  -     simvastatin (Zocor) 20 MG tablet; Take 1 tablet by mouth Every Night.  Dispense: 90 tablet; Refill: 3      Return in about 5 months (around 10/19/2022).          There are no Patient Instructions on file for this visit.     Zane Magallon MD    Assessment & Plan

## 2022-06-01 ENCOUNTER — OFFICE VISIT (OUTPATIENT)
Dept: CARDIOLOGY | Facility: CLINIC | Age: 77
End: 2022-06-01

## 2022-06-01 VITALS
HEIGHT: 71 IN | WEIGHT: 205 LBS | OXYGEN SATURATION: 96 % | DIASTOLIC BLOOD PRESSURE: 80 MMHG | HEART RATE: 64 BPM | SYSTOLIC BLOOD PRESSURE: 128 MMHG | BODY MASS INDEX: 28.7 KG/M2

## 2022-06-01 DIAGNOSIS — I10 PRIMARY HYPERTENSION: ICD-10-CM

## 2022-06-01 DIAGNOSIS — E78.2 MIXED HYPERLIPIDEMIA: ICD-10-CM

## 2022-06-01 DIAGNOSIS — I25.10 CORONARY ARTERY DISEASE INVOLVING NATIVE CORONARY ARTERY OF NATIVE HEART WITHOUT ANGINA PECTORIS: Primary | ICD-10-CM

## 2022-06-01 PROCEDURE — 99214 OFFICE O/P EST MOD 30 MIN: CPT | Performed by: INTERNAL MEDICINE

## 2022-06-01 RX ORDER — BISOPROLOL FUMARATE 5 MG/1
2.5 TABLET, FILM COATED ORAL DAILY
Qty: 45 TABLET | Refills: 1 | Status: SHIPPED | OUTPATIENT
Start: 2022-06-01 | End: 2023-01-16

## 2022-07-05 ENCOUNTER — LAB (OUTPATIENT)
Dept: LAB | Facility: HOSPITAL | Age: 77
End: 2022-07-05

## 2022-07-05 LAB — PSA SERPL-MCNC: 1.82 NG/ML (ref 0–4)

## 2022-07-05 PROCEDURE — G0103 PSA SCREENING: HCPCS | Performed by: INTERNAL MEDICINE

## 2022-07-05 PROCEDURE — 36415 COLL VENOUS BLD VENIPUNCTURE: CPT | Performed by: INTERNAL MEDICINE

## 2022-10-03 ENCOUNTER — TELEPHONE (OUTPATIENT)
Dept: INTERNAL MEDICINE | Facility: CLINIC | Age: 77
End: 2022-10-03

## 2022-10-03 DIAGNOSIS — R79.9 ABNORMAL FINDING OF BLOOD CHEMISTRY, UNSPECIFIED: ICD-10-CM

## 2022-10-03 DIAGNOSIS — I10 ESSENTIAL HYPERTENSION: ICD-10-CM

## 2022-10-03 DIAGNOSIS — E78.2 MIXED HYPERLIPIDEMIA: Primary | ICD-10-CM

## 2022-10-03 DIAGNOSIS — M10.9 GOUT, UNSPECIFIED CAUSE, UNSPECIFIED CHRONICITY, UNSPECIFIED SITE: ICD-10-CM

## 2022-10-03 NOTE — TELEPHONE ENCOUNTER
Caller: Deniz Dickson    Relationship: Self    Best call back number: [phone]    What orders are you requesting (i.e. lab or imaging): LAB ORDERS    In what timeframe would the patient need to come in: PATIENT IS SCHEDULED TO SEE DR. PALUMBO  ON 10.18.22    Where will you receive your lab/imaging services: [facility]      Additional notes: PATIENT STATES THAT HE ALSO NEEDS TO HAVE HIS THYROID CHECKED WITHIN HIS LABS.      PATIENT ALSO NEEDS A RECOMMENDATION FOR A NEW PULMONOLOGIST, AS HIS RECENTLY RETIRED.     PLEASE CALL THE PATIENT AND LET HIM KNOW WHEN THESE LABS ARE ACTIVE AND TO ADVISE ABOUT PULMONOLOGY.     THANK YOU.

## 2022-10-11 ENCOUNTER — HOSPITAL ENCOUNTER (OUTPATIENT)
Facility: HOSPITAL | Age: 77
Discharge: HOME | End: 2022-10-11
Payer: MEDICARE

## 2022-10-11 ENCOUNTER — TELEPHONE (OUTPATIENT)
Dept: INTERNAL MEDICINE | Facility: CLINIC | Age: 77
End: 2022-10-11

## 2022-10-11 LAB
A/G RATIO: 2.1 (ref 0.8–2)
ALBUMIN SERPL-MCNC: 4.7 G/DL (ref 3.4–4.8)
ALP BLD-CCNC: 58 U/L (ref 25–100)
ALT SERPL-CCNC: 25 U/L (ref 4–36)
ANION GAP SERPL CALCULATED.3IONS-SCNC: 13 MMOL/L (ref 3–16)
AST SERPL-CCNC: 26 U/L (ref 8–33)
BASOPHILS ABSOLUTE: 0 K/UL (ref 0–0.1)
BASOPHILS RELATIVE PERCENT: 0.2 %
BILIRUB SERPL-MCNC: 1.2 MG/DL (ref 0.3–1.2)
BUN BLDV-MCNC: 13 MG/DL (ref 6–20)
CALCIUM SERPL-MCNC: 9.2 MG/DL (ref 8.5–10.5)
CHLORIDE BLD-SCNC: 98 MMOL/L (ref 98–107)
CHOLESTEROL, TOTAL: 163 MG/DL (ref 0–200)
CO2: 24 MMOL/L (ref 20–30)
CREAT SERPL-MCNC: 0.7 MG/DL (ref 0.4–1.2)
CREATININE URINE: 43 MG/DL (ref 1.5–300)
EOSINOPHILS ABSOLUTE: 0.1 K/UL (ref 0–0.4)
EOSINOPHILS RELATIVE PERCENT: 2.1 %
GFR AFRICAN AMERICAN: >59
GFR NON-AFRICAN AMERICAN: >59
GLOBULIN: 2.2 G/DL
GLUCOSE BLD-MCNC: 101 MG/DL (ref 74–106)
HBA1C MFR BLD: 5.5 %
HCT VFR BLD CALC: 44.2 % (ref 40–54)
HDLC SERPL-MCNC: 72 MG/DL (ref 40–60)
HEMOGLOBIN: 15.4 G/DL (ref 13–18)
IMMATURE GRANULOCYTES #: 0 K/UL
IMMATURE GRANULOCYTES %: 0.2 % (ref 0–5)
LDL CHOLESTEROL CALCULATED: 70 MG/DL
LYMPHOCYTES ABSOLUTE: 1 K/UL (ref 1.5–4)
LYMPHOCYTES RELATIVE PERCENT: 24 %
MCH RBC QN AUTO: 33.4 PG (ref 27–32)
MCHC RBC AUTO-ENTMCNC: 34.8 G/DL (ref 31–35)
MCV RBC AUTO: 95.9 FL (ref 80–100)
MICROALBUMIN UR-MCNC: <1.2 MG/DL (ref 0–22)
MICROALBUMIN/CREAT UR-RTO: NORMAL MG/G (ref 0–30)
MONOCYTES ABSOLUTE: 0.5 K/UL (ref 0.2–0.8)
MONOCYTES RELATIVE PERCENT: 11.1 %
NEUTROPHILS ABSOLUTE: 2.7 K/UL (ref 2–7.5)
NEUTROPHILS RELATIVE PERCENT: 62.4 %
PDW BLD-RTO: 12.5 % (ref 11–16)
PLATELET # BLD: 95 K/UL (ref 150–400)
PMV BLD AUTO: 8.6 FL (ref 6–10)
POTASSIUM SERPL-SCNC: 4.2 MMOL/L (ref 3.4–5.1)
RBC # BLD: 4.61 M/UL (ref 4.5–6)
SODIUM BLD-SCNC: 135 MMOL/L (ref 136–145)
T4 FREE: 1.52 NG/DL (ref 0.89–1.76)
TOTAL PROTEIN: 6.9 G/DL (ref 6.4–8.3)
TRIGL SERPL-MCNC: 106 MG/DL (ref 0–249)
TSH SERPL DL<=0.05 MIU/L-ACNC: 1.05 UIU/ML (ref 0.27–4.2)
VITAMIN B-12: 943 PG/ML (ref 211–911)
VLDLC SERPL CALC-MCNC: 21 MG/DL
WBC # BLD: 4.3 K/UL (ref 4–11)

## 2022-10-11 PROCEDURE — 85025 COMPLETE CBC W/AUTO DIFF WBC: CPT

## 2022-10-11 PROCEDURE — 84439 ASSAY OF FREE THYROXINE: CPT

## 2022-10-11 PROCEDURE — 84443 ASSAY THYROID STIM HORMONE: CPT

## 2022-10-11 PROCEDURE — 83036 HEMOGLOBIN GLYCOSYLATED A1C: CPT

## 2022-10-11 PROCEDURE — 82570 ASSAY OF URINE CREATININE: CPT

## 2022-10-11 PROCEDURE — 80053 COMPREHEN METABOLIC PANEL: CPT

## 2022-10-11 PROCEDURE — 82043 UR ALBUMIN QUANTITATIVE: CPT

## 2022-10-11 PROCEDURE — 36415 COLL VENOUS BLD VENIPUNCTURE: CPT

## 2022-10-11 PROCEDURE — 82607 VITAMIN B-12: CPT

## 2022-10-11 PROCEDURE — 80061 LIPID PANEL: CPT

## 2022-10-11 NOTE — TELEPHONE ENCOUNTER
Called lab are [facility] and informed Dr. Magallon is out of the office this week, but have printed off the labs and have them waiting to be signed and will fax them over next week with signature. Charito from the lab advised that will be okay.

## 2022-10-11 NOTE — TELEPHONE ENCOUNTER
Caller: JAQUELINE - [facility]    Relationship: Other    Best call back number: [phone]    What orders are you requesting (i.e. lab or imaging): SIGNED LAB ORDERS    In what timeframe would the patient need to come in: AS SOON AS POSSIBLE    Where will you receive your lab/imaging services: [facility]    Additional notes: JAQUELINE STATED THAT THEY RECEIVED THE LAB ORDERS BUT IT DOES NOT HAVE A PHYSICIAN SIGNATURE ON IT. THEIR FAX NUMBER [phone]

## 2022-10-17 ENCOUNTER — TELEPHONE (OUTPATIENT)
Dept: INTERNAL MEDICINE | Facility: CLINIC | Age: 77
End: 2022-10-17

## 2022-10-17 NOTE — TELEPHONE ENCOUNTER
Caller: Jenny Dickson    Relationship: Self    Best call back number: [phone]    Caller requesting test results: JENNY DICKSON    What test was performed: BLOOD TEST    Where was the test performed: [facility]    Additional notes: PATIENT WANTED TO KNOW IF WE HAVE RECEIVED THESE RESULTS. PLEASE CALL HIM TO CONFIRM IF WE HAVE.

## 2022-10-18 ENCOUNTER — OFFICE VISIT (OUTPATIENT)
Dept: INTERNAL MEDICINE | Facility: CLINIC | Age: 77
End: 2022-10-18

## 2022-10-18 VITALS
RESPIRATION RATE: 16 BRPM | SYSTOLIC BLOOD PRESSURE: 120 MMHG | DIASTOLIC BLOOD PRESSURE: 72 MMHG | TEMPERATURE: 98.2 F | HEART RATE: 71 BPM | OXYGEN SATURATION: 96 % | HEIGHT: 71 IN | BODY MASS INDEX: 27.86 KG/M2 | WEIGHT: 199 LBS

## 2022-10-18 DIAGNOSIS — R15.9 INCONTINENCE OF FECES, UNSPECIFIED FECAL INCONTINENCE TYPE: ICD-10-CM

## 2022-10-18 DIAGNOSIS — K64.9 HEMORRHOIDS, UNSPECIFIED HEMORRHOID TYPE: ICD-10-CM

## 2022-10-18 DIAGNOSIS — Z99.89 OSA ON CPAP: ICD-10-CM

## 2022-10-18 DIAGNOSIS — Z12.5 PROSTATE CANCER SCREENING: ICD-10-CM

## 2022-10-18 DIAGNOSIS — N20.0 NEPHROLITHIASIS: ICD-10-CM

## 2022-10-18 DIAGNOSIS — G47.33 OSA ON CPAP: ICD-10-CM

## 2022-10-18 DIAGNOSIS — Z23 NEED FOR INFLUENZA VACCINATION: ICD-10-CM

## 2022-10-18 DIAGNOSIS — I10 PRIMARY HYPERTENSION: Primary | ICD-10-CM

## 2022-10-18 DIAGNOSIS — N40.0 BENIGN PROSTATIC HYPERPLASIA WITHOUT LOWER URINARY TRACT SYMPTOMS: ICD-10-CM

## 2022-10-18 PROCEDURE — 99214 OFFICE O/P EST MOD 30 MIN: CPT | Performed by: INTERNAL MEDICINE

## 2022-10-18 PROCEDURE — G0008 ADMIN INFLUENZA VIRUS VAC: HCPCS | Performed by: INTERNAL MEDICINE

## 2022-10-18 PROCEDURE — 90662 IIV NO PRSV INCREASED AG IM: CPT | Performed by: INTERNAL MEDICINE

## 2022-10-18 RX ORDER — EZETIMIBE 10 MG/1
1 TABLET ORAL DAILY
COMMUNITY
Start: 2022-05-19

## 2022-10-18 RX ORDER — SIMVASTATIN 20 MG
1 TABLET ORAL DAILY
COMMUNITY
Start: 2022-05-19

## 2022-10-18 NOTE — PROGRESS NOTES
Subjective     Patient ID: Deniz Dickson is a 77 y.o. male. Patient is here for management of multiple medical problems.     Chief Complaint   Patient presents with   • Hypertension     History of Present Illness       Htn.  Feels well. Not weak. occ dizziness. Not falling.      Needs new Pulmonologist.  Seen Dr Mccormick.        The following portions of the patient's history were reviewed and updated as appropriate: allergies, current medications, past family history, past medical history, past social history, past surgical history and problem list.    Review of Systems    Current Outpatient Medications:   •  aspirin 81 MG EC tablet, Take 1 tablet by mouth Daily., Disp: 90 tablet, Rfl: 1  •  bisoprolol (ZEBeta) 5 MG tablet, Take 0.5 tablets by mouth Daily., Disp: 45 tablet, Rfl: 1  •  celecoxib (CeleBREX) 200 MG capsule, , Disp: , Rfl:   •  Cholecalciferol (VITAMIN D-3 PO), Take 1 tablet by mouth Daily., Disp: , Rfl:   •  ezetimibe (ZETIA) 10 MG tablet, Take 1 tablet by mouth Daily., Disp: , Rfl:   •  famotidine (PEPCID) 40 MG tablet, Take 40 mg by mouth 2 (Two) Times a Day., Disp: , Rfl:   •  finasteride (PROSCAR) 5 MG tablet, Take 1 tablet by mouth Daily., Disp: 90 tablet, Rfl: 3  •  fluticasone (FLONASE) 50 MCG/ACT nasal spray, 1 spray into the nostril(s) as directed by provider Daily., Disp: , Rfl:   •  Glucosamine-Chondroitin (OSTEO BI-FLEX REGULAR STRENGTH PO), Take 1 tablet by mouth 2 (two) times a day., Disp: , Rfl:   •  hydroxychloroquine (PLAQUENIL) 200 MG tablet, Take 1 tablet by mouth Every 12 (Twelve) Hours., Disp: , Rfl:   •  levothyroxine (SYNTHROID, LEVOTHROID) 137 MCG tablet, TAKE ONE TABLET BY MOUTH DAILY, Disp: 90 tablet, Rfl: 3  •  montelukast (SINGULAIR) 10 MG tablet, TAKE ONE TABLET BY MOUTH ONCE NIGHTLY, Disp: 90 tablet, Rfl: 3  •  Multiple Vitamins-Minerals (CENTRUM SILVER ADULT 50+) tablet, Take 1 tablet by mouth daily., Disp: , Rfl:   •  Omega-3 Fatty Acids (FISH OIL) 1200 MG capsule  "capsule, Take 1,200 mg by mouth 2 (Two) Times a Day With Meals., Disp: , Rfl:   •  Potassium 75 MG tablet, Take 595 mg by mouth Daily., Disp: , Rfl:   •  sildenafil (REVATIO) 20 MG tablet, TAKE ONE TO THREE TABLETS BY MOUTH DAILY AS NEEDED FOR ERECTILE DYSFUNCTION, Disp: 30 tablet, Rfl: 4  •  simvastatin (ZOCOR) 20 MG tablet, Take 1 tablet by mouth Daily., Disp: , Rfl:   •  vitamin B-12 (CYANOCOBALAMIN) 1000 MCG tablet, Take 1,000 mcg by mouth Daily., Disp: , Rfl:     Objective      Blood pressure 120/72, pulse 71, temperature 98.2 °F (36.8 °C), resp. rate 16, height 179.1 cm (70.5\"), weight 90.3 kg (199 lb), SpO2 96 %.    Physical Exam     General Appearance:    Alert, cooperative, no distress, appears stated age   Head:    Normocephalic, without obvious abnormality, atraumatic   Eyes:    PERRL, conjunctiva/corneas clear, EOM's intact   Ears:    Normal TM's and external ear canals, both ears   Nose:   Nares normal, septum midline, mucosa normal, no drainage   or sinus tenderness   Throat:   Lips, mucosa, and tongue normal; teeth and gums normal   Neck:   Supple, symmetrical, trachea midline, no adenopathy;        thyroid:  No enlargement/tenderness/nodules; no carotid    bruit or JVD   Back:     Symmetric, no curvature, ROM normal, no CVA tenderness   Lungs:     Clear to auscultation bilaterally, respirations unlabored   Chest wall:    No tenderness or deformity   Heart:    Regular rate and rhythm, S1 and S2 normal, no murmur,        rub or gallop   Abdomen:     Soft, non-tender, bowel sounds active all four quadrants,     no masses, no organomegaly   Extremities:   Extremities normal, atraumatic, no cyanosis or edema   Pulses:   2+ and symmetric all extremities   Skin:   Skin color, texture, turgor normal, no rashes or lesions   Lymph nodes:   Cervical, supraclavicular, and axillary nodes normal   Neurologic:   CNII-XII intact. Normal strength, sensation and reflexes       throughout      Results for orders placed " or performed in visit on 05/19/22   PSA Screen    Specimen: Blood   Result Value Ref Range    PSA 1.820 0.000 - 4.000 ng/mL         Assessment & Plan   Diet going well.     Exercise improving.       Wt down a few pounds.    Now with hemmoroid. Needs gen surg.    Using Prep H.   Years of problems.          Diagnoses and all orders for this visit:    1. Primary hypertension (Primary)  -     TSH  -     T4, Free  -     Comprehensive Metabolic Panel  -     Vitamin B12  -     CBC & Differential  -     Lipid Panel  -     PSA Screen    2. Need for influenza vaccination  -     Fluzone High-Dose 65+yrs  -     TSH  -     T4, Free  -     Comprehensive Metabolic Panel  -     Vitamin B12  -     CBC & Differential  -     Lipid Panel  -     PSA Screen    3. JUWAN on CPAP  -     Ambulatory Referral to Pulmonology  -     TSH  -     T4, Free  -     Comprehensive Metabolic Panel  -     Vitamin B12  -     CBC & Differential  -     Lipid Panel  -     PSA Screen    4. Hemorrhoids, unspecified hemorrhoid type  -     Ambulatory Referral to General Surgery  -     TSH  -     T4, Free  -     Comprehensive Metabolic Panel  -     Vitamin B12  -     CBC & Differential  -     Lipid Panel  -     PSA Screen    5. Incontinence of feces, unspecified fecal incontinence type  -     TSH  -     T4, Free  -     Comprehensive Metabolic Panel  -     Vitamin B12  -     CBC & Differential  -     Lipid Panel  -     PSA Screen    6. Prostate cancer screening  -     TSH  -     T4, Free  -     Comprehensive Metabolic Panel  -     Vitamin B12  -     CBC & Differential  -     Lipid Panel  -     PSA Screen  -     Ambulatory Referral to Urology    7. Nephrolithiasis  -     Ambulatory Referral to Urology    8. Benign prostatic hyperplasia without lower urinary tract symptoms  -     Ambulatory Referral to Urology      Return in about 6 months (around 4/18/2023).          There are no Patient Instructions on file for this visit.     Zane Magallon MD    Assessment &  Plan

## 2022-11-02 NOTE — PROGRESS NOTES
Patient: Deniz Dickson    YOB: 1945    Date: 11/03/2022    Primary Care Provider: Zane Magallon MD    Chief Complaint   Patient presents with   • Hemorrhoids       SUBJECTIVE:    History of present illness:  Patient was seen today in the office for the evaluation and treatment for hemorrhoids.  Patient did have a colonoscopy done by Dr. Rhodes in 2019 that did show internal hemorrhoids otherwise negative.  Patient states he that rectal bleeding, concerned about prolapsed hemorrhoids as well.  His last colonoscopy was 3 years ago.  No weight loss or anemia.  Considerable diarrhea recently that concerning the patient.    The following portions of the patient's history were reviewed and updated as appropriate: allergies, current medications, past family history, past medical history, past social history, past surgical history and problem list.      Review of Systems   Constitutional: Negative for chills, fever and unexpected weight change.   HENT: Negative for trouble swallowing and voice change.    Eyes: Negative for visual disturbance.   Respiratory: Negative for apnea, cough, chest tightness, shortness of breath and wheezing.    Cardiovascular: Negative for chest pain, palpitations and leg swelling.   Gastrointestinal: Positive for rectal pain. Negative for abdominal distention, abdominal pain, anal bleeding, blood in stool, constipation, diarrhea, nausea and vomiting.   Endocrine: Negative for cold intolerance and heat intolerance.   Genitourinary: Negative for difficulty urinating, dysuria, flank pain, scrotal swelling and testicular pain.   Musculoskeletal: Negative for back pain, gait problem and joint swelling.   Skin: Negative for color change, rash and wound.   Neurological: Negative for dizziness, syncope, speech difficulty, weakness, numbness and headaches.   Hematological: Negative for adenopathy. Does not bruise/bleed easily.   Psychiatric/Behavioral: Negative for confusion. The patient  is not nervous/anxious.        Allergies:  Allergies   Allergen Reactions   • Penicillins Itching and Rash       Medications:    Current Outpatient Medications:   •  aspirin 81 MG EC tablet, Take 1 tablet by mouth Daily., Disp: 90 tablet, Rfl: 1  •  bisoprolol (ZEBeta) 5 MG tablet, Take 0.5 tablets by mouth Daily., Disp: 45 tablet, Rfl: 1  •  celecoxib (CeleBREX) 200 MG capsule, , Disp: , Rfl:   •  Cholecalciferol (VITAMIN D-3 PO), Take 1 tablet by mouth Daily., Disp: , Rfl:   •  ezetimibe (ZETIA) 10 MG tablet, Take 1 tablet by mouth Daily., Disp: , Rfl:   •  famotidine (PEPCID) 40 MG tablet, Take 40 mg by mouth 2 (Two) Times a Day., Disp: , Rfl:   •  finasteride (PROSCAR) 5 MG tablet, Take 1 tablet by mouth Daily., Disp: 90 tablet, Rfl: 3  •  fluticasone (FLONASE) 50 MCG/ACT nasal spray, 1 spray into the nostril(s) as directed by provider Daily., Disp: , Rfl:   •  Glucosamine-Chondroitin (OSTEO BI-FLEX REGULAR STRENGTH PO), Take 1 tablet by mouth 2 (two) times a day., Disp: , Rfl:   •  hydroxychloroquine (PLAQUENIL) 200 MG tablet, Take 1 tablet by mouth Every 12 (Twelve) Hours., Disp: , Rfl:   •  levothyroxine (SYNTHROID, LEVOTHROID) 137 MCG tablet, TAKE ONE TABLET BY MOUTH DAILY, Disp: 90 tablet, Rfl: 3  •  montelukast (SINGULAIR) 10 MG tablet, TAKE ONE TABLET BY MOUTH ONCE NIGHTLY, Disp: 90 tablet, Rfl: 3  •  Multiple Vitamins-Minerals (CENTRUM SILVER ADULT 50+) tablet, Take 1 tablet by mouth daily., Disp: , Rfl:   •  Omega-3 Fatty Acids (FISH OIL) 1200 MG capsule capsule, Take 1,200 mg by mouth 2 (Two) Times a Day With Meals., Disp: , Rfl:   •  Potassium 75 MG tablet, Take 595 mg by mouth Daily., Disp: , Rfl:   •  sildenafil (REVATIO) 20 MG tablet, TAKE ONE TO THREE TABLETS BY MOUTH DAILY AS NEEDED FOR ERECTILE DYSFUNCTION, Disp: 30 tablet, Rfl: 4  •  simvastatin (ZOCOR) 20 MG tablet, Take 1 tablet by mouth Daily., Disp: , Rfl:   •  vitamin B-12 (CYANOCOBALAMIN) 1000 MCG tablet, Take 1,000 mcg by mouth Daily.,  "Disp: , Rfl:     History:  Past Medical History:   Diagnosis Date   • Arthritis     OA   • Basal cell carcinoma 02/2020    left ear   • Cancer (HCC)     Skin cancer removed 3x    • Coronary artery disease     Probable Non obstructive    • Dyslipidemia    • Elevated prostate specific antigen (PSA)    • Essential hypertension, benign    • Full dentures     Instructed no adhesives the DOS   • GERD (gastroesophageal reflux disease)    • H/O benign prostatic hypertrophy    • H/O cardiovascular stress test 07/19/2019    Patient reported done by Dr Ojeda around 3-4 years ago and reported that all was wnl's    • High cholesterol    • History of bronchitis    • History of hemorrhoids    • History of idiopathic thrombocytopenic purpura    • History of malignant neoplasm of skin    • History of sleep apnea    • [place] (hard of hearing)     No use of hearing aids   • Hypertension    • Hypothyroidism    • JUWAN on CPAP     Patient instructed to bring mask and machine the DOS   • Rectal bleeding    • S/P right knee surgery 07/12/2017   • Seasonal allergies    • Tobacco abuse    • Wears glasses     Rx wears       Past Surgical History:   Procedure Laterality Date   • APPENDECTOMY     • BASAL CELL CARCINOMA EXCISION  2020    left ear   • COLONOSCOPY      Complete Colonoscopy   • COLONOSCOPY N/A 7/22/2019    Procedure: COLONOSCOPY;  Surgeon: Lisa Rhodes MD;  Location: [facility] ENDOSCOPY;  Service: Gastroenterology   • HERNIA REPAIR      patient reported by Dr Banuelos - patient unsure extact location   • JOINT REPLACEMENT Left     partial knee replacement   • JOINT REPLACEMENT Right     partial knee replacement   • KNEE SURGERY      Left Knee   • MOUTH SURGERY      full mouth extraction   • OTHER SURGICAL HISTORY      Surgery Testis Orchiopexy around age 20   • OTHER SURGICAL HISTORY      Cyst removed from a finger - patient reported to this as a \"growth\" and is unsure laterality   • SKIN BIOPSY      Patient reported skin cancer " "removed 3x.  Nose, back and face   • URETEROSCOPY LASER LITHOTRIPSY WITH STENT INSERTION Left 11/15/2020    Procedure: URETEROSCOPY LASER LITHOTRIPSY WITH STENT INSERTION, Rertrograde;  Surgeon: Bobo Carter MD;  Location: [facility];  Service: Urology;  Laterality: Left;       Family History   Problem Relation Age of Onset   • Migraines Mother    • Mental illness Mother    • Heart disease Mother    • Alcohol abuse Father    • Cancer Father    • Lung cancer Father    • Brain cancer Sister    • Colon cancer Paternal Grandmother    • Prostate cancer Other    • Colon cancer Other        Social History     Tobacco Use   • Smoking status: Former     Packs/day: 2.00     Years: 15.00     Pack years: 30.00     Types: Cigarettes     Quit date: 1985     Years since quittin.4   • Smokeless tobacco: Former     Types: Chew     Quit date:    Vaping Use   • Vaping Use: Never used   Substance Use Topics   • Alcohol use: No   • Drug use: No        OBJECTIVE:    Vital Signs:   Vitals:    22 0906   BP: 128/62   Pulse: 80   Resp: 14   Temp: 97 °F (36.1 °C)   SpO2: 99%   Weight: 91.2 kg (201 lb)   Height: 179.1 cm (70.5\")       Physical Exam:   General Appearance:    Alert, cooperative, in no acute distress   Head:    Normocephalic, without obvious abnormality, atraumatic   Eyes:            Lids and lashes normal, conjunctivae and sclerae normal, no   icterus, no pallor, corneas clear, PERRLA   Ears:    Ears appear intact with no abnormalities noted   Throat:   No oral lesions, no thrush, oral mucosa moist   Neck:   No adenopathy, supple, trachea midline, no thyromegaly, no   carotid bruit, no JVD   Lungs:     Clear to auscultation,respirations regular, even and                  unlabored    Heart:    Regular rhythm and normal rate, normal S1 and S2, no            murmur, no gallop, no rub, no click   Chest Wall:    No abnormalities observed   Abdomen:     Normal bowel sounds, no masses, no organomegaly, soft  "       non-tender, non-distended, no guarding, no rebound                Tenderness  Rectal-visible prolapsed internal hemorrhoids.  No active bleeding.   Extremities:   Moves all extremities well, no edema, no cyanosis, no             redness   Pulses:   Pulses palpable and equal bilaterally   Skin:   No bleeding, bruising or rash   Lymph nodes:   No palpable adenopathy   Neurologic:   Cranial nerves 2 - 12 grossly intact, sensation intact, DTR       present and equal bilaterally     Results Review:   I reviewed the patient's new clinical results.    Review of Systems was reviewed and confirmed as accurate as documented by the MA.    ASSESSMENT/PLAN:    1. Rectal bleed    2. Chronic diarrhea    3. Internal hemorrhoids        Patient was scheduled for colonoscopy with hemorrhoid banding next spring.  If the bleeding infection and recurrence discussed and patient agreeable.  Patient will call to schedule procedure.    I discussed the patients findings and my recommendations with patient        Electronically signed by Roel Mcghee MD  11/03/22

## 2022-11-03 ENCOUNTER — OFFICE VISIT (OUTPATIENT)
Dept: SURGERY | Facility: CLINIC | Age: 77
End: 2022-11-03

## 2022-11-03 VITALS
HEART RATE: 80 BPM | SYSTOLIC BLOOD PRESSURE: 128 MMHG | OXYGEN SATURATION: 99 % | RESPIRATION RATE: 14 BRPM | WEIGHT: 201 LBS | TEMPERATURE: 97 F | HEIGHT: 71 IN | BODY MASS INDEX: 28.14 KG/M2 | DIASTOLIC BLOOD PRESSURE: 62 MMHG

## 2022-11-03 DIAGNOSIS — K52.9 CHRONIC DIARRHEA: ICD-10-CM

## 2022-11-03 DIAGNOSIS — K62.5 RECTAL BLEED: Primary | ICD-10-CM

## 2022-11-03 DIAGNOSIS — K64.8 INTERNAL HEMORRHOIDS: ICD-10-CM

## 2022-11-03 PROCEDURE — 99203 OFFICE O/P NEW LOW 30 MIN: CPT | Performed by: SURGERY

## 2022-11-06 DIAGNOSIS — E03.9 ACQUIRED HYPOTHYROIDISM: ICD-10-CM

## 2022-11-07 RX ORDER — LEVOTHYROXINE SODIUM 137 UG/1
TABLET ORAL
Qty: 90 TABLET | Refills: 3 | Status: SHIPPED | OUTPATIENT
Start: 2022-11-07

## 2022-11-20 DIAGNOSIS — N52.01 ERECTILE DYSFUNCTION DUE TO ARTERIAL INSUFFICIENCY: ICD-10-CM

## 2022-11-21 RX ORDER — MONTELUKAST SODIUM 10 MG/1
TABLET ORAL
Qty: 90 TABLET | Refills: 3 | Status: SHIPPED | OUTPATIENT
Start: 2022-11-21

## 2022-11-21 RX ORDER — SILDENAFIL CITRATE 20 MG/1
TABLET ORAL
Qty: 30 TABLET | Refills: 4 | Status: SHIPPED | OUTPATIENT
Start: 2022-11-21

## 2022-11-22 ENCOUNTER — OFFICE VISIT (OUTPATIENT)
Dept: PULMONOLOGY | Facility: CLINIC | Age: 77
End: 2022-11-22

## 2022-11-22 VITALS
OXYGEN SATURATION: 99 % | WEIGHT: 199 LBS | BODY MASS INDEX: 27.86 KG/M2 | DIASTOLIC BLOOD PRESSURE: 72 MMHG | HEIGHT: 71 IN | SYSTOLIC BLOOD PRESSURE: 124 MMHG | HEART RATE: 72 BPM

## 2022-11-22 DIAGNOSIS — G47.19 EXCESSIVE DAYTIME SLEEPINESS: ICD-10-CM

## 2022-11-22 DIAGNOSIS — G47.33 OBSTRUCTIVE SLEEP APNEA: Primary | ICD-10-CM

## 2022-11-22 DIAGNOSIS — J30.89 OTHER ALLERGIC RHINITIS: ICD-10-CM

## 2022-11-22 PROCEDURE — 99204 OFFICE O/P NEW MOD 45 MIN: CPT | Performed by: INTERNAL MEDICINE

## 2022-11-22 RX ORDER — FLUTICASONE PROPIONATE 50 MCG
1 SPRAY, SUSPENSION (ML) NASAL DAILY
Qty: 16 G | Refills: 5 | Status: SHIPPED | OUTPATIENT
Start: 2022-11-22

## 2022-11-22 NOTE — PROGRESS NOTES
CONSULT NOTE    Requested by:   Zane Magallon MD Geile, Michael A, MD      Chief Complaint   Patient presents with   • Sleeping Problem   • Consult       Subjective:  Deniz Dickson is a 77 y.o. male.     History of Present Illness   Patient comes in today for consultation because of sleep apnea.  The patient says that  he was diagnosed with sleep apnea 13-14 years ago.  It appears that he has been on a PAP device since then.    Patient doesn't report any issues with the device. Patient says that the compliance with the use of the equipment is good.      Patient reports continued improvement with the use of the PAP device.     Patient is not aware of snoring through the device. He does, however, sometimes feel that he doesnot get enough air if he is on his back, even with CPAP.     he is not complaining of occasional headaches.    The patient describes no significant issues with his mask either.     Patient's sleep schedule was reviewed.     he is complaining of issues with occasional excessive dryness.    Patient says that the compliance with the use of the equipment is good.     Patient says that he has been using his nasal sprays on a regular basis and describes no significant ongoing issues other than occasional congestion.       The following portions of the patient's history were reviewed and updated as appropriate: allergies, current medications, past family history, past medical history, past social history and past surgical history.    Review of Systems   Respiratory: Positive for apnea. Negative for cough, choking, chest tightness, shortness of breath, wheezing and stridor.    Cardiovascular: Negative for chest pain, palpitations and leg swelling.   All other systems reviewed and are negative.      Past Medical History:   Diagnosis Date   • Arthritis     OA   • Basal cell carcinoma 02/2020    left ear   • Cancer (HCC)     Skin cancer removed 3x    • Coronary artery disease     Probable Non  "obstructive    • Dyslipidemia    • Elevated prostate specific antigen (PSA)    • Essential hypertension, benign    • Full dentures     Instructed no adhesives the DOS   • GERD (gastroesophageal reflux disease)    • H/O benign prostatic hypertrophy    • H/O cardiovascular stress test 2019    Patient reported done by Dr Ojeda around 3-4 years ago and reported that all was wnl's    • High cholesterol    • History of bronchitis    • History of hemorrhoids    • History of idiopathic thrombocytopenic purpura    • History of malignant neoplasm of skin    • History of sleep apnea    • [place] (hard of hearing)     No use of hearing aids   • Hypertension    • Hypothyroidism    • JUWAN on CPAP     Patient instructed to bring mask and machine the DOS   • Rectal bleeding    • S/P right knee surgery 2017   • Seasonal allergies    • Tobacco abuse    • Wears glasses     Rx wears       Social History     Tobacco Use   • Smoking status: Former     Packs/day: 2.00     Years: 15.00     Pack years: 30.00     Types: Cigarettes     Quit date: 1985     Years since quittin.4   • Smokeless tobacco: Former     Types: Chew     Quit date:    Substance Use Topics   • Alcohol use: No         Objective:  Visit Vitals  /72   Pulse 72   Ht 179.1 cm (70.5\")   Wt 90.3 kg (199 lb)   SpO2 99%   BMI 28.15 kg/m²       Physical Exam  Vitals reviewed.   Constitutional:       Appearance: He is well-developed.   HENT:      Head:      Comments: No acute lesions noted     Mouth/Throat:      Mouth: Mucous membranes are moist.      Comments: Dentures noted.  Oropharynx was crowded.   Neck:      Thyroid: No thyromegaly.      Vascular: No JVD.   Cardiovascular:      Rate and Rhythm: Normal rate and regular rhythm.      Heart sounds: No murmur heard.  Pulmonary:      Effort: Pulmonary effort is normal. No respiratory distress.      Breath sounds: No wheezing or rales.   Musculoskeletal:      Cervical back: Neck supple.      Comments: Gait " was normal.   Skin:     General: Skin is warm and dry.   Neurological:      Mental Status: He is alert and oriented to person, place, and time.   Psychiatric:         Behavior: Behavior normal.         Assessment/Plan:  Diagnoses and all orders for this visit:    1. Obstructive sleep apnea (Primary)  -     BIPAP / CPAP Adjustment    2. Excessive daytime sleepiness    3. Other allergic rhinitis    Other orders  -     fluticasone (FLONASE) 50 MCG/ACT nasal spray; 1 spray into the nostril(s) as directed by provider Daily.  Dispense: 16 g; Refill: 5        Return in about 9 months (around 8/22/2023) for [facility].    DISCUSSION(if any):  Laboratory workup was also reviewed which showed   Lab Results   Component Value Date    CO2 24.3 07/02/2021     Laboratory workup also showed   Lab Results   Component Value Date    HGB 15.4 10/11/2022    HGB 15.6 05/16/2022    HGB 15.2 02/10/2022   ,   Lab Results   Component Value Date    HCT 44.2 10/11/2022    HCT 46.3 05/16/2022    HCT 44.3 02/10/2022      Referring physicians office note was also reviewed that did mention sleep apnea    ===========================  ===========================    We will try to obtain his last sleep study results from the performing facility, if not already scanned in our system.     I told the patient that his symptoms are consistent with well controlled sleep apnea.    Patient was advised to continue using PAP for at least 4 hours every night.    I told the patient that his symptoms are consistent with partially controlled sleep apnea.    I have decided to empirically change him to AutoPAP at a pressure of 8/20.    If the symptoms do not improve, even after undertaking the above measures, the patient may have to undergo a titration study.    Reviewed Dr. Mccormick's last note. It listed severe obstructive sleep apnea. It also listed that he was setup on CPAP at 6/15.     Patient was advised to continue his nasal spray, especially given  improvement in symptoms overall.      Dictated utilizing Dragon dictation.    This document was electronically signed by Bayron Hager MD on 11/22/22 at 13:44 EST

## 2022-11-28 ENCOUNTER — PATIENT ROUNDING (BHMG ONLY) (OUTPATIENT)
Dept: PULMONOLOGY | Facility: CLINIC | Age: 77
End: 2022-11-28

## 2023-01-16 RX ORDER — BISOPROLOL FUMARATE 5 MG/1
TABLET, FILM COATED ORAL
Qty: 45 TABLET | Refills: 6 | Status: SHIPPED | OUTPATIENT
Start: 2023-01-16

## 2023-03-05 DIAGNOSIS — N40.0 BENIGN NON-NODULAR PROSTATIC HYPERPLASIA WITHOUT LOWER URINARY TRACT SYMPTOMS: ICD-10-CM

## 2023-03-06 RX ORDER — FINASTERIDE 5 MG/1
TABLET, FILM COATED ORAL
Qty: 90 TABLET | Refills: 3 | Status: SHIPPED | OUTPATIENT
Start: 2023-03-06

## 2023-04-04 ENCOUNTER — TELEPHONE (OUTPATIENT)
Dept: INTERNAL MEDICINE | Facility: CLINIC | Age: 78
End: 2023-04-04
Payer: MEDICARE

## 2023-04-04 NOTE — TELEPHONE ENCOUNTER
Caller: Deniz Dickson    Relationship: Self    Best call back number:     [phone]    What orders are you requesting (i.e. lab or imaging):     PATIENT STATED HE HAS AN APPOINTMENT WITH DR PALUMBO 4/19 AND REQUESTED A BLOOD WORK ORDER BE PLACED     PATIENT REQUESTED THE BLOOD WORK ORDER FORWARDED TO [facility] IN [place], [place]    In what timeframe would the patient need to come in:     ASAP    DR PALUMBO

## 2023-04-19 ENCOUNTER — OFFICE VISIT (OUTPATIENT)
Dept: INTERNAL MEDICINE | Facility: CLINIC | Age: 78
End: 2023-04-19
Payer: MEDICARE

## 2023-04-19 VITALS
DIASTOLIC BLOOD PRESSURE: 74 MMHG | TEMPERATURE: 97.3 F | SYSTOLIC BLOOD PRESSURE: 112 MMHG | WEIGHT: 195 LBS | HEIGHT: 71 IN | RESPIRATION RATE: 16 BRPM | OXYGEN SATURATION: 97 % | BODY MASS INDEX: 27.3 KG/M2 | HEART RATE: 73 BPM

## 2023-04-19 DIAGNOSIS — M25.552 LEFT HIP PAIN: ICD-10-CM

## 2023-04-19 DIAGNOSIS — R17 ELEVATED BILIRUBIN: Primary | ICD-10-CM

## 2023-04-19 DIAGNOSIS — K80.21 CALCULUS OF GALLBLADDER WITH BILIARY OBSTRUCTION BUT WITHOUT CHOLECYSTITIS: ICD-10-CM

## 2023-04-19 DIAGNOSIS — M54.50 LEFT LOW BACK PAIN, UNSPECIFIED CHRONICITY, UNSPECIFIED WHETHER SCIATICA PRESENT: ICD-10-CM

## 2023-04-19 NOTE — PROGRESS NOTES
Subjective     Patient ID: Deniz Dickson is a 77 y.o. male. Patient is here for management of multiple medical problems.     Chief Complaint   Patient presents with   • Hypertension     History of Present Illness   htn    hypercholest      On listing to the right. Sees chiropractor.   Was doing well.  2 weeks ago hurt self.  No with hip pain. Want to go back to Dr Reed to check hips.    Back pain.   Thinks ms. Will do ua.        The following portions of the patient's history were reviewed and updated as appropriate: allergies, current medications, past family history, past medical history, past social history, past surgical history and problem list.    Review of Systems    Current Outpatient Medications:   •  aspirin 81 MG EC tablet, Take 1 tablet by mouth Daily., Disp: 90 tablet, Rfl: 1  •  bisoprolol (ZEBeta) 5 MG tablet, TAKE 1/2 TABLET BY MOUTH DAILY, Disp: 45 tablet, Rfl: 6  •  celecoxib (CeleBREX) 200 MG capsule, , Disp: , Rfl:   •  Cholecalciferol (VITAMIN D-3 PO), Take 1 tablet by mouth Daily., Disp: , Rfl:   •  ezetimibe (ZETIA) 10 MG tablet, Take 1 tablet by mouth Daily., Disp: , Rfl:   •  famotidine (PEPCID) 40 MG tablet, Take 1 tablet by mouth At Night As Needed., Disp: , Rfl:   •  finasteride (PROSCAR) 5 MG tablet, TAKE ONE TABLET BY MOUTH DAILY, Disp: 90 tablet, Rfl: 3  •  fluticasone (FLONASE) 50 MCG/ACT nasal spray, 1 spray into the nostril(s) as directed by provider Daily., Disp: 16 g, Rfl: 5  •  Glucosamine-Chondroitin (OSTEO BI-FLEX REGULAR STRENGTH PO), Take 1 tablet by mouth 2 (two) times a day., Disp: , Rfl:   •  hydroxychloroquine (PLAQUENIL) 200 MG tablet, Take 1 tablet by mouth Every 12 (Twelve) Hours., Disp: , Rfl:   •  levothyroxine (SYNTHROID, LEVOTHROID) 137 MCG tablet, TAKE ONE TABLET BY MOUTH DAILY, Disp: 90 tablet, Rfl: 3  •  montelukast (SINGULAIR) 10 MG tablet, TAKE ONE TABLET BY MOUTH ONCE NIGHTLY, Disp: 90 tablet, Rfl: 3  •  Multiple Vitamins-Minerals (CENTRUM SILVER  "ADULT 50+) tablet, Take 1 tablet by mouth Daily., Disp: , Rfl:   •  Omega-3 Fatty Acids (FISH OIL) 1200 MG capsule capsule, Take 1 capsule by mouth 2 (Two) Times a Day With Meals., Disp: , Rfl:   •  Potassium 75 MG tablet, Take 595 mg by mouth Daily., Disp: , Rfl:   •  sildenafil (REVATIO) 20 MG tablet, TAKE ONE TO THREE TABLETS BY MOUTH DAILY AS NEEDED FOR ERECTILE DYSFUNCTION, Disp: 30 tablet, Rfl: 4  •  simvastatin (ZOCOR) 20 MG tablet, Take 1 tablet by mouth Daily., Disp: , Rfl:   •  vitamin B-12 (CYANOCOBALAMIN) 1000 MCG tablet, Take 1 tablet by mouth Daily., Disp: , Rfl:     Objective      Blood pressure 112/74, pulse 73, temperature 97.3 °F (36.3 °C), resp. rate 16, height 179.1 cm (70.5\"), weight 88.5 kg (195 lb), SpO2 97 %.    Physical Exam     General Appearance:    Alert, cooperative, no distress, appears stated age   Head:    Normocephalic, without obvious abnormality, atraumatic   Eyes:    PERRL, conjunctiva/corneas clear, EOM's intact   Ears:    Normal TM's and external ear canals, both ears   Nose:   Nares normal, septum midline, mucosa normal, no drainage   or sinus tenderness   Throat:   Lips, mucosa, and tongue normal; teeth and gums normal   Neck:   Supple, symmetrical, trachea midline, no adenopathy;        thyroid:  No enlargement/tenderness/nodules; no carotid    bruit or JVD   Back:     Symmetric, no curvature, ROM normal, no CVA tenderness   Lungs:     Clear to auscultation bilaterally, respirations unlabored   Chest wall:    No tenderness or deformity   Heart:    Regular rate and rhythm, S1 and S2 normal, no murmur,        rub or gallop   Abdomen:     Soft, non-tender, bowel sounds active all four quadrants,     no masses, no organomegaly   Extremities:   Extremities normal, atraumatic, no cyanosis or edema   Pulses:   2+ and symmetric all extremities   Skin:   Skin color, texture, turgor normal, no rashes or lesions   Lymph nodes:   Cervical, supraclavicular, and axillary nodes normal "   Neurologic:   CNII-XII intact. Normal strength, sensation and reflexes       throughout      Results for orders placed or performed in visit on 05/19/22   PSA Screen    Specimen: Blood   Result Value Ref Range    PSA 1.820 0.000 - 4.000 ng/mL         Assessment & Plan   htn    hypercholest      On listing to the right. Sees chiropractor.   Was doing well.  2 weeks ago hurt self.  No with hip pain. Want to go back to Dr Reed to check hips.    Back pain.   Thinks ms. Will do ua.      Narrative & Impression   CT SCAN OF THE ABDOMEN AND PELVIS WITHOUT CONTRAST     11/15/2020 3:46  PM      HISTORY: Left lower quadrant abdominal pain.     COMPARISON: None.     PROCEDURE: Axial images were obtained from the lung bases to the pubic  symphysis by computed tomography. This study was performed with  techniques to keep radiation doses as low as reasonably achievable,  (ALARA). Individualized dose reduction techniques using automated  exposure control or adjustment of mA and/or kV according to the patient  size were employed.     FINDINGS:      ABDOMEN: Chronic changes in the lung bases.     Tiny gallstones suspected. Liver and spleen unremarkable. Pancreas and  adrenals are unremarkable. Mild left hydronephrosis and hydroureter  secondary to a 5 mm left UVJ stone. No intrarenal stones. No bowel  obstruction or acute inflammatory process. Moderate stool throughout the  colon. Small fat-containing umbilical hernia. No free air, free fluid,  or lymphadenopathy. Mild calcified plaque of the abdominal aorta without  aneurysm.     PELVIS: Mild prostate enlargement. No free fluid or adenopathy. Previous  hernia repair of the left pelvic wall. No acute osseous abnormalities.           IMPRESSION:  Obstructing 5 mm stone at the left UVJ. Mild left  hydronephrosis.        Bili up a little looking back as ct ab. GB stone seen.       thrombocytopenia improved some.  Other labs ok.    psa wnl but increasing velocity.  Followed by  Dr Yanez. Pt will bring this up with him on the next visit.      Diagnoses and all orders for this visit:    1. Elevated bilirubin (Primary)  -     US Gallbladder    2. Calculus of gallbladder with biliary obstruction but without cholecystitis  -     US Gallbladder    3. Left hip pain  -     Ambulatory Referral to Orthopedic Surgery    4. Left low back pain, unspecified chronicity, unspecified whether sciatica present  -     Urinalysis With Microscopic - Urine, Clean Catch      No follow-ups on file.          There are no Patient Instructions on file for this visit.     Zane Magallon MD    Assessment & Plan

## 2023-04-21 ENCOUNTER — HOSPITAL ENCOUNTER (EMERGENCY)
Facility: HOSPITAL | Age: 78
Discharge: HOME | End: 2023-04-22
Attending: EMERGENCY MEDICINE
Payer: MEDICARE

## 2023-04-21 ENCOUNTER — APPOINTMENT (OUTPATIENT)
Dept: GENERAL RADIOLOGY | Facility: HOSPITAL | Age: 78
End: 2023-04-21
Payer: MEDICARE

## 2023-04-21 DIAGNOSIS — M25.552 PAIN OF LEFT HIP: Primary | ICD-10-CM

## 2023-04-21 PROCEDURE — 73502 X-RAY EXAM HIP UNI 2-3 VIEWS: CPT

## 2023-04-21 PROCEDURE — 99283 EMERGENCY DEPT VISIT LOW MDM: CPT

## 2023-04-21 RX ORDER — HYDROCODONE BITARTRATE AND ACETAMINOPHEN 5; 325 MG/1; MG/1
1 TABLET ORAL ONCE
Status: COMPLETED | OUTPATIENT
Start: 2023-04-21 | End: 2023-04-21

## 2023-04-21 RX ORDER — HYDROCODONE BITARTRATE AND ACETAMINOPHEN 5; 325 MG/1; MG/1
1 TABLET ORAL ONCE
Status: COMPLETED | OUTPATIENT
Start: 2023-04-22 | End: 2023-04-22

## 2023-04-21 RX ORDER — IBUPROFEN 600 MG/1
600 TABLET ORAL ONCE
Status: COMPLETED | OUTPATIENT
Start: 2023-04-22 | End: 2023-04-22

## 2023-04-21 RX ADMIN — HYDROCODONE BITARTRATE AND ACETAMINOPHEN 1 TABLET: 5; 325 TABLET ORAL at 22:38

## 2023-04-22 VITALS
WEIGHT: 190 LBS | SYSTOLIC BLOOD PRESSURE: 135 MMHG | BODY MASS INDEX: 27.2 KG/M2 | HEIGHT: 70 IN | DIASTOLIC BLOOD PRESSURE: 81 MMHG | RESPIRATION RATE: 20 BRPM | OXYGEN SATURATION: 98 % | TEMPERATURE: 98 F | HEART RATE: 68 BPM

## 2023-04-22 RX ORDER — HYDROCODONE BITARTRATE AND ACETAMINOPHEN 5; 325 MG/1; MG/1
1 TABLET ORAL EVERY 8 HOURS PRN
Qty: 9 TABLET | Refills: 0 | Status: SHIPPED | OUTPATIENT
Start: 2023-04-22

## 2023-04-22 RX ADMIN — HYDROCODONE BITARTRATE AND ACETAMINOPHEN 1 TABLET: 5; 325 TABLET ORAL at 00:14

## 2023-04-22 RX ADMIN — IBUPROFEN 600 MG: 600 TABLET ORAL at 00:14

## 2023-04-27 NOTE — ED PROVIDER NOTES
History of Present Illness:    Chief Complaint: Left hip pain  History of Present Illness: 77-year-old male presents for evaluation of left hip pain.  Ongoing issues over the last week.  Atraumatic.  States he has not been able to sleep tonight due to the pain.  Has been referred to orthopedist but has not seen them yet.  No imaging    Nurses Notes reviewed and agree, including vitals, allergies, social history and prior medical history.     REVIEW OF SYSTEMS: All systems reviewed and not pertinent unless noted.  Review of Systems      Positive for: Left hip pain    Negative for: Weakness numbness incontinence radicular pain    Past Medical History:   Diagnosis Date   • Arthritis     OA   • Basal cell carcinoma 02/2020    left ear   • Cancer     Skin cancer removed 3x    • Coronary artery disease     Probable Non obstructive    • Dyslipidemia    • Elevated prostate specific antigen (PSA)    • Essential hypertension, benign    • Full dentures     Instructed no adhesives the DOS   • GERD (gastroesophageal reflux disease)    • H/O benign prostatic hypertrophy    • H/O cardiovascular stress test 07/19/2019    Patient reported done by Dr Ojeda around 3-4 years ago and reported that all was wnl's    • High cholesterol    • History of bronchitis    • History of hemorrhoids    • History of idiopathic thrombocytopenic purpura    • History of malignant neoplasm of skin    • History of sleep apnea    • [place] (hard of hearing)     No use of hearing aids   • Hypertension    • Hypothyroidism    • JUWAN on CPAP     Patient instructed to bring mask and machine the DOS   • Rectal bleeding    • S/P right knee surgery 07/12/2017   • Seasonal allergies    • Tobacco abuse    • Wears glasses     Rx wears       Allergies:    Penicillins      Past Surgical History:   Procedure Laterality Date   • APPENDECTOMY     • BASAL CELL CARCINOMA EXCISION  2020    left ear   • COLONOSCOPY      Complete Colonoscopy   • COLONOSCOPY N/A 7/22/2019     "Procedure: COLONOSCOPY;  Surgeon: Lisa Rhodes MD;  Location: [facility] ENDOSCOPY;  Service: Gastroenterology   • HERNIA REPAIR      patient reported by Dr Banuelos - patient unsure extact location   • JOINT REPLACEMENT Left     partial knee replacement   • JOINT REPLACEMENT Right     partial knee replacement   • KNEE SURGERY      Left Knee   • MOUTH SURGERY      full mouth extraction   • OTHER SURGICAL HISTORY      Surgery Testis Orchiopexy around age 20   • OTHER SURGICAL HISTORY      Cyst removed from a finger - patient reported to this as a \"growth\" and is unsure laterality   • SKIN BIOPSY      Patient reported skin cancer removed 3x.  Nose, back and face   • URETEROSCOPY LASER LITHOTRIPSY WITH STENT INSERTION Left 11/15/2020    Procedure: URETEROSCOPY LASER LITHOTRIPSY WITH STENT INSERTION, Rertrograde;  Surgeon: Bobo Carter MD;  Location: [facility] OR;  Service: Urology;  Laterality: Left;         Social History     Socioeconomic History   • Marital status:    Tobacco Use   • Smoking status: Former     Packs/day: 2.00     Years: 15.00     Pack years: 30.00     Types: Cigarettes     Quit date: 1985     Years since quittin.9   • Smokeless tobacco: Former     Types: Chew     Quit date:    Vaping Use   • Vaping Use: Never used   Substance and Sexual Activity   • Alcohol use: No   • Drug use: No   • Sexual activity: Defer         Family History   Problem Relation Age of Onset   • Migraines Mother    • Mental illness Mother    • Heart disease Mother    • Alcohol abuse Father    • Cancer Father    • Lung cancer Father    • Brain cancer Sister    • Colon cancer Paternal Grandmother    • Prostate cancer Other    • Colon cancer Other        Objective  Physical Exam:  /81   Pulse 68   Temp 98 °F (36.7 °C) (Oral)   Resp 20   Ht 177.8 cm (70\")   Wt 86.2 kg (190 lb)   SpO2 98%   BMI 27.26 kg/m²      Physical Exam    CONSTITUTIONAL: Well developed, nontoxic 77-year-old male,  in mild to " moderate discomfort.  VITAL SIGNS: per nursing, reviewed and noted  SKIN: exposed skin with no rashes, ulcerations or petechiae  EYES: Grossly EOMI, no icterus  ENT: Normal voice.  Moist mucous membranes   RESPIRATORY:  No increased work of breathing. No retractions.   CARDIOVASCULAR:   Extremities pink and warm.  Good cap refill to extremities.   GI: Abdomen without distention   MUSCULOSKELETAL: Age-appropriate bulk and tone, moves all 4 extremities tenderness palpation to the greater trochanter left hip.  No skin changes.  No shortening, no internal or external rotation.  No pain with straight leg raising no lumbar tenderness.  NEUROLOGIC: Alert, oriented x 3. No gross deficits. GCS 15.   PSYCH: appropriate affect.  : no bladder tenderness or distention, no CVA tenderness    Procedures    ED Course:    Lab Results (last 24 hours)     ** No results found for the last 24 hours. **           No radiology results from the last 24 hrs     MDM         Medical Decision Making:    Initial impression of presenting illness: 77-year-old male presents for evaluation of left hip pain    DDX: includes but is not limited to: Bursitis strain sprain arthritis, pathologic fracture         Patient arrives oxygen saturations 94% on room air afebrile no tachycardia, blood pressure 131/79 with vitals interpreted by myself.     Pertinent features from physical exam: Left lateral hip tenderness palpation without evidence of weakness, normal sensation,.    Initial diagnostic plan: Plain films of the left hip, pain control    Results from initial plan were reviewed and interpreted by me revealing no acute findings per my interpretation of plain films left hip    Diagnostic information from other sources: Family member at the bedside    Interventions / Re-evaluation: Provided Norco, continue pain added ibuprofen    Results/clinical rationale were discussed with patient and family member    Consultations/Discussion of results with other  physicians: None    Disposition plan: Patient was discharged in home stable condition.  Counseled on supportive care, outpatient follow-up. Return precaution discussed.  Patient/family was understanding and agreeable with plan    -----  PDMP [place] reviewed.  Prescription [place].  Pharmacy closed.  Will provide 1 to go.    Final diagnoses:   Pain of left hip        Prateek Woods,   04/27/23 0559

## 2023-05-02 ENCOUNTER — HOSPITAL ENCOUNTER (OUTPATIENT)
Dept: PHYSICAL THERAPY | Facility: HOSPITAL | Age: 78
Setting detail: THERAPIES SERIES
Discharge: HOME | End: 2023-05-02
Payer: MEDICARE

## 2023-05-02 PROCEDURE — 97161 PT EVAL LOW COMPLEX 20 MIN: CPT

## 2023-05-02 PROCEDURE — 97110 THERAPEUTIC EXERCISES: CPT

## 2023-05-02 ASSESSMENT — PAIN DESCRIPTION - PAIN TYPE: TYPE: ACUTE PAIN

## 2023-05-02 ASSESSMENT — PAIN SCALES - GENERAL: PAINLEVEL_OUTOF10: 2

## 2023-05-02 ASSESSMENT — PAIN DESCRIPTION - LOCATION: LOCATION: BACK;HIP

## 2023-05-02 ASSESSMENT — PAIN DESCRIPTION - ORIENTATION: ORIENTATION: LEFT

## 2023-05-02 NOTE — PROGRESS NOTES
Physical Therapy: Initial Evaluation    Patient: Ke Munroe (10 y.o. male)   Examination Date:   Plan of Care Certification Period: 2023 to 23      :  1945 ;    Confirmed: Yes MRN: [id number]  CSN: [id number]   Insurance: Payor: MEDICARE / Plan: MEDICARE PART A AND B / Product Type: *No Product type* /   Insurance ID: [id number] - (Medicare) Secondary Insurance (if applicable): HU[employer]   Referring Physician: MD Katrina Mooney PA-C   PCP: Catherine Akbar MD Visits to Date/Visits Approved:   /      No Show/Cancelled Appts:   /       Medical Diagnosis: Left hip pain [M25.552]  Low back pain [M54.50] Left hip and low back pain  Treatment Diagnosis: L hip pain, low back pain     PERTINENT MEDICAL HISTORY   Patient Assessed for Rehabilitation Services: Yes       Medical History: Chart Reviewed: Yes   Past Medical History:   Diagnosis Date    Allergic rhinitis     Hyperlipidemia     Hypertension     Hypothyroidism     [facility]     Dr. Kye Hairston    Unspecified sleep apnea      Surgical History:   Past Surgical History:   Procedure Laterality Date    APPENDECTOMY      HERNIA REPAIR      KNEE SURGERY Left     UNDESCENDED TESTICLE EXPLORATION         Medications:   Current Outpatient Medications:     bisoprolol (ZEBETA) 5 MG tablet, Take 5 mg by mouth daily, Disp: , Rfl:     celecoxib (CELEBREX) 100 MG capsule, Take 200 mg by mouth 2 times daily, Disp: , Rfl:     levothyroxine (SYNTHROID) 50 MCG tablet, Take 1 tablet by mouth Daily. , Disp: 90 tablet, Rfl: 3    lisinopril (PRINIVIL;ZESTRIL) 10 MG tablet, Take 1 tablet by mouth daily. , Disp: 90 tablet, Rfl: 3    ezetimibe-simvastatin (VYTORIN) 10-20 MG per tablet, Take 1 tablet by mouth nightly., Disp: 90 tablet, Rfl: 3    finasteride (PROSCAR) 5 MG tablet, Take 1 tablet by mouth daily. , Disp: 90 tablet, Rfl: 3  Allergies: Pcn [penicillins]      SUBJECTIVE EXAMINATION

## 2023-05-04 ENCOUNTER — HOSPITAL ENCOUNTER (OUTPATIENT)
Dept: PHYSICAL THERAPY | Facility: HOSPITAL | Age: 78
Setting detail: THERAPIES SERIES
Discharge: HOME | End: 2023-05-04
Payer: MEDICARE

## 2023-05-04 PROCEDURE — 97140 MANUAL THERAPY 1/> REGIONS: CPT

## 2023-05-04 PROCEDURE — 97110 THERAPEUTIC EXERCISES: CPT

## 2023-05-04 NOTE — FLOWSHEET NOTE
Physical Therapy Daily Treatment Note   Date:  2023    TIme In:                      Time Out: 1107    Patient Name:  Candis Paniagua    :    MRN: [id number]    Restrictions/Precautions:    Pertinent Medical History:  Medical/Treatment Diagnosis Information:  Left hip pain [M25.552]  Low back pain [O88.05]     Insurance/Certification information:  Payor: MEDICARE / Plan: MEDICARE PART A AND B / Product Type: *No Product type* /   Physician Information:  Zhanna Washington,Luis  Plan of care signed (Y/N):    Visit# / total visits:     2 /    G-Code (if applicable):      Date / Visit # G-Code Applied:         Progress Note: []  Yes  [x]  No  Next due by: Visit #10       Pain level:   0/10  Subjective: Pt reports doing well after evaluation, no new complaints today. He has been working on his HEP. Objective:  Observation:   Test measurements:    Palpation:    Exercises:  Exercise Resistance/Repetitions Date performed   Nustep L5 x 8' 4   Standing hip abd / ext 3x10 B 4   Wall posture 5x10\" 4   Hip abd / add  BTB / ball 3x10 4   Supine active HS stretch 5x20\" B 4   LTR x30 4   Bridges  3x10 4   Piriformis / figure 4 stretch 5x20\" 4   SKTC 5x20\" 4   Clam shells 3x10 4                    Other Therapeutic Activities:      Manual Treatments:  STM lumbar spine / L glut med region, grade I-II PA in side lying position x 12'     Modalities:        Timed Code Treatment Minutes:  65      Total Treatment Minutes:  69    Treatment/Activity Tolerance:  [x] Patient tolerated treatment well [] Patient limited by fatigue  [] Patient limited by pain  [] Patient limited by other medical complications  [x] Other: Pt progressed through activity well with verbal cues given for proper exercise form. Pt had mild discomfort during manual therapy with gentle L pelvic distraction.      Pain after treatment:      0/10    Prognosis: [x] Good [] Fair  [] Poor    Patient Requires Follow-up: [x] Yes  []

## 2023-05-08 ENCOUNTER — APPOINTMENT (OUTPATIENT)
Dept: PHYSICAL THERAPY | Facility: HOSPITAL | Age: 78
End: 2023-05-08
Payer: MEDICARE

## 2023-05-09 ENCOUNTER — HOSPITAL ENCOUNTER (OUTPATIENT)
Dept: PHYSICAL THERAPY | Facility: HOSPITAL | Age: 78
Setting detail: THERAPIES SERIES
Discharge: HOME | End: 2023-05-09
Payer: MEDICARE

## 2023-05-09 PROCEDURE — 97140 MANUAL THERAPY 1/> REGIONS: CPT

## 2023-05-09 PROCEDURE — 97110 THERAPEUTIC EXERCISES: CPT

## 2023-05-09 NOTE — FLOWSHEET NOTE
Physical Therapy Daily Treatment Note   Date:  2023    TIme In:   9353                   Time Out: 4476    Patient Name:  Melinda Mahan    :  [birth year]  MRN: [id number]    Restrictions/Precautions:    Pertinent Medical History:  Medical/Treatment Diagnosis Information:  Left hip pain [M25.552]  Low back pain [F06.61]     Insurance/Certification information:  Payor: MEDICARE / Plan: MEDICARE PART A AND B / Product Type: *No Product type* /   Physician Information:  Ezekiel Yip,*  Plan of care signed (Y/N):    Visit# / total visits:     3 /    G-Code (if applicable):      Date / Visit # G-Code Applied:         Progress Note: []  Yes  [x]  No  Next due by: Visit #10       Pain level:   0/10    Subjective: Pt reports he has been walking a lot better and has been performing his HEP with only 2 exercises that are a little hard to perform. Objective:  Observation:   Test measurements:    Palpation:    Exercises:  Exercise Resistance/Repetitions Date performed   Nustep L5 x 8' 9   Standing hip abd / ext 3x10 B 9   Wall posture 5x10\" 9   Hip abd / add  BTB / ball 3x10 9   SLR 2X10 BLE  9   Supine active HS stretch 5x20\" B 9   LTR x30 9   Bridges  3x10 9   Piriformis / figure 4 stretch 5x20\" 9   SKTC 5x20\" 9   Clam shells 3x10 BTB 9                    Other Therapeutic Activities:      Manual Treatments:  STM lumbar spine / L glut med region, grade I-II PA in side lying position x 15'     Modalities:        Timed Code Treatment Minutes:  64      Total Treatment Minutes:  68    Treatment/Activity Tolerance:  [x] Patient tolerated treatment well [] Patient limited by fatigue  [] Patient limited by pain  [] Patient limited by other medical complications  [x] Other: Pt progressed through all exercises with min cues throughout for proper form and motor recruitment. Pt seems to have ambulated with improved GT mechanics post tx.  Pt had min discomfort during manual therapy with gentle L pelvic

## 2023-05-11 ENCOUNTER — APPOINTMENT (OUTPATIENT)
Dept: PHYSICAL THERAPY | Facility: HOSPITAL | Age: 78
End: 2023-05-11
Payer: MEDICARE

## 2023-05-12 ENCOUNTER — HOSPITAL ENCOUNTER (OUTPATIENT)
Dept: PHYSICAL THERAPY | Facility: HOSPITAL | Age: 78
Setting detail: THERAPIES SERIES
Discharge: HOME | End: 2023-05-12
Payer: MEDICARE

## 2023-05-12 PROCEDURE — 97150 GROUP THERAPEUTIC PROCEDURES: CPT

## 2023-05-12 PROCEDURE — 97140 MANUAL THERAPY 1/> REGIONS: CPT

## 2023-05-12 PROCEDURE — 97110 THERAPEUTIC EXERCISES: CPT

## 2023-05-12 NOTE — FLOWSHEET NOTE
Physical Therapy Daily Treatment Note   Date:  2023    TIme In: 1000                   Time Out: 1117    Patient Name:  Rosie Smith    :  1945  MRN: [id number]    Restrictions/Precautions:    Pertinent Medical History:  Medical/Treatment Diagnosis Information:  Left hip pain [M25.552]  Low back pain [N38.56]     Insurance/Certification information:  Payor: MEDICARE / Plan: MEDICARE PART A AND B / Product Type: *No Product type* /   Physician Information:  Gregory Acevedo,*  Plan of care signed (Y/N):    Visit# / total visits:     4 /    G-Code (if applicable):      Date / Visit # G-Code Applied:         Progress Note: []  Yes  [x]  No  Next due by: Visit #10       Pain level:   0/10    Subjective: Pt reports he has been doing a lot better with his hip     Objective:  Observation:   Test measurements:    Palpation:    Exercises:  Exercise Resistance/Repetitions Date performed   Nustep L5 x 8' 12   Standing hip abd / ext 3x10 B 12   Wall posture 5x10\" 12   Hip abd / add  BTB / ball 3x10 12   SLR 2X10 BLE  12   Supine active HS stretch 5x20\" B 12   LTR x30 12   Bridges  3x10 12   Piriformis / figure 4 stretch 5x20\" 12   SKTC 5x20\" 12   Clam shells 3x10 BTB 12                    Other Therapeutic Activities:      Manual Treatments:  STM lumbar spine / L glut med region, grade I-II PA in side lying position x 15'     Modalities:        Timed Code Treatment Minutes:  x75 1 group 30' 15 manual, 30 TE      Total Treatment Minutes:  77    Treatment/Activity Tolerance:  [x] Patient tolerated treatment well [] Patient limited by fatigue  [] Patient limited by pain  [] Patient limited by other medical complications  [x] Other: Pt progressed through all exercises with min/occa cues throughout for proper form. Little to no discomfort during manual therapy. No pain post tx.      Pain after treatment:      0/10    Prognosis: [x] Good [] Fair  [] Poor    Patient Requires Follow-up: [x] Yes  []

## 2023-05-16 ENCOUNTER — HOSPITAL ENCOUNTER (OUTPATIENT)
Dept: PHYSICAL THERAPY | Facility: HOSPITAL | Age: 78
Setting detail: THERAPIES SERIES
Discharge: HOME | End: 2023-05-16
Payer: MEDICARE

## 2023-05-16 PROCEDURE — 97140 MANUAL THERAPY 1/> REGIONS: CPT

## 2023-05-16 PROCEDURE — 97110 THERAPEUTIC EXERCISES: CPT

## 2023-05-16 NOTE — FLOWSHEET NOTE
Physical Therapy Daily Treatment Note   Date:  2023    TIme In:    5110                Time Out: [facility] 7715    Patient Name:  Марина Costa    :    MRN: [id number]    Restrictions/Precautions:    Pertinent Medical History:  Medical/Treatment Diagnosis Information:  Left hip pain [M25.552]  Low back pain [T59.13]     Insurance/Certification information:  Payor: MEDICARE / Plan: MEDICARE PART A AND B / Product Type: *No Product type* /   Physician Information:  Anselmo Walton,Luis  Plan of care signed (Y/N):    Visit# / total visits:     5 /    G-Code (if applicable):      Date / Visit # G-Code Applied:         Progress Note: []  Yes  [x]  No  Next due by: Visit #10       Pain level:   0/10    Subjective: Pt reports he may have over done it this weekend but he has got his back calmed down now. Objective:  Observation:   Test measurements:    Palpation:    Exercises:  Exercise Resistance/Repetitions Date performed   Nustep L5 x 8' 16   Standing hip abd / ext 3x10 B 16   Wall posture 5x10\" 16   Hip abd / add  BTB / ball 3x10 16   SLR 2X10 BLE  16   Supine active HS stretch 5x20\" B 16   LTR x30 16   Bridges  3x10 16   Piriformis / figure 4 stretch 5x20\" 16   SKTC 5x20\" 16   Clam shells 3x10 BTB 16                    Other Therapeutic Activities:      Manual Treatments:  STM lumbar spine / L glut med region, grade I-II PA in side lying position x 15'     Modalities:        Timed Code Treatment Minutes:  65    Total Treatment Minutes:  70    Treatment/Activity Tolerance:  [x] Patient tolerated treatment well [] Patient limited by fatigue  [] Patient limited by pain  [] Patient limited by other medical complications  [x] Other: Pt completed tx with no pain and no tenderness during manual tx.     Pain after treatment:      \"feels good\"/10    Prognosis: [x] Good [] Fair  [] Poor    Patient Requires Follow-up: [x] Yes  [] No    Plan:   [x] Continue per plan of care [] Alter current plan (see

## 2023-05-19 ENCOUNTER — HOSPITAL ENCOUNTER (OUTPATIENT)
Dept: PHYSICAL THERAPY | Facility: HOSPITAL | Age: 78
Setting detail: THERAPIES SERIES
Discharge: HOME | End: 2023-05-19
Payer: MEDICARE

## 2023-05-19 PROCEDURE — 97140 MANUAL THERAPY 1/> REGIONS: CPT

## 2023-05-19 PROCEDURE — 97110 THERAPEUTIC EXERCISES: CPT

## 2023-05-19 NOTE — FLOWSHEET NOTE
Physical Therapy Daily Treatment Note   Date:  2023    TIme In: 6808                Time Out: 1042    Patient Name:  Nenita Joya    :    MRN: [id number]    Restrictions/Precautions:    Pertinent Medical History:  Medical/Treatment Diagnosis Information:  Left hip pain [M25.552]  Low back pain [C29.94]     Insurance/Certification information:  Payor: MEDICARE / Plan: MEDICARE PART A AND B / Product Type: *No Product type* /   Physician Information:  Sarahi Sales,*  Plan of care signed (Y/N):    Visit# / total visits:     6 /    G-Code (if applicable):      Date / Visit # G-Code Applied:         Progress Note: []  Yes  [x]  No  Next due by: Visit #10       Pain level:   0/10    Subjective: Pt reports he feels pretty good today. Objective:  Observation:   Test measurements:    Palpation:    Exercises:  Exercise Resistance/Repetitions Date performed   Nustep L5 x 8' 19   Standing hip abd / ext 3x10 B 19   Wall posture 5x10\" 19   Hip abd / add  BTB / ball 3x10 19   SLR 2X10 BLE  19   Supine active HS stretch 5x20\" B 19   LTR x30 19   Bridges  3x10 19   Piriformis / figure 4 stretch 5x20\" 23   SKTC 5x20\" 19   Clam shells 3x10 BTB 19                    Other Therapeutic Activities:      Manual Treatments:  STM lumbar spine / L glut med region, grade I-II PA in side lying position x 15'     Modalities:        Timed Code Treatment Minutes:  67    Total Treatment Minutes:  73    Treatment/Activity Tolerance:  [x] Patient tolerated treatment well [] Patient limited by fatigue  [] Patient limited by pain  [] Patient limited by other medical complications  [x] Other: Pt progressed well with exercises throughout, Min cues to decreased muscle substitution. Pt completed tx with no pain and no tenderness during manual tx.     Pain after treatment:      \"feels good\"/10    Prognosis: [x] Good [] Fair  [] Poor    Patient Requires Follow-up: [x] Yes  [] No    Plan:   [x] Continue per plan of

## 2023-05-22 ENCOUNTER — HOSPITAL ENCOUNTER (OUTPATIENT)
Dept: PHYSICAL THERAPY | Facility: HOSPITAL | Age: 78
Setting detail: THERAPIES SERIES
Discharge: HOME | End: 2023-05-22
Payer: MEDICARE

## 2023-05-22 PROCEDURE — 97140 MANUAL THERAPY 1/> REGIONS: CPT

## 2023-05-22 PROCEDURE — 97110 THERAPEUTIC EXERCISES: CPT

## 2023-05-22 NOTE — FLOWSHEET NOTE
Physical Therapy Daily Treatment Note   Date:  2023    TIme In:     665             Time Out: 622    Patient Name:  Chris Pastrana    :  [birth year]  MRN: [id number]    Restrictions/Precautions:    Pertinent Medical History:  Medical/Treatment Diagnosis Information:  Left hip pain [M25.552]  Low back pain [Q90.94]     Insurance/Certification information:  Payor: MEDICARE / Plan: MEDICARE PART A AND B / Product Type: *No Product type* /   Physician Information:  Abbey Caller,*  Plan of care signed (Y/N):    Visit# / total visits:     7 /    G-Code (if applicable):      Date / Visit # G-Code Applied:         Progress Note: []  Yes  [x]  No  Next due by: Visit #10       Pain level:   0/10    Subjective: Pt reports his hip is doing pretty well but he stretches it out a lot and he can tell it really helps. Objective:  Observation:   Test measurements:    Palpation:    Exercises:  Exercise Resistance/Repetitions Date performed   Nustep L5 x 8' 22   Standing hip abd / ext 3x10 B 22   Wall posture 5x10\" 22   Hip abd / add  BTB / ball 3x10 22   SLR 2X10 BLE  22   Supine active HS stretch 5x20\" B 22   LTR x30 22   Bridges  3x10 22   Piriformis / figure 4 stretch 5x20\" 25   SKTC 5x20\" 22   Clam shells 3x10 BTB 22                    Other Therapeutic Activities:      Manual Treatments:  STM lumbar spine / L glut med region, grade I-II PA in side lying position x 12'     Modalities:        Timed Code Treatment Minutes:  65    Total Treatment Minutes:  67    Treatment/Activity Tolerance:  [x] Patient tolerated treatment well [] Patient limited by fatigue  [] Patient limited by pain  [] Patient limited by other medical complications  [x] Other: Pt progressed well with no pain and minimal glute tightness.     Pain after treatment:      0/10    Prognosis: [x] Good [] Fair  [] Poor    Patient Requires Follow-up: [x] Yes  [] No    Plan:   [x] Continue per plan of care [] Alter current plan (see

## 2023-05-25 ENCOUNTER — APPOINTMENT (OUTPATIENT)
Dept: PHYSICAL THERAPY | Facility: HOSPITAL | Age: 78
End: 2023-05-25
Payer: MEDICARE

## 2023-05-26 ENCOUNTER — HOSPITAL ENCOUNTER (OUTPATIENT)
Dept: PHYSICAL THERAPY | Facility: HOSPITAL | Age: 78
Setting detail: THERAPIES SERIES
Discharge: HOME | End: 2023-05-26
Payer: MEDICARE

## 2023-05-26 PROCEDURE — 97140 MANUAL THERAPY 1/> REGIONS: CPT

## 2023-05-26 PROCEDURE — 97110 THERAPEUTIC EXERCISES: CPT

## 2023-05-26 NOTE — FLOWSHEET NOTE
Physical Therapy Daily Treatment Note   Date:  2023    TIme In: 3785             Time Out: 1100    Patient Name:  Karmen Farmer    :  1945  MRN: [id number]    Restrictions/Precautions:    Pertinent Medical History:  Medical/Treatment Diagnosis Information:  Left hip pain [M25.552]  Low back pain [K32.40]     Insurance/Certification information:  Payor: MEDICARE / Plan: MEDICARE PART A AND B / Product Type: *No Product type* /   Physician Information:  Ingrid Pal,*  Plan of care signed (Y/N):    Visit# / total visits:     8 /    G-Code (if applicable):      Date / Visit # G-Code Applied:         Progress Note: []  Yes  [x]  No  Next due by: Visit #10       Pain level:   0/10    Subjective: Pt reports his hip is doing well     Objective:  Observation:   Test measurements:    Palpation:    Exercises:  Exercise Resistance/Repetitions Date performed   Nustep L5 x 8' 26   Standing hip abd / ext 3x10 B 26 2x10 OTB next time   Wall posture 5x10\" 26   Hip abd / add  BTB / ball 3x10 26   SLR 2X10 BLE  26 #2 next time   LTR x30 26   Bridges  3x10 26   SKTC 5x20\" B 26   Piriformis / figure 4 stretch 5x20\" B 26   Supine active HS stretch 1x20 B 26   Clam shells 3x10 BTB B 26                    Other Therapeutic Activities:      Manual Treatments:  STM lumbar spine / L glut med region, grade I-II PA in side lying position x 12'     Modalities:        Timed Code Treatment Minutes:  54    Total Treatment Minutes:  65    Treatment/Activity Tolerance:  [x] Patient tolerated treatment well [] Patient limited by fatigue  [] Patient limited by pain  [] Patient limited by other medical complications  [x] Other: Pt progressed well with exercises, min cues for proper hip positioning for increased glute med activation.     Pain after treatment:      0/10    Prognosis: [x] Good [] Fair  [] Poor    Patient Requires Follow-up: [x] Yes  [] No    Plan:   [x] Continue per plan of care [] Alter current plan (see

## 2023-05-30 ENCOUNTER — HOSPITAL ENCOUNTER (OUTPATIENT)
Dept: PHYSICAL THERAPY | Facility: HOSPITAL | Age: 78
Setting detail: THERAPIES SERIES
Discharge: HOME | End: 2023-05-30
Payer: MEDICARE

## 2023-05-30 PROCEDURE — 97140 MANUAL THERAPY 1/> REGIONS: CPT

## 2023-05-30 PROCEDURE — 97110 THERAPEUTIC EXERCISES: CPT

## 2023-05-30 NOTE — FLOWSHEET NOTE
Physical Therapy Daily Treatment Note   Date:  2023    TIme In: 1006             Time Out: [address]    Patient Name:  Rachel Diaz    :  1945  MRN: [id number]    Restrictions/Precautions:    Pertinent Medical History:  Medical/Treatment Diagnosis Information:  Left hip pain [M25.552]  Low back pain [A47.10]     Insurance/Certification information:  Payor: MEDICARE / Plan: MEDICARE PART A AND B / Product Type: *No Product type* /   Physician Information:  Jose C Blue,*  Plan of care signed (Y/N):    Visit# / total visits:     9 /    G-Code (if applicable):      Date / Visit # G-Code Applied:         Progress Note: []  Yes  [x]  No  Next due by: Visit #10       Pain level: 1/10    Subjective: Pt reports his hip is doing well     Objective:  Observation:   Test measurements:    Palpation:    Exercises:  Exercise Resistance/Repetitions Date performed   Nustep L5 x 8' 30   Standing hip abd / ext 3x10 B OTB  30   Wall posture 5x10\" 30   Hip abd / add  BTB / ball 3x10 30   SLR 2X10 BLE #2 next time 30   LTR x30 30   Bridges  3x10 30   SKTC 5x20\" B 30   Piriformis / figure 4 stretch 5x20\" B 30   Supine active HS stretch 1x20 B 30   Clam shells 3x10 BTB B 30                    Other Therapeutic Activities:      Manual Treatments:  STM lumbar spine / L glut med region, grade I-II PA in side lying position, Inferior hip mobs x 15'     Modalities:        Timed Code Treatment Minutes:  60    Total Treatment Minutes:  69    Treatment/Activity Tolerance:  [x] Patient tolerated treatment well [] Patient limited by fatigue  [] Patient limited by pain  [] Patient limited by other medical complications  [x] Other: Pt tolerated increased resistance with exercises, pt continues to require min cues for decreasing compensation for proper hip positioning. Decreased pain post tx.     Pain after treatment:      0/10    Prognosis: [x] Good [] Fair  [] Poor    Patient Requires Follow-up: [x] Yes  []

## 2023-06-02 ENCOUNTER — TELEPHONE (OUTPATIENT)
Dept: INTERNAL MEDICINE | Facility: CLINIC | Age: 78
End: 2023-06-02

## 2023-06-02 ENCOUNTER — HOSPITAL ENCOUNTER (OUTPATIENT)
Dept: PHYSICAL THERAPY | Facility: HOSPITAL | Age: 78
Setting detail: THERAPIES SERIES
Discharge: HOME | End: 2023-06-02
Payer: MEDICARE

## 2023-06-02 PROCEDURE — 97140 MANUAL THERAPY 1/> REGIONS: CPT

## 2023-06-02 PROCEDURE — 97110 THERAPEUTIC EXERCISES: CPT

## 2023-06-02 NOTE — FLOWSHEET NOTE
in [address]. Exercises adjusted for next session.      Pain after treatment:      0/10    Prognosis: [x] Good [] Fair  [] Poor    Patient Requires Follow-up: [x] Yes  [] No    Plan:   [x] Continue per plan of care [] Alter current plan (see comments)  [] Plan of care initiated [] Hold pending MD visit [] Discharge    Plan for Next Session:        Electronically signed by:  Luis Enrique López PT

## 2023-06-02 NOTE — TELEPHONE ENCOUNTER
Caller: [facility]    Relationship: Other    Best call back number: [phone]    What orders are you requesting (i.e. lab or imaging): ULTRASOUND GALLBLADDER RIGHT UPPER QUADRANT     In what timeframe would the patient need to come in: ASAP    Where will you receive your lab/imaging services: [facility]    Additional notes: FAX#- [phone]- PATIENT HAS AN APPOINTMENT Monday 6/5/23           [employer] Filler  Volume In Cc: 0

## 2023-06-05 ENCOUNTER — HOSPITAL ENCOUNTER (OUTPATIENT)
Dept: PHYSICAL THERAPY | Facility: HOSPITAL | Age: 78
Setting detail: THERAPIES SERIES
Discharge: HOME | End: 2023-06-05
Payer: MEDICARE

## 2023-06-05 ENCOUNTER — HOSPITAL ENCOUNTER (OUTPATIENT)
Dept: ULTRASOUND IMAGING | Facility: HOSPITAL | Age: 78
Discharge: HOME | End: 2023-06-05
Payer: MEDICARE

## 2023-06-05 DIAGNOSIS — R17 ELEVATED BILIRUBIN: ICD-10-CM

## 2023-06-05 DIAGNOSIS — K80.21 CALCULUS OF GALLBLADDER WITH BILIARY OBSTRUCTION BUT WITHOUT CHOLECYSTITIS: ICD-10-CM

## 2023-06-05 PROCEDURE — 97140 MANUAL THERAPY 1/> REGIONS: CPT

## 2023-06-05 PROCEDURE — 97110 THERAPEUTIC EXERCISES: CPT

## 2023-06-05 PROCEDURE — 76705 ECHO EXAM OF ABDOMEN: CPT

## 2023-06-05 RX ORDER — EZETIMIBE 10 MG/1
TABLET ORAL
Qty: 90 TABLET | Refills: 3 | Status: SHIPPED | OUTPATIENT
Start: 2023-06-05

## 2023-06-05 RX ORDER — SIMVASTATIN 20 MG
TABLET ORAL
Qty: 90 TABLET | Refills: 3 | Status: SHIPPED | OUTPATIENT
Start: 2023-06-05

## 2023-06-05 NOTE — FLOWSHEET NOTE
Physical Therapy Daily Treatment Note  Date:  2023    TIme In: 6111             Time Out: 2319    Patient Name:  Alfredito Camarillo    :    MRN: [id number]    Restrictions/Precautions:    Pertinent Medical History:  Medical/Treatment Diagnosis Information:  Left hip pain [M25.552]  Low back pain [M54.50]  R hip pain [M25. 531]     Insurance/Certification information:  Payor: MEDICARE / Plan: MEDICARE PART A AND B / Product Type: *No Product type* /   Physician Information:  Alaina Christina,*  Plan of care signed (Y/N):    Visit# / total visits:     11 /    G-Code (if applicable):      Date / Visit # G-Code Applied:         Progress Note: []  Yes  [x]  No  Next due by: 23      Pain level: 110    Subjective: Pt reports doing well today. No new reports since last visit except that most of his discomfort is still at his posterior R hip and lumbar region. Objective:  Observation:   Test measurements:    Left Strength   L Hip Flexion: 4+/5  L Hip ABduction: 4-/5  L Hip Internal Rotation: 4+/5  L Hip External Rotation: 4/5  L Knee Flexion: 4+/5  L Knee Extension: 4+/5   Back Index: 32%   LEFS: 26/80  daily activities with L hip pain?  0/10   lumbar flexion of 0-40 deg, ext of 0-15 deg  Palpation:    Exercises:  Exercise Resistance/Repetitions Date performed   Nustep L5 x 8' 5   Standing hip abd / ext 3x10 B 2# 5   Wall posture 5x10\" 5   Hip abd / add  BlckTB / ball 3x10 5   SLR 2X10 BLE #2 5   LTR x30 5   Bridges  3x10 5   SKTC 5x20\" B 5   Piriformis / figure 4 stretch 5x20\" B 5   Supine active HS stretch 1x20 B 5   Clam shells 3x10 BTB B 5   B HS stretch  3x30\" 5   B ant hip stretch on table 3x30\" 5   Box lifts  X5, box only 5     Other Therapeutic Activities:  T    Manual Treatments:  STM lumbar spine / Rolling to R glute/ Ishial tuberosity region, grade I-II PA in side lying position, Inferior hip mobs x 15'     Modalities:        Timed Code Treatment Minutes:  80    Total

## 2023-06-06 RX ORDER — SIMVASTATIN 20 MG
TABLET ORAL
Qty: 90 TABLET | Refills: 3 | OUTPATIENT
Start: 2023-06-06

## 2023-06-08 ENCOUNTER — HOSPITAL ENCOUNTER (OUTPATIENT)
Dept: PHYSICAL THERAPY | Facility: HOSPITAL | Age: 78
Setting detail: THERAPIES SERIES
Discharge: HOME | End: 2023-06-08
Payer: MEDICARE

## 2023-06-08 PROCEDURE — 97110 THERAPEUTIC EXERCISES: CPT

## 2023-06-08 PROCEDURE — 97140 MANUAL THERAPY 1/> REGIONS: CPT

## 2023-06-08 NOTE — FLOWSHEET NOTE
Physical Therapy Daily Treatment Note  Date:  2023    TIme In:  930             Time Out: [facility]    Patient Name:  Arron Roberts    :  [birth year]  MRN: [id number]    Restrictions/Precautions:    Pertinent Medical History:  Medical/Treatment Diagnosis Information:  Left hip pain [M25.552]  Low back pain [M54.50]  R hip pain [M25. 375]     Insurance/Certification information:  Payor: MEDICARE / Plan: MEDICARE PART A AND B / Product Type: *No Product type* /   Physician Information:  Arthuro Endo,*  Plan of care signed (Y/N):    Visit# / total visits:     12 /    G-Code (if applicable):      Date / Visit # G-Code Applied:         Progress Note: []  Yes  [x]  No  Next due by: 23      Pain level: 0/10    Subjective: Pt reports his left side is doing well but he still has some discomfort in his right side        Objective:  Observation:   Test measurements:    Left Strength   L Hip Flexion: 4+/5  L Hip ABduction: 4-/5  L Hip Internal Rotation: 4+/5  L Hip External Rotation: 4/5  L Knee Flexion: 4+/5  L Knee Extension: 4+/5   Back Index: 32%   LEFS: 26/80  daily activities with L hip pain?  0/10   lumbar flexion of 0-40 deg, ext of 0-15 deg  Palpation:    Exercises:  Exercise Resistance/Repetitions Date performed   Nustep L5 x 8' 8   Standing hip abd / ext 3x10 B 2# 8   Wall posture 5x10\" 8   Hip abd / add  BlckTB / ball 3x10 8   SLR 2X10 BLE #2 8   LTR x30 8   Bridges  3x10 8   SKTC 5x20\" B 8   Piriformis / figure 4 stretch 5x20\" B 8   Supine active HS stretch 1x20 B 8   Clam shells 3x10 BTB B 8   B HS stretch  3x30\" 8   B ant hip stretch on table 3x30\" 8   Box lifts  X5, box only 8     Other Therapeutic Activities:      Manual Treatments:  STM lumbar spine / Rolling to R glute/ Ishial tuberosity region, grade I-II PA in side lying position, Inferior hip mobs x 15'     Modalities:        Timed Code Treatment Minutes:  70    Total Treatment Minutes:  78    GOALS   Patient Goal(s):    Short Term

## 2023-06-14 RX ORDER — EZETIMIBE 10 MG/1
TABLET ORAL
Qty: 90 TABLET | Refills: 3 | Status: SHIPPED | OUTPATIENT
Start: 2023-06-14

## 2023-06-14 NOTE — TELEPHONE ENCOUNTER
Rx Refill Note  Requested Prescriptions     Pending Prescriptions Disp Refills    ezetimibe (ZETIA) 10 MG tablet [Pharmacy Med Name: EZETIMIBE 10 MG TABLET] 90 tablet 3     Sig: TAKE ONE TABLET BY MOUTH DAILY      Last office visit with prescribing clinician: 4/19/2023   Last telemedicine visit with prescribing clinician: Visit date not found   Next office visit with prescribing clinician: 10/19/2023                         Would you like a call back once the refill request has been completed: [] Yes [] No    If the office needs to give you a call back, can they leave a voicemail: [] Yes [] No    Nenita Nelson MA  06/14/23, 09:08 EDT

## 2023-06-20 ENCOUNTER — HOSPITAL ENCOUNTER (OUTPATIENT)
Dept: PHYSICAL THERAPY | Facility: HOSPITAL | Age: 78
Setting detail: THERAPIES SERIES
Discharge: HOME | End: 2023-06-20
Payer: MEDICARE

## 2023-06-20 PROCEDURE — 97140 MANUAL THERAPY 1/> REGIONS: CPT

## 2023-06-20 PROCEDURE — 97150 GROUP THERAPEUTIC PROCEDURES: CPT

## 2023-06-20 PROCEDURE — 97110 THERAPEUTIC EXERCISES: CPT

## 2023-06-20 NOTE — FLOWSHEET NOTE
Physical Therapy Daily Treatment Note  Date:  2023    TIme In: 0900             Time Out: [place]    Patient Name:  Chester Gosselin    :  [birth year]  MRN: [id number]    Restrictions/Precautions:    Pertinent Medical History:  Medical/Treatment Diagnosis Information:  Left hip pain [M25.552]  Low back pain [M54.50]  R hip pain [M25. 650]     Insurance/Certification information:  Payor: MEDICARE / Plan: MEDICARE PART A AND B / Product Type: *No Product type* /   Physician Information:  Marlyn Estrada,*  Plan of care signed (Y/N):    Visit# / total visits:     13 /    G-Code (if applicable):      Date / Visit # G-Code Applied:         Progress Note: []  Yes  [x]  No  Next due by: 23      Pain level: 1/10    Subjective: Pt states he feels like his pain has moved back towards the L side        Objective:  Observation:   Test measurements:    Left Strength   L Hip Flexion: 4+/5  L Hip ABduction: 4-/5  L Hip Internal Rotation: 4+/5  L Hip External Rotation: 4/5  L Knee Flexion: 4+/5  L Knee Extension: 4+/5   Back Index: 32%   LEFS: 26/80  daily activities with L hip pain?  0/10   lumbar flexion of 0-40 deg, ext of 0-15 deg  Palpation:    Exercises:  Exercise Resistance/Repetitions Date performed   Nustep L7 x 6' 20   Standing hip abd / ext 3x10 B 2# 20   Wall posture 5x10\" 20   Box lifts  X5, box only x5 10lbs  20        Hip abd / add  BlckTB / ball 3x10 20   SLR 2X10 BLE #3 20   LTR x30 20   Bridges  3x10 20   SKTC 2x20\" B 20   Piriformis / figure 4 stretch 2x30\" B 20   Supine active HS stretch 1x20 B 20   Clam shells 3x10 Blck TB B 20   B HS stretch  2x20\" 20   B ant hip stretch off table with strap 2x20\" 20          Other Therapeutic Activities:      Manual Treatments:  STM lumbar spine / Rolling to R glute/ Ishial tuberosity region, grade I-II PA in side lying position, Inferior hip mobs x 15'     Modalities:   x12'      Timed Code Treatment Minutes:  x70' 19' group, 2TE, 1 manual.     Total

## 2023-06-23 ENCOUNTER — HOSPITAL ENCOUNTER (OUTPATIENT)
Dept: PHYSICAL THERAPY | Facility: HOSPITAL | Age: 78
Setting detail: THERAPIES SERIES
Discharge: HOME | End: 2023-06-23
Payer: MEDICARE

## 2023-06-23 PROCEDURE — 97140 MANUAL THERAPY 1/> REGIONS: CPT

## 2023-06-23 PROCEDURE — 97110 THERAPEUTIC EXERCISES: CPT

## 2023-06-23 PROCEDURE — 97150 GROUP THERAPEUTIC PROCEDURES: CPT

## 2023-06-23 NOTE — FLOWSHEET NOTE
Physical Therapy Daily Treatment Note  Date:  2023    TIme In: 0930             Time Out: 1034    Patient Name:  Kellie Trevino    :    MRN: [id number]    Restrictions/Precautions:    Pertinent Medical History:  Medical/Treatment Diagnosis Information:  Left hip pain [M25.552]  Low back pain [M54.50]  R hip pain [M25. 403]     Insurance/Certification information:  Payor: MEDICARE / Plan: MEDICARE PART A AND B / Product Type: *No Product type* /   Physician Information:  Dorothe Score,*  Plan of care signed (Y/N):    Visit# / total visits:     12 /    G-Code (if applicable):      Date / Visit # G-Code Applied:         Progress Note: []  Yes  [x]  No  Next due by: 23      Pain level: 1/10    Subjective: Pt states he feels his pain still bothers him on his L side        Objective:  Observation:   Test measurements:    Left Strength   L Hip Flexion: 4+/5  L Hip ABduction: 4-/5  L Hip Internal Rotation: 4+/5  L Hip External Rotation: 4/5  L Knee Flexion: 4+/5  L Knee Extension: 4+/5   Back Index: 32%   LEFS: 26/80  daily activities with L hip pain?  0/10   lumbar flexion of 0-40 deg, ext of 0-15 deg  Palpation:    Exercises:  Exercise Resistance/Repetitions Date performed   Nustep L7 x 6' 23   Standing hip abd / ext 3x10 B 2# 23   Wall posture 5x10\" 23   Box lifts  X5, box only x5 10lbs  23        Hip abd / add  BlckTB / ball 3x10 23   SLR 2X10 BLE #3 23   LTR x30 23   Bridges  3x10 23   SKTC 2x20\" B 23   Piriformis / figure 4 stretch 2x30\" B 23   Supine active HS stretch 1x20 B 23   Clam shells 3x10 Blck TB B 23   B HS stretch  2x20\" 23   B ant hip stretch off table with strap 2x20\" 23          Other Therapeutic Activities:      Manual Treatments:  STM lumbar spine / Rolling to R glute/ Ishial tuberosity region, grade I-II PA in side lying position, Inferior hip mobs x 15'     Modalities:  MH (not performed)      Timed Code Treatment Minutes:  18mins group, 31mins TE, 15min

## 2023-06-27 ENCOUNTER — HOSPITAL ENCOUNTER (OUTPATIENT)
Dept: PHYSICAL THERAPY | Facility: HOSPITAL | Age: 78
Setting detail: THERAPIES SERIES
Discharge: HOME | End: 2023-06-27
Payer: MEDICARE

## 2023-06-27 PROCEDURE — 97110 THERAPEUTIC EXERCISES: CPT

## 2023-06-27 PROCEDURE — 97140 MANUAL THERAPY 1/> REGIONS: CPT

## 2023-06-30 ENCOUNTER — HOSPITAL ENCOUNTER (OUTPATIENT)
Dept: PHYSICAL THERAPY | Facility: HOSPITAL | Age: 78
Setting detail: THERAPIES SERIES
Discharge: HOME | End: 2023-06-30
Payer: MEDICARE

## 2023-06-30 PROCEDURE — 97110 THERAPEUTIC EXERCISES: CPT

## 2023-07-03 ENCOUNTER — HOSPITAL ENCOUNTER (OUTPATIENT)
Dept: PHYSICAL THERAPY | Facility: HOSPITAL | Age: 78
Setting detail: THERAPIES SERIES
Discharge: HOME | End: 2023-07-03
Payer: MEDICARE

## 2023-07-03 PROCEDURE — 97140 MANUAL THERAPY 1/> REGIONS: CPT

## 2023-07-03 PROCEDURE — 97110 THERAPEUTIC EXERCISES: CPT

## 2023-07-03 PROCEDURE — 97150 GROUP THERAPEUTIC PROCEDURES: CPT

## 2023-07-03 NOTE — FLOWSHEET NOTE
Physical Therapy Daily Treatment Note / Re-Assessment   Date:  7/3/2023    TIme In: 09             Time Out: Victor Manuel    Patient Name:  Keya Garcia    :    MRN: [id number]    Restrictions/Precautions:    Pertinent Medical History:  Medical/Treatment Diagnosis Information:  Left hip pain [M25.552]  Low back pain [M54.50]  R hip pain [M25. 798]     Insurance/Certification information:  Payor: Sixto Banerjee / Plan: MEDICARE PART A AND B / Product Type: *No Product type* /   Physician Information:  Isael Mott,*  Plan of care signed (Y/N):    Visit# / total visits:     23 /    G-Code (if applicable):      Date / Visit # G-Code Applied:         Progress Note: [x]  Yes  []  No  Next due by: 8/3/23      Pain level: 0/10    Subjective: Pt reports he can see improvement in his LE strength and overall function. He notes his hip pain alternates between R and L hip depending on the activity he has done at home. He is pleased with his progress so far.        Objective:  Observation:   Test measurements:  Back Index: 26%  LEFS: 56/80  Left Strength   L Hip Flexion: 4+/5  L Hip ABduction: 4-/5  L Hip Internal Rotation: 4+/5  L Hip External Rotation: 4/5  L Knee Flexion: 4+/5  L Knee Extension: 4+/5  Lumbar AROM: flexion 0-30 deg, ext 0-20 deg  Palpation:    Exercises:  Exercise Resistance/Repetitions Date performed   Nustep L8 x 6' 3   Standing hip abd / ext 3x10 B #3 3   Wall posture 5x10\" 3   Box lifts  5lbs x5 20lbs x5  3        Hip abd / add  BlckTB / ball 3x10 3   SLR 2X10 BLE #3 3   LTR x30 3   Bridges  3x10 3   SKTC 2x20\" B 3   Piriformis / figure 4 stretch 2x30\" B 3   Supine active HS stretch 1x20 B 3   Clam shells 3x10 Blck TB B 3   B HS stretch  2x20\" 3   B ant hip stretch off table with strap 2x20\" 3          Other Therapeutic Activities:      Manual Treatments:  STM L hip x 15'  Performed by GRACIELA Hernandez    Modalities:  MH x12' - not performed today       Timed Code Treatment Minutes:

## 2023-07-07 ENCOUNTER — APPOINTMENT (OUTPATIENT)
Dept: PHYSICAL THERAPY | Facility: HOSPITAL | Age: 78
End: 2023-07-07
Payer: MEDICARE

## 2023-07-11 ENCOUNTER — HOSPITAL ENCOUNTER (OUTPATIENT)
Dept: PHYSICAL THERAPY | Facility: HOSPITAL | Age: 78
Setting detail: THERAPIES SERIES
Discharge: HOME | End: 2023-07-11
Payer: MEDICARE

## 2023-07-11 PROCEDURE — 97110 THERAPEUTIC EXERCISES: CPT

## 2023-07-11 PROCEDURE — 97140 MANUAL THERAPY 1/> REGIONS: CPT

## 2023-07-11 NOTE — FLOWSHEET NOTE
Physical Therapy Daily Treatment Note   Date:  2023    TIme In: 926             Time Out: [address]    Patient Name:  Jameson Bailey    :    MRN: [id number]    Restrictions/Precautions:    Pertinent Medical History:  Medical/Treatment Diagnosis Information:  Left hip pain [M25.552]  Low back pain [M54.50]  R hip pain [M25. 033]     Insurance/Certification information:  Payor: Eliceo Shabazz / Plan: MEDICARE PART A AND B / Product Type: *No Product type* /   Physician Information:  Ivonne Paniagua,Luis  Plan of care signed (Y/N):    Visit# / total visits:     21 /    G-Code (if applicable):      Date / Visit # G-Code Applied:         Progress Note: []  Yes  [x]  No  Next due by: 8/3/23      Pain level:   0/10    Subjective: Pt reports most of his pain is on the right side today in his low back against his spine. Objective:  Observation:   Test measurements:  Back Index: 26%  LEFS: 56/80  Left Strength   L Hip Flexion: 4+/5  L Hip ABduction: 4-/5  L Hip Internal Rotation: 4+/5  L Hip External Rotation: 4/5  L Knee Flexion: 4+/5  L Knee Extension: 4+/5  Lumbar AROM: flexion 0-30 deg, ext 0-20 deg  Palpation:    Exercises:  Exercise Resistance/Repetitions Date performed   Nustep L8 x 6' 11   Standing hip abd / ext 3x10 B #3 11   Wall posture 5x10\" 11   Box lifts  5lbs x5 20lbs x5  11        Hip abd / add  BlckTB / ball 3x10 11   SLR 2X10 BLE #3 11   LTR x30 11   Bridges  3x10 11   SKTC 2x20\" B 11   Piriformis / figure 4 stretch 2x30\" B 11   Supine active HS stretch 1x20 B 11   Clam shells 3x10 Blck TB B 11   B HS stretch  2x20\" 11   B ant hip stretch off table with strap 2x20\" 11          Other Therapeutic Activities:      Manual Treatments:  STM R hip x 15'      Modalities:  MH x12' - not performed today. Timed Code Treatment Minutes: 60    Total Treatment Minutes: 70    GOALS   Patient Goal(s):    Short Term Goals Completed by 3 weeks Goal Status   Pt to be I with HEP.  MET   Pt to perform

## 2023-07-13 NOTE — PROGRESS NOTES
[facility] [facility]  eMERGENCY dEPARTMENT eNCOUnter      Pt Name: Evan Ambriz  MRN: [id number]  [address] [facility] [address] 1996  Date of evaluation: 7/12/2023  Provider: Aditi Ribeiro MD  PCP: No primary care provider on file. CHIEF COMPLAINT       Chief Complaint   Patient presents with    Eye Injury     Got a soap solution in OD 6 hrs ago        HISTORY OFPRESENT ILLNESS   (Location/Symptom, Timing/Onset, Context/Setting, Quality, Duration, Modifying Factors,Severity)  Note limiting factors. Evan Ambriz is a 32 y.o. male [address] [employer] and was washing dishes and got some soap solution in his right eye approximately 6 hours ago the name of the item's Lyndol [place] and its in all Temp detergent I discussed the case with poison control and they said as a potential for a pH of 14 the patient did irrigate his right eye which is where he got the soap he is not complaining of any pain or any loss of vision just some minor swelling apparently it contains potassium hydroxide    Nursing Notes were all reviewed and agreed with or any disagreements were addressed  in the HPI. REVIEW OF SYSTEMS    (2-9 systems for level 4, 10 or more for level 5)     Review of Systems    Positives and Pertinent negatives as per HPI. Except as noted above in the ROS, all other systems were reviewed andnegative. PASTMEDICAL HISTORY   History reviewed. No pertinent past medical history. SURGICAL HISTORY     History reviewed. No pertinent surgical history. CURRENT MEDICATIONS       Previous Medications    NAPROXEN (NAPROSYN) 500 MG TABLET    Take 1 tablet by mouth 2 times daily for 20 doses       ALLERGIES     Patient has no known allergies. FAMILY HISTORY     History reviewed. No pertinent family history.        SOCIAL HISTORY       Social History     Socioeconomic History    Marital status: Single     Spouse name: None    Number of children: None    Years of education: None The ABCs of the Annual Wellness Visit  Subsequent Medicare Wellness Visit    Subjective      Deniz Dickson is a 77 y.o. male who presents for a Subsequent Medicare Wellness Visit.    The following portions of the patient's history were reviewed and   updated as appropriate: allergies, current medications, past family history, past medical history, past social history, past surgical history and problem list.    Compared to one year ago, the patient feels his physical   health is the same.    Compared to one year ago, the patient feels his mental   health is the same.    Recent Hospitalizations:  He was not admitted to the hospital during the last year.       Current Medical Providers:  Patient Care Team:  Zane Magallon MD as PCP - General (Internal Medicine)  Lisa Rhodes MD as Surgeon (General Surgery)    Outpatient Medications Prior to Visit   Medication Sig Dispense Refill   • aspirin 81 MG EC tablet Take 1 tablet by mouth Daily. 90 tablet 1   • bisoprolol (ZEBeta) 5 MG tablet TAKE 1/2 TABLET BY MOUTH DAILY 45 tablet 6   • celecoxib (CeleBREX) 200 MG capsule      • Cholecalciferol (VITAMIN D-3 PO) Take 1 tablet by mouth Daily.     • ezetimibe (ZETIA) 10 MG tablet Take 1 tablet by mouth Daily.     • famotidine (PEPCID) 40 MG tablet Take 1 tablet by mouth At Night As Needed.     • finasteride (PROSCAR) 5 MG tablet TAKE ONE TABLET BY MOUTH DAILY 90 tablet 3   • fluticasone (FLONASE) 50 MCG/ACT nasal spray 1 spray into the nostril(s) as directed by provider Daily. 16 g 5   • Glucosamine-Chondroitin (OSTEO BI-FLEX REGULAR STRENGTH PO) Take 1 tablet by mouth 2 (two) times a day.     • hydroxychloroquine (PLAQUENIL) 200 MG tablet Take 1 tablet by mouth Every 12 (Twelve) Hours.     • levothyroxine (SYNTHROID, LEVOTHROID) 137 MCG tablet TAKE ONE TABLET BY MOUTH DAILY 90 tablet 3   • montelukast (SINGULAIR) 10 MG tablet TAKE ONE TABLET BY MOUTH ONCE NIGHTLY 90 tablet 3   • Multiple Vitamins-Minerals (CENTRUM  SILVER ADULT 50+) tablet Take 1 tablet by mouth Daily.     • Omega-3 Fatty Acids (FISH OIL) 1200 MG capsule capsule Take 1 capsule by mouth 2 (Two) Times a Day With Meals.     • Potassium 75 MG tablet Take 595 mg by mouth Daily.     • sildenafil (REVATIO) 20 MG tablet TAKE ONE TO THREE TABLETS BY MOUTH DAILY AS NEEDED FOR ERECTILE DYSFUNCTION 30 tablet 4   • simvastatin (ZOCOR) 20 MG tablet Take 1 tablet by mouth Daily.     • vitamin B-12 (CYANOCOBALAMIN) 1000 MCG tablet Take 1 tablet by mouth Daily.       No facility-administered medications prior to visit.       No opioid medication identified on active medication list. I have reviewed chart for other potential  high risk medication/s and harmful drug interactions in the elderly.          Aspirin is on active medication list. Aspirin use is indicated based on review of current medical condition/s. Pros and cons of this therapy have been discussed today. Benefits of this medication outweigh potential harm.  Patient has been encouraged to continue taking this medication.  .      Patient Active Problem List   Diagnosis   • Atopic rhinitis   • BPH (benign prostatic hyperplasia)   • Gastroesophageal reflux disease   • Granulomatous disease   • Hemorrhoids   • Hyperlipidemia   • Hypertension   • Hypothyroidism   • Idiopathic thrombocytopenic purpura   • JUWAN on CPAP   • Vitamin D deficiency   • Skin lesion   • Diastasis recti   • Palpitations   • Osteoarthritis   • History of ITP   • Arthritic-like pain   • Coronary artery disease   • Dyslipidemia   • Hypertension   • Contracture of joint of right foot   • Bronchitis   • History of adenomatous polyp of colon   • Arthritis   • Chronic bilateral low back pain without sciatica   • CATHY (acute kidney injury)   • Nephrolithiasis   • Ureterolithiasis     Advance Care Planning   Advance Care Planning     Advance Directive is not on file.  ACP discussion was held with the patient during this visit. Patient does not have an  "advance directive, declines further assistance.     Objective    Vitals:    23 0906   BP: 112/74   Pulse: 73   Resp: 16   Temp: 97.3 °F (36.3 °C)   SpO2: 97%   Weight: 88.5 kg (195 lb)   Height: 179.1 cm (70.5\")   PainSc:   3   PainLoc: Back     Estimated body mass index is 27.58 kg/m² as calculated from the following:    Height as of this encounter: 179.1 cm (70.5\").    Weight as of this encounter: 88.5 kg (195 lb).    BMI is >= 25 and <30. (Overweight) The following options were offered after discussion;: weight loss educational material (shared in after visit summary), exercise counseling/recommendations and nutrition counseling/recommendations      Does the patient have evidence of cognitive impairment?   No    Lab Results   Component Value Date    CHLPL 162 04/10/2023    TRIG 128 04/10/2023    HDL 76 (H) 04/10/2023    LDL 60 04/10/2023    VLDL 26 04/10/2023          HEALTH RISK ASSESSMENT    Smoking Status:  Social History     Tobacco Use   Smoking Status Former   • Packs/day: 2.00   • Years: 15.00   • Pack years: 30.00   • Types: Cigarettes   • Quit date: 1985   • Years since quittin.8   Smokeless Tobacco Former   • Types: Chew   • Quit date:      Alcohol Consumption:  Social History     Substance and Sexual Activity   Alcohol Use No     Fall Risk Screen:    SHARYN Fall Risk Assessment was completed, and patient is at MODERATE risk for falls. Assessment completed on:2023    Depression Screenin/19/2023     9:11 AM   PHQ-2/PHQ-9 Depression Screening   Little Interest or Pleasure in Doing Things 0-->not at all   Feeling Down, Depressed or Hopeless 0-->not at all   PHQ-9: Brief Depression Severity Measure Score 0       Health Habits and Functional and Cognitive Screenin/19/2023     9:00 AM   Functional & Cognitive Status   Do you have difficulty preparing food and eating? No   Do you have difficulty bathing yourself, getting dressed or grooming yourself? No   Do you have " difficulty using the toilet? No   Do you have difficulty moving around from place to place? No   Do you have trouble with steps or getting out of a bed or a chair? No   Current Diet Well Balanced Diet   Dental Exam Up to date   Eye Exam Up to date   Exercise (times per week) 4 times per week   Current Exercises Include Walking   Do you need help using the phone?  No   Are you deaf or do you have serious difficulty hearing?  No   Do you need help with transportation? No   Do you need help shopping? No   Do you need help preparing meals?  No   Do you need help with housework?  No   Do you need help with laundry? No   Do you need help taking your medications? No   Do you need help managing money? No   Do you ever drive or ride in a car without wearing a seat belt? No   Have you felt unusual stress, anger or loneliness in the last month? No   Who do you live with? Child   If you need help, do you have trouble finding someone available to you? No   Have you been bothered in the last four weeks by sexual problems? No   Do you have difficulty concentrating, remembering or making decisions? No       Age-appropriate Screening Schedule:  Refer to the list below for future screening recommendations based on patient's age, sex and/or medical conditions. Orders for these recommended tests are listed in the plan section. The patient has been provided with a written plan.    Health Maintenance   Topic Date Due   • TDAP/TD VACCINES (2 - Td or Tdap) 07/31/2022   • ZOSTER VACCINE (2 of 3) 10/18/2023 (Originally 3/29/2015)   • INFLUENZA VACCINE  08/01/2023   • LIPID PANEL  04/10/2024   • ANNUAL WELLNESS VISIT  04/19/2024   • COLORECTAL CANCER SCREENING  07/22/2029   • COVID-19 Vaccine  Completed   • HEPATITIS C SCREENING  Addressed   • Pneumococcal Vaccine 65+  Discontinued                  CMS Preventative Services Quick Reference  Risk Factors Identified During Encounter:    Chronic Pain: Orthopedics Referral  Ordered  Inactivity/Sedentary: Patient was advised to exercise at least 150 minutes a week per CDC recommendations.    The above risks/problems have been discussed with the patient.  Pertinent information has been shared with the patient in the After Visit Summary.    Diagnoses and all orders for this visit:    1. Elevated bilirubin (Primary)  -     US Gallbladder    2. Calculus of gallbladder with biliary obstruction but without cholecystitis  -     US Gallbladder    3. Left hip pain  -     Ambulatory Referral to Orthopedic Surgery    4. Left low back pain, unspecified chronicity, unspecified whether sciatica present  -     Urinalysis With Microscopic - Urine, Clean Catch        Follow Up:   Next Medicare Wellness visit to be scheduled in 1 year.      An After Visit Summary and PPPS were made available to the patient.

## 2023-07-18 ENCOUNTER — HOSPITAL ENCOUNTER (OUTPATIENT)
Dept: PHYSICAL THERAPY | Facility: HOSPITAL | Age: 78
Setting detail: THERAPIES SERIES
Discharge: HOME | End: 2023-07-18
Payer: MEDICARE

## 2023-07-18 PROCEDURE — 97140 MANUAL THERAPY 1/> REGIONS: CPT

## 2023-07-18 PROCEDURE — 97150 GROUP THERAPEUTIC PROCEDURES: CPT

## 2023-07-18 NOTE — FLOWSHEET NOTE
Physical Therapy Daily Treatment Note   Date:  2023    TIme In:  0930             Time Out: [address] [facility] [place]    Patient Name:  Lorene Chiang    :    MRN: [id number]    Restrictions/Precautions:    Pertinent Medical History:  Medical/Treatment Diagnosis Information:  Left hip pain [M25.552]  Low back pain [M54.50]  R hip pain [M25. 303]     Insurance/Certification information:  Payor: Deloris Yobani / Plan: MEDICARE PART A AND B / Product Type: *No Product type* /   Physician Information:  Heidi Velasquez,Luis  Plan of care signed (Y/N):    Visit# / total visits:     21 /    G-Code (if applicable):      Date / Visit # G-Code Applied:         Progress Note: []  Yes  [x]  No  Next due by: 8/3/23      Pain level:   0/10    Subjective: Pt reports he has seen good improvement overall since starting PT. He notes he has been able to use his weedeater at home with greater ease whereas he could not use it when he started PT. Objective:  Observation:   Test measurements:  Back Index: 26%  LEFS: 56/80  Left Strength   L Hip Flexion: 4+/5  L Hip ABduction: 4-/5  L Hip Internal Rotation: 4+/5  L Hip External Rotation: 4/5  L Knee Flexion: 4+/5  L Knee Extension: 4+/5  Lumbar AROM: flexion 0-30 deg, ext 0-20 deg  Palpation:    Exercises:  Exercise Resistance/Repetitions Date performed   Nustep L8 x 6' 18   Standing hip abd / ext 3x10 B #3 18   Wall posture 5x10\" 18   Box lifts  5lbs x5 20lbs x5  18        Hip abd / add  BlckTB / ball 3x10 18   SLR 2X10 BLE #3 18   LTR x30 18   Bridges  3x10 18   SKTC 2x20\" B 18   Piriformis / figure 4 stretch 2x30\" B 18   Supine active HS stretch 1x20 B 18   Clam shells 3x10 Blck TB B 18   B HS stretch  2x20\" 18   B ant hip stretch off table with strap 2x20\" 18          Other Therapeutic Activities:      Manual Treatments:  STM R hip / lumbar spine x 15'      Modalities:  MH x12' - not performed today.       Timed Code Treatment Minutes: 79 - 39' group    Total Treatment

## 2023-07-24 ENCOUNTER — OFFICE VISIT (OUTPATIENT)
Dept: CARDIOLOGY | Facility: CLINIC | Age: 78
End: 2023-07-24
Payer: MEDICARE

## 2023-07-24 VITALS
HEIGHT: 71 IN | SYSTOLIC BLOOD PRESSURE: 128 MMHG | WEIGHT: 190.6 LBS | OXYGEN SATURATION: 97 % | HEART RATE: 69 BPM | DIASTOLIC BLOOD PRESSURE: 78 MMHG | BODY MASS INDEX: 26.68 KG/M2

## 2023-07-24 DIAGNOSIS — I10 PRIMARY HYPERTENSION: ICD-10-CM

## 2023-07-24 DIAGNOSIS — I25.10 CORONARY ARTERY DISEASE INVOLVING NATIVE CORONARY ARTERY OF NATIVE HEART WITHOUT ANGINA PECTORIS: Primary | ICD-10-CM

## 2023-07-24 DIAGNOSIS — E78.2 MIXED HYPERLIPIDEMIA: ICD-10-CM

## 2023-07-24 PROCEDURE — 3078F DIAST BP <80 MM HG: CPT | Performed by: INTERNAL MEDICINE

## 2023-07-24 PROCEDURE — 3074F SYST BP LT 130 MM HG: CPT | Performed by: INTERNAL MEDICINE

## 2023-07-24 PROCEDURE — 99214 OFFICE O/P EST MOD 30 MIN: CPT | Performed by: INTERNAL MEDICINE

## 2023-07-24 RX ORDER — CYCLOBENZAPRINE HCL 5 MG
5 TABLET ORAL AS NEEDED
COMMUNITY
Start: 2023-04-24

## 2023-07-24 RX ORDER — TAMSULOSIN HYDROCHLORIDE 0.4 MG/1
1 CAPSULE ORAL DAILY
COMMUNITY

## 2023-07-24 NOTE — PROGRESS NOTES
[facility] Cardiology  Office Progress Note  Deniz Dickson  1945  [address] [place] [address]       Visit Date: 07/24/23    PCP: Zane Magallon MD  [address]  [place] [address]    IDENTIFICATION: A 78 y.o. male , retired ,  2016 from [place].  [facility]    PROBLEM LIST:   Probable nonobstructive coronary artery disease:   2/15 SE wnl w low exercise tolerance  6/21 Lexiscan MPS: Lexiscan Cardiolite WNL.  LVEF = 65%.  Low risk test.  7/21 CT scan with heavily calcified LAD  Dyslipidemia   4/23 162/128/76/60  PVCs  7-day Holter monitor: AVG 66, min 47, max 113.  A. fib 0.0%.  SVE <.1%.  VE 20% burden.  Hypertension  Remote bilateral knee surgery per Dr. Smith. Redo R 8/18  Obstructive sleep apnea, on CPAP greater than 5 years.   Reformed smoker, times greater than 12 years.   Hypothyroidism.   Nephrolithiasis with history of hydronephrosis  7/21 CT A&P: Mild right hydronephrosis secondary to 4 mm right ureteral stone.      CC:   Chief Complaint   Patient presents with    Coronary artery disease involving native coronary artery of    Essential hypertension       Allergies  Allergies   Allergen Reactions    Penicillins Itching and Rash       Current Medications    Current Outpatient Medications:     aspirin 81 MG EC tablet, Take 1 tablet by mouth Daily., Disp: 90 tablet, Rfl: 1    bisoprolol (ZEBeta) 5 MG tablet, TAKE 1/2 TABLET BY MOUTH DAILY, Disp: 45 tablet, Rfl: 6    celecoxib (CeleBREX) 200 MG capsule, , Disp: , Rfl:     Cholecalciferol (VITAMIN D-3 PO), Take 1 tablet by mouth Daily., Disp: , Rfl:     cyclobenzaprine (FLEXERIL) 5 MG tablet, Take 1 tablet by mouth As Needed., Disp: , Rfl:     ezetimibe (ZETIA) 10 MG tablet, TAKE ONE TABLET BY MOUTH DAILY, Disp: 90 tablet, Rfl: 3    famotidine (PEPCID) 40 MG tablet, Take 1 tablet by mouth At Night As Needed., Disp: , Rfl:     finasteride (PROSCAR) 5 MG tablet, TAKE ONE TABLET  "BY MOUTH DAILY, Disp: 90 tablet, Rfl: 3    fluticasone (FLONASE) 50 MCG/ACT nasal spray, 1 spray into the nostril(s) as directed by provider Daily., Disp: 16 g, Rfl: 5    Glucosamine-Chondroitin (OSTEO BI-FLEX REGULAR STRENGTH PO), Take 1 tablet by mouth 2 (two) times a day., Disp: , Rfl:     hydroxychloroquine (PLAQUENIL) 200 MG tablet, Take 1 tablet by mouth Every 12 (Twelve) Hours., Disp: , Rfl:     levothyroxine (SYNTHROID, LEVOTHROID) 137 MCG tablet, TAKE ONE TABLET BY MOUTH DAILY, Disp: 90 tablet, Rfl: 3    montelukast (SINGULAIR) 10 MG tablet, TAKE ONE TABLET BY MOUTH ONCE NIGHTLY, Disp: 90 tablet, Rfl: 3    Multiple Vitamins-Minerals (CENTRUM SILVER ADULT 50+) tablet, Take 1 tablet by mouth Daily., Disp: , Rfl:     Omega-3 Fatty Acids (FISH OIL) 1200 MG capsule capsule, Take 1 capsule by mouth 2 (Two) Times a Day With Meals., Disp: , Rfl:     Potassium 75 MG tablet, Take 595 mg by mouth Daily., Disp: , Rfl:     sildenafil (REVATIO) 20 MG tablet, TAKE ONE TO THREE TABLETS BY MOUTH DAILY AS NEEDED FOR ERECTILE DYSFUNCTION, Disp: 30 tablet, Rfl: 4    simvastatin (ZOCOR) 20 MG tablet, TAKE ONE TABLET BY MOUTH ONCE NIGHTLY, Disp: 90 tablet, Rfl: 3    tamsulosin (FLOMAX) 0.4 MG capsule 24 hr capsule, Take 1 capsule by mouth Daily., Disp: , Rfl:     vitamin B-12 (CYANOCOBALAMIN) 1000 MCG tablet, Take 1 tablet by mouth Daily., Disp: , Rfl:       History of Present Illness   Deniz Dickson is a 78 y.o. year old male here for follow up.  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  He has no provocative chest symptoms.  He is compliant with his medications and states he feels well outside of having back problems        OBJECTIVE:  Vitals:    07/24/23 0946   Pulse: 69   SpO2: 97%   Weight: 86.5 kg (190 lb 9.6 oz)   Height: 179.1 cm (70.5\")       Body mass index is 26.96 kg/m².    Constitutional:       Appearance: Healthy appearance. Not in distress.   Neck:      Vascular: No JVR. JVD normal.   Pulmonary:      " Effort: Pulmonary effort is normal.      Breath sounds: Normal breath sounds. No wheezing. No rhonchi. No rales.   Chest:      Chest wall: Not tender to palpatation.   Cardiovascular:      PMI at left midclavicular line. Normal rate. Regular rhythm. Normal S1. Normal S2.       Murmurs: There is a systolic murmur.      No gallop.  No click. No rub.   Pulses:     Intact distal pulses.   Edema:     Peripheral edema absent.   Abdominal:      General: Bowel sounds are normal.      Palpations: Abdomen is soft.      Tenderness: There is no abdominal tenderness.   Musculoskeletal: Normal range of motion.         General: No tenderness. Skin:     General: Skin is warm and dry.   Neurological:      General: No focal deficit present.      Mental Status: Alert and oriented to person, place and time.       Diagnostic Data:  Procedures      ASSESSMENT:   Diagnosis Plan   1. Coronary artery disease involving native coronary artery of native heart without angina pectoris        2. Primary hypertension        3. Mixed hyperlipidemia              PLAN:  CAD is manifestation coronary calcification continued guideline directed medical therapy      Hypertension/palpitations controlled bisoprolol    Mixed dyslipidemia controlled on simvastatin combination Alex Ojeda MD, [facility]

## 2023-07-25 ENCOUNTER — HOSPITAL ENCOUNTER (OUTPATIENT)
Dept: PHYSICAL THERAPY | Facility: HOSPITAL | Age: 78
Setting detail: THERAPIES SERIES
Discharge: HOME | End: 2023-07-25
Payer: MEDICARE

## 2023-07-25 PROCEDURE — 97140 MANUAL THERAPY 1/> REGIONS: CPT

## 2023-07-25 PROCEDURE — 97110 THERAPEUTIC EXERCISES: CPT

## 2023-07-25 NOTE — FLOWSHEET NOTE
Physical Therapy Daily Treatment Note/Discharge Summary  Date:  2023    TIme In:  9929             Time Out: 1034    Patient Name:  Dereck Riddle    :  [birth year]  MRN: [id number]    Restrictions/Precautions:    Pertinent Medical History:  Medical/Treatment Diagnosis Information:  Left hip pain [M25.552]  Low back pain [M54.50]  R hip pain [M25. 090]     Insurance/Certification information:  Payor: Manuela Gene / Plan: MEDICARE PART A AND B / Product Type: *No Product type* /   Physician Information:  Pia Akhtar,*  Plan of care signed (Y/N):    Visit# / total visits:     25 /    G-Code (if applicable):      Date / Visit # G-Code Applied:         Progress Note: []  Yes  [x]  No  Next due by: 8/3/23      Pain level:   0/10    Subjective: Pt reports he is doing better and most of his issue is in his right hip now. He expressed that he thinks he is ready to graduate and can do everything at home that he needs too. Objective:  Observation:   Test measurements:  Back Index: 2%  LEFS: 56/80  Left Strength   L Hip Flexion: 4+/5  L Hip ABduction: 4-/5  L Hip Internal Rotation: 4+/5  L Hip External Rotation: 4/5  L Knee Flexion: 4+/5  L Knee Extension: 4+/5  Lumbar AROM: flexion 0-30 deg, ext 0-20 deg  Palpation:    Exercises:  Exercise Resistance/Repetitions Date performed   Nustep L8 x 6' 25   Standing hip abd / ext 3x10 B #3 25   Wall posture 5x10\" 25   Box lifts  5lbs x5 20lbs x5  25        Hip abd / add  BlckTB / ball 3x10 25   SLR 2X10 BLE #3 25   LTR x30 25   Bridges  3x10 25   SKTC 2x20\" B 25   Piriformis / figure 4 stretch 2x30\" B 25   Supine active HS stretch 1x20 B 25   Clam shells 3x10 Blck TB B 25   B HS stretch  2x20\" 25   B ant hip stretch off table with strap 2x20\" 25          Other Therapeutic Activities:      Manual Treatments:  STM R hip / lumbar spine x 15'. Modalities:  MH x12' - not performed today.       Timed Code Treatment Minutes: 60    Total Treatment Minutes:

## 2023-08-13 DIAGNOSIS — E03.9 ACQUIRED HYPOTHYROIDISM: ICD-10-CM

## 2023-08-14 RX ORDER — LEVOTHYROXINE SODIUM 137 UG/1
TABLET ORAL
Qty: 90 TABLET | Refills: 3 | Status: SHIPPED | OUTPATIENT
Start: 2023-08-14

## 2023-08-23 ENCOUNTER — OFFICE VISIT (OUTPATIENT)
Dept: PULMONOLOGY | Facility: CLINIC | Age: 78
End: 2023-08-23
Payer: MEDICARE

## 2023-08-23 VITALS
BODY MASS INDEX: 25.76 KG/M2 | WEIGHT: 184 LBS | HEIGHT: 71 IN | DIASTOLIC BLOOD PRESSURE: 78 MMHG | OXYGEN SATURATION: 95 % | RESPIRATION RATE: 18 BRPM | SYSTOLIC BLOOD PRESSURE: 126 MMHG | HEART RATE: 78 BPM

## 2023-08-23 DIAGNOSIS — G47.19 EXCESSIVE DAYTIME SLEEPINESS: ICD-10-CM

## 2023-08-23 DIAGNOSIS — R68.89 VIVID DREAM: ICD-10-CM

## 2023-08-23 DIAGNOSIS — G47.33 OBSTRUCTIVE SLEEP APNEA: Primary | ICD-10-CM

## 2023-08-23 DIAGNOSIS — J30.89 OTHER ALLERGIC RHINITIS: ICD-10-CM

## 2023-08-23 PROCEDURE — 99213 OFFICE O/P EST LOW 20 MIN: CPT | Performed by: NURSE PRACTITIONER

## 2023-08-23 PROCEDURE — 3074F SYST BP LT 130 MM HG: CPT | Performed by: NURSE PRACTITIONER

## 2023-08-23 PROCEDURE — 3078F DIAST BP <80 MM HG: CPT | Performed by: NURSE PRACTITIONER

## 2023-08-23 RX ORDER — NORTRIPTYLINE HYDROCHLORIDE 25 MG/1
25 CAPSULE ORAL NIGHTLY
Qty: 30 CAPSULE | Refills: 5 | Status: SHIPPED | OUTPATIENT
Start: 2023-08-23

## 2023-08-23 NOTE — PROGRESS NOTES
"Chief Complaint   Patient presents with    Sleeping Problem    Follow-up         Subjective   Deniz Dickson is a 78 y.o. male.     Patient comes back today for follow up of Obstructive Sleep apnea.     Patient says that he is compliant with his device and using it regularly.    Patient's symptoms of sleep disturbance and daytime sleepiness have been helped greatly with the use of PAP device, as prescribed.  He uses the machine regularly when it is not broken.     He states he is not doing well with machine. Since pressure was increased he feels pressure if too high.     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     He machine has to be sent for repair and he got it back and it worked for awhile. [place] [facility] [place] suggested turning on manual so that he has to turn it on/off manually.     Nasal spray has helped.     He c/o having nightmares and asked for a sedative to keep him from dreaming. He states he has nightmares from things throughout the week and these wake him up but he cannot remember the nightmare even 10 minutes later. The dreams are not scary but they wake him up. 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     He has cattle and continues to farm.     He has difficulty sleeping if he drinks too much caffeine.     Bed 8-9 pm OOB 5 am. This has been his wake up time for a long time. He does exercises from 5-6 am.     The following portions of the patient's history were reviewed and updated as appropriate: allergies, current medications, past family history, past medical history, past social history, and past surgical history.      Review of Systems   HENT:  Negative for sneezing.    Respiratory:  Positive for shortness of breath.    Psychiatric/Behavioral:  Positive for sleep disturbance.        Objective   Visit Vitals  /78   Pulse 78   Resp 18   Ht 179.1 cm (70.5\")   Wt 83.5 kg (184 lb)   SpO2 95%   BMI 26.03 kg/mý       Physical " Exam  Vitals reviewed.   Constitutional:       Appearance: He is well-developed.   HENT:      Head: Atraumatic.      Mouth/Throat:      Mouth: Mucous membranes are moist.      Comments: Crowded oropharynx.   Eyes:      Extraocular Movements: Extraocular movements intact.   Cardiovascular:      Rate and Rhythm: Normal rate and regular rhythm.   Musculoskeletal:      Comments: Gait normal.   Skin:     General: Skin is warm.   Neurological:      Mental Status: He is alert and oriented to person, place, and time.         Assessment & Plan   Diagnoses and all orders for this visit:    1. Obstructive sleep apnea (Primary)  -     BIPAP / CPAP Adjustment    2. Excessive daytime sleepiness    3. Other allergic rhinitis    4. Vivid dream    Other orders  -     nortriptyline (PAMELOR) 25 MG capsule; Take 1 capsule by mouth Every Night.  Dispense: 30 capsule; Refill: 5           Return in about 6 months (around 2/23/2024) for Recheck, For Dr. Hager, Sleep ONLY.    DISCUSSION (if any):   Reviewed sleep study from 2007 which was consistent with moderate JUWAN with AHI 21.2 per hour. REM AHI 53 per hour.     Will adjust AutoPAP back to 6/15 cm due to pressure being too high per patient.     Continue treatment with AutoPAP at a pressure of 6/15, with a full-face mask.    I will ask staff to request compliance from [employer].     Humidification setup, hose and mask care discussed.    Weight loss advised.    Use every night for at least 4 hours stressed.     Continue nasal spray since he has benefited. He does not need refills today.     I have prescribed nortriptyline 25 mg nightly to help patient relax more.     A total of 20 minutes was spent reviewing all the information available including reviewing previous history from PCP/sleep specialist, as applicable, reviewing available sleep studies/compliance data.  This also included discussion regarding importance of sleep hygiene and possible lifestyle modifications, if  applicable/necessary, that may impact sleep.  Time was also spent documenting information in electronic health record and placing orders, as appropriate and applicable.     Dictated utilizing Dragon dictation.    This document was electronically signed by KELVIN Almanza August 23, 2023  13:54 EDT

## 2023-10-13 ENCOUNTER — LAB (OUTPATIENT)
Dept: LAB | Facility: HOSPITAL | Age: 78
End: 2023-10-13
Payer: MEDICARE

## 2023-10-13 LAB
ALBUMIN SERPL-MCNC: 4.3 G/DL (ref 3.5–5.2)
ALBUMIN/GLOB SERPL: 2 G/DL
ALP SERPL-CCNC: 54 U/L (ref 39–117)
ALT SERPL W P-5'-P-CCNC: 19 U/L (ref 1–41)
ANION GAP SERPL CALCULATED.3IONS-SCNC: 5.9 MMOL/L (ref 5–15)
AST SERPL-CCNC: 22 U/L (ref 1–40)
BACTERIA UR QL AUTO: NORMAL /HPF
BASOPHILS # BLD AUTO: 0.01 10*3/MM3 (ref 0–0.2)
BASOPHILS NFR BLD AUTO: 0.2 % (ref 0–1.5)
BILIRUB SERPL-MCNC: 0.7 MG/DL (ref 0–1.2)
BILIRUB UR QL STRIP: NEGATIVE
BUN SERPL-MCNC: 13 MG/DL (ref 8–23)
BUN/CREAT SERPL: 17.8 (ref 7–25)
CALCIUM SPEC-SCNC: 9.1 MG/DL (ref 8.6–10.5)
CHLORIDE SERPL-SCNC: 103 MMOL/L (ref 98–107)
CHOLEST SERPL-MCNC: 159 MG/DL (ref 0–200)
CLARITY UR: CLEAR
CO2 SERPL-SCNC: 29.1 MMOL/L (ref 22–29)
COLOR UR: YELLOW
CREAT SERPL-MCNC: 0.73 MG/DL (ref 0.76–1.27)
DEPRECATED RDW RBC AUTO: 44.8 FL (ref 37–54)
EGFRCR SERPLBLD CKD-EPI 2021: 93.1 ML/MIN/1.73
EOSINOPHIL # BLD AUTO: 0.08 10*3/MM3 (ref 0–0.4)
EOSINOPHIL NFR BLD AUTO: 1.8 % (ref 0.3–6.2)
ERYTHROCYTE [DISTWIDTH] IN BLOOD BY AUTOMATED COUNT: 12.7 % (ref 12.3–15.4)
GLOBULIN UR ELPH-MCNC: 2.1 GM/DL
GLUCOSE SERPL-MCNC: 102 MG/DL (ref 65–99)
GLUCOSE UR STRIP-MCNC: NEGATIVE MG/DL
HCT VFR BLD AUTO: 42.6 % (ref 37.5–51)
HDLC SERPL-MCNC: 74 MG/DL (ref 40–60)
HGB BLD-MCNC: 14.5 G/DL (ref 13–17.7)
HGB UR QL STRIP.AUTO: NEGATIVE
HYALINE CASTS UR QL AUTO: NORMAL /LPF
IMM GRANULOCYTES # BLD AUTO: 0.01 10*3/MM3 (ref 0–0.05)
IMM GRANULOCYTES NFR BLD AUTO: 0.2 % (ref 0–0.5)
KETONES UR QL STRIP: NEGATIVE
LDLC SERPL CALC-MCNC: 68 MG/DL (ref 0–100)
LDLC/HDLC SERPL: 0.89 {RATIO}
LEUKOCYTE ESTERASE UR QL STRIP.AUTO: NEGATIVE
LYMPHOCYTES # BLD AUTO: 1.32 10*3/MM3 (ref 0.7–3.1)
LYMPHOCYTES NFR BLD AUTO: 29.9 % (ref 19.6–45.3)
MCH RBC QN AUTO: 32.7 PG (ref 26.6–33)
MCHC RBC AUTO-ENTMCNC: 34 G/DL (ref 31.5–35.7)
MCV RBC AUTO: 96.2 FL (ref 79–97)
MONOCYTES # BLD AUTO: 0.53 10*3/MM3 (ref 0.1–0.9)
MONOCYTES NFR BLD AUTO: 12 % (ref 5–12)
NEUTROPHILS NFR BLD AUTO: 2.47 10*3/MM3 (ref 1.7–7)
NEUTROPHILS NFR BLD AUTO: 55.9 % (ref 42.7–76)
NITRITE UR QL STRIP: NEGATIVE
NRBC BLD AUTO-RTO: 0 /100 WBC (ref 0–0.2)
PH UR STRIP.AUTO: 6.5 [PH] (ref 5–8)
PLATELET # BLD AUTO: 104 10*3/MM3 (ref 140–450)
PMV BLD AUTO: 9.5 FL (ref 6–12)
POTASSIUM SERPL-SCNC: 4.7 MMOL/L (ref 3.5–5.2)
PROT SERPL-MCNC: 6.4 G/DL (ref 6–8.5)
PROT UR QL STRIP: NEGATIVE
PSA SERPL-MCNC: 1.58 NG/ML (ref 0–4)
RBC # BLD AUTO: 4.43 10*6/MM3 (ref 4.14–5.8)
RBC # UR STRIP: NORMAL /HPF
REF LAB TEST METHOD: NORMAL
SODIUM SERPL-SCNC: 138 MMOL/L (ref 136–145)
SP GR UR STRIP: 1.02 (ref 1–1.03)
SQUAMOUS #/AREA URNS HPF: NORMAL /HPF
T4 FREE SERPL-MCNC: 1.25 NG/DL (ref 0.93–1.7)
TRIGL SERPL-MCNC: 95 MG/DL (ref 0–150)
TSH SERPL DL<=0.05 MIU/L-ACNC: 1.83 UIU/ML (ref 0.27–4.2)
UROBILINOGEN UR QL STRIP: NORMAL
VIT B12 BLD-MCNC: 835 PG/ML (ref 211–946)
VLDLC SERPL-MCNC: 17 MG/DL (ref 5–40)
WBC # UR STRIP: NORMAL /HPF
WBC NRBC COR # BLD: 4.42 10*3/MM3 (ref 3.4–10.8)

## 2023-10-13 PROCEDURE — 85025 COMPLETE CBC W/AUTO DIFF WBC: CPT | Performed by: INTERNAL MEDICINE

## 2023-10-13 PROCEDURE — 84439 ASSAY OF FREE THYROXINE: CPT | Performed by: INTERNAL MEDICINE

## 2023-10-13 PROCEDURE — 80053 COMPREHEN METABOLIC PANEL: CPT | Performed by: INTERNAL MEDICINE

## 2023-10-13 PROCEDURE — 81001 URINALYSIS AUTO W/SCOPE: CPT | Performed by: INTERNAL MEDICINE

## 2023-10-13 PROCEDURE — 80061 LIPID PANEL: CPT | Performed by: INTERNAL MEDICINE

## 2023-10-13 PROCEDURE — G0103 PSA SCREENING: HCPCS | Performed by: INTERNAL MEDICINE

## 2023-10-13 PROCEDURE — 84443 ASSAY THYROID STIM HORMONE: CPT | Performed by: INTERNAL MEDICINE

## 2023-10-13 PROCEDURE — 82607 VITAMIN B-12: CPT | Performed by: INTERNAL MEDICINE

## 2023-11-06 ENCOUNTER — OFFICE VISIT (OUTPATIENT)
Dept: INTERNAL MEDICINE | Facility: CLINIC | Age: 78
End: 2023-11-06
Payer: MEDICARE

## 2023-11-06 VITALS
DIASTOLIC BLOOD PRESSURE: 70 MMHG | HEART RATE: 61 BPM | SYSTOLIC BLOOD PRESSURE: 128 MMHG | OXYGEN SATURATION: 99 % | BODY MASS INDEX: 26.6 KG/M2 | HEIGHT: 71 IN | TEMPERATURE: 97.8 F | RESPIRATION RATE: 16 BRPM | WEIGHT: 190 LBS

## 2023-11-06 DIAGNOSIS — R93.2 ABNORMAL GALL BLADDER DIAGNOSTIC IMAGING: Primary | ICD-10-CM

## 2023-11-06 DIAGNOSIS — I10 PRIMARY HYPERTENSION: ICD-10-CM

## 2023-11-06 DIAGNOSIS — E03.9 ACQUIRED HYPOTHYROIDISM: ICD-10-CM

## 2023-11-06 DIAGNOSIS — M79.674 PAIN OF TOE OF RIGHT FOOT: ICD-10-CM

## 2023-11-06 DIAGNOSIS — L60.0 INGROWING TOENAIL: ICD-10-CM

## 2023-11-06 DIAGNOSIS — E79.0 HYPERURICEMIA: ICD-10-CM

## 2023-11-06 RX ORDER — MONTELUKAST SODIUM 10 MG/1
TABLET ORAL
Qty: 90 TABLET | Refills: 3 | Status: SHIPPED | OUTPATIENT
Start: 2023-11-06

## 2023-11-06 NOTE — PROGRESS NOTES
Subjective     Patient ID: Deniz Dickson is a 78 y.o. male. Patient is here for management of multiple medical problems.     Chief Complaint   Patient presents with    Hypertension    Toe Pain     Right foot, big toe pain     History of Present Illness   Htn      Toe pain;. Right big toe.   Durration. Worse over the past few days. Started a month ago.    Ingrown toe nail.         The following portions of the patient's history were reviewed and updated as appropriate: allergies, current medications, past family history, past medical history, past social history, past surgical history and problem list.    Review of Systems    Current Outpatient Medications:     aspirin 81 MG EC tablet, Take 1 tablet by mouth Daily., Disp: 90 tablet, Rfl: 1    bisoprolol (ZEBeta) 5 MG tablet, TAKE 1/2 TABLET BY MOUTH DAILY, Disp: 45 tablet, Rfl: 6    celecoxib (CeleBREX) 200 MG capsule, , Disp: , Rfl:     Cholecalciferol (VITAMIN D-3 PO), Take 1 tablet by mouth Daily., Disp: , Rfl:     cyclobenzaprine (FLEXERIL) 5 MG tablet, Take 1 tablet by mouth As Needed., Disp: , Rfl:     ezetimibe (ZETIA) 10 MG tablet, TAKE ONE TABLET BY MOUTH DAILY, Disp: 90 tablet, Rfl: 3    famotidine (PEPCID) 40 MG tablet, Take 1 tablet by mouth At Night As Needed., Disp: , Rfl:     finasteride (PROSCAR) 5 MG tablet, TAKE ONE TABLET BY MOUTH DAILY, Disp: 90 tablet, Rfl: 3    fluticasone (FLONASE) 50 MCG/ACT nasal spray, 1 spray into the nostril(s) as directed by provider Daily., Disp: 16 g, Rfl: 5    Glucosamine-Chondroitin (OSTEO BI-FLEX REGULAR STRENGTH PO), Take 1 tablet by mouth 2 (two) times a day., Disp: , Rfl:     hydroxychloroquine (PLAQUENIL) 200 MG tablet, Take 1 tablet by mouth Every 12 (Twelve) Hours., Disp: , Rfl:     levothyroxine (SYNTHROID, LEVOTHROID) 137 MCG tablet, TAKE ONE TABLET BY MOUTH DAILY, Disp: 90 tablet, Rfl: 3    montelukast (SINGULAIR) 10 MG tablet, TAKE ONE TABLET BY MOUTH ONCE NIGHTLY, Disp: 90 tablet, Rfl: 3    Multiple  "Vitamins-Minerals (CENTRUM SILVER ADULT 50+) tablet, Take 1 tablet by mouth Daily., Disp: , Rfl:     nortriptyline (PAMELOR) 25 MG capsule, Take 1 capsule by mouth Every Night., Disp: 30 capsule, Rfl: 5    Omega-3 Fatty Acids (FISH OIL) 1200 MG capsule capsule, Take 1 capsule by mouth 2 (Two) Times a Day With Meals., Disp: , Rfl:     Potassium 75 MG tablet, Take 595 mg by mouth Daily., Disp: , Rfl:     sildenafil (REVATIO) 20 MG tablet, TAKE ONE TO THREE TABLETS BY MOUTH DAILY AS NEEDED FOR ERECTILE DYSFUNCTION, Disp: 30 tablet, Rfl: 4    simvastatin (ZOCOR) 20 MG tablet, TAKE ONE TABLET BY MOUTH ONCE NIGHTLY, Disp: 90 tablet, Rfl: 3    vitamin B-12 (CYANOCOBALAMIN) 1000 MCG tablet, Take 1 tablet by mouth Daily., Disp: , Rfl:     Objective      Blood pressure 128/70, pulse 61, temperature 97.8 °F (36.6 °C), resp. rate 16, height 179.1 cm (70.5\"), weight 86.2 kg (190 lb), SpO2 99%.    Physical Exam     General Appearance:    Alert, cooperative, no distress, appears stated age   Head:    Normocephalic, without obvious abnormality, atraumatic   Eyes:    PERRL, conjunctiva/corneas clear, EOM's intact   Ears:    Normal TM's and external ear canals, both ears   Nose:   Nares normal, septum midline, mucosa normal, no drainage   or sinus tenderness   Throat:   Lips, mucosa, and tongue normal; teeth and gums normal   Neck:   Supple, symmetrical, trachea midline, no adenopathy;        thyroid:  No enlargement/tenderness/nodules; no carotid    bruit or JVD   Back:     Symmetric, no curvature, ROM normal, no CVA tenderness   Lungs:     Clear to auscultation bilaterally, respirations unlabored   Chest wall:    No tenderness or deformity   Heart:    Regular rate and rhythm, S1 and S2 normal, no murmur,        rub or gallop   Abdomen:     Soft, non-tender, bowel sounds active all four quadrants,     no masses, no organomegaly   Extremities:   Extremities normal, atraumatic, no cyanosis or edema   Pulses:   2+ and symmetric all " extremities   Skin:   Skin color, texture, turgor normal, no rashes or lesions   Lymph nodes:   Cervical, supraclavicular, and axillary nodes normal   Neurologic:   CNII-XII intact. Normal strength, sensation and reflexes       throughout      Results for orders placed or performed in visit on 07/18/23   PSA Screen    Specimen: Blood   Result Value Ref Range    PSA 1.930 0.000 - 4.000 ng/mL         Assessment & Plan     Labs look good.        Diagnoses and all orders for this visit:    1. Abnormal gall bladder diagnostic imaging (Primary)  -     Ambulatory Referral to General Surgery  -     CBC & Differential  -     Vitamin B12  -     Comprehensive Metabolic Panel  -     TSH    2. Acquired hypothyroidism  -     CBC & Differential  -     Vitamin B12  -     Comprehensive Metabolic Panel  -     TSH    3. Primary hypertension  -     CBC & Differential  -     Vitamin B12  -     Comprehensive Metabolic Panel  -     TSH    4. Hyperuricemia  -     CBC & Differential  -     Vitamin B12  -     Comprehensive Metabolic Panel  -     TSH    5. Pain of toe of right foot  -     CBC & Differential  -     Vitamin B12  -     Comprehensive Metabolic Panel  -     TSH  -     Ambulatory Referral to Podiatry    6. Ingrowing toenail  -     Ambulatory Referral to Podiatry    Other orders  -     Fluzone High-Dose 65+yrs ([id number])      Return in about 6 months (around 5/6/2024).          There are no Patient Instructions on file for this visit.     Zane Magallon MD    Assessment & Plan

## 2023-11-14 NOTE — PROGRESS NOTES
Patient: Deniz Dickson    YOB: 1945    Date: 11/16/2023    Primary Care Provider: Zane Magallon MD    Chief Complaint   Patient presents with    Cholelithiasis       SUBJECTIVE:    History of present illness:  I saw the patient in the office today as a consultation for evaluation and treatment of cholelithiasis. He had an ultrasound that indicated gallstones.  He states it was an accidental finding. He states he was having back pain, ultrasound was ordered. He denies abdominal pain, diarrhea, constipation, nausea and vomiting.  Patient currently pain-free, feels that he has hip degenerative disease and lower back pain.  No typical gallbladder symptoms.    The following portions of the patient's history were reviewed and updated as appropriate: allergies, current medications, past family history, past medical history, past social history, past surgical history and problem list.    Review of Systems   Constitutional:  Negative for chills, fever and unexpected weight change.   HENT:  Negative for trouble swallowing and voice change.    Eyes:  Negative for visual disturbance.   Respiratory:  Negative for apnea, cough, chest tightness, shortness of breath and wheezing.    Cardiovascular:  Negative for chest pain, palpitations and leg swelling.   Gastrointestinal:  Negative for abdominal distention, abdominal pain, anal bleeding, blood in stool, constipation, diarrhea, nausea, rectal pain and vomiting.   Endocrine: Negative for cold intolerance and heat intolerance.   Genitourinary:  Negative for difficulty urinating, dysuria, flank pain, scrotal swelling and testicular pain.   Musculoskeletal:  Negative for back pain, gait problem and joint swelling.   Skin:  Negative for color change, rash and wound.   Neurological:  Negative for dizziness, syncope, speech difficulty, weakness, numbness and headaches.   Hematological:  Negative for adenopathy. Does not bruise/bleed easily.   Psychiatric/Behavioral:   "Negative for confusion. The patient is not nervous/anxious.        History:  Past Medical History:   Diagnosis Date    Arthritis     OA    Basal cell carcinoma 02/2020    left ear    Cancer     Skin cancer removed 3x     Coronary artery disease     Probable Non obstructive     Dyslipidemia     Elevated prostate specific antigen (PSA)     Essential hypertension, benign     Full dentures     Instructed no adhesives the DOS    GERD (gastroesophageal reflux disease)     H/O benign prostatic hypertrophy     H/O cardiovascular stress test 07/19/2019    Patient reported done by Dr Ojeda around 3-4 years ago and reported that all was wnl's     High cholesterol     History of bronchitis     History of hemorrhoids     History of idiopathic thrombocytopenic purpura     History of malignant neoplasm of skin     History of sleep apnea     [place] hearing)     No use of hearing aids    Hypertension     Hypothyroidism     JUWAN on CPAP     Patient instructed to bring mask and machine the DOS    Rectal bleeding     S/P right knee surgery 07/12/2017    Seasonal allergies     Tobacco abuse     Wears glasses     Rx wears       Past Surgical History:   Procedure Laterality Date    APPENDECTOMY      BASAL CELL CARCINOMA EXCISION  2020    left ear    COLONOSCOPY      Complete Colonoscopy    COLONOSCOPY N/A 7/22/2019    Procedure: COLONOSCOPY;  Surgeon: Lisa Rhodes MD;  Location: [facility] ENDOSCOPY;  Service: Gastroenterology    HERNIA REPAIR      patient reported by Dr Banuelos - patient unsure extact location    JOINT REPLACEMENT Left     partial knee replacement    JOINT REPLACEMENT Right     partial knee replacement    KNEE SURGERY      Left Knee    MOUTH SURGERY      full mouth extraction    OTHER SURGICAL HISTORY      Surgery Testis Orchiopexy around age 20    OTHER SURGICAL HISTORY      Cyst removed from a finger - patient reported to this as a \"growth\" and is unsure laterality    SKIN BIOPSY      Patient reported skin cancer " removed 3x.  Nose, back and face    URETEROSCOPY LASER LITHOTRIPSY WITH STENT INSERTION Left 11/15/2020    Procedure: URETEROSCOPY LASER LITHOTRIPSY WITH STENT INSERTION, Rertrograde;  Surgeon: Bobo Carter MD;  Location: [facility];  Service: Urology;  Laterality: Left;       Family History   Problem Relation Age of Onset    Migraines Mother     Mental illness Mother     Heart disease Mother     Alcohol abuse Father     Cancer Father     Lung cancer Father     Brain cancer Sister     Colon cancer Paternal Grandmother     Prostate cancer Other     Colon cancer Other        Social History     Tobacco Use    Smoking status: Former     Packs/day: 2.00     Years: 15.00     Additional pack years: 0.00     Total pack years: 30.00     Types: Cigarettes     Quit date: 1985     Years since quittin.4     Passive exposure: Past    Smokeless tobacco: Former     Types: Chew     Quit date:    Vaping Use    Vaping Use: Never used   Substance Use Topics    Alcohol use: No    Drug use: No       Allergies:  Allergies   Allergen Reactions    Penicillins Itching, Rash and Unknown (See Comments)       Medications:    Current Outpatient Medications:     aspirin 81 MG EC tablet, Take 1 tablet by mouth Daily., Disp: 90 tablet, Rfl: 1    bisoprolol (ZEBeta) 5 MG tablet, TAKE 1/2 TABLET BY MOUTH DAILY, Disp: 45 tablet, Rfl: 6    celecoxib (CeleBREX) 200 MG capsule, , Disp: , Rfl:     Cholecalciferol (VITAMIN D-3 PO), Take 1 tablet by mouth Daily., Disp: , Rfl:     cyclobenzaprine (FLEXERIL) 5 MG tablet, Take 1 tablet by mouth As Needed., Disp: , Rfl:     ezetimibe (ZETIA) 10 MG tablet, TAKE ONE TABLET BY MOUTH DAILY, Disp: 90 tablet, Rfl: 3    famotidine (PEPCID) 40 MG tablet, Take 1 tablet by mouth At Night As Needed., Disp: , Rfl:     finasteride (PROSCAR) 5 MG tablet, TAKE ONE TABLET BY MOUTH DAILY, Disp: 90 tablet, Rfl: 3    fluticasone (FLONASE) 50 MCG/ACT nasal spray, 1 spray into the nostril(s) as directed by  "provider Daily., Disp: 16 g, Rfl: 5    Glucosamine-Chondroitin (OSTEO BI-FLEX REGULAR STRENGTH PO), Take 1 tablet by mouth 2 (two) times a day., Disp: , Rfl:     hydroxychloroquine (PLAQUENIL) 200 MG tablet, Take 1 tablet by mouth Every 12 (Twelve) Hours., Disp: , Rfl:     levothyroxine (SYNTHROID, LEVOTHROID) 137 MCG tablet, TAKE ONE TABLET BY MOUTH DAILY, Disp: 90 tablet, Rfl: 3    montelukast (SINGULAIR) 10 MG tablet, TAKE ONE TABLET BY MOUTH ONCE NIGHTLY, Disp: 90 tablet, Rfl: 3    Multiple Vitamins-Minerals (CENTRUM SILVER ADULT 50+) tablet, Take 1 tablet by mouth Daily., Disp: , Rfl:     nortriptyline (PAMELOR) 25 MG capsule, Take 1 capsule by mouth Every Night., Disp: 30 capsule, Rfl: 5    Omega-3 Fatty Acids (FISH OIL) 1200 MG capsule capsule, Take 1 capsule by mouth 2 (Two) Times a Day With Meals., Disp: , Rfl:     Potassium 75 MG tablet, Take 595 mg by mouth Daily., Disp: , Rfl:     sildenafil (REVATIO) 20 MG tablet, TAKE ONE TO THREE TABLETS BY MOUTH DAILY AS NEEDED FOR ERECTILE DYSFUNCTION, Disp: 30 tablet, Rfl: 4    simvastatin (ZOCOR) 20 MG tablet, TAKE ONE TABLET BY MOUTH ONCE NIGHTLY, Disp: 90 tablet, Rfl: 3    vitamin B-12 (CYANOCOBALAMIN) 1000 MCG tablet, Take 1 tablet by mouth Daily., Disp: , Rfl:     OBJECTIVE:    Vital Signs:   Vitals:    11/16/23 0837   BP: 131/67   Pulse: 63   Temp: 97.8 °F (36.6 °C)   SpO2: 96%   Weight: 86.2 kg (190 lb)   Height: 179.1 cm (70.5\")       Physical Exam:   General Appearance:    Alert, cooperative, in no acute distress   Head:    Normocephalic, without obvious abnormality, atraumatic   Eyes:            Lids and lashes normal, conjunctivae and sclerae normal, no   icterus, no pallor, corneas clear, PERRLA   Ears:    Ears appear intact with no abnormalities noted   Throat:   No oral lesions, no thrush, oral mucosa moist   Neck:   No adenopathy, supple, trachea midline, no thyromegaly, no   carotid bruit, no JVD   Lungs:     Clear to auscultation,respirations " regular, even and                  unlabored    Heart:    Regular rhythm and normal rate, normal S1 and S2, no            murmur   Abdomen:     no masses, no organomegaly, soft non-tender, non-distended, no guarding, there is evidence of right upper quadrant tenderness.  Mild  epigastric tenderness.  No abdominal hernias or masses.   Extremities:   Moves all extremities well, no edema, no cyanosis, no             redness   Pulses:   Pulses palpable and equal bilaterally   Skin:   No bleeding, bruising or rash   Lymph nodes:   No palpable adenopathy   Neurologic:   Cranial nerves 2 - 12 grossly intact, sensation intact      Results Review:   I reviewed the patient's new clinical results.    Review of Systems was reviewed and confirmed as accurate as documented by the MA.    ASSESSMENT/PLAN:    1. Calculus of gallbladder without cholecystitis without obstruction        I had a detailed discussion, patient at this time does not want to have surgery.  He feels like he is symptom-free and have no complaints.  His LFTs were normal recently.  If he does have further attacks or symptoms he will be happy to call back and schedule cholecystectomy if needed.  He had no further questions.  He will follow-up as needed.  The patient had no questions for me at the end of the discussion.  I did draw a picture of the anatomy for the patient and used this in my informed consent.     I discussed the patients findings and my recommendations with patient.    Electronically signed by Roel Mcghee MD  11/16/23

## 2023-11-16 ENCOUNTER — OFFICE VISIT (OUTPATIENT)
Dept: SURGERY | Facility: CLINIC | Age: 78
End: 2023-11-16
Payer: MEDICARE

## 2023-11-16 VITALS
BODY MASS INDEX: 26.6 KG/M2 | TEMPERATURE: 97.8 F | HEART RATE: 63 BPM | DIASTOLIC BLOOD PRESSURE: 67 MMHG | SYSTOLIC BLOOD PRESSURE: 131 MMHG | WEIGHT: 190 LBS | OXYGEN SATURATION: 96 % | HEIGHT: 71 IN

## 2023-11-16 DIAGNOSIS — K80.20 CALCULUS OF GALLBLADDER WITHOUT CHOLECYSTITIS WITHOUT OBSTRUCTION: Primary | ICD-10-CM

## 2023-11-16 PROCEDURE — 1160F RVW MEDS BY RX/DR IN RCRD: CPT | Performed by: SURGERY

## 2023-11-16 PROCEDURE — 3078F DIAST BP <80 MM HG: CPT | Performed by: SURGERY

## 2023-11-16 PROCEDURE — 3075F SYST BP GE 130 - 139MM HG: CPT | Performed by: SURGERY

## 2023-11-16 PROCEDURE — 1159F MED LIST DOCD IN RCRD: CPT | Performed by: SURGERY

## 2023-11-16 PROCEDURE — 99213 OFFICE O/P EST LOW 20 MIN: CPT | Performed by: SURGERY

## 2023-11-22 DIAGNOSIS — N52.01 ERECTILE DYSFUNCTION DUE TO ARTERIAL INSUFFICIENCY: ICD-10-CM

## 2023-11-26 DIAGNOSIS — J30.89 OTHER ALLERGIC RHINITIS: Primary | ICD-10-CM

## 2023-11-27 RX ORDER — FLUTICASONE PROPIONATE 50 MCG
SPRAY, SUSPENSION (ML) NASAL
Qty: 16 ML | Refills: 5 | Status: SHIPPED | OUTPATIENT
Start: 2023-11-27

## 2023-11-27 RX ORDER — SILDENAFIL CITRATE 20 MG/1
TABLET ORAL
Qty: 30 TABLET | Refills: 4 | Status: SHIPPED | OUTPATIENT
Start: 2023-11-27

## 2024-02-11 DIAGNOSIS — N40.0 BENIGN NON-NODULAR PROSTATIC HYPERPLASIA WITHOUT LOWER URINARY TRACT SYMPTOMS: ICD-10-CM

## 2024-02-12 RX ORDER — FINASTERIDE 5 MG/1
TABLET, FILM COATED ORAL
Qty: 90 TABLET | Refills: 3 | Status: SHIPPED | OUTPATIENT
Start: 2024-02-12

## 2024-03-05 DIAGNOSIS — N40.0 BENIGN NON-NODULAR PROSTATIC HYPERPLASIA WITHOUT LOWER URINARY TRACT SYMPTOMS: ICD-10-CM

## 2024-03-05 RX ORDER — FINASTERIDE 5 MG/1
TABLET, FILM COATED ORAL
Qty: 90 TABLET | Refills: 3 | Status: SHIPPED | OUTPATIENT
Start: 2024-03-05

## 2024-03-13 ENCOUNTER — OFFICE VISIT (OUTPATIENT)
Dept: PULMONOLOGY | Facility: CLINIC | Age: 79
End: 2024-03-13
Payer: MEDICARE

## 2024-03-13 VITALS
WEIGHT: 191 LBS | RESPIRATION RATE: 16 BRPM | OXYGEN SATURATION: 95 % | BODY MASS INDEX: 26.74 KG/M2 | HEIGHT: 71 IN | HEART RATE: 81 BPM | SYSTOLIC BLOOD PRESSURE: 124 MMHG | DIASTOLIC BLOOD PRESSURE: 64 MMHG

## 2024-03-13 DIAGNOSIS — G47.33 OBSTRUCTIVE SLEEP APNEA: Primary | ICD-10-CM

## 2024-03-13 DIAGNOSIS — J30.89 OTHER ALLERGIC RHINITIS: ICD-10-CM

## 2024-03-13 PROCEDURE — 3074F SYST BP LT 130 MM HG: CPT | Performed by: INTERNAL MEDICINE

## 2024-03-13 PROCEDURE — 3078F DIAST BP <80 MM HG: CPT | Performed by: INTERNAL MEDICINE

## 2024-03-13 PROCEDURE — 99214 OFFICE O/P EST MOD 30 MIN: CPT | Performed by: INTERNAL MEDICINE

## 2024-03-13 NOTE — PROGRESS NOTES
"  Chief Complaint   Patient presents with    Sleeping Problem    Follow-up         Subjective   Deniz Dickson is a 78 y.o. male.   Patient was evaluated today for follow up of Sleep apnea and allergic rhinitis.     Patient doesn't report any issues with the PAP device. The patient describes no significant issues with his mask either.     Patient says that the compliance with the use of the equipment is good.     Patient says that his symptoms of fatigue & daytime sleepiness have been helped greatly with the use of PAP, as prescribed.     Patient says that he has been using his nasal sprays on a seasonal basis and describes no significant ongoing issues other than occasional congestion.       The following portions of the patient's history were reviewed and updated as appropriate: allergies, current medications, past family history, past medical history, past social history, past surgical history, and problem list.    Review of Systems   HENT:  Positive for congestion. Negative for sinus pressure, sneezing and sore throat.    Respiratory:  Positive for shortness of breath. Negative for cough, chest tightness and wheezing.        Objective   Visit Vitals  /64   Pulse 81   Resp 16   Ht 179.1 cm (70.5\") Comment: pt reported   Wt 86.6 kg (191 lb)   SpO2 95%   BMI 27.02 kg/m²         BMI Readings from Last 3 Encounters:   03/13/24 27.02 kg/m²   11/16/23 26.88 kg/m²   11/06/23 26.88 kg/m²       Physical Exam  Vitals reviewed.   Constitutional:       Appearance: He is well-developed.   HENT:      Head: Atraumatic.      Nose:      Comments: Nasal erythema noted.     Mouth/Throat:      Comments: Oropharynx was somewhat cobblestoned.  Dentures noted.   Musculoskeletal:      Comments: Gait was slow.   Neurological:      Mental Status: He is alert and oriented to person, place, and time.         Assessment & Plan   Diagnoses and all orders for this visit:    1. Obstructive sleep apnea (Primary)    2. Other allergic " rhinitis           Return in about 1 year (around 3/13/2025) for Farhat/Amada.    DISCUSSION (if any):  Sleep study performed in 2007  AHI was 21 / hour.   REM AHI was 53/hour.     Latest PAP device provided in early 2021.   DME company: [employer]    Current PAP settings: 8-20  Current mask type: FFM    Compliance data was obtained from the his device/DME company.    Continue PAP device on a regular basis, at least 4 hours or more per night.    Sleep hygiene measures were discussed    Patient was advised to continue his nasal spray on a seasonal basis, since his symptoms are consistent with seasonal allergic rhinitis.    Vaccination status addressed.    Up-to-date with influenza vaccinations.     Up-to-date with pneumonia vaccinations.       Dictated utilizing Dragon dictation.    This document was electronically signed by Bayron Hager MD on 03/13/24 at 13:57 EDT

## 2024-04-08 RX ORDER — BISOPROLOL FUMARATE 5 MG/1
TABLET, FILM COATED ORAL
Qty: 45 TABLET | Refills: 6 | Status: SHIPPED | OUTPATIENT
Start: 2024-04-08

## 2024-04-16 ENCOUNTER — LAB (OUTPATIENT)
Dept: LAB | Facility: HOSPITAL | Age: 79
End: 2024-04-16
Payer: MEDICARE

## 2024-04-16 LAB
ALBUMIN SERPL-MCNC: 4.1 G/DL (ref 3.5–5.2)
ALBUMIN/GLOB SERPL: 1.7 G/DL
ALP SERPL-CCNC: 59 U/L (ref 39–117)
ALT SERPL W P-5'-P-CCNC: 19 U/L (ref 1–41)
ANION GAP SERPL CALCULATED.3IONS-SCNC: 10.3 MMOL/L (ref 5–15)
AST SERPL-CCNC: 27 U/L (ref 1–40)
BASOPHILS # BLD AUTO: 0.02 10*3/MM3 (ref 0–0.2)
BASOPHILS NFR BLD AUTO: 0.4 % (ref 0–1.5)
BILIRUB SERPL-MCNC: 1.3 MG/DL (ref 0–1.2)
BUN SERPL-MCNC: 17 MG/DL (ref 8–23)
BUN/CREAT SERPL: 21.5 (ref 7–25)
CALCIUM SPEC-SCNC: 9.2 MG/DL (ref 8.6–10.5)
CHLORIDE SERPL-SCNC: 103 MMOL/L (ref 98–107)
CO2 SERPL-SCNC: 25.7 MMOL/L (ref 22–29)
CREAT SERPL-MCNC: 0.79 MG/DL (ref 0.76–1.27)
DEPRECATED RDW RBC AUTO: 41.5 FL (ref 37–54)
EGFRCR SERPLBLD CKD-EPI 2021: 90.9 ML/MIN/1.73
EOSINOPHIL # BLD AUTO: 0.06 10*3/MM3 (ref 0–0.4)
EOSINOPHIL NFR BLD AUTO: 1.3 % (ref 0.3–6.2)
ERYTHROCYTE [DISTWIDTH] IN BLOOD BY AUTOMATED COUNT: 12 % (ref 12.3–15.4)
GLOBULIN UR ELPH-MCNC: 2.4 GM/DL
GLUCOSE SERPL-MCNC: 100 MG/DL (ref 65–99)
HCT VFR BLD AUTO: 43.1 % (ref 37.5–51)
HGB BLD-MCNC: 14.5 G/DL (ref 13–17.7)
IMM GRANULOCYTES # BLD AUTO: 0.01 10*3/MM3 (ref 0–0.05)
IMM GRANULOCYTES NFR BLD AUTO: 0.2 % (ref 0–0.5)
LYMPHOCYTES # BLD AUTO: 1.21 10*3/MM3 (ref 0.7–3.1)
LYMPHOCYTES NFR BLD AUTO: 25.7 % (ref 19.6–45.3)
MCH RBC QN AUTO: 32.2 PG (ref 26.6–33)
MCHC RBC AUTO-ENTMCNC: 33.6 G/DL (ref 31.5–35.7)
MCV RBC AUTO: 95.8 FL (ref 79–97)
MONOCYTES # BLD AUTO: 0.5 10*3/MM3 (ref 0.1–0.9)
MONOCYTES NFR BLD AUTO: 10.6 % (ref 5–12)
NEUTROPHILS NFR BLD AUTO: 2.9 10*3/MM3 (ref 1.7–7)
NEUTROPHILS NFR BLD AUTO: 61.8 % (ref 42.7–76)
NRBC BLD AUTO-RTO: 0 /100 WBC (ref 0–0.2)
PLATELET # BLD AUTO: 109 10*3/MM3 (ref 140–450)
PMV BLD AUTO: 9.5 FL (ref 6–12)
POTASSIUM SERPL-SCNC: 4.3 MMOL/L (ref 3.5–5.2)
PROT SERPL-MCNC: 6.5 G/DL (ref 6–8.5)
RBC # BLD AUTO: 4.5 10*6/MM3 (ref 4.14–5.8)
SODIUM SERPL-SCNC: 139 MMOL/L (ref 136–145)
TSH SERPL DL<=0.05 MIU/L-ACNC: 1.66 UIU/ML (ref 0.27–4.2)
VIT B12 BLD-MCNC: 1232 PG/ML (ref 211–946)
WBC NRBC COR # BLD AUTO: 4.7 10*3/MM3 (ref 3.4–10.8)

## 2024-04-16 PROCEDURE — 84443 ASSAY THYROID STIM HORMONE: CPT | Performed by: INTERNAL MEDICINE

## 2024-04-16 PROCEDURE — 80053 COMPREHEN METABOLIC PANEL: CPT | Performed by: INTERNAL MEDICINE

## 2024-04-16 PROCEDURE — 82607 VITAMIN B-12: CPT | Performed by: INTERNAL MEDICINE

## 2024-04-16 PROCEDURE — 85025 COMPLETE CBC W/AUTO DIFF WBC: CPT | Performed by: INTERNAL MEDICINE

## 2024-04-22 ENCOUNTER — OFFICE VISIT (OUTPATIENT)
Dept: INTERNAL MEDICINE | Facility: CLINIC | Age: 79
End: 2024-04-22
Payer: MEDICARE

## 2024-04-22 VITALS
BODY MASS INDEX: 27.16 KG/M2 | RESPIRATION RATE: 16 BRPM | OXYGEN SATURATION: 98 % | HEIGHT: 71 IN | SYSTOLIC BLOOD PRESSURE: 128 MMHG | WEIGHT: 194 LBS | HEART RATE: 70 BPM | DIASTOLIC BLOOD PRESSURE: 62 MMHG | TEMPERATURE: 96.2 F

## 2024-04-22 DIAGNOSIS — E78.2 MIXED HYPERLIPIDEMIA: ICD-10-CM

## 2024-04-22 DIAGNOSIS — E03.9 ACQUIRED HYPOTHYROIDISM: Primary | ICD-10-CM

## 2024-04-22 DIAGNOSIS — I10 PRIMARY HYPERTENSION: ICD-10-CM

## 2024-04-22 DIAGNOSIS — Z00.00 ROUTINE GENERAL MEDICAL EXAMINATION AT A HEALTH CARE FACILITY: ICD-10-CM

## 2024-04-22 DIAGNOSIS — Z12.5 ENCOUNTER FOR SCREENING FOR MALIGNANT NEOPLASM OF PROSTATE: ICD-10-CM

## 2024-04-22 PROCEDURE — G0439 PPPS, SUBSEQ VISIT: HCPCS | Performed by: INTERNAL MEDICINE

## 2024-04-22 PROCEDURE — 99213 OFFICE O/P EST LOW 20 MIN: CPT | Performed by: INTERNAL MEDICINE

## 2024-04-22 PROCEDURE — 3074F SYST BP LT 130 MM HG: CPT | Performed by: INTERNAL MEDICINE

## 2024-04-22 PROCEDURE — 3078F DIAST BP <80 MM HG: CPT | Performed by: INTERNAL MEDICINE

## 2024-04-22 PROCEDURE — 1170F FXNL STATUS ASSESSED: CPT | Performed by: INTERNAL MEDICINE

## 2024-04-22 NOTE — PROGRESS NOTES
Subjective     Patient ID: Deniz Dickson is a 78 y.o. male. Patient is here for management of multiple medical problems.     Chief Complaint   Patient presents with    Hypertension     History of Present Illness   Hypertension    B vit still to high.      The following portions of the patient's history were reviewed and updated as appropriate: allergies, current medications, past family history, past medical history, past social history, past surgical history and problem list.    Review of Systems    Current Outpatient Medications:     aspirin 81 MG EC tablet, Take 1 tablet by mouth Daily., Disp: 90 tablet, Rfl: 1    bisoprolol (ZEBeta) 5 MG tablet, TAKE 1/2 TABLET BY MOUTH DAILY, Disp: 45 tablet, Rfl: 6    celecoxib (CeleBREX) 200 MG capsule, , Disp: , Rfl:     Cholecalciferol (VITAMIN D-3 PO), Take 1 tablet by mouth Daily., Disp: , Rfl:     ezetimibe (ZETIA) 10 MG tablet, TAKE ONE TABLET BY MOUTH DAILY, Disp: 90 tablet, Rfl: 3    finasteride (PROSCAR) 5 MG tablet, TAKE ONE TABLET BY MOUTH DAILY, Disp: 90 tablet, Rfl: 3    fluticasone (FLONASE) 50 MCG/ACT nasal spray, SPRAY ONE SPRAY IN EACH NOSTRIL AS DIRECTED BY PROVIDER DAILY, Disp: 16 mL, Rfl: 5    Glucosamine-Chondroitin (OSTEO BI-FLEX REGULAR STRENGTH PO), Take 1 tablet by mouth 2 (two) times a day., Disp: , Rfl:     hydroxychloroquine (PLAQUENIL) 200 MG tablet, Take 1 tablet by mouth Every 12 (Twelve) Hours., Disp: , Rfl:     levothyroxine (SYNTHROID, LEVOTHROID) 137 MCG tablet, TAKE ONE TABLET BY MOUTH DAILY, Disp: 90 tablet, Rfl: 3    montelukast (SINGULAIR) 10 MG tablet, TAKE ONE TABLET BY MOUTH ONCE NIGHTLY, Disp: 90 tablet, Rfl: 3    Multiple Vitamins-Minerals (CENTRUM SILVER ADULT 50+) tablet, Take 1 tablet by mouth Daily., Disp: , Rfl:     Omega-3 Fatty Acids (FISH OIL) 1200 MG capsule capsule, Take 1 capsule by mouth 2 (Two) Times a Day With Meals., Disp: , Rfl:     Potassium 75 MG tablet, Take 595 mg by mouth Daily., Disp: , Rfl:     sildenafil  "(REVATIO) 20 MG tablet, TAKE ONE TO THREE TABLETS ONCE DAILY AS NEEDED FOR ERECTILE DYSFUNCTION, Disp: 30 tablet, Rfl: 4    simvastatin (ZOCOR) 20 MG tablet, TAKE ONE TABLET BY MOUTH ONCE NIGHTLY, Disp: 90 tablet, Rfl: 3    vitamin B-12 (CYANOCOBALAMIN) 1000 MCG tablet, Take 1 tablet by mouth Daily., Disp: , Rfl:     cyclobenzaprine (FLEXERIL) 5 MG tablet, Take 1 tablet by mouth As Needed., Disp: , Rfl:     famotidine (PEPCID) 40 MG tablet, Take 1 tablet by mouth At Night As Needed., Disp: , Rfl:     Objective      Blood pressure 128/62, pulse 70, temperature 96.2 °F (35.7 °C), resp. rate 16, height 179.1 cm (70.5\"), weight 88 kg (194 lb), SpO2 98%.            Physical Exam     General Appearance:    Alert, cooperative, no distress, appears stated age   Head:    Normocephalic, without obvious abnormality, atraumatic   Eyes:    PERRL, conjunctiva/corneas clear, EOM's intact   Ears:    Normal TM's and external ear canals, both ears   Nose:   Nares normal, septum midline, mucosa normal, no drainage   or sinus tenderness   Throat:   Lips, mucosa, and tongue normal; teeth and gums normal   Neck:   Supple, symmetrical, trachea midline, no adenopathy;        thyroid:  No enlargement/tenderness/nodules; no carotid    bruit or JVD   Back:     Symmetric, no curvature, ROM normal, no CVA tenderness   Lungs:     Clear to auscultation bilaterally, respirations unlabored   Chest wall:    No tenderness or deformity   Heart:    Regular rate and rhythm, S1 and S2 normal, no murmur,        rub or gallop   Abdomen:     Soft, non-tender, bowel sounds active all four quadrants,     no masses, no organomegaly   Extremities:   Extremities normal, atraumatic, no cyanosis or edema   Pulses:   2+ and symmetric all extremities   Skin:   Skin color, texture, turgor normal, no rashes or lesions   Lymph nodes:   Cervical, supraclavicular, and axillary nodes normal   Neurologic:   CNII-XII intact. Normal strength, sensation and reflexes       " throughout      Results for orders placed or performed in visit on 11/06/23   Vitamin B12    Specimen: Blood   Result Value Ref Range    Vitamin B-12 1,232 (H) 211 - 946 pg/mL   Comprehensive Metabolic Panel    Specimen: Blood   Result Value Ref Range    Glucose 100 (H) 65 - 99 mg/dL    BUN 17 8 - 23 mg/dL    Creatinine 0.79 0.76 - 1.27 mg/dL    Sodium 139 136 - 145 mmol/L    Potassium 4.3 3.5 - 5.2 mmol/L    Chloride 103 98 - 107 mmol/L    CO2 25.7 22.0 - 29.0 mmol/L    Calcium 9.2 8.6 - 10.5 mg/dL    Total Protein 6.5 6.0 - 8.5 g/dL    Albumin 4.1 3.5 - 5.2 g/dL    ALT (SGPT) 19 1 - 41 U/L    AST (SGOT) 27 1 - 40 U/L    Alkaline Phosphatase 59 39 - 117 U/L    Total Bilirubin 1.3 (H) 0.0 - 1.2 mg/dL    Globulin 2.4 gm/dL    A/G Ratio 1.7 g/dL    BUN/Creatinine Ratio 21.5 7.0 - 25.0    Anion Gap 10.3 5.0 - 15.0 mmol/L    eGFR 90.9 >60.0 mL/min/1.73   TSH    Specimen: Blood   Result Value Ref Range    TSH 1.660 0.270 - 4.200 uIU/mL   CBC Auto Differential    Specimen: Blood   Result Value Ref Range    WBC 4.70 3.40 - 10.80 10*3/mm3    RBC 4.50 4.14 - 5.80 10*6/mm3    Hemoglobin 14.5 13.0 - 17.7 g/dL    Hematocrit 43.1 37.5 - 51.0 %    MCV 95.8 79.0 - 97.0 fL    MCH 32.2 26.6 - 33.0 pg    MCHC 33.6 31.5 - 35.7 g/dL    RDW 12.0 (L) 12.3 - 15.4 %    RDW-SD 41.5 37.0 - 54.0 fl    MPV 9.5 6.0 - 12.0 fL    Platelets 109 (L) 140 - 450 10*3/mm3    Neutrophil % 61.8 42.7 - 76.0 %    Lymphocyte % 25.7 19.6 - 45.3 %    Monocyte % 10.6 5.0 - 12.0 %    Eosinophil % 1.3 0.3 - 6.2 %    Basophil % 0.4 0.0 - 1.5 %    Immature Grans % 0.2 0.0 - 0.5 %    Neutrophils, Absolute 2.90 1.70 - 7.00 10*3/mm3    Lymphocytes, Absolute 1.21 0.70 - 3.10 10*3/mm3    Monocytes, Absolute 0.50 0.10 - 0.90 10*3/mm3    Eosinophils, Absolute 0.06 0.00 - 0.40 10*3/mm3    Basophils, Absolute 0.02 0.00 - 0.20 10*3/mm3    Immature Grans, Absolute 0.01 0.00 - 0.05 10*3/mm3    nRBC 0.0 0.0 - 0.2 /100 WBC         Assessment & Plan   Vit b12 still high. Will  stop or decrease to one a week. Doing well.      Bp stable.    Mixed dyslipidemia.    Left hip pain. Hx of back pain. Unable to see Dr Mantilla anymore.  Will start tens 7000.      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  On Plaquinil.          Diagnoses and all orders for this visit:    1. Acquired hypothyroidism (Primary)  -     PSA Screen  -     Lipid Panel  -     CBC & Differential  -     Vitamin B12  -     Comprehensive Metabolic Panel  -     TSH    2. Primary hypertension  -     PSA Screen  -     Lipid Panel  -     CBC & Differential  -     Vitamin B12  -     Comprehensive Metabolic Panel  -     TSH    3. Mixed hyperlipidemia  -     PSA Screen  -     Lipid Panel  -     CBC & Differential  -     Vitamin B12  -     Comprehensive Metabolic Panel  -     TSH    4. Encounter for screening for malignant neoplasm of prostate  -     PSA Screen      No follow-ups on file.          There are no Patient Instructions on file for this visit.     Zane Magallon MD    Assessment & Plan

## 2024-04-22 NOTE — PROGRESS NOTES
The ABCs of the Annual Wellness Visit  Subsequent Medicare Wellness Visit    Subjective      Deniz Dickson is a 78 y.o. male who presents for a Subsequent Medicare Wellness Visit.    The following portions of the patient's history were reviewed and   updated as appropriate: allergies, current medications, past family history, past medical history, past social history, past surgical history, and problem list.    Compared to one year ago, the patient feels his physical   health is the same.    Compared to one year ago, the patient feels his mental   health is the same.    Recent Hospitalizations:  He was not admitted to the hospital during the last year.       Current Medical Providers:  Patient Care Team:  Zane Magallon MD as PCP - General (Internal Medicine)  Lisa Rhodes MD as Surgeon (General Surgery)    Outpatient Medications Prior to Visit   Medication Sig Dispense Refill    aspirin 81 MG EC tablet Take 1 tablet by mouth Daily. 90 tablet 1    bisoprolol (ZEBeta) 5 MG tablet TAKE 1/2 TABLET BY MOUTH DAILY 45 tablet 6    celecoxib (CeleBREX) 200 MG capsule       Cholecalciferol (VITAMIN D-3 PO) Take 1 tablet by mouth Daily.      ezetimibe (ZETIA) 10 MG tablet TAKE ONE TABLET BY MOUTH DAILY 90 tablet 3    finasteride (PROSCAR) 5 MG tablet TAKE ONE TABLET BY MOUTH DAILY 90 tablet 3    fluticasone (FLONASE) 50 MCG/ACT nasal spray SPRAY ONE SPRAY IN EACH NOSTRIL AS DIRECTED BY PROVIDER DAILY 16 mL 5    Glucosamine-Chondroitin (OSTEO BI-FLEX REGULAR STRENGTH PO) Take 1 tablet by mouth 2 (two) times a day.      hydroxychloroquine (PLAQUENIL) 200 MG tablet Take 1 tablet by mouth Every 12 (Twelve) Hours.      levothyroxine (SYNTHROID, LEVOTHROID) 137 MCG tablet TAKE ONE TABLET BY MOUTH DAILY 90 tablet 3    montelukast (SINGULAIR) 10 MG tablet TAKE ONE TABLET BY MOUTH ONCE NIGHTLY 90 tablet 3    Multiple Vitamins-Minerals (CENTRUM SILVER ADULT 50+) tablet Take 1 tablet by mouth Daily.      Omega-3 Fatty Acids  (FISH OIL) 1200 MG capsule capsule Take 1 capsule by mouth 2 (Two) Times a Day With Meals.      Potassium 75 MG tablet Take 595 mg by mouth Daily.      sildenafil (REVATIO) 20 MG tablet TAKE ONE TO THREE TABLETS ONCE DAILY AS NEEDED FOR ERECTILE DYSFUNCTION 30 tablet 4    simvastatin (ZOCOR) 20 MG tablet TAKE ONE TABLET BY MOUTH ONCE NIGHTLY 90 tablet 3    vitamin B-12 (CYANOCOBALAMIN) 1000 MCG tablet Take 1 tablet by mouth Daily.      cyclobenzaprine (FLEXERIL) 5 MG tablet Take 1 tablet by mouth As Needed.      famotidine (PEPCID) 40 MG tablet Take 1 tablet by mouth At Night As Needed.      nortriptyline (PAMELOR) 25 MG capsule Take 1 capsule by mouth Every Night. (Patient not taking: Reported on 3/13/2024) 30 capsule 5     No facility-administered medications prior to visit.       No opioid medication identified on active medication list. I have reviewed chart for other potential  high risk medication/s and harmful drug interactions in the elderly.        Aspirin is on active medication list. Aspirin use is indicated based on review of current medical condition/s. Pros and cons of this therapy have been discussed today. Benefits of this medication outweigh potential harm.  Patient has been encouraged to continue taking this medication.  .      Patient Active Problem List   Diagnosis    Atopic rhinitis    BPH (benign prostatic hyperplasia)    Gastroesophageal reflux disease    Granulomatous disease    Hemorrhoids    Hyperlipidemia    Hypertension    Hypothyroidism    Idiopathic thrombocytopenic purpura    JUWAN on CPAP    Vitamin D deficiency    Skin lesion    Diastasis recti    Palpitations    Osteoarthritis    History of ITP    Arthritic-like pain    Coronary artery disease    Dyslipidemia    Hypertension    Contracture of joint of right foot    Bronchitis    History of adenomatous polyp of colon    Arthritis    Chronic bilateral low back pain without sciatica    CATHY (acute kidney injury)    Nephrolithiasis     "Ureterolithiasis     Advance Care Planning   Advance Care Planning     Advance Directive is not on file.  ACP discussion was held with the patient during this visit. Patient does not have an advance directive, declines further assistance.     Objective    Vitals:    24 0755   BP: 128/62   Pulse: 70   Resp: 16   Temp: 96.2 °F (35.7 °C)   SpO2: 98%   Weight: 88 kg (194 lb)   Height: 179.1 cm (70.5\")     Estimated body mass index is 27.44 kg/m² as calculated from the following:    Height as of this encounter: 179.1 cm (70.5\").    Weight as of this encounter: 88 kg (194 lb).           Does the patient have evidence of cognitive impairment?   No     Mini cog  4/5       HEALTH RISK ASSESSMENT    Smoking Status:  Social History     Tobacco Use   Smoking Status Former    Current packs/day: 0.00    Average packs/day: 2.0 packs/day for 15.0 years (30.0 ttl pk-yrs)    Types: Cigarettes    Start date: 1970    Quit date: 1985    Years since quittin.8    Passive exposure: Past   Smokeless Tobacco Former    Types: Chew    Quit date:      Alcohol Consumption:  Social History     Substance and Sexual Activity   Alcohol Use No     Fall Risk Screen:    SHARYN Fall Risk Assessment was completed, and patient is at MODERATE risk for falls. Assessment completed on:2024    Depression Screenin/22/2024     8:02 AM   PHQ-2/PHQ-9 Depression Screening   Little Interest or Pleasure in Doing Things 0-->not at all   Feeling Down, Depressed or Hopeless 0-->not at all   PHQ-9: Brief Depression Severity Measure Score 0       Health Habits and Functional and Cognitive Screenin/22/2024     8:00 AM   Functional & Cognitive Status   Do you have difficulty preparing food and eating? No   Do you have difficulty bathing yourself, getting dressed or grooming yourself? No   Do you have difficulty using the toilet? No   Do you have difficulty moving around from place to place? No   Do you have trouble with steps or " getting out of a bed or a chair? No   Current Diet Well Balanced Diet   Dental Exam Up to date   Eye Exam Up to date   Exercise (times per week) 5 times per week   Current Exercises Include Walking   Do you need help using the phone?  No   Are you deaf or do you have serious difficulty hearing?  Yes   Do you need help to go to places out of walking distance? No   Do you need help shopping? No   Do you need help preparing meals?  No   Do you need help with housework?  No   Do you need help with laundry? No   Do you need help taking your medications? No   Do you need help managing money? No   Do you ever drive or ride in a car without wearing a seat belt? No   Have you felt unusual stress, anger or loneliness in the last month? No   Who do you live with? Child   If you need help, do you have trouble finding someone available to you? No   Have you been bothered in the last four weeks by sexual problems? No   Do you have difficulty concentrating, remembering or making decisions? No       Age-appropriate Screening Schedule:  Refer to the list below for future screening recommendations based on patient's age, sex and/or medical conditions. Orders for these recommended tests are listed in the plan section. The patient has been provided with a written plan.    Health Maintenance   Topic Date Due    ANNUAL WELLNESS VISIT  04/19/2024    TDAP/TD VACCINES (2 - Td or Tdap) 04/22/2024 (Originally 7/31/2022)    ZOSTER VACCINE (2 of 3) 04/22/2024 (Originally 3/29/2015)    RSV Vaccine - Adults (1 - 1-dose 60+ series) 05/22/2024 (Originally 5/17/2005)    INFLUENZA VACCINE  08/01/2024    LIPID PANEL  10/13/2024    BMI FOLLOWUP  04/22/2025    COLORECTAL CANCER SCREENING  07/22/2029    COVID-19 Vaccine  Completed    HEPATITIS C SCREENING  Addressed    Pneumococcal Vaccine 65+  Discontinued                  CMS Preventative Services Quick Reference  Risk Factors Identified During Encounter:    Fall Risk-High or Moderate: Discussed Fall  Prevention in the home, Information on Fall Prevention Shared in After Visit Summary, and Sit to Stand Exercise Information Shared in After Visit Summary    The above risks/problems have been discussed with the patient.  Pertinent information has been shared with the patient in the After Visit Summary.    Diagnoses and all orders for this visit:    1. Acquired hypothyroidism (Primary)  -     PSA Screen  -     Lipid Panel  -     CBC & Differential  -     Vitamin B12  -     Comprehensive Metabolic Panel  -     TSH    2. Primary hypertension  -     PSA Screen  -     Lipid Panel  -     CBC & Differential  -     Vitamin B12  -     Comprehensive Metabolic Panel  -     TSH    3. Mixed hyperlipidemia  -     PSA Screen  -     Lipid Panel  -     CBC & Differential  -     Vitamin B12  -     Comprehensive Metabolic Panel  -     TSH    4. Encounter for screening for malignant neoplasm of prostate  -     PSA Screen    5. Routine general medical examination at a health care facility        Follow Up:   Next Medicare Wellness visit to be scheduled in 1 year.      An After Visit Summary and PPPS were made available to the patient.

## 2024-05-03 ENCOUNTER — PATIENT ROUNDING (BHMG ONLY) (OUTPATIENT)
Dept: INTERNAL MEDICINE | Facility: CLINIC | Age: 79
End: 2024-05-03
Payer: MEDICARE

## 2024-05-03 NOTE — PROGRESS NOTES
5/3/2024    My name is Rebecca, Patient Access Supervisor.        I want to officially thank you for your recent visit to our practice and ask about your visit.        If you could, tell me about your visit with us. What things went well?  Ok       How was your experience with check-in and registation staff? ok      Is there anything that we could do to improve your check-in process?  Nothing         We're always looking for ways to make our patients' experiences even better. What can we do to improve your visits in the future?  nothing        Overall were you satisfied with your visit to our practice? ok        I appreciate you taking the time to answer a few questions today.        Thank you, and have a great day!

## 2024-06-10 RX ORDER — SIMVASTATIN 20 MG
TABLET ORAL
Qty: 90 TABLET | Refills: 3 | Status: SHIPPED | OUTPATIENT
Start: 2024-06-10

## 2024-06-10 RX ORDER — EZETIMIBE 10 MG/1
10 TABLET ORAL DAILY
Qty: 90 TABLET | Refills: 3 | Status: SHIPPED | OUTPATIENT
Start: 2024-06-10

## 2024-06-20 ENCOUNTER — TELEPHONE (OUTPATIENT)
Dept: INTERNAL MEDICINE | Facility: CLINIC | Age: 79
End: 2024-06-20
Payer: MEDICARE

## 2024-06-20 NOTE — TELEPHONE ENCOUNTER
Caller: Yasmin Dickson     Relationship: [unfilled]  YASMIN  (DAUGHTER)    Best call back number: [phone]     What is your medical concern? DIARRHEA    How long has this issue been going on? 3 DAYS    Is your provider already aware of this issue? NO    Have you been treated for this issue? NO    PLEASE CALL TO DISCUSS AND LET HIM KNOW WHAT TO DO.  NO TELEHEALTH APPTS UNTIL THE 21ST  PATIENT CANNOT MAKE IT TO THE OFFICE WITH THIS PROBLEM.

## 2024-06-21 NOTE — TELEPHONE ENCOUNTER
Caller: Yasmin Dickson    Relationship: Emergency Contact    Best call back number: [phone]         Who are you requesting to speak with (clinical staff, provider,  specific staff member): CLINICAL      What was the call regarding: RESOLVED

## 2024-06-21 NOTE — TELEPHONE ENCOUNTER
Called patient's daughter back , informed of Dr. Magallon's message, she states patient is feeling much better and to disregard that message from yesterday, informed if patient needed to be seen to call back.

## 2024-06-27 ENCOUNTER — HOSPITAL ENCOUNTER (OUTPATIENT)
Facility: HOSPITAL | Age: 79
Discharge: HOME | End: 2024-06-27
Payer: MEDICARE

## 2024-06-27 LAB
ALBUMIN SERPL-MCNC: 4.5 G/DL (ref 3.4–4.8)
ALBUMIN/GLOB SERPL: 2.1 {RATIO} (ref 0.8–2)
ALP SERPL-CCNC: 85 U/L (ref 25–100)
ALT SERPL-CCNC: 35 U/L (ref 4–36)
ANION GAP SERPL CALCULATED.3IONS-SCNC: 10 MMOL/L (ref 3–16)
AST SERPL-CCNC: 26 U/L (ref 8–33)
BASOPHILS # BLD: 0 K/UL (ref 0–0.1)
BASOPHILS NFR BLD: 0 %
BILIRUB SERPL-MCNC: 1.2 MG/DL (ref 0.3–1.2)
BUN SERPL-MCNC: 18 MG/DL (ref 6–20)
CALCIUM SERPL-MCNC: 9.1 MG/DL (ref 8.5–10.5)
CHLORIDE SERPL-SCNC: 100 MMOL/L (ref 98–107)
CO2 SERPL-SCNC: 28 MMOL/L (ref 20–30)
CREAT SERPL-MCNC: 0.7 MG/DL (ref 0.4–1.2)
EOSINOPHIL # BLD: 0 K/UL (ref 0–0.4)
EOSINOPHIL NFR BLD: 0.8 %
ERYTHROCYTE [DISTWIDTH] IN BLOOD BY AUTOMATED COUNT: 12.4 % (ref 11–16)
GFR SERPLBLD CREATININE-BSD FMLA CKD-EPI: >90 ML/MIN/{1.73_M2}
GLOBULIN SER CALC-MCNC: 2.1 G/DL
GLUCOSE SERPL-MCNC: 105 MG/DL (ref 74–106)
HCT VFR BLD AUTO: 43.2 % (ref 40–54)
HGB BLD-MCNC: 15.1 G/DL (ref 13–18)
IMM GRANULOCYTES # BLD: 0 K/UL
IMM GRANULOCYTES NFR BLD: 0.4 % (ref 0–5)
LYMPHOCYTES # BLD: 1 K/UL (ref 1.5–4)
LYMPHOCYTES NFR BLD: 20.7 %
MCH RBC QN AUTO: 33.6 PG (ref 27–32)
MCHC RBC AUTO-ENTMCNC: 35 G/DL (ref 31–35)
MCV RBC AUTO: 96.2 FL (ref 80–100)
MONOCYTES # BLD: 0.6 K/UL (ref 0.2–0.8)
MONOCYTES NFR BLD: 11.5 %
NEUTROPHILS # BLD: 3.3 K/UL (ref 2–7.5)
NEUTS SEG NFR BLD: 66.6 %
PLATELET # BLD AUTO: 115 K/UL (ref 150–400)
PMV BLD AUTO: 8.4 FL (ref 6–10)
POTASSIUM SERPL-SCNC: 4.4 MMOL/L (ref 3.4–5.1)
PROT SERPL-MCNC: 6.6 G/DL (ref 6.4–8.3)
RBC # BLD AUTO: 4.49 M/UL (ref 4.5–6)
SODIUM SERPL-SCNC: 138 MMOL/L (ref 136–145)
WBC # BLD AUTO: 4.9 K/UL (ref 4–11)

## 2024-06-27 PROCEDURE — 80053 COMPREHEN METABOLIC PANEL: CPT

## 2024-06-27 PROCEDURE — 85025 COMPLETE CBC W/AUTO DIFF WBC: CPT

## 2024-06-27 PROCEDURE — 36415 COLL VENOUS BLD VENIPUNCTURE: CPT

## 2024-07-30 ENCOUNTER — LAB (OUTPATIENT)
Dept: LAB | Facility: HOSPITAL | Age: 79
End: 2024-07-30
Payer: MEDICARE

## 2024-07-30 ENCOUNTER — TRANSCRIBE ORDERS (OUTPATIENT)
Dept: LAB | Facility: HOSPITAL | Age: 79
End: 2024-07-30
Payer: MEDICARE

## 2024-07-30 DIAGNOSIS — R97.20 ELEVATED PROSTATE SPECIFIC ANTIGEN (PSA): Primary | ICD-10-CM

## 2024-07-30 DIAGNOSIS — R97.20 ELEVATED PROSTATE SPECIFIC ANTIGEN (PSA): ICD-10-CM

## 2024-07-30 PROCEDURE — 84153 ASSAY OF PSA TOTAL: CPT

## 2024-07-30 PROCEDURE — 36415 COLL VENOUS BLD VENIPUNCTURE: CPT

## 2024-07-30 PROCEDURE — 84154 ASSAY OF PSA FREE: CPT

## 2024-07-31 LAB
PSA FREE MFR SERPL: 17.8 %
PSA FREE SERPL-MCNC: 0.32 NG/ML
PSA SERPL-MCNC: 1.8 NG/ML (ref 0–4)

## 2024-08-04 DIAGNOSIS — E03.9 ACQUIRED HYPOTHYROIDISM: ICD-10-CM

## 2024-08-05 RX ORDER — LEVOTHYROXINE SODIUM 137 UG/1
137 TABLET ORAL DAILY
Qty: 90 TABLET | Refills: 3 | Status: SHIPPED | OUTPATIENT
Start: 2024-08-05

## 2024-10-07 ENCOUNTER — TELEPHONE (OUTPATIENT)
Age: 79
End: 2024-10-07
Payer: MEDICARE

## 2024-10-07 DIAGNOSIS — M15.9 OSTEOARTHRITIS OF MULTIPLE JOINTS, UNSPECIFIED OSTEOARTHRITIS TYPE: ICD-10-CM

## 2024-10-07 DIAGNOSIS — Z86.39 HISTORY OF VITAMIN B DEFICIENCY: Primary | ICD-10-CM

## 2024-10-07 DIAGNOSIS — Z79.899 HIGH RISK MEDICATION USE: ICD-10-CM

## 2024-10-07 DIAGNOSIS — E55.9 VITAMIN D DEFICIENCY: ICD-10-CM

## 2024-10-07 NOTE — TELEPHONE ENCOUNTER
Pt is requesting a new lab order also including a B12 level to be mailed to him. -Yasmin Barton, PRASHANTHA

## 2024-10-15 ENCOUNTER — TELEPHONE (OUTPATIENT)
Age: 79
End: 2024-10-15
Payer: MEDICARE

## 2024-10-17 ENCOUNTER — LAB (OUTPATIENT)
Dept: LAB | Facility: HOSPITAL | Age: 79
End: 2024-10-17
Payer: MEDICARE

## 2024-10-17 DIAGNOSIS — M15.9 OSTEOARTHRITIS OF MULTIPLE JOINTS, UNSPECIFIED OSTEOARTHRITIS TYPE: ICD-10-CM

## 2024-10-17 DIAGNOSIS — Z79.899 HIGH RISK MEDICATION USE: ICD-10-CM

## 2024-10-17 DIAGNOSIS — Z86.39 HISTORY OF VITAMIN B DEFICIENCY: ICD-10-CM

## 2024-10-17 DIAGNOSIS — E55.9 VITAMIN D DEFICIENCY: ICD-10-CM

## 2024-10-17 LAB
ALBUMIN SERPL-MCNC: 4.4 G/DL (ref 3.5–5.2)
ALBUMIN/GLOB SERPL: 1.7 G/DL
ALP SERPL-CCNC: 76 U/L (ref 39–117)
ALT SERPL W P-5'-P-CCNC: 21 U/L (ref 1–41)
ANION GAP SERPL CALCULATED.3IONS-SCNC: 14.9 MMOL/L (ref 5–15)
AST SERPL-CCNC: 32 U/L (ref 1–40)
BASOPHILS # BLD AUTO: 0 10*3/MM3 (ref 0–0.2)
BASOPHILS NFR BLD AUTO: 0 % (ref 0–1.5)
BILIRUB SERPL-MCNC: 0.9 MG/DL (ref 0–1.2)
BUN SERPL-MCNC: 14 MG/DL (ref 8–23)
BUN/CREAT SERPL: 20 (ref 7–25)
CALCIUM SPEC-SCNC: 9.3 MG/DL (ref 8.6–10.5)
CHLORIDE SERPL-SCNC: 99 MMOL/L (ref 98–107)
CHOLEST SERPL-MCNC: 175 MG/DL (ref 0–200)
CO2 SERPL-SCNC: 22.1 MMOL/L (ref 22–29)
CREAT SERPL-MCNC: 0.7 MG/DL (ref 0.76–1.27)
DEPRECATED RDW RBC AUTO: 44.8 FL (ref 37–54)
EGFRCR SERPLBLD CKD-EPI 2021: 93.7 ML/MIN/1.73
EOSINOPHIL # BLD AUTO: 0.09 10*3/MM3 (ref 0–0.4)
EOSINOPHIL NFR BLD AUTO: 1.6 % (ref 0.3–6.2)
ERYTHROCYTE [DISTWIDTH] IN BLOOD BY AUTOMATED COUNT: 12.6 % (ref 12.3–15.4)
GLOBULIN UR ELPH-MCNC: 2.6 GM/DL
GLUCOSE SERPL-MCNC: 106 MG/DL (ref 65–99)
HCT VFR BLD AUTO: 44.7 % (ref 37.5–51)
HDLC SERPL-MCNC: 78 MG/DL (ref 40–60)
HGB BLD-MCNC: 15.7 G/DL (ref 13–17.7)
IMM GRANULOCYTES # BLD AUTO: 0.03 10*3/MM3 (ref 0–0.05)
IMM GRANULOCYTES NFR BLD AUTO: 0.5 % (ref 0–0.5)
LDLC SERPL CALC-MCNC: 81 MG/DL (ref 0–100)
LDLC/HDLC SERPL: 1.01 {RATIO}
LYMPHOCYTES # BLD AUTO: 1.08 10*3/MM3 (ref 0.7–3.1)
LYMPHOCYTES NFR BLD AUTO: 19.4 % (ref 19.6–45.3)
MCH RBC QN AUTO: 33.8 PG (ref 26.6–33)
MCHC RBC AUTO-ENTMCNC: 35.1 G/DL (ref 31.5–35.7)
MCV RBC AUTO: 96.3 FL (ref 79–97)
MONOCYTES # BLD AUTO: 0.46 10*3/MM3 (ref 0.1–0.9)
MONOCYTES NFR BLD AUTO: 8.2 % (ref 5–12)
NEUTROPHILS NFR BLD AUTO: 3.92 10*3/MM3 (ref 1.7–7)
NEUTROPHILS NFR BLD AUTO: 70.3 % (ref 42.7–76)
NRBC BLD AUTO-RTO: 0 /100 WBC (ref 0–0.2)
PLATELET # BLD AUTO: 85 10*3/MM3 (ref 140–450)
PMV BLD AUTO: 10.1 FL (ref 6–12)
POTASSIUM SERPL-SCNC: 4.6 MMOL/L (ref 3.5–5.2)
PROT SERPL-MCNC: 7 G/DL (ref 6–8.5)
PSA SERPL-MCNC: 1.93 NG/ML (ref 0–4)
RBC # BLD AUTO: 4.64 10*6/MM3 (ref 4.14–5.8)
SODIUM SERPL-SCNC: 136 MMOL/L (ref 136–145)
TRIGL SERPL-MCNC: 91 MG/DL (ref 0–150)
TSH SERPL DL<=0.05 MIU/L-ACNC: 1.04 UIU/ML (ref 0.27–4.2)
VIT B12 BLD-MCNC: 884 PG/ML (ref 211–946)
VLDLC SERPL-MCNC: 16 MG/DL (ref 5–40)
WBC NRBC COR # BLD AUTO: 5.58 10*3/MM3 (ref 3.4–10.8)

## 2024-10-17 PROCEDURE — 82607 VITAMIN B-12: CPT | Performed by: INTERNAL MEDICINE

## 2024-10-17 PROCEDURE — 80061 LIPID PANEL: CPT | Performed by: INTERNAL MEDICINE

## 2024-10-17 PROCEDURE — 80053 COMPREHEN METABOLIC PANEL: CPT | Performed by: INTERNAL MEDICINE

## 2024-10-17 PROCEDURE — 85025 COMPLETE CBC W/AUTO DIFF WBC: CPT | Performed by: INTERNAL MEDICINE

## 2024-10-17 PROCEDURE — 84443 ASSAY THYROID STIM HORMONE: CPT | Performed by: INTERNAL MEDICINE

## 2024-10-17 PROCEDURE — G0103 PSA SCREENING: HCPCS | Performed by: INTERNAL MEDICINE

## 2024-10-22 ENCOUNTER — OFFICE VISIT (OUTPATIENT)
Age: 79
End: 2024-10-22
Payer: MEDICARE

## 2024-10-22 ENCOUNTER — OFFICE VISIT (OUTPATIENT)
Dept: INTERNAL MEDICINE | Facility: CLINIC | Age: 79
End: 2024-10-22
Payer: MEDICARE

## 2024-10-22 VITALS
WEIGHT: 181.3 LBS | HEART RATE: 71 BPM | DIASTOLIC BLOOD PRESSURE: 70 MMHG | BODY MASS INDEX: 25.38 KG/M2 | TEMPERATURE: 97.9 F | SYSTOLIC BLOOD PRESSURE: 132 MMHG | HEIGHT: 71 IN

## 2024-10-22 VITALS
SYSTOLIC BLOOD PRESSURE: 118 MMHG | OXYGEN SATURATION: 94 % | TEMPERATURE: 97.2 F | HEIGHT: 71 IN | HEART RATE: 68 BPM | RESPIRATION RATE: 16 BRPM | WEIGHT: 179 LBS | DIASTOLIC BLOOD PRESSURE: 60 MMHG | BODY MASS INDEX: 25.06 KG/M2

## 2024-10-22 DIAGNOSIS — Z23 NEED FOR INFLUENZA VACCINATION: ICD-10-CM

## 2024-10-22 DIAGNOSIS — M25.552 PAIN OF LEFT HIP: ICD-10-CM

## 2024-10-22 DIAGNOSIS — Z79.1 LONG TERM (CURRENT) USE OF NON-STEROIDAL ANTI-INFLAMMATORIES (NSAID): ICD-10-CM

## 2024-10-22 DIAGNOSIS — E55.9 VITAMIN D DEFICIENCY: ICD-10-CM

## 2024-10-22 DIAGNOSIS — D69.3 IMMUNE THROMBOCYTOPENIC PURPURA: ICD-10-CM

## 2024-10-22 DIAGNOSIS — J30.1 ALLERGIC RHINITIS DUE TO POLLEN, UNSPECIFIED SEASONALITY: Primary | ICD-10-CM

## 2024-10-22 DIAGNOSIS — M15.9 OSTEOARTHRITIS OF MULTIPLE JOINTS, UNSPECIFIED OSTEOARTHRITIS TYPE: ICD-10-CM

## 2024-10-22 DIAGNOSIS — M19.90 INFLAMMATORY ARTHRITIS: ICD-10-CM

## 2024-10-22 DIAGNOSIS — Z79.899 HIGH RISK MEDICATION USE: ICD-10-CM

## 2024-10-22 DIAGNOSIS — E78.2 MIXED HYPERLIPIDEMIA: ICD-10-CM

## 2024-10-22 DIAGNOSIS — Z86.39 HISTORY OF VITAMIN B DEFICIENCY: Primary | ICD-10-CM

## 2024-10-22 DIAGNOSIS — M19.90 ARTHRITIS: ICD-10-CM

## 2024-10-22 DIAGNOSIS — I10 PRIMARY HYPERTENSION: ICD-10-CM

## 2024-10-22 PROCEDURE — G0008 ADMIN INFLUENZA VIRUS VAC: HCPCS | Performed by: INTERNAL MEDICINE

## 2024-10-22 PROCEDURE — 99214 OFFICE O/P EST MOD 30 MIN: CPT | Performed by: INTERNAL MEDICINE

## 2024-10-22 PROCEDURE — 90662 IIV NO PRSV INCREASED AG IM: CPT | Performed by: INTERNAL MEDICINE

## 2024-10-22 PROCEDURE — 1125F AMNT PAIN NOTED PAIN PRSNT: CPT | Performed by: INTERNAL MEDICINE

## 2024-10-22 PROCEDURE — 3078F DIAST BP <80 MM HG: CPT | Performed by: INTERNAL MEDICINE

## 2024-10-22 PROCEDURE — 3074F SYST BP LT 130 MM HG: CPT | Performed by: INTERNAL MEDICINE

## 2024-10-22 RX ORDER — CELECOXIB 200 MG/1
200 CAPSULE ORAL 2 TIMES DAILY
Qty: 180 CAPSULE | Refills: 1 | Status: SHIPPED | OUTPATIENT
Start: 2024-10-22

## 2024-10-22 RX ORDER — HYDROXYCHLOROQUINE SULFATE 200 MG/1
200 TABLET, FILM COATED ORAL EVERY 12 HOURS
Qty: 180 TABLET | Refills: 1 | Status: SHIPPED | OUTPATIENT
Start: 2024-10-22

## 2024-10-22 NOTE — ASSESSMENT & PLAN NOTE
Plaquenil well tolerated and effective  COVID Vaccination UTD. He also got the Bivalent booster.       Plan:  CBC CMP Q 6months 4/2023  Eye exam Q 9-12 months with annual OCT 7/2023

## 2024-10-22 NOTE — ASSESSMENT & PLAN NOTE
Had partial left KR 1/13- Had Right partial Knee replacement 7/17.  See's Dr. Cassidy.  Back is stable with chiropractor.  He takes 100mg of celebrex bid  ( 200mg bid with flare)  Positive MCP Swelling Pain and stiffness. May be Seronegative RA. He has improved on steroids.   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      Moderate Right Degenerative changes of the wrist and severe degenerative changes of the 1st CMC and Moderate degenerative changes in the DIP. Moderate changes in the PIP Joint of the 5th digit.    Mod Degenerative changes of the mid foot and 1st MTP Bilaterally. No Erosions.   10/22 xrays showed hammertoes.   10/31/20 CRP .9 VERONICA Negative CCP Negative. Sed Rate Normal. RF IGA IGM AND IGG all Normal.   He is taking Plaquenil and Celebrex, off prednisone.  Does not tolerate less than Celebrex 200mg bid.  Joints are doing well without synovitis.        Plan:  Continue Plaquenil and Celebrex.   Shoulders have worsened.    Xray bilateral shoulders today.  He would like to start working out.

## 2024-10-22 NOTE — PROGRESS NOTES
Subjective     Patient ID: Deniz Dickson is a 79 y.o. male. Patient is here for management of multiple medical problems.     Chief Complaint   Patient presents with    Hypothyroidism     History of Present Illness   Hypothyroid    Hypercholest.    Upper airway congestion.   Months.   Normally takes otc sinex or day quil daily.   Helps.     The following portions of the patient's history were reviewed and updated as appropriate: allergies, current medications, past family history, past medical history, past social history, past surgical history and problem list.    Review of Systems    Current Outpatient Medications:     aspirin 81 MG EC tablet, Take 1 tablet by mouth Daily., Disp: 90 tablet, Rfl: 1    bisoprolol (ZEBeta) 5 MG tablet, TAKE 1/2 TABLET BY MOUTH DAILY, Disp: 45 tablet, Rfl: 6    celecoxib (CeleBREX) 200 MG capsule, , Disp: , Rfl:     Cholecalciferol (VITAMIN D-3 PO), Take 1 tablet by mouth Daily. 1000 units, Disp: , Rfl:     Cyanocobalamin (Vitamin B-12) 5000 MCG sublingual tablet, Place  under the tongue. 2 per week, Disp: , Rfl:     cyclobenzaprine (FLEXERIL) 5 MG tablet, Take 1 tablet by mouth As Needed., Disp: , Rfl:     ezetimibe (ZETIA) 10 MG tablet, TAKE 1 TABLET BY MOUTH DAILY, Disp: 90 tablet, Rfl: 3    ezetimibe-simvastatin (Vytorin) 10-20 MG per tablet, Take 1 tablet by mouth Daily., Disp: , Rfl:     famotidine (PEPCID) 40 MG tablet, Take 1 tablet by mouth At Night As Needed., Disp: , Rfl:     finasteride (PROSCAR) 5 MG tablet, TAKE ONE TABLET BY MOUTH DAILY, Disp: 90 tablet, Rfl: 3    fluticasone (FLONASE) 50 MCG/ACT nasal spray, SPRAY ONE SPRAY IN EACH NOSTRIL AS DIRECTED BY PROVIDER DAILY, Disp: 16 mL, Rfl: 5    Glucosamine-Chondroitin (OSTEO BI-FLEX REGULAR STRENGTH PO), Take 1 tablet by mouth 2 (two) times a day., Disp: , Rfl:     hydroxychloroquine (PLAQUENIL) 200 MG tablet, Take 1 tablet by mouth Every 12 (Twelve) Hours., Disp: , Rfl:     levothyroxine (SYNTHROID, LEVOTHROID) 137 MCG  "tablet, TAKE 1 TABLET BY MOUTH DAILY, Disp: 90 tablet, Rfl: 3    Misc Natural Products (Osteo Bi-Flex Adv Triple St) tablet, Take 1 tablet by mouth 2 (Two) Times a Day. 750 mg-644 mg-30mg-1mg, Disp: , Rfl:     montelukast (SINGULAIR) 10 MG tablet, TAKE ONE TABLET BY MOUTH ONCE NIGHTLY, Disp: 90 tablet, Rfl: 3    Multiple Vitamins-Minerals (CENTRUM SILVER ADULT 50+) tablet, Take 1 tablet by mouth Daily., Disp: , Rfl:     Omega-3 Fatty Acids (FISH OIL) 1200 MG capsule capsule, Take 1 capsule by mouth 2 (Two) Times a Day With Meals., Disp: , Rfl:     Potassium 75 MG tablet, Take 595 mg by mouth Daily., Disp: , Rfl:     Potassium 99 MG tablet, Take 1 tablet by mouth Daily., Disp: , Rfl:     sildenafil (REVATIO) 20 MG tablet, TAKE ONE TO THREE TABLETS ONCE DAILY AS NEEDED FOR ERECTILE DYSFUNCTION, Disp: 30 tablet, Rfl: 4    simvastatin (ZOCOR) 20 MG tablet, TAKE ONE TABLET BY MOUTH ONCE NIGHTLY, Disp: 90 tablet, Rfl: 3    vitamin B-12 (CYANOCOBALAMIN) 1000 MCG tablet, Take 1 tablet by mouth Daily., Disp: , Rfl:     Objective      Blood pressure 118/60, pulse 68, temperature 97.2 °F (36.2 °C), resp. rate 16, height 179.1 cm (70.5\"), weight 81.2 kg (179 lb), SpO2 94%.            Physical Exam     General Appearance:    Alert, cooperative, no distress, appears stated age   Head:    Normocephalic, without obvious abnormality, atraumatic   Eyes:    PERRL, conjunctiva/corneas clear, EOM's intact   Ears:    Normal TM's and external ear canals, both ears   Nose:   Nares normal, septum midline, mucosa normal, no drainage   or sinus tenderness   Throat:   Lips, mucosa, and tongue normal; teeth and gums normal   Neck:   Supple, symmetrical, trachea midline, no adenopathy;        thyroid:  No enlargement/tenderness/nodules; no carotid    bruit or JVD   Back:     Symmetric, no curvature, ROM normal, no CVA tenderness   Lungs:     Clear to auscultation bilaterally, respirations unlabored   Chest wall:    No tenderness or deformity "   Heart:    Regular rate and rhythm, S1 and S2 normal, no murmur,        rub or gallop   Abdomen:     Soft, non-tender, bowel sounds active all four quadrants,     no masses, no organomegaly   Extremities:   Extremities normal, atraumatic, no cyanosis or edema   Pulses:   2+ and symmetric all extremities   Skin:   Skin color, texture, turgor normal, no rashes or lesions   Lymph nodes:   Cervical, supraclavicular, and axillary nodes normal   Neurologic:   CNII-XII intact. Normal strength, sensation and reflexes       throughout      Results for orders placed or performed in visit on 07/30/24   PSA, Total & Free    Collection Time: 07/30/24  8:32 AM    Specimen: Blood   Result Value Ref Range    PSA 1.8 0.0 - 4.0 ng/mL    PSA, Free 0.32 N/A ng/mL    PSA, Free % 17.8 %         Assessment & Plan   Start Allermi supper spray. Phone number give to call.    Hip pain. Feels he needs a hip replacement. Told back is the issue. Improving with time.         Diagnoses and all orders for this visit:    1. Allergic rhinitis due to pollen, unspecified seasonality (Primary)  -     Comprehensive Metabolic Panel  -     CBC & Differential  -     Lipid Panel  -     Vitamin B12  -     TSH  -     T4, Free    2. Arthritis  -     Comprehensive Metabolic Panel  -     CBC & Differential  -     Lipid Panel  -     Vitamin B12  -     TSH  -     T4, Free    3. Mixed hyperlipidemia  -     Comprehensive Metabolic Panel  -     CBC & Differential  -     Lipid Panel  -     Vitamin B12  -     TSH  -     T4, Free    4. Primary hypertension  -     Comprehensive Metabolic Panel  -     CBC & Differential  -     Lipid Panel  -     Vitamin B12  -     TSH  -     T4, Free      Return in about 6 months (around 4/22/2025).          There are no Patient Instructions on file for this visit.     Zane Magallon MD    Assessment & Plan

## 2024-10-22 NOTE — ASSESSMENT & PLAN NOTE
See's Dr Whiting- Stable- Platelet Count 115k in Nov 2017.  109K in 6/18.  6/19 94k.  87k in 12/19  100k in 10/2020  91k 04/21  81K 08/21  95K 10/22  109K 4/2024      Plan:  Dr Singh released him. If the Platelets drop to 50k we will need to refer him back

## 2024-10-22 NOTE — PROGRESS NOTES
[facility] [place] [place]     Office Follow Up      Date: 10/22/2024   Patient Name: Deniz Dickson  MRN: [id number]  YOB: 1945    Referring Physician: Zane Magallon MD     Chief Complaint   Patient presents with    Inflammatory arthritis       History of Present Illness: Deniz Dickson is a 79 y.o. male who is here today for follow up on   History of Present Illness         Result Review :        Results            Subjective     Allergies   Allergen Reactions    Penicillins Itching, Rash and Unknown (See Comments)         Current Outpatient Medications:     aspirin 81 MG EC tablet, Take 1 tablet by mouth Daily., Disp: 90 tablet, Rfl: 1    bisoprolol (ZEBeta) 5 MG tablet, TAKE 1/2 TABLET BY MOUTH DAILY, Disp: 45 tablet, Rfl: 6    celecoxib (CeleBREX) 200 MG capsule, , Disp: , Rfl:     Cholecalciferol (VITAMIN D-3 PO), Take 1 tablet by mouth Daily. 1000 units, Disp: , Rfl:     Cyanocobalamin (Vitamin B-12) 5000 MCG sublingual tablet, Place  under the tongue. 2 per week, Disp: , Rfl:     cyclobenzaprine (FLEXERIL) 5 MG tablet, Take 1 tablet by mouth As Needed., Disp: , Rfl:     ezetimibe (ZETIA) 10 MG tablet, TAKE 1 TABLET BY MOUTH DAILY, Disp: 90 tablet, Rfl: 3    ezetimibe-simvastatin (Vytorin) 10-20 MG per tablet, Take 1 tablet by mouth Daily., Disp: , Rfl:     famotidine (PEPCID) 40 MG tablet, Take 1 tablet by mouth At Night As Needed., Disp: , Rfl:     finasteride (PROSCAR) 5 MG tablet, TAKE ONE TABLET BY MOUTH DAILY, Disp: 90 tablet, Rfl: 3    fluticasone (FLONASE) 50 MCG/ACT nasal spray, SPRAY ONE SPRAY IN EACH NOSTRIL AS DIRECTED BY PROVIDER DAILY, Disp: 16 mL, Rfl: 5    Glucosamine-Chondroitin (OSTEO BI-FLEX REGULAR STRENGTH PO), Take 1 tablet by mouth 2 (two) times a day., Disp: , Rfl:     hydroxychloroquine (PLAQUENIL) 200 MG tablet, Take 1 tablet by mouth Every 12 (Twelve) Hours., Disp: , Rfl:     levothyroxine (SYNTHROID,  LEVOTHROID) 137 MCG tablet, TAKE 1 TABLET BY MOUTH DAILY, Disp: 90 tablet, Rfl: 3    Misc Natural Products (Osteo Bi-Flex Adv Triple St) tablet, Take 1 tablet by mouth 2 (Two) Times a Day. 750 mg-644 mg-30mg-1mg, Disp: , Rfl:     montelukast (SINGULAIR) 10 MG tablet, TAKE ONE TABLET BY MOUTH ONCE NIGHTLY, Disp: 90 tablet, Rfl: 3    Multiple Vitamins-Minerals (CENTRUM SILVER ADULT 50+) tablet, Take 1 tablet by mouth Daily., Disp: , Rfl:     Omega-3 Fatty Acids (FISH OIL) 1200 MG capsule capsule, Take 1 capsule by mouth 2 (Two) Times a Day With Meals., Disp: , Rfl:     Potassium 75 MG tablet, Take 595 mg by mouth Daily., Disp: , Rfl:     Potassium 99 MG tablet, Take 1 tablet by mouth Daily., Disp: , Rfl:     sildenafil (REVATIO) 20 MG tablet, TAKE ONE TO THREE TABLETS ONCE DAILY AS NEEDED FOR ERECTILE DYSFUNCTION, Disp: 30 tablet, Rfl: 4    simvastatin (ZOCOR) 20 MG tablet, TAKE ONE TABLET BY MOUTH ONCE NIGHTLY, Disp: 90 tablet, Rfl: 3    vitamin B-12 (CYANOCOBALAMIN) 1000 MCG tablet, Take 1 tablet by mouth Daily., Disp: , Rfl:     Past Medical History:   Diagnosis Date    Acquired deformity of joint of finger     Arthritis     OA    Basal cell carcinoma 02/2020    left ear    Cancer     Skin cancer removed 3x     Carpal tunnel syndrome     Coronary artery disease     Probable Non obstructive     Dyslipidemia     Elevated prostate specific antigen (PSA)     Essential hypertension, benign     Fatty liver     Full dentures     Instructed no adhesives the DOS    GERD (gastroesophageal reflux disease)     Gilbert syndrome     H/O benign prostatic hypertrophy     H/O cardiovascular stress test 07/19/2019    Patient reported done by Dr Ojeda around 3-4 years ago and reported that all was wnl's     Hammertoe     right foot saw dr garcia who recommend surgery but pt defers    High cholesterol     History of bronchitis     History of hemorrhoids     History of idiopathic thrombocytopenic purpura     History of malignant  "neoplasm of skin     History of sleep apnea     [place] (hard of hearing)     No use of hearing aids    Hypertension     Hypothyroidism     Kidney stones     Long term (current) use of non-steroidal anti-inflammatories (nsaid)     JUWAN on CPAP     Patient instructed to bring mask and machine the DOS    Osteoarthritis of knee     Pain in joint of left elbow     Rectal bleeding     S/P right knee surgery 07/12/2017    Seasonal allergies     Tobacco abuse     Vitamin D deficiency     Wears glasses     Rx wears        Past Surgical History:   Procedure Laterality Date    APPENDECTOMY      BASAL CELL CARCINOMA EXCISION  2020    left ear    COLONOSCOPY      Complete Colonoscopy    COLONOSCOPY N/A 07/22/2019    Procedure: COLONOSCOPY;  Surgeon: Lisa Rhodes MD;  Location: [facility] ENDOSCOPY;  Service: Gastroenterology    HERNIA REPAIR      patient reported by Dr Banuelos - patient unsure extact location    JOINT REPLACEMENT Left     partial knee replacement    JOINT REPLACEMENT Right     partial knee replacement    KNEE SURGERY  01/2013    Left Knee    KNEE SURGERY Right 07/2017    MOHS SURGERY  04/2018    nose removed ;bc    MOUTH SURGERY      full mouth extraction    OTHER SURGICAL HISTORY      Surgery Testis Orchiopexy around age 20    OTHER SURGICAL HISTORY      Cyst removed from a finger - patient reported to this as a \"growth\" and is unsure laterality    SKIN BIOPSY      Patient reported skin cancer removed 3x.  Nose, back and face    URETEROSCOPY LASER LITHOTRIPSY WITH STENT INSERTION Left 11/15/2020    Procedure: URETEROSCOPY LASER LITHOTRIPSY WITH STENT INSERTION, Rertrograde;  Surgeon: Bobo Carter MD;  Location: [facility] OR;  Service: Urology;  Laterality: Left;       Family History   Problem Relation Age of Onset    Migraines Mother     Mental illness Mother     Heart disease Mother     Arthritis Mother     Alcohol abuse Father     Cancer Father     Lung cancer Father     Arthritis Father     Brain cancer " Sister     Colon cancer Paternal Grandmother     Prostate cancer Other     Colon cancer Other         Social History     Socioeconomic History    Marital status:     Number of children: 2   Tobacco Use    Smoking status: Former     Current packs/day: 0.00     Average packs/day: 2.0 packs/day for 15.0 years (30.0 ttl pk-yrs)     Types: Cigarettes     Start date: 1970     Quit date: 1985     Years since quittin.4     Passive exposure: Past    Smokeless tobacco: Former     Types: Chew     Quit date:    Vaping Use    Vaping status: Never Used   Substance and Sexual Activity    Alcohol use: No    Drug use: No    Sexual activity: Defer       Review of Systems   Constitutional:  Positive for fatigue.   Respiratory:  Positive for shortness of breath.    Musculoskeletal:  Positive for arthralgias.   Hematological:  Bruises/bleeds easily.      I have reviewed and updated the patient's chief complaint, history of present illness, review of systems, past medical history, surgical history, family history, social history, medications and allergy list as appropriate.     Objective    Vital Signs:   There were no vitals filed for this visit.  Deniz Dickson reports a pain score of .  Given his pain assessment as noted, treatment options were discussed and the following options were decided upon as a follow-up plan to address the patient's pain: .      There is no height or weight on file to calculate BMI.         Physical Exam   Physical Exam      Physical Exam  There is currently no information documented on the homunculus. Go to the Rheumatology activity and complete the homunculus joint exam.     Results Review:   Imaging Results (Last 24 Hours)       ** No results found for the last 24 hours. **            Procedures    Assessment / Plan    Assessment/Plan:   Diagnoses and all orders for this visit:    1. History of vitamin B deficiency (Primary)    2. Vitamin D deficiency    3. Osteoarthritis of  multiple joints, unspecified osteoarthritis type    4. High risk medication use  Assessment & Plan:  Plaquenil well tolerated and effective  COVID Vaccination UTD. He also got the Bivalent booster.       Plan:  CBC CMP Q 6months 4/2023  Eye exam Q 9-12 months with annual OCT 7/2023      5. Inflammatory arthritis  Assessment & Plan:  Had partial left KR 1/13- Had Right partial Knee replacement 7/17.  See's Dr. Cassidy.  Back is stable with chiropractor.  He takes 100mg of celebrex bid  ( 200mg bid with flare)  Positive MCP Swelling Pain and stiffness. May be Seronegative RA. He has improved on steroids.   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      Moderate Right Degenerative changes of the wrist and severe degenerative changes of the 1st CMC and Moderate degenerative changes in the DIP. Moderate changes in the PIP Joint of the 5th digit.    Mod Degenerative changes of the mid foot and 1st MTP Bilaterally. No Erosions.   10/22 xrays showed hammertoes.   10/31/20 CRP .9 VERONICA Negative CCP Negative. Sed Rate Normal. RF IGA IGM AND IGG all Normal.   He is taking Plaquenil and Celebrex, off prednisone.  Does not tolerate less than Celebrex 200mg bid.  Joints are doing well without synovitis.        Plan:  Continue Plaquenil and Celebrex.   Shoulders have worsened.    Xray bilateral shoulders today.  He would like to start working out.        6. Immune thrombocytopenic purpura  Assessment & Plan:  See's Dr Whiting- Stable- Platelet Count 115k in Nov 2017.  109K in 6/18.  6/19 94k.  87k in 12/19  100k in 10/2020  91k 04/21  81K 08/21  95K 10/22  109K 4/2024      Plan:  Dr Singh released him. If the Platelets drop to 50k we will need to refer him back      7. Long term (current) use of non-steroidal anti-inflammatories (nsaid)          Assessment & Plan          Follow Up:   No follow-ups on file.       Margot Estrella MD  [facility] [place] [facility]     Encounter  Administratively Closed by Health Information Management

## 2024-11-11 RX ORDER — MONTELUKAST SODIUM 10 MG/1
TABLET ORAL
Qty: 90 TABLET | Refills: 3 | Status: SHIPPED | OUTPATIENT
Start: 2024-11-11

## 2024-12-23 ENCOUNTER — OFFICE VISIT (OUTPATIENT)
Dept: CARDIOLOGY | Facility: CLINIC | Age: 79
End: 2024-12-23
Payer: MEDICARE

## 2024-12-23 VITALS
DIASTOLIC BLOOD PRESSURE: 60 MMHG | BODY MASS INDEX: 24.99 KG/M2 | SYSTOLIC BLOOD PRESSURE: 106 MMHG | WEIGHT: 178.5 LBS | HEART RATE: 60 BPM | HEIGHT: 71 IN

## 2024-12-23 DIAGNOSIS — I25.10 CORONARY ARTERY DISEASE INVOLVING NATIVE CORONARY ARTERY OF NATIVE HEART WITHOUT ANGINA PECTORIS: Primary | ICD-10-CM

## 2024-12-23 DIAGNOSIS — I10 PRIMARY HYPERTENSION: ICD-10-CM

## 2024-12-23 DIAGNOSIS — E78.2 MIXED HYPERLIPIDEMIA: ICD-10-CM

## 2024-12-23 PROCEDURE — 3078F DIAST BP <80 MM HG: CPT | Performed by: INTERNAL MEDICINE

## 2024-12-23 PROCEDURE — 3074F SYST BP LT 130 MM HG: CPT | Performed by: INTERNAL MEDICINE

## 2024-12-23 PROCEDURE — 99214 OFFICE O/P EST MOD 30 MIN: CPT | Performed by: INTERNAL MEDICINE

## 2024-12-23 NOTE — PROGRESS NOTES
[facility]  Office Progress Note  Deniz Dickson  1945  [address] [place] [address]       Visit Date: 12/23/24    PCP: Zane Magallon MD  [address]  [place] [address]    IDENTIFICATION: A 79 y.o. male , retired ,  2016 from [place].  [employer]    PROBLEM LIST:   Probable nonobstructive coronary artery disease:   2/15 SE wnl w low exercise tolerance  6/21 Lexiscan MPS: Lexiscan Cardiolite WNL.  LVEF = 65%.  Low risk test.  7/21 CT scan with heavily calcified LAD  Dyslipidemia   10/24 175/91/78/81  PVCs  7-day Holter monitor: AVG 66, min 47, max 113.  A. fib 0.0%.  SVE <.1%.  VE 20% burden.  Hypertension  Remote bilateral knee surgery per Dr. Smith. Redo R 8/18  Obstructive sleep apnea, on CPAP greater than 5 years.   Reformed smoker, times greater than 12 years.   Hypothyroidism.   Nephrolithiasis with history of hydronephrosis  7/21 CT A&P: Mild right hydronephrosis secondary to 4 mm right ureteral stone.      CC:   Chief Complaint   Patient presents with    Hypertension    Hyperlipidemia       Allergies  Allergies   Allergen Reactions    Penicillins Itching, Rash and Unknown (See Comments)       Current Medications    Current Outpatient Medications:     aspirin 81 MG EC tablet, Take 1 tablet by mouth Daily., Disp: 90 tablet, Rfl: 1    bisoprolol (ZEBeta) 5 MG tablet, TAKE 1/2 TABLET BY MOUTH DAILY, Disp: 45 tablet, Rfl: 6    celecoxib (CeleBREX) 200 MG capsule, Take 1 capsule by mouth 2 (Two) Times a Day., Disp: 180 capsule, Rfl: 1    Cholecalciferol (VITAMIN D-3 PO), Take 1 tablet by mouth Daily. 1000 units, Disp: , Rfl:     Cyanocobalamin (Vitamin B-12) 5000 MCG sublingual tablet, Place  under the tongue. 2 per week, Disp: , Rfl:     cyclobenzaprine (FLEXERIL) 5 MG tablet, Take 1 tablet by mouth As Needed., Disp: , Rfl:     ezetimibe (ZETIA) 10 MG tablet, TAKE 1 TABLET BY MOUTH DAILY, Disp: 90 tablet, Rfl: 3     ezetimibe-simvastatin (Vytorin) 10-20 MG per tablet, Take 1 tablet by mouth Daily., Disp: , Rfl:     finasteride (PROSCAR) 5 MG tablet, TAKE ONE TABLET BY MOUTH DAILY, Disp: 90 tablet, Rfl: 3    fluticasone (FLONASE) 50 MCG/ACT nasal spray, SPRAY ONE SPRAY IN EACH NOSTRIL AS DIRECTED BY PROVIDER DAILY, Disp: 16 mL, Rfl: 5    Glucosamine-Chondroitin (OSTEO BI-FLEX REGULAR STRENGTH PO), Take 1 tablet by mouth 2 (two) times a day., Disp: , Rfl:     hydroxychloroquine (PLAQUENIL) 200 MG tablet, Take 1 tablet by mouth Every 12 (Twelve) Hours., Disp: 180 tablet, Rfl: 1    levothyroxine (SYNTHROID, LEVOTHROID) 137 MCG tablet, TAKE 1 TABLET BY MOUTH DAILY, Disp: 90 tablet, Rfl: 3    Misc Natural Products (Osteo Bi-Flex Adv Triple St) tablet, Take 1 tablet by mouth 2 (Two) Times a Day. 750 mg-644 mg-30mg-1mg, Disp: , Rfl:     montelukast (SINGULAIR) 10 MG tablet, TAKE ONE TABLET BY MOUTH ONCE NIGHTLY, Disp: 90 tablet, Rfl: 3    Multiple Vitamins-Minerals (CENTRUM SILVER ADULT 50+) tablet, Take 1 tablet by mouth Daily., Disp: , Rfl:     Omega-3 Fatty Acids (FISH OIL) 1200 MG capsule capsule, Take 1 capsule by mouth 2 (Two) Times a Day With Meals., Disp: , Rfl:     Potassium 99 MG tablet, Take 1 tablet by mouth Daily., Disp: , Rfl:     sildenafil (REVATIO) 20 MG tablet, TAKE ONE TO THREE TABLETS ONCE DAILY AS NEEDED FOR ERECTILE DYSFUNCTION, Disp: 30 tablet, Rfl: 4    simvastatin (ZOCOR) 20 MG tablet, TAKE ONE TABLET BY MOUTH ONCE NIGHTLY, Disp: 90 tablet, Rfl: 3    vitamin B-12 (CYANOCOBALAMIN) 1000 MCG tablet, Take 1 tablet by mouth Daily., Disp: , Rfl:     famotidine (PEPCID) 40 MG tablet, Take 1 tablet by mouth At Night As Needed. (Patient not taking: Reported on 12/23/2024), Disp: , Rfl:       History of Present Illness   Deniz Dickson is a 79 y.o. year old male here for follow up.    No new cardiac symptoms.  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    Constitutional:       Appearance: Healthy appearance. Not in distress.   Neck:      Vascular: No JVR. JVD normal.   Pulmonary:      Effort: Pulmonary effort is normal.      Breath sounds: Normal breath sounds. No wheezing. No rhonchi. No rales.   Chest:      Chest wall: Not tender to palpatation.   Cardiovascular:      PMI at left midclavicular line. Normal rate. Regular rhythm. Normal S1. Normal S2.       Murmurs: There is a systolic murmur.      No gallop.  No click. No rub.   Pulses:     Intact distal pulses.   Edema:     Peripheral edema absent.   Abdominal:      General: Bowel sounds are normal.      Palpations: Abdomen is soft.      Tenderness: There is no abdominal tenderness.   Musculoskeletal: Normal range of motion.         General: No tenderness. Skin:     General: Skin is warm and dry.   Neurological:      General: No focal deficit present.      Mental Status: Alert and oriented to person, place and time.         Diagnostic Data:  Procedures      ASSESSMENT:   Diagnosis Plan   1. Coronary artery disease involving native coronary artery of native heart without angina pectoris        2. Primary hypertension        3. Mixed hyperlipidemia                PLAN:  CAD is manifestation coronary calcification continued guideline directed medical therapy      Hypertension/palpitations controlled bisoprolol    Mixed dyslipidemia controlled on simvastatin combination Alex Ojeda MD, FACC  "

## 2024-12-29 DIAGNOSIS — N52.01 ERECTILE DYSFUNCTION DUE TO ARTERIAL INSUFFICIENCY: ICD-10-CM

## 2024-12-30 RX ORDER — SILDENAFIL CITRATE 20 MG/1
TABLET ORAL
Qty: 30 TABLET | Refills: 4 | Status: SHIPPED | OUTPATIENT
Start: 2024-12-30

## 2025-02-28 ENCOUNTER — TELEPHONE (OUTPATIENT)
Age: 80
End: 2025-02-28
Payer: MEDICARE

## 2025-02-28 NOTE — TELEPHONE ENCOUNTER
PT APPT HAD TO BE CANCELLED. IF PT CALLS BACK TO RESCHEDULE, PLEASE SCHEDULE WITH SUSAN CROUCH OR SOFIA. IF NOT ALREADY ON, PLEASE ADD PT TO THE CANCELLATION LIST AS NORMAL PRIOTIRY WITH AN END DATE OF 12/31/2025.   -HUB READY TO SCHEDULE.

## 2025-03-09 DIAGNOSIS — N40.0 BENIGN NON-NODULAR PROSTATIC HYPERPLASIA WITHOUT LOWER URINARY TRACT SYMPTOMS: ICD-10-CM

## 2025-03-10 RX ORDER — FINASTERIDE 5 MG/1
5 TABLET, FILM COATED ORAL DAILY
Qty: 90 TABLET | Refills: 3 | Status: SHIPPED | OUTPATIENT
Start: 2025-03-10

## 2025-03-13 ENCOUNTER — OFFICE VISIT (OUTPATIENT)
Dept: PULMONOLOGY | Facility: CLINIC | Age: 80
End: 2025-03-13
Payer: MEDICARE

## 2025-03-13 VITALS
OXYGEN SATURATION: 97 % | SYSTOLIC BLOOD PRESSURE: 124 MMHG | DIASTOLIC BLOOD PRESSURE: 68 MMHG | BODY MASS INDEX: 24.89 KG/M2 | RESPIRATION RATE: 18 BRPM | WEIGHT: 177.8 LBS | HEIGHT: 71 IN | HEART RATE: 71 BPM

## 2025-03-13 DIAGNOSIS — J30.89 OTHER ALLERGIC RHINITIS: ICD-10-CM

## 2025-03-13 DIAGNOSIS — G47.33 OBSTRUCTIVE SLEEP APNEA: Primary | ICD-10-CM

## 2025-03-13 DIAGNOSIS — G47.19 EXCESSIVE DAYTIME SLEEPINESS: ICD-10-CM

## 2025-03-13 PROCEDURE — 99213 OFFICE O/P EST LOW 20 MIN: CPT | Performed by: NURSE PRACTITIONER

## 2025-03-13 PROCEDURE — 3074F SYST BP LT 130 MM HG: CPT | Performed by: NURSE PRACTITIONER

## 2025-03-13 PROCEDURE — 3078F DIAST BP <80 MM HG: CPT | Performed by: NURSE PRACTITIONER

## 2025-03-13 RX ORDER — FLUTICASONE PROPIONATE 50 MCG
1 SPRAY, SUSPENSION (ML) NASAL DAILY
Qty: 16 G | Refills: 11 | Status: SHIPPED | OUTPATIENT
Start: 2025-03-13

## 2025-03-13 NOTE — PROGRESS NOTES
"Chief Complaint   Patient presents with    Sleeping Problem    Follow-up         Subjective   Deniz Dickson is a 79 y.o. male.   Patient comes back today for follow up of Obstructive Sleep apnea.     Patient says that he is compliant with his device and using it regularly.    Patient's symptoms of sleep disturbance and daytime sleepiness have been helped greatly with the use of PAP device, as prescribed.  He feels more rested with the machine.       The following portions of the patient's history were reviewed and updated as appropriate: allergies, current medications, past family history, past medical history, past social history, and past surgical history.      Review of Systems   HENT:  Negative for sinus pressure, sneezing and sore throat.    Respiratory:  Positive for shortness of breath. Negative for cough, chest tightness and wheezing.    Psychiatric/Behavioral:  Positive for sleep disturbance.        Objective   Visit Vitals  /68   Pulse 71   Resp 18   Ht 179.1 cm (70.51\")   Wt 80.6 kg (177 lb 12.8 oz)   SpO2 97%   BMI 25.14 kg/m²       Physical Exam  Vitals reviewed.   Constitutional:       Appearance: He is well-developed.   HENT:      Head: Atraumatic.      Mouth/Throat:      Mouth: Mucous membranes are moist.      Comments: Crowded oropharynx.   Eyes:      Extraocular Movements: Extraocular movements intact.   Cardiovascular:      Rate and Rhythm: Normal rate and regular rhythm.   Neurological:      Mental Status: He is alert and oriented to person, place, and time.           Assessment & Plan   Diagnoses and all orders for this visit:    1. Obstructive sleep apnea (Primary)    2. Excessive daytime sleepiness    3. Other allergic rhinitis  -     fluticasone (FLONASE) 50 MCG/ACT nasal spray; Administer 1 spray into the nostril(s) as directed by provider Daily.  Dispense: 16 g; Refill: 11           Return in about 1 year (around 3/13/2026) for Recheck, For Dr. Hager, Sleep ONLY.    DISCUSSION (if " any):  Sleep study performed in 2007  AHI was 21 / hour.   REM AHI was 53/hour.      Latest PAP device provided in early 2021.   DME company: [employer]     Current PAP settings: 8-20  Current mask type: FFM    I provided him with a [employer]Wear full lab mask (modified fullface mask) and medium size.    Continue treatment with AutoPAP at a pressure of 8-20, with a full-face mask.    Patient's compliance data was reviewed and the compliance is greater than 90%.    Humidification setup, hose and mask care discussed.    Weight loss advised.    Use every night for at least 4 hours stressed.       Dictated utilizing Dragon dictation.    This document was electronically signed by KELVIN Almanza March 13, 2025  13:38 EDT

## 2025-04-22 ENCOUNTER — LAB (OUTPATIENT)
Dept: LAB | Facility: HOSPITAL | Age: 80
End: 2025-04-22
Payer: MEDICARE

## 2025-04-22 DIAGNOSIS — D69.3 IMMUNE THROMBOCYTOPENIC PURPURA: ICD-10-CM

## 2025-04-22 DIAGNOSIS — Z79.1 LONG TERM (CURRENT) USE OF NON-STEROIDAL ANTI-INFLAMMATORIES (NSAID): ICD-10-CM

## 2025-04-22 DIAGNOSIS — M19.90 INFLAMMATORY ARTHRITIS: ICD-10-CM

## 2025-04-22 DIAGNOSIS — M15.9 OSTEOARTHRITIS OF MULTIPLE JOINTS, UNSPECIFIED OSTEOARTHRITIS TYPE: ICD-10-CM

## 2025-04-22 LAB
ALBUMIN SERPL-MCNC: 4.1 G/DL (ref 3.5–5.2)
ALBUMIN/GLOB SERPL: 1.7 G/DL
ALP SERPL-CCNC: 63 U/L (ref 39–117)
ALT SERPL W P-5'-P-CCNC: 21 U/L (ref 1–41)
ANION GAP SERPL CALCULATED.3IONS-SCNC: 8.3 MMOL/L (ref 5–15)
AST SERPL-CCNC: 25 U/L (ref 1–40)
BASOPHILS # BLD AUTO: 0.01 10*3/MM3 (ref 0–0.2)
BASOPHILS NFR BLD AUTO: 0.2 % (ref 0–1.5)
BILIRUB SERPL-MCNC: 1.1 MG/DL (ref 0–1.2)
BUN SERPL-MCNC: 13 MG/DL (ref 8–23)
BUN/CREAT SERPL: 16.5 (ref 7–25)
CALCIUM SPEC-SCNC: 9.1 MG/DL (ref 8.6–10.5)
CHLORIDE SERPL-SCNC: 101 MMOL/L (ref 98–107)
CHOLEST SERPL-MCNC: 145 MG/DL (ref 0–200)
CO2 SERPL-SCNC: 26.7 MMOL/L (ref 22–29)
CREAT SERPL-MCNC: 0.79 MG/DL (ref 0.76–1.27)
DEPRECATED RDW RBC AUTO: 42.4 FL (ref 37–54)
EGFRCR SERPLBLD CKD-EPI 2021: 90.4 ML/MIN/1.73
EOSINOPHIL # BLD AUTO: 0.06 10*3/MM3 (ref 0–0.4)
EOSINOPHIL NFR BLD AUTO: 1.2 % (ref 0.3–6.2)
ERYTHROCYTE [DISTWIDTH] IN BLOOD BY AUTOMATED COUNT: 12 % (ref 12.3–15.4)
GLOBULIN UR ELPH-MCNC: 2.4 GM/DL
GLUCOSE SERPL-MCNC: 101 MG/DL (ref 65–99)
HCT VFR BLD AUTO: 42.5 % (ref 37.5–51)
HDLC SERPL-MCNC: 75 MG/DL (ref 40–60)
HGB BLD-MCNC: 14.8 G/DL (ref 13–17.7)
IMM GRANULOCYTES # BLD AUTO: 0.01 10*3/MM3 (ref 0–0.05)
IMM GRANULOCYTES NFR BLD AUTO: 0.2 % (ref 0–0.5)
LDLC SERPL CALC-MCNC: 52 MG/DL (ref 0–100)
LDLC/HDLC SERPL: 0.68 {RATIO}
LYMPHOCYTES # BLD AUTO: 1.22 10*3/MM3 (ref 0.7–3.1)
LYMPHOCYTES NFR BLD AUTO: 24.4 % (ref 19.6–45.3)
MCH RBC QN AUTO: 33.6 PG (ref 26.6–33)
MCHC RBC AUTO-ENTMCNC: 34.8 G/DL (ref 31.5–35.7)
MCV RBC AUTO: 96.6 FL (ref 79–97)
MONOCYTES # BLD AUTO: 0.54 10*3/MM3 (ref 0.1–0.9)
MONOCYTES NFR BLD AUTO: 10.8 % (ref 5–12)
NEUTROPHILS NFR BLD AUTO: 3.16 10*3/MM3 (ref 1.7–7)
NEUTROPHILS NFR BLD AUTO: 63.2 % (ref 42.7–76)
PLATELET # BLD AUTO: 110 10*3/MM3 (ref 140–450)
PMV BLD AUTO: 9.4 FL (ref 6–12)
POTASSIUM SERPL-SCNC: 4.5 MMOL/L (ref 3.5–5.2)
PROT SERPL-MCNC: 6.5 G/DL (ref 6–8.5)
RBC # BLD AUTO: 4.4 10*6/MM3 (ref 4.14–5.8)
SODIUM SERPL-SCNC: 136 MMOL/L (ref 136–145)
T4 FREE SERPL-MCNC: 1.54 NG/DL (ref 0.92–1.68)
TRIGL SERPL-MCNC: 96 MG/DL (ref 0–150)
TSH SERPL DL<=0.05 MIU/L-ACNC: 0.79 UIU/ML (ref 0.27–4.2)
VIT B12 BLD-MCNC: 1292 PG/ML (ref 211–946)
VLDLC SERPL-MCNC: 18 MG/DL (ref 5–40)
WBC NRBC COR # BLD AUTO: 5 10*3/MM3 (ref 3.4–10.8)

## 2025-04-22 PROCEDURE — 80061 LIPID PANEL: CPT | Performed by: INTERNAL MEDICINE

## 2025-04-22 PROCEDURE — 85025 COMPLETE CBC W/AUTO DIFF WBC: CPT | Performed by: INTERNAL MEDICINE

## 2025-04-22 PROCEDURE — 84443 ASSAY THYROID STIM HORMONE: CPT | Performed by: INTERNAL MEDICINE

## 2025-04-22 PROCEDURE — 80053 COMPREHEN METABOLIC PANEL: CPT | Performed by: INTERNAL MEDICINE

## 2025-04-22 PROCEDURE — 82607 VITAMIN B-12: CPT | Performed by: INTERNAL MEDICINE

## 2025-04-22 PROCEDURE — 84439 ASSAY OF FREE THYROXINE: CPT | Performed by: INTERNAL MEDICINE

## 2025-04-24 ENCOUNTER — OFFICE VISIT (OUTPATIENT)
Dept: INTERNAL MEDICINE | Facility: CLINIC | Age: 80
End: 2025-04-24
Payer: MEDICARE

## 2025-04-24 VITALS
WEIGHT: 176 LBS | HEIGHT: 71 IN | TEMPERATURE: 97.8 F | OXYGEN SATURATION: 99 % | BODY MASS INDEX: 24.64 KG/M2 | SYSTOLIC BLOOD PRESSURE: 128 MMHG | RESPIRATION RATE: 16 BRPM | HEART RATE: 51 BPM | DIASTOLIC BLOOD PRESSURE: 68 MMHG

## 2025-04-24 DIAGNOSIS — E79.0 HYPERURICEMIA: ICD-10-CM

## 2025-04-24 DIAGNOSIS — E03.9 ACQUIRED HYPOTHYROIDISM: ICD-10-CM

## 2025-04-24 DIAGNOSIS — Z12.5 ENCOUNTER FOR SCREENING FOR MALIGNANT NEOPLASM OF PROSTATE: ICD-10-CM

## 2025-04-24 DIAGNOSIS — E78.2 MIXED HYPERLIPIDEMIA: Primary | ICD-10-CM

## 2025-04-24 DIAGNOSIS — Z00.00 ROUTINE GENERAL MEDICAL EXAMINATION AT A HEALTH CARE FACILITY: ICD-10-CM

## 2025-04-24 NOTE — ASSESSMENT & PLAN NOTE
Lipid abnormalities are stable    Plan:  Continue same medication/s without change.      Discussed medication dosage, use, side effects, and goals of treatment in detail.    Counseled patient on lifestyle modifications to help control hyperlipidemia.     Patient Treatment Goals:   LDL goal is less than 70    Followup in 6 months.    Orders:    Comprehensive Metabolic Panel    CBC & Differential    PSA Screen    TSH

## 2025-04-24 NOTE — PROGRESS NOTES
Subjective   The ABCs of the Annual Wellness Visit  Medicare Wellness Visit      Deniz Dickson is a 79 y.o. patient who presents for a Medicare Wellness Visit.    The following portions of the patient's history were reviewed and   updated as appropriate: allergies, current medications, past family history, past medical history, past social history, past surgical history, and problem list.    Compared to one year ago, the patient's physical   health is the same.  Compared to one year ago, the patient's mental   health is the same.    Recent Hospitalizations:  He was not admitted to the hospital during the last year.     Current Medical Providers:  Patient Care Team:  Zane Magallon MD as PCP - General (Internal Medicine)  Lisa Rhodes MD as Surgeon (General Surgery)  Margot Estrella MD as Consulting Physician (Rheumatology)  Consuelo Finney APRN as Nurse Practitioner (Rheumatology)  Suresh Ojeda MD as Consulting Physician (Cardiology)  Sharmin Reeves APRN as Nurse Practitioner (Cardiology)    Outpatient Medications Prior to Visit   Medication Sig Dispense Refill    aspirin 81 MG EC tablet Take 1 tablet by mouth Daily. 90 tablet 1    bisoprolol (ZEBeta) 5 MG tablet TAKE 1/2 TABLET BY MOUTH DAILY 45 tablet 6    celecoxib (CeleBREX) 200 MG capsule Take 1 capsule by mouth 2 (Two) Times a Day. 180 capsule 1    Cholecalciferol (VITAMIN D-3 PO) Take 1 tablet by mouth Daily. 1000 units      Cyanocobalamin (Vitamin B-12) 5000 MCG sublingual tablet Place  under the tongue. 2 per week      cyclobenzaprine (FLEXERIL) 5 MG tablet Take 1 tablet by mouth As Needed.      ezetimibe (ZETIA) 10 MG tablet TAKE 1 TABLET BY MOUTH DAILY 90 tablet 3    finasteride (PROSCAR) 5 MG tablet TAKE 1 TABLET BY MOUTH DAILY 90 tablet 3    fluticasone (FLONASE) 50 MCG/ACT nasal spray Administer 1 spray into the nostril(s) as directed by provider Daily. 16 g 11    Glucosamine-Chondroitin (OSTEO BI-FLEX REGULAR STRENGTH PO)  Take 1 tablet by mouth 2 (two) times a day.      hydroxychloroquine (PLAQUENIL) 200 MG tablet Take 1 tablet by mouth Every 12 (Twelve) Hours. 180 tablet 1    levothyroxine (SYNTHROID, LEVOTHROID) 137 MCG tablet TAKE 1 TABLET BY MOUTH DAILY 90 tablet 3    Misc Natural Products (Osteo Bi-Flex Adv Triple St) tablet Take 1 tablet by mouth 2 (Two) Times a Day. 750 mg-644 mg-30mg-1mg      montelukast (SINGULAIR) 10 MG tablet TAKE ONE TABLET BY MOUTH ONCE NIGHTLY 90 tablet 3    Multiple Vitamins-Minerals (CENTRUM SILVER ADULT 50+) tablet Take 1 tablet by mouth Daily.      Omega-3 Fatty Acids (FISH OIL) 1200 MG capsule capsule Take 1 capsule by mouth 2 (Two) Times a Day With Meals.      Potassium 99 MG tablet Take 1 tablet by mouth Daily.      sildenafil (REVATIO) 20 MG tablet TAKE 1 TO 3 TABLETS ONCE DAILY AS NEEDED FOR ERECTILE DYSFUNCTION. 30 tablet 4    simvastatin (ZOCOR) 20 MG tablet TAKE ONE TABLET BY MOUTH ONCE NIGHTLY 90 tablet 3    vitamin B-12 (CYANOCOBALAMIN) 1000 MCG tablet Take 1 tablet by mouth Daily.      ezetimibe-simvastatin (Vytorin) 10-20 MG per tablet Take 1 tablet by mouth Daily. (Patient not taking: Reported on 3/13/2025)      famotidine (PEPCID) 40 MG tablet Take 1 tablet by mouth At Night As Needed. (Patient not taking: Reported on 12/23/2024)       No facility-administered medications prior to visit.     No opioid medication identified on active medication list. I have reviewed chart for other potential  high risk medication/s and harmful drug interactions in the elderly.      Aspirin is on active medication list. Aspirin use is indicated based on review of current medical condition/s. Pros and cons of this therapy have been discussed today. Benefits of this medication outweigh potential harm.  Patient has been encouraged to continue taking this medication.  .      Patient Active Problem List   Diagnosis    Atopic rhinitis    BPH (benign prostatic hyperplasia)    Gastroesophageal reflux disease     "Granulomatous disease    Hemorrhoids    Hyperlipidemia    Hypertension    Hypothyroidism    Immune thrombocytopenic purpura    JUWAN on CPAP    Vitamin D deficiency    Skin lesion    Diastasis recti    Palpitations    Osteoarthritis    History of ITP    Arthritic-like pain    Coronary artery disease    Dyslipidemia    Hypertension    Contracture of joint of right foot    Bronchitis    History of adenomatous polyp of colon    Inflammatory arthritis    Chronic bilateral low back pain without sciatica    CATHY (acute kidney injury)    Nephrolithiasis    Ureterolithiasis    High risk medication use    History of vitamin B deficiency    Long term (current) use of non-steroidal anti-inflammatories (nsaid)     Advance Care Planning Advance Directive is not on file.  ACP discussion was held with the patient during this visit. Patient does not have an advance directive, declines further assistance.            Objective   Vitals:    04/24/25 0859   BP: 128/68   Pulse: 51   Resp: 16   Temp: 97.8 °F (36.6 °C)   SpO2: 99%   Weight: 79.8 kg (176 lb)   Height: 179.1 cm (70.51\")   PainSc: 0-No pain       Estimated body mass index is 24.89 kg/m² as calculated from the following:    Height as of this encounter: 179.1 cm (70.51\").    Weight as of this encounter: 79.8 kg (176 lb).    BMI is within normal parameters. No other follow-up for BMI required.    Gait and Balance Evaluation:  Slow Tentative Pace, Shuffle, and Walker         Does the patient have evidence of cognitive impairment? No  Lab Results   Component Value Date    TRIG 96 04/22/2025    HDL 75 (H) 04/22/2025    LDL 52 04/22/2025    VLDL 18 04/22/2025                                                                                               Health  Risk Assessment    Smoking Status:  Social History     Tobacco Use   Smoking Status Former    Current packs/day: 0.00    Average packs/day: 2.0 packs/day for 15.0 years (30.0 ttl pk-yrs)    Types: Cigarettes    Start date: " 1970    Quit date: 1985    Years since quittin.9    Passive exposure: Past   Smokeless Tobacco Former    Types: Chew    Quit date:      Alcohol Consumption:  Social History     Substance and Sexual Activity   Alcohol Use No       Fall Risk Screen  NIKIADI Fall Risk Assessment was completed, and patient is at MODERATE risk for falls. Assessment completed on:2025    Depression Screening   Little interest or pleasure in doing things? Not at all   Feeling down, depressed, or hopeless? Not at all   PHQ-2 Total Score 0      Health Habits and Functional and Cognitive Screenin/24/2025     9:02 AM   Functional & Cognitive Status   Do you have difficulty preparing food and eating? No   Do you have difficulty bathing yourself, getting dressed or grooming yourself? No   Do you have difficulty using the toilet? No   Do you have difficulty moving around from place to place? Yes   Do you have trouble with steps or getting out of a bed or a chair? Yes   Current Diet Other   Dental Exam Up to date   Eye Exam Up to date   Exercise (times per week) 7 times per week   Current Exercises Include Walking;Yard Work        Exercise Comment Leg exercises   Do you need help using the phone?  No   Are you deaf or do you have serious difficulty hearing?  Yes   Do you need help to go to places out of walking distance? No   Do you need help shopping? No   Do you need help preparing meals?  No   Do you need help with housework?  No   Do you need help with laundry? No   Do you need help taking your medications? No   Do you need help managing money? No   Do you ever drive or ride in a car without wearing a seat belt? No   Have you felt unusual stress, anger or loneliness in the last month? Yes   Who do you live with? Child   If you need help, do you have trouble finding someone available to you? No   Have you been bothered in the last four weeks by sexual problems? No   Do you have difficulty concentrating, remembering  or making decisions? No           Age-appropriate Screening Schedule:  Refer to the list below for future screening recommendations based on patient's age, sex and/or medical conditions. Orders for these recommended tests are listed in the plan section. The patient has been provided with a written plan.    Health Maintenance List  Health Maintenance   Topic Date Due    TDAP/TD VACCINES (2 - Td or Tdap) 07/31/2022    ANNUAL WELLNESS VISIT  04/22/2025    ZOSTER VACCINE (2 of 3) 05/08/2025 (Originally 3/29/2015)    RSV Vaccine - Adults (1 - 1-dose 75+ series) 10/22/2025 (Originally 5/17/2020)    INFLUENZA VACCINE  07/01/2025    COVID-19 Vaccine (9 - 2024-25 season) 07/13/2025    LIPID PANEL  04/22/2026    COLORECTAL CANCER SCREENING  07/22/2029    HEPATITIS C SCREENING  Addressed    Pneumococcal Vaccine 50+  Discontinued                                                                                                                                                CMS Preventative Services Quick Reference  Risk Factors Identified During Encounter  Chronic Pain: Chronic Pain Educational material Discussed and Shared in After Visit Summary for Patient.  Fall Risk-High or Moderate: Discussed Fall Prevention in the home and Information on Fall Prevention Shared in After Visit Summary  Inactivity/Sedentary: Patient was advised to exercise at least 150 minutes a week per CDC recommendations.    The above risks/problems have been discussed with the patient.  Pertinent information has been shared with the patient in the After Visit Summary.  An After Visit Summary and PPPS were made available to the patient.    Follow Up:   Next Medicare Wellness visit to be scheduled in 1 year.     Assessment & Plan  Mixed hyperlipidemia   Lipid abnormalities are stable    Plan:  Continue same medication/s without change.      Discussed medication dosage, use, side effects, and goals of treatment in detail.    Counseled patient on lifestyle  modifications to help control hyperlipidemia.     Patient Treatment Goals:   LDL goal is less than 70    Followup in 6 months.    Orders:    Comprehensive Metabolic Panel    CBC & Differential    PSA Screen    TSH    Acquired hypothyroidism    Orders:    Comprehensive Metabolic Panel    CBC & Differential    PSA Screen    TSH    Routine general medical examination at a health care facility    Orders:    Comprehensive Metabolic Panel    CBC & Differential    PSA Screen    TSH    Hyperuricemia    Orders:    Comprehensive Metabolic Panel    CBC & Differential    PSA Screen    TSH  using bright and walker intermediately.  Still farming and feeding cows.        Encounter for screening for malignant neoplasm of prostate    Orders:    PSA Screen         Follow Up:   No follow-ups on file.

## 2025-04-24 NOTE — PROGRESS NOTES
Subjective     Patient ID: Deniz Dickson is a 79 y.o. male. Patient is here for management of multiple medical problems.     Fatigue    History of Present Illness     Fatigue.     Bradycardia.     Wellness exam          The following portions of the patient's history were reviewed and updated as appropriate: allergies, current medications, past family history, past medical history, past social history, past surgical history and problem list.    Review of Systems    Current Outpatient Medications:     aspirin 81 MG EC tablet, Take 1 tablet by mouth Daily., Disp: 90 tablet, Rfl: 1    bisoprolol (ZEBeta) 5 MG tablet, TAKE 1/2 TABLET BY MOUTH DAILY, Disp: 45 tablet, Rfl: 6    celecoxib (CeleBREX) 200 MG capsule, Take 1 capsule by mouth 2 (Two) Times a Day., Disp: 180 capsule, Rfl: 1    Cholecalciferol (VITAMIN D-3 PO), Take 1 tablet by mouth Daily. 1000 units, Disp: , Rfl:     Cyanocobalamin (Vitamin B-12) 5000 MCG sublingual tablet, Place  under the tongue. 2 per week, Disp: , Rfl:     cyclobenzaprine (FLEXERIL) 5 MG tablet, Take 1 tablet by mouth As Needed., Disp: , Rfl:     ezetimibe (ZETIA) 10 MG tablet, TAKE 1 TABLET BY MOUTH DAILY, Disp: 90 tablet, Rfl: 3    finasteride (PROSCAR) 5 MG tablet, TAKE 1 TABLET BY MOUTH DAILY, Disp: 90 tablet, Rfl: 3    fluticasone (FLONASE) 50 MCG/ACT nasal spray, Administer 1 spray into the nostril(s) as directed by provider Daily., Disp: 16 g, Rfl: 11    Glucosamine-Chondroitin (OSTEO BI-FLEX REGULAR STRENGTH PO), Take 1 tablet by mouth 2 (two) times a day., Disp: , Rfl:     hydroxychloroquine (PLAQUENIL) 200 MG tablet, Take 1 tablet by mouth Every 12 (Twelve) Hours., Disp: 180 tablet, Rfl: 1    levothyroxine (SYNTHROID, LEVOTHROID) 137 MCG tablet, TAKE 1 TABLET BY MOUTH DAILY, Disp: 90 tablet, Rfl: 3    Misc Natural Products (Osteo Bi-Flex Adv Triple St) tablet, Take 1 tablet by mouth 2 (Two) Times a Day. 750 mg-644 mg-30mg-1mg, Disp: , Rfl:     montelukast (SINGULAIR) 10 MG  "tablet, TAKE ONE TABLET BY MOUTH ONCE NIGHTLY, Disp: 90 tablet, Rfl: 3    Multiple Vitamins-Minerals (CENTRUM SILVER ADULT 50+) tablet, Take 1 tablet by mouth Daily., Disp: , Rfl:     Omega-3 Fatty Acids (FISH OIL) 1200 MG capsule capsule, Take 1 capsule by mouth 2 (Two) Times a Day With Meals., Disp: , Rfl:     Potassium 99 MG tablet, Take 1 tablet by mouth Daily., Disp: , Rfl:     sildenafil (REVATIO) 20 MG tablet, TAKE 1 TO 3 TABLETS ONCE DAILY AS NEEDED FOR ERECTILE DYSFUNCTION., Disp: 30 tablet, Rfl: 4    simvastatin (ZOCOR) 20 MG tablet, TAKE ONE TABLET BY MOUTH ONCE NIGHTLY, Disp: 90 tablet, Rfl: 3    vitamin B-12 (CYANOCOBALAMIN) 1000 MCG tablet, Take 1 tablet by mouth Daily., Disp: , Rfl:     Objective      Blood pressure 128/68, pulse 51, temperature 97.8 °F (36.6 °C), resp. rate 16, height 179.1 cm (70.51\"), weight 79.8 kg (176 lb), SpO2 99%.    BMI is within normal parameters. No other follow-up for BMI required.       Physical Exam     General Appearance:    Alert, cooperative, no distress, appears stated age   Head:    Normocephalic, without obvious abnormality, atraumatic   Eyes:    PERRL, conjunctiva/corneas clear, EOM's intact   Ears:    Normal TM's and external ear canals, both ears   Nose:   Nares normal, septum midline, mucosa normal, no drainage   or sinus tenderness   Throat:   Lips, mucosa, and tongue normal; teeth and gums normal   Neck:   Supple, symmetrical, trachea midline, no adenopathy;        thyroid:  No enlargement/tenderness/nodules; no carotid    bruit or JVD   Back:     Symmetric, no curvature, ROM normal, no CVA tenderness   Lungs:     Clear to auscultation bilaterally, respirations unlabored   Chest wall:    No tenderness or deformity   Heart:    Regular rate and rhythm, S1 and S2 normal, no murmur,        rub or gallop   Abdomen:     Soft, non-tender, bowel sounds active all four quadrants,     no masses, no organomegaly   Extremities:   Extremities normal, atraumatic, no " cyanosis or edema   Pulses:   2+ and symmetric all extremities   Skin:   Skin color, texture, turgor normal, no rashes or lesions   Lymph nodes:   Cervical, supraclavicular, and axillary nodes normal   Neurologic:   CNII-XII intact. Normal strength, sensation and reflexes       throughout      Results for orders placed or performed in visit on 10/22/24   Comprehensive Metabolic Panel    Collection Time: 04/22/25 10:22 AM    Specimen: Blood   Result Value Ref Range    Glucose 101 (H) 65 - 99 mg/dL    BUN 13 8 - 23 mg/dL    Creatinine 0.79 0.76 - 1.27 mg/dL    Sodium 136 136 - 145 mmol/L    Potassium 4.5 3.5 - 5.2 mmol/L    Chloride 101 98 - 107 mmol/L    CO2 26.7 22.0 - 29.0 mmol/L    Calcium 9.1 8.6 - 10.5 mg/dL    Total Protein 6.5 6.0 - 8.5 g/dL    Albumin 4.1 3.5 - 5.2 g/dL    ALT (SGPT) 21 1 - 41 U/L    AST (SGOT) 25 1 - 40 U/L    Alkaline Phosphatase 63 39 - 117 U/L    Total Bilirubin 1.1 0.0 - 1.2 mg/dL    Globulin 2.4 gm/dL    A/G Ratio 1.7 g/dL    BUN/Creatinine Ratio 16.5 7.0 - 25.0    Anion Gap 8.3 5.0 - 15.0 mmol/L    eGFR 90.4 >60.0 mL/min/1.73   Lipid Panel    Collection Time: 04/22/25 10:22 AM    Specimen: Blood   Result Value Ref Range    Total Cholesterol 145 0 - 200 mg/dL    Triglycerides 96 0 - 150 mg/dL    HDL Cholesterol 75 (H) 40 - 60 mg/dL    LDL Cholesterol  52 0 - 100 mg/dL    VLDL Cholesterol 18 5 - 40 mg/dL    LDL/HDL Ratio 0.68    Vitamin B12    Collection Time: 04/22/25 10:22 AM    Specimen: Blood   Result Value Ref Range    Vitamin B-12 1,292 (H) 211 - 946 pg/mL   TSH    Collection Time: 04/22/25 10:22 AM    Specimen: Blood   Result Value Ref Range    TSH 0.787 0.270 - 4.200 uIU/mL   T4, Free    Collection Time: 04/22/25 10:22 AM    Specimen: Blood   Result Value Ref Range    Free T4 1.54 0.92 - 1.68 ng/dL   CBC Auto Differential    Collection Time: 04/22/25 10:22 AM    Specimen: Blood   Result Value Ref Range    WBC 5.00 3.40 - 10.80 10*3/mm3    RBC 4.40 4.14 - 5.80 10*6/mm3     Hemoglobin 14.8 13.0 - 17.7 g/dL    Hematocrit 42.5 37.5 - 51.0 %    MCV 96.6 79.0 - 97.0 fL    MCH 33.6 (H) 26.6 - 33.0 pg    MCHC 34.8 31.5 - 35.7 g/dL    RDW 12.0 (L) 12.3 - 15.4 %    RDW-SD 42.4 37.0 - 54.0 fl    MPV 9.4 6.0 - 12.0 fL    Platelets 110 (L) 140 - 450 10*3/mm3    Neutrophil % 63.2 42.7 - 76.0 %    Lymphocyte % 24.4 19.6 - 45.3 %    Monocyte % 10.8 5.0 - 12.0 %    Eosinophil % 1.2 0.3 - 6.2 %    Basophil % 0.2 0.0 - 1.5 %    Immature Grans % 0.2 0.0 - 0.5 %    Neutrophils, Absolute 3.16 1.70 - 7.00 10*3/mm3    Lymphocytes, Absolute 1.22 0.70 - 3.10 10*3/mm3    Monocytes, Absolute 0.54 0.10 - 0.90 10*3/mm3    Eosinophils, Absolute 0.06 0.00 - 0.40 10*3/mm3    Basophils, Absolute 0.01 0.00 - 0.20 10*3/mm3    Immature Grans, Absolute 0.01 0.00 - 0.05 10*3/mm3         Assessment & Plan     Labs look good.    B12 a  bit high. Will decrease 2 pills to one pill a day.      There are no diagnoses linked to this encounter.  No follow-ups on file.          There are no Patient Instructions on file for this visit.     Zane Magallon MD    Assessment & Plan

## 2025-05-05 ENCOUNTER — TELEPHONE (OUTPATIENT)
Age: 80
End: 2025-05-05
Payer: MEDICARE

## 2025-05-05 NOTE — TELEPHONE ENCOUNTER
Caller: Deniz Dickson We    Relationship to patient: Self    Best call back number: [phone]; WOULD PREFER A TEXT    Patient is needing: PATIENT'S HANDICAP IS EXPIRING 5/31 AND [facility] PREVIOUSLY SIGNED HIS FORM FOR HIM. HE WILL BE IN [place] ON THURSDAY, 5/8 AND IS WANTING TO KNOW IF HE CAN HAVE SOMEONE SIGN THIS FOR HIM. PLEASE ADVISE PATIENT. LAST OFFICE VISIT 10/22/24 WITH [facility].

## 2025-05-12 ENCOUNTER — TELEPHONE (OUTPATIENT)
Age: 80
End: 2025-05-12
Payer: MEDICARE

## 2025-05-12 NOTE — TELEPHONE ENCOUNTER
Caller: Deniz Dickson    Relationship: Self    Best call back number: [phone]    Requested Prescriptions: HYDROXYCHLOROQUINE (PLAQUENIL) 200 MG TABLET  Requested Prescriptions      No prescriptions requested or ordered in this encounter        Pharmacy where request should be sent:  [facility]  [id number] [address]  [facility]     Last office visit with prescribing clinician: Visit date not found   Last telemedicine visit with prescribing clinician: Visit date not found   Next office visit with prescribing clinician: Visit date not found     Additional details provided by patient: PT HAS LIKE 2-3 PILLS LEFT.     Does the patient have less than a 3 day supply:  [x] Yes  [] No    Would you like a call back once the refill request has been completed: [] Yes [x] No    If the office needs to give you a call back, can they leave a voicemail: [x] Yes [] No    Javier Cruz Rep   05/12/25 15:09 EDT

## 2025-05-13 RX ORDER — HYDROXYCHLOROQUINE SULFATE 200 MG/1
200 TABLET, FILM COATED ORAL EVERY 12 HOURS
Qty: 180 TABLET | Refills: 0 | Status: SHIPPED | OUTPATIENT
Start: 2025-05-13

## 2025-05-13 RX ORDER — HYDROXYCHLOROQUINE SULFATE 200 MG/1
200 TABLET, FILM COATED ORAL EVERY 12 HOURS
Qty: 180 TABLET | Refills: 1 | OUTPATIENT
Start: 2025-05-13

## 2025-05-13 NOTE — TELEPHONE ENCOUNTER
Caller: Deniz Dickson    Relationship: Self    Best call back number: [phone]    Requested Prescriptions:   Requested Prescriptions     Pending Prescriptions Disp Refills    hydroxychloroquine (PLAQUENIL) 200 MG tablet 180 tablet 1     Sig: Take 1 tablet by mouth Every 12 (Twelve) Hours.        Pharmacy where request should be sent:    [facility] [id number] [address] AT [address] - [phone] [facility] [phone]    Last office visit with prescribing clinician: Visit date not found   Last telemedicine visit with prescribing clinician: Visit date not found   Next office visit with prescribing clinician: Visit date not found     Additional details provided by patient:     Does the patient have less than a 3 day supply:  [] Yes  [] No        Javier Che Rep   05/13/25 09:59 EDT

## 2025-05-19 RX ORDER — BISOPROLOL FUMARATE 5 MG/1
2.5 TABLET, FILM COATED ORAL DAILY
Qty: 45 TABLET | Refills: 6 | Status: SHIPPED | OUTPATIENT
Start: 2025-05-19

## 2025-05-19 RX ORDER — SIMVASTATIN 20 MG
20 TABLET ORAL NIGHTLY
Qty: 90 TABLET | Refills: 3 | Status: SHIPPED | OUTPATIENT
Start: 2025-05-19

## 2025-05-19 RX ORDER — EZETIMIBE 10 MG/1
10 TABLET ORAL DAILY
Qty: 90 TABLET | Refills: 3 | Status: SHIPPED | OUTPATIENT
Start: 2025-05-19

## 2025-05-19 NOTE — ASSESSMENT & PLAN NOTE
Celebrex-working well-no side effects    Risks of NSAIDS discussed including GI upset, GI bleeding, renal and hepatic risks and the risks of cardiovascular disease and stroke. Warned not to take with other NSAIDs including over-the-counter NSAIDs such as ibuprofen, naproxen, Aleve, Motrin, Advil.

## 2025-05-19 NOTE — PROGRESS NOTES
Office Follow Up      Date: 05/21/2025   Patient Name: Deniz Dickson  MRN: [id number]  YOB: 1945    Referring Physician: Provider, No Known     Chief Complaint:   Chief Complaint   Patient presents with    Follow-up    Back Pain       History of Present Illness:   Deniz Dickson is a 80 y.o. male who is here today for follow up for inflammatory arthritis.  Today he reports feeling the same.  He rates his pain 4/10 with 30 minutes of early morning stiffness.  Right hands are the worst, they are more painful when cold. He utilizes heating pads and gloves as needed.   His labs 4/22/25 indicated elevated Vit B12- he has reduced his daily dosage.       Subjective     Review of Systems positive for hearing loss, sneezing, tinnitus, dry eyes, shortness of breath, cold intolerance, joint pain, back pain, bruising, hair loss, environmental allergies      Current Outpatient Medications:     aspirin 81 MG EC tablet, Take 1 tablet by mouth Daily., Disp: 90 tablet, Rfl: 1    bisoprolol (ZEBeta) 5 MG tablet, TAKE 1/2 TABLET BY MOUTH DAILY, Disp: 45 tablet, Rfl: 6    celecoxib (CeleBREX) 200 MG capsule, Take 1 capsule by mouth 2 (Two) Times a Day., Disp: 180 capsule, Rfl: 1    Cholecalciferol (VITAMIN D-3 PO), Take 1 tablet by mouth Daily. 1000 units, Disp: , Rfl:     Cyanocobalamin (Vitamin B-12) 5000 MCG sublingual tablet, Place  under the tongue. 2 per week, Disp: , Rfl:     cyclobenzaprine (FLEXERIL) 5 MG tablet, Take 1 tablet by mouth As Needed., Disp: , Rfl:     ezetimibe (ZETIA) 10 MG tablet, TAKE 1 TABLET BY MOUTH DAILY, Disp: 90 tablet, Rfl: 3    finasteride (PROSCAR) 5 MG tablet, TAKE 1 TABLET BY MOUTH DAILY, Disp: 90 tablet, Rfl: 3    fluticasone (FLONASE) 50 MCG/ACT nasal spray, Administer 1 spray into the nostril(s) as directed by provider Daily., Disp: 16 g, Rfl: 11    Glucosamine-Chondroitin (OSTEO BI-FLEX REGULAR STRENGTH PO), Take 1 tablet by mouth 2 (two) times a day.,  "Disp: , Rfl:     hydroxychloroquine (PLAQUENIL) 200 MG tablet, Take 1 tablet by mouth Every 12 (Twelve) Hours., Disp: 180 tablet, Rfl: 0    levothyroxine (SYNTHROID, LEVOTHROID) 137 MCG tablet, TAKE 1 TABLET BY MOUTH DAILY, Disp: 90 tablet, Rfl: 3    Misc Natural Products (Osteo Bi-Flex Adv Triple St) tablet, Take 1 tablet by mouth 2 (Two) Times a Day. 750 mg-644 mg-30mg-1mg, Disp: , Rfl:     montelukast (SINGULAIR) 10 MG tablet, TAKE ONE TABLET BY MOUTH ONCE NIGHTLY, Disp: 90 tablet, Rfl: 3    Multiple Vitamins-Minerals (CENTRUM SILVER ADULT 50+) tablet, Take 1 tablet by mouth Daily., Disp: , Rfl:     Omega-3 Fatty Acids (FISH OIL) 1200 MG capsule capsule, Take 1 capsule by mouth 2 (Two) Times a Day With Meals., Disp: , Rfl:     Potassium 99 MG tablet, Take 1 tablet by mouth Daily., Disp: , Rfl:     sildenafil (REVATIO) 20 MG tablet, TAKE 1 TO 3 TABLETS ONCE DAILY AS NEEDED FOR ERECTILE DYSFUNCTION., Disp: 30 tablet, Rfl: 4    simvastatin (ZOCOR) 20 MG tablet, TAKE ONE TABLET BY MOUTH ONCE NIGHTLY, Disp: 90 tablet, Rfl: 3    vitamin B-12 (CYANOCOBALAMIN) 1000 MCG tablet, Take 1 tablet by mouth Daily., Disp: , Rfl:     Allergies   Allergen Reactions    Penicillins Itching, Rash and Unknown (See Comments)       I have reviewed and updated the patient's chief complaint, history of present illness, review of systems, past medical history, surgical history, family history, social history, medications and allergy list as appropriate.     Objective      Vitals:    05/21/25 1322   BP: 122/66   Pulse: 70   Temp: 97.7 °F (36.5 °C)   TempSrc: Infrared   Weight: 80.6 kg (177 lb 11.2 oz)   Height: 179.1 cm (70.51\")   PainSc: 6    PainLoc: Back     Body mass index is 25.13 kg/m².      Physical Exam  Constitutional:       Appearance: Normal appearance.   HENT:      Head: Normocephalic.      Mouth/Throat:      Mouth: Mucous membranes are moist.   Eyes:      Pupils: Pupils are equal, round, and reactive to light.   Cardiovascular: "      Rate and Rhythm: Normal rate.   Pulmonary:      Effort: Pulmonary effort is normal.   Abdominal:      Palpations: Abdomen is soft.   Musculoskeletal:      Cervical back: Normal range of motion.      Comments: Deformity Left 2nd MCP   Mild swelling left wrist   Antalgic gait      Skin:     General: Skin is warm and dry.   Neurological:      Mental Status: He is alert.   Psychiatric:         Mood and Affect: Mood normal.        CMP          10/17/2024    10:10 4/22/2025    10:22   CMP   Glucose 106  101    BUN 14  13    Creatinine 0.70  0.79    EGFR 93.7  90.4    Sodium 136  136    Potassium 4.6  4.5    Chloride 99  101    Calcium 9.3  9.1    Total Protein 7.0  6.5    Albumin 4.4  4.1    Globulin 2.6  2.4    Total Bilirubin 0.9  1.1    Alkaline Phosphatase 76  63    AST (SGOT) 32  25    ALT (SGPT) 21  21    Albumin/Globulin Ratio 1.7  1.7    BUN/Creatinine Ratio 20.0  16.5    Anion Gap 14.9  8.3      CBC          6/27/2024    08:32 10/17/2024    10:10 4/22/2025    10:22   CBC   WBC 4.9     5.58  5.00    RBC 4.49     4.64  4.40    Hemoglobin 15.1     15.7  14.8    Hematocrit 43.2     44.7  42.5    MCV 96.2     96.3  96.6    MCH 33.6     33.8  33.6    MCHC 35.0     35.1  34.8    RDW 12.4     12.6  12.0    Platelets 115     85  110       Details          This result is from an external source.                   Lab Results   Component Value Date    Sed Rate 5 10/31/2020    CCP Antibodies IgG/IgA 8 09/09/2019         Procedures    Assessment / Plan      Assessment & Plan  Inflammatory arthritis  Had partial left KR 1/13- Had Right partial Knee replacement 7/17.  See's Dr. Cassidy.  Back is stable with chiropractor.  He takes 100mg of celebrex bid  ( 200mg bid with flare)  Positive MCP Swelling Pain and stiffness. May be Seronegative RA. He has improved on steroids.   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      Moderate Right Degenerative changes of the  wrist and severe degenerative changes of the 1st CMC and Moderate degenerative changes in the DIP. Moderate changes in the PIP Joint of the 5th digit.    Mod Degenerative changes of the mid foot and 1st MTP Bilaterally. No Erosions.   10/22 xrays showed hammertoes.   10/31/20 CRP .9 VERONICA Negative CCP Negative. Sed Rate Normal. RF IGA IGM AND IGG all Normal.   He is taking Plaquenil and Celebrex  Does not tolerate less than Celebrex 200mg bid.  Joints are doing well without synovitis.    Plan:  Continue Plaquenil and Celebrex.   Left shoulder - unable to raise above head   Xray bilateral shoulders 4/2024-  osteoarthritis bilaterally   Labs before next appointment      Orders:    Comprehensive Metabolic Panel; Future    C-reactive Protein; Future    Sedimentation Rate; Future    CBC (No Diff); Future     High risk medication use  Plaquenil well tolerated and effective    Plan:  CBC CMP Q 6months 4/205  Eye exam Q 9-12 months with annual OCT due 10/2025    Patient's taking Plaquenil should have an eye exam at least once/year to monitor for signs of medication toxicity     Orders:    Comprehensive Metabolic Panel; Future    CBC (No Diff); Future     Immune thrombocytopenic purpura  See's Dr Whiting- Stable- Platelet Count 115k in Nov 2017.  109K in 6/18.  6/19 94k.  87k in 12/19  100k in 10/2020  91k 04/21  81K 08/21  95K 10/22  109K 4/2024    Plan:  Dr Singh released him. If the Platelets drop to 50k we will need to refer him back        Long term (current) use of non-steroidal anti-inflammatories (nsaid)  Celebrex-working well-no side effects    Risks of NSAIDS discussed including GI upset, GI bleeding, renal and hepatic risks and the risks of cardiovascular disease and stroke. Warned not to take with other NSAIDs including over-the-counter NSAIDs such as ibuprofen, naproxen, Aleve, Motrin, Advil.          History of vitamin B deficiency  Recent elevation on labs   He has reduced his daily dosage.        Vitamin D  deficiency  Will recheck with next labs  Orders:    Vitamin D,25-Hydroxy; Future    Osteoarthritis of multiple joints, unspecified osteoarthritis type           Follow Up:   Return in about 6 months (around 11/21/2025) for NP's.      KELVIN Salinas  [facility]

## 2025-05-19 NOTE — ASSESSMENT & PLAN NOTE
Had partial left KR 1/13- Had Right partial Knee replacement 7/17.  See's Dr. Cassidy.  Back is stable with chiropractor.  He takes 100mg of celebrex bid  ( 200mg bid with flare)  Positive MCP Swelling Pain and stiffness. May be Seronegative RA. He has improved on steroids.   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      Moderate Right Degenerative changes of the wrist and severe degenerative changes of the 1st CMC and Moderate degenerative changes in the DIP. Moderate changes in the PIP Joint of the 5th digit.    Mod Degenerative changes of the mid foot and 1st MTP Bilaterally. No Erosions.   10/22 xrays showed hammertoes.   10/31/20 CRP .9 VERONICA Negative CCP Negative. Sed Rate Normal. RF IGA IGM AND IGG all Normal.   He is taking Plaquenil and Celebrex  Does not tolerate less than Celebrex 200mg bid.  Joints are doing well without synovitis.    Plan:  Continue Plaquenil and Celebrex.   Left shoulder - unable to raise above head   Xray bilateral shoulders 4/2024-  osteoarthritis bilaterally   Labs before next appointment      Orders:    Comprehensive Metabolic Panel; Future    C-reactive Protein; Future    Sedimentation Rate; Future    CBC (No Diff); Future

## 2025-05-19 NOTE — ASSESSMENT & PLAN NOTE
Plaquenil well tolerated and effective    Plan:  CBC CMP Q 6months 4/205  Eye exam Q 9-12 months with annual OCT due 10/2025    Patient's taking Plaquenil should have an eye exam at least once/year to monitor for signs of medication toxicity     Orders:    Comprehensive Metabolic Panel; Future    CBC (No Diff); Future

## 2025-05-21 ENCOUNTER — OFFICE VISIT (OUTPATIENT)
Age: 80
End: 2025-05-21
Payer: MEDICARE

## 2025-05-21 VITALS
DIASTOLIC BLOOD PRESSURE: 66 MMHG | BODY MASS INDEX: 24.88 KG/M2 | HEART RATE: 70 BPM | SYSTOLIC BLOOD PRESSURE: 122 MMHG | TEMPERATURE: 97.7 F | WEIGHT: 177.7 LBS | HEIGHT: 71 IN

## 2025-05-21 DIAGNOSIS — M15.9 OSTEOARTHRITIS OF MULTIPLE JOINTS, UNSPECIFIED OSTEOARTHRITIS TYPE: Chronic | ICD-10-CM

## 2025-05-21 DIAGNOSIS — Z86.39 HISTORY OF VITAMIN B DEFICIENCY: ICD-10-CM

## 2025-05-21 DIAGNOSIS — Z79.1 LONG TERM (CURRENT) USE OF NON-STEROIDAL ANTI-INFLAMMATORIES (NSAID): Chronic | ICD-10-CM

## 2025-05-21 DIAGNOSIS — Z79.899 HIGH RISK MEDICATION USE: Chronic | ICD-10-CM

## 2025-05-21 DIAGNOSIS — M19.90 INFLAMMATORY ARTHRITIS: Primary | Chronic | ICD-10-CM

## 2025-05-21 DIAGNOSIS — E55.9 VITAMIN D DEFICIENCY: ICD-10-CM

## 2025-05-21 DIAGNOSIS — D69.3 IMMUNE THROMBOCYTOPENIC PURPURA: ICD-10-CM

## 2025-05-21 RX ORDER — HYDROXYCHLOROQUINE SULFATE 200 MG/1
200 TABLET, FILM COATED ORAL EVERY 12 HOURS
Qty: 180 TABLET | Refills: 0 | Status: SHIPPED | OUTPATIENT
Start: 2025-05-21

## 2025-05-21 RX ORDER — CELECOXIB 200 MG/1
200 CAPSULE ORAL 2 TIMES DAILY
Qty: 180 CAPSULE | Refills: 1 | Status: SHIPPED | OUTPATIENT
Start: 2025-05-21

## 2025-07-27 DIAGNOSIS — E03.9 ACQUIRED HYPOTHYROIDISM: ICD-10-CM

## 2025-07-28 RX ORDER — LEVOTHYROXINE SODIUM 137 UG/1
137 TABLET ORAL DAILY
Qty: 90 TABLET | Refills: 3 | Status: SHIPPED | OUTPATIENT
Start: 2025-07-28

## 2025-08-04 ENCOUNTER — TRANSCRIBE ORDERS (OUTPATIENT)
Dept: LAB | Facility: HOSPITAL | Age: 80
End: 2025-08-04
Payer: MEDICARE

## 2025-08-04 ENCOUNTER — LAB (OUTPATIENT)
Dept: LAB | Facility: HOSPITAL | Age: 80
End: 2025-08-04
Payer: MEDICARE

## 2025-08-04 DIAGNOSIS — R97.20 ELEVATED PROSTATE SPECIFIC ANTIGEN (PSA): Primary | ICD-10-CM

## 2025-08-04 DIAGNOSIS — R97.20 ELEVATED PROSTATE SPECIFIC ANTIGEN (PSA): ICD-10-CM

## 2025-08-04 LAB — PSA SERPL-MCNC: 2.4 NG/ML (ref 0–4)

## 2025-08-04 PROCEDURE — 36415 COLL VENOUS BLD VENIPUNCTURE: CPT

## 2025-08-04 PROCEDURE — 84153 ASSAY OF PSA TOTAL: CPT

## 2025-08-11 RX ORDER — HYDROXYCHLOROQUINE SULFATE 200 MG/1
200 TABLET, FILM COATED ORAL EVERY 12 HOURS
Qty: 180 TABLET | Refills: 0 | Status: SHIPPED | OUTPATIENT
Start: 2025-08-11

## (undated) DEVICE — GLV SURG SENSICARE LT W/ALOE PF LF 7 STRL

## (undated) DEVICE — Device: Brand: DEFENDO AIR/WATER/SUCTION AND BIOPSY VALVE

## (undated) DEVICE — ADAPT/Y SCPE GATEWAY ADVNTGE

## (undated) DEVICE — NITINOL WIRE WITH HYDROPHILIC TIP: Brand: SENSOR

## (undated) DEVICE — FIBR LASR HOLMIUM SLIMLINE SIS EZ 365U

## (undated) DEVICE — RICH CYSTO: Brand: MEDLINE INDUSTRIES, INC.

## (undated) DEVICE — MEDI-VAC NON-CONDUCTIVE SUCTION TUBING: Brand: CARDINAL HEALTH

## (undated) DEVICE — SOL IRR NACL 0.9PCT 3000ML

## (undated) DEVICE — GLV SURG SENSICARE W/ALOE PF LF 6.5 STRL

## (undated) DEVICE — GW AMPLTZ SUPRSTF PTFE JB .035 7X145CM

## (undated) DEVICE — DILATOR/SHEATH SET: Brand: 8/10 DILATOR/SHEATH SET

## (undated) DEVICE — GLV SURG SENSICARE W/ALOE PF LF SZ6 STRL

## (undated) DEVICE — Device

## (undated) DEVICE — YANKAUER,BULB TIP, NO VENT: Brand: ARGYLE

## (undated) DEVICE — ST FLD IRR WARM

## (undated) DEVICE — SLV SCD CALF HEMOFORCE DVT THERP REPROC MD

## (undated) DEVICE — ADAPT UROLOK

## (undated) DEVICE — NITINOL STONE RETRIEVAL BASKET: Brand: ZERO TIP

## (undated) DEVICE — JELLY,LUBE,STERILE,FLIP TOP,TUBE,2-OZ: Brand: MEDLINE

## (undated) DEVICE — DUAL LUMEN URETERAL CATHETER

## (undated) DEVICE — CATH URETRL AP 18F